# Patient Record
Sex: FEMALE | Race: WHITE | NOT HISPANIC OR LATINO | Employment: OTHER | RURAL
[De-identification: names, ages, dates, MRNs, and addresses within clinical notes are randomized per-mention and may not be internally consistent; named-entity substitution may affect disease eponyms.]

---

## 2017-01-10 ENCOUNTER — TELEPHONE (OUTPATIENT)
Dept: FAMILY MEDICINE CLINIC | Facility: CLINIC | Age: 72
End: 2017-01-10

## 2017-01-10 NOTE — TELEPHONE ENCOUNTER
----- Message from Conrado Tinoco MD sent at 10/14/2016 10:42 AM CDT -----  6/12m on sugar lags    ----- Message -----     From: Gini Rincon     Sent: 10/14/2016   9:53 AM       To: Conrado Tinoco MD

## 2017-01-16 ENCOUNTER — TELEPHONE (OUTPATIENT)
Dept: FAMILY MEDICINE CLINIC | Facility: CLINIC | Age: 72
End: 2017-01-16

## 2017-01-16 DIAGNOSIS — C50.512 MALIGNANT NEOPLASM OF LOWER-OUTER QUADRANT OF LEFT FEMALE BREAST (HCC): Primary | ICD-10-CM

## 2017-01-16 NOTE — TELEPHONE ENCOUNTER
"Vm \"I was supposed to have an appt with oncology for a check up but they called and canceled it, Im supposed to have my blood checked every 3 months and they said my doctor left. What am I supposed to do?\"  "

## 2017-01-18 LAB
ALBUMIN SERPL-MCNC: 4.4 G/DL (ref 3.5–5)
ALBUMIN/GLOB SERPL: 1.7 G/DL (ref 1.1–2.5)
ALP SERPL-CCNC: 121 U/L (ref 24–120)
ALT SERPL-CCNC: 30 U/L (ref 0–54)
AST SERPL-CCNC: 35 U/L (ref 7–45)
BASOPHILS # BLD AUTO: 0.02 10*3/MM3 (ref 0–0.2)
BASOPHILS NFR BLD AUTO: 0.2 % (ref 0–2)
BILIRUB SERPL-MCNC: 0.8 MG/DL (ref 0.1–1)
BUN SERPL-MCNC: 12 MG/DL (ref 5–21)
BUN/CREAT SERPL: 22.6 (ref 7–25)
CALCIUM SERPL-MCNC: 10 MG/DL (ref 8.4–10.4)
CANCER AG27-29 SERPL-ACNC: 35.2 U/ML (ref 0–38.6)
CEA SERPL-MCNC: 3.68 NG/ML (ref 0–5)
CHLORIDE SERPL-SCNC: 98 MMOL/L (ref 98–110)
CO2 SERPL-SCNC: 28 MMOL/L (ref 24–31)
CREAT SERPL-MCNC: 0.53 MG/DL (ref 0.5–1.4)
EOSINOPHIL # BLD AUTO: 0.13 10*3/MM3 (ref 0–0.7)
EOSINOPHIL NFR BLD AUTO: 1.5 % (ref 0–4)
ERYTHROCYTE [DISTWIDTH] IN BLOOD BY AUTOMATED COUNT: 13.2 % (ref 12–15)
GLOBULIN SER CALC-MCNC: 2.6 GM/DL
GLUCOSE SERPL-MCNC: 127 MG/DL (ref 70–100)
HCT VFR BLD AUTO: 44.1 % (ref 37–47)
HGB BLD-MCNC: 14.2 G/DL (ref 12–16)
IMM GRANULOCYTES # BLD: 0.04 10*3/MM3 (ref 0–0.03)
IMM GRANULOCYTES NFR BLD: 0.5 % (ref 0–5)
LYMPHOCYTES # BLD AUTO: 2.9 10*3/MM3 (ref 0.72–4.86)
LYMPHOCYTES NFR BLD AUTO: 33.1 % (ref 15–45)
MCH RBC QN AUTO: 30.3 PG (ref 28–32)
MCHC RBC AUTO-ENTMCNC: 32.2 G/DL (ref 33–36)
MCV RBC AUTO: 94.2 FL (ref 82–98)
MONOCYTES # BLD AUTO: 0.41 10*3/MM3 (ref 0.19–1.3)
MONOCYTES NFR BLD AUTO: 4.7 % (ref 4–12)
NEUTROPHILS # BLD AUTO: 5.27 10*3/MM3 (ref 1.87–8.4)
NEUTROPHILS NFR BLD AUTO: 60 % (ref 39–78)
PLATELET # BLD AUTO: 165 10*3/MM3 (ref 130–400)
POTASSIUM SERPL-SCNC: 4.2 MMOL/L (ref 3.5–5.3)
PROT SERPL-MCNC: 7 G/DL (ref 6.3–8.7)
RBC # BLD AUTO: 4.68 10*6/MM3 (ref 4.2–5.4)
SODIUM SERPL-SCNC: 140 MMOL/L (ref 135–145)
WBC # BLD AUTO: 8.77 10*3/MM3 (ref 4.8–10.8)

## 2017-02-01 ENCOUNTER — OFFICE VISIT (OUTPATIENT)
Dept: ONCOLOGY | Facility: CLINIC | Age: 72
End: 2017-02-01

## 2017-02-01 VITALS
HEART RATE: 56 BPM | TEMPERATURE: 97.7 F | WEIGHT: 202.8 LBS | DIASTOLIC BLOOD PRESSURE: 84 MMHG | SYSTOLIC BLOOD PRESSURE: 132 MMHG | BODY MASS INDEX: 35.93 KG/M2 | RESPIRATION RATE: 16 BRPM | OXYGEN SATURATION: 96 % | HEIGHT: 63 IN

## 2017-02-01 DIAGNOSIS — N32.81 OVERACTIVE BLADDER: Chronic | ICD-10-CM

## 2017-02-01 DIAGNOSIS — E66.9 OBESITY, UNSPECIFIED OBESITY SEVERITY, UNSPECIFIED OBESITY TYPE: Chronic | ICD-10-CM

## 2017-02-01 DIAGNOSIS — F17.201 TOBACCO USE DISORDER, SEVERE, IN SUSTAINED REMISSION: ICD-10-CM

## 2017-02-01 DIAGNOSIS — C50.512 MALIGNANT NEOPLASM OF LOWER-OUTER QUADRANT OF LEFT FEMALE BREAST (HCC): Primary | ICD-10-CM

## 2017-02-01 PROCEDURE — 99214 OFFICE O/P EST MOD 30 MIN: CPT | Performed by: INTERNAL MEDICINE

## 2017-02-01 RX ORDER — SODIUM CHLORIDE 0.9 % (FLUSH) 0.9 %
10 SYRINGE (ML) INJECTION AS NEEDED
Status: DISCONTINUED | OUTPATIENT
Start: 2017-02-01 | End: 2017-02-01 | Stop reason: HOSPADM

## 2017-02-01 RX ORDER — SODIUM CHLORIDE 0.9 % (FLUSH) 0.9 %
10 SYRINGE (ML) INJECTION AS NEEDED
Status: CANCELLED | OUTPATIENT
Start: 2017-02-01

## 2017-02-01 RX ADMIN — Medication 10 ML: at 09:45

## 2017-02-01 NOTE — PROGRESS NOTES
"[facility]  HEMATOLOGY & ONCOLOGY        Subjective Doing well but clearly worried about a recurrence of her breast cancer.  Rather, she does not, apparently, want to have her Port-A-Cath removed.  Am not sure why but I told her she did be to have it flushed more often than every \"2 months\".  She smoked for 30+ years, has not had a CAT scan, and her  continues to smoke!    VISIT DIAGNOSIS: Breast cancer and difficult bladder control.  The alteration in her Ditropan XL will spoil her control significantly.  I offered here a  referral but she wants to wait.  Encounter Diagnoses   Name Primary?   • Malignant neoplasm of lower-outer quadrant of left female breast Yes   • Tobacco use disorder, severe, in sustained remission    • Obesity, unspecified obesity severity, unspecified obesity type    • Overactive bladder        REASON FOR VISIT:   No chief complaint on file.       HEMATOLOGY / ONCOLOGY HISTORY:    No history exists.       Cancer Staging Information:  No matching staging information was found for the patient.      INTERVAL HISTORY  Patient ID: Ira Sanchez is a 71 y.o. year old female is here today for follow-up.      Past Medical History:   Past Medical History   Diagnosis Date   • Bradycardia    • Breast cancer    • Diabetes mellitus    • Urinary incontinence      Past Surgical History:   Past Surgical History   Procedure Laterality Date   • Knee surgery     • Appendectomy     • Throat surgery     • Dilatation and curettage     • Mastectomy Bilateral    • Breast surgery     • Cataract extraction Bilateral    • Nipple tattoo  04/12/2016     Social History:   Social History     Social History   • Marital status:      Spouse name: Horace   • Number of children: 0   • Years of education: 12.5     Occupational History   • retired      [employer]     Social History Main Topics   • Smoking status: Former Smoker     Packs/day: 2.00     Years: 37.00     Types: " Cigarettes     Start date: 2/20/1963     Quit date: 9/29/2000   • Smokeless tobacco: Never Used   • Alcohol use No   • Drug use: No   • Sexual activity: Defer     Other Topics Concern   • Not on file     Social History Narrative     Family History:   Family History   Problem Relation Age of Onset   • Hypertension Mother    • Heart failure Mother    • Cancer Father      colon   • Cancer Maternal Aunt      breast   • No Known Problems Maternal Grandmother    • No Known Problems Maternal Grandfather    • No Known Problems Paternal Grandmother    • No Known Problems Paternal Grandfather    • Cancer Maternal Aunt      breast   • Cancer Maternal Aunt      breast       Review of Systems   HENT: Positive for postnasal drip.    Eyes: Positive for pain, discharge and redness.   Genitourinary: Positive for frequency and urgency.   Neurological: Positive for numbness.        Performance Status:  Symptomatic; fully ambulatory    Medications:    Current Outpatient Prescriptions   Medication Sig Dispense Refill   • anastrozole (ARIMIDEX) 1 MG tablet Take 1 mg by mouth Daily.     • Calcium Carb-Cholecalciferol (CALCIUM-VITAMIN D) 600-400 MG-UNIT tablet Take 1 tablet by mouth 2 (Two) Times a Day.     • cycloSPORINE (RESTASIS) 0.05 % ophthalmic emulsion 1 drop Every 12 (Twelve) Hours.     • diclofenac (VOLTAREN) 1 % gel gel Apply 4 g topically 4 (Four) Times a Day As Needed (arthritis pain). 100 g 1   • diphenhydrAMINE (BENADRYL) 25 mg capsule Take 25 mg by mouth Daily As Needed.     • ibuprofen (ADVIL,MOTRIN) 200 MG tablet Take 200 mg by mouth.     • oxybutynin XL (DITROPAN-XL) 5 MG 24 hr tablet Take 5 mg by mouth Daily.     • thiamine (VITAMINE B-1) 100 MG tablet Take 100 mg by mouth Daily.       Current Facility-Administered Medications   Medication Dose Route Frequency Provider Last Rate Last Dose   • heparin flush (porcine) 100 UNIT/ML injection 500 Units  500 Units Intravenous PRN Edmund Malin MD   500 Units at 02/01/17 0946  "  • sodium chloride 0.9 % flush 10 mL  10 mL Intravenous PRN Edmund Malin MD   10 mL at 02/01/17 0945       ALLERGIES:    Allergies   Allergen Reactions   • Benzonatate Hives and Itching       Objective      Vitals:    02/01/17 0938   BP: 132/84   Pulse: 56   Resp: 16   Temp: 97.7 °F (36.5 °C)   TempSrc: Tympanic   SpO2: 96%   Weight: 202 lb 12.8 oz (92 kg)   Height: 63\" (160 cm)     Visit Vitals   • /84   • Pulse 56   • Temp 97.7 °F (36.5 °C) (Tympanic)   • Resp 16   • Ht 63\" (160 cm)   • Wt 202 lb 12.8 oz (92 kg)   • LMP  (LMP Unknown)   • SpO2 96%   • BMI 35.92 kg/m2       Current Status 2/1/2017   ECOG score 0         General Appearance:    Alert, cooperative, no distress, appears stated age   Head:    Normocephalic, without obvious abnormality, atraumatic   Eyes:    PERRL, conjunctiva/corneas clear, EOM's intact, fundi     benign, both eyes   Ears:    Normal TM's and external ear canals, both ears   Nose:   Nares normal, septum midline, mucosa normal, no drainage     or sinus tenderness   Throat:   Lips, mucosa, and tongue normal; teeth and gums normal   Neck:   Supple, symmetrical, trachea midline, no adenopathy;     thyroid:  no enlargement/tenderness/nodules; no carotid    bruit or JVD   Back:     Symmetric, no curvature, ROM normal, no CVA tenderness   Lungs:     Clear to auscultation bilaterally, respirations unlabored   Chest Wall:    No tenderness or deformity    Heart:    Regular rate and rhythm, S1 and S2 normal, no murmur, rub    or gallop   Abdomen:     Soft, non-tender, bowel sounds active all four quadrants,     no masses, no organomegaly   Extremities:   Extremities normal, atraumatic, no cyanosis or edema   Pulses:   2+ and symmetric all extremities   Skin:   Skin color, texture, turgor normal, no rashes or lesions   Lymph nodes:   Cervical, supraclavicular, and axillary nodes normal   Neurologic:   CNII-XII intact, normal strength, sensation and reflexes     throughout       RECENT " LABS:    No results found for: AMPHETSCREEN, BARBITSCNUR, LABBENZSCN, COCAINEUR, OSMOLALITY, PCPUR, THCSCNINP, LABMETHSCN     Results for orders placed or performed in visit on 01/16/17   Comprehensive Metabolic Panel   Result Value Ref Range    Glucose 127 (H) 70 - 100 mg/dL    BUN 12 5 - 21 mg/dL    Creatinine 0.53 0.50 - 1.40 mg/dL    eGFR Non African Am 113 >60 mL/min/1.73    eGFR African Am 137 >60 mL/min/1.73    BUN/Creatinine Ratio 22.6 7.0 - 25.0    Sodium 140 135 - 145 mmol/L    Potassium 4.2 3.5 - 5.3 mmol/L    Chloride 98 98 - 110 mmol/L    Total CO2 28.0 24.0 - 31.0 mmol/L    Calcium 10.0 8.4 - 10.4 mg/dL    Total Protein 7.0 6.3 - 8.7 g/dL    Albumin 4.40 3.50 - 5.00 g/dL    Globulin 2.6 gm/dL    A/G Ratio 1.7 1.1 - 2.5 g/dL    Total Bilirubin 0.8 0.1 - 1.0 mg/dL    Alkaline Phosphatase 121 (H) 24 - 120 U/L    AST (SGOT) 35 7 - 45 U/L    ALT (SGPT) 30 0 - 54 U/L   Cancer Antigen 27.29   Result Value Ref Range    CA 27.29 35.2 0.0 - 38.6 U/mL   CEA   Result Value Ref Range    CEA 3.68 0.00 - 5.00 ng/mL   CBC & AUTO Differential   Result Value Ref Range    WBC 8.77 4.80 - 10.80 10*3/mm3    RBC 4.68 4.20 - 5.40 10*6/mm3    Hemoglobin 14.2 12.0 - 16.0 g/dL    Hematocrit 44.1 37.0 - 47.0 %    MCV 94.2 82.0 - 98.0 fL    MCH 30.3 28.0 - 32.0 pg    MCHC 32.2 (L) 33.0 - 36.0 g/dL    RDW 13.2 12.0 - 15.0 %    Platelets 165 130 - 400 10*3/mm3    Neutrophil Rel % 60.0 39.0 - 78.0 %    Lymphocyte Rel % 33.1 15.0 - 45.0 %    Monocyte Rel % 4.7 4.0 - 12.0 %    Eosinophil Rel % 1.5 0.0 - 4.0 %    Basophil Rel % 0.2 0.0 - 2.0 %    Neutrophils Absolute 5.27 1.87 - 8.40 10*3/mm3    Lymphocytes Absolute 2.90 0.72 - 4.86 10*3/mm3    Monocytes Absolute 0.41 0.19 - 1.30 10*3/mm3    Eosinophils Absolute 0.13 0.00 - 0.70 10*3/mm3    Basophils Absolute 0.02 0.00 - 0.20 10*3/mm3    Immature Granulocyte Rel % 0.5 0.0 - 5.0 %    Immature Grans Absolute 0.04 (H) 0.00 - 0.03 10*3/mm3           RADIOLOGY:  No results found.       Assessment/Plan     Patient Active Problem List   Diagnosis   • Obesity   • Breast cancer   • Overactive bladder   • Diabetes type 2, controlled   • Hypertension   • Osteopenia   • Neuropathy   • Osteoarthritis of multiple joints   • Malignant neoplasm of lower-outer quadrant of left female breast   • Family history of malignant neoplasm of breast   • Malignant neoplasm of upper-outer quadrant of female breast   • History of bilateral mastectomy        Diagnoses and all orders for this visit:    Malignant neoplasm of lower-outer quadrant of left female breast  -     ONC Nursing Communication  -     sodium chloride 0.9 % flush 10 mL; Infuse 10 mL into a venous catheter As Needed for line care.  -     heparin flush (porcine) 100 UNIT/ML injection 500 Units; Infuse 5 mL into a venous catheter As Needed for line care.    Tobacco use disorder, severe, in sustained remission  -     CT Chest Low Dose Wo; Future    Obesity, unspecified obesity severity, unspecified obesity type    Overactive bladder    Other orders  -     sodium chloride 0.9 % flush 10 mL; Infuse 10 mL into a venous catheter As Needed for line care.  -     heparin flush (porcine) 100 UNIT/ML injection 500 Units; Infuse 5 mL into a venous catheter As Needed for line care.       Generally doing quite well with multiple medical problems which I reassured her about to some extent.          Moy Lynch MD    2/1/2017    11:05 AM

## 2017-02-06 ENCOUNTER — HOSPITAL ENCOUNTER (OUTPATIENT)
Dept: CT IMAGING | Age: 72
Discharge: HOME | End: 2017-02-06
Payer: MEDICARE

## 2017-02-06 DIAGNOSIS — F17.201 TOBACCO USE DISORDER, SEVERE, IN EARLY REMISSION: ICD-10-CM

## 2017-02-06 PROCEDURE — G0297 LDCT FOR LUNG CA SCREEN: HCPCS

## 2017-02-08 ENCOUNTER — TELEPHONE (OUTPATIENT)
Dept: FAMILY MEDICINE CLINIC | Facility: CLINIC | Age: 72
End: 2017-02-08

## 2017-02-09 NOTE — TELEPHONE ENCOUNTER
"Notified pt and stated \"i don't think i can have it again in a year because i will be 72?\" advised pt to gurpreet it on her calender and discuss it again with her doctors at that time, pt stated understanding  "

## 2017-03-15 ENCOUNTER — CLINICAL SUPPORT (OUTPATIENT)
Dept: ONCOLOGY | Facility: CLINIC | Age: 72
End: 2017-03-15

## 2017-03-15 DIAGNOSIS — C50.512 MALIGNANT NEOPLASM OF LOWER-OUTER QUADRANT OF LEFT FEMALE BREAST (HCC): Primary | ICD-10-CM

## 2017-03-15 RX ORDER — SODIUM CHLORIDE 0.9 % (FLUSH) 0.9 %
10 SYRINGE (ML) INJECTION AS NEEDED
Status: DISCONTINUED | OUTPATIENT
Start: 2017-03-15 | End: 2017-03-15 | Stop reason: HOSPADM

## 2017-03-15 RX ORDER — SODIUM CHLORIDE 0.9 % (FLUSH) 0.9 %
10 SYRINGE (ML) INJECTION AS NEEDED
Status: CANCELLED | OUTPATIENT
Start: 2017-03-15

## 2017-03-15 RX ADMIN — Medication 10 ML: at 14:33

## 2017-03-30 NOTE — PROGRESS NOTES
I have reviewed the notes, assessments, and/or procedures performed, I concur with her/his documentation of Ira Sanchez.

## 2017-04-03 RX ORDER — ANASTROZOLE 1 MG/1
1 TABLET ORAL DAILY
Qty: 90 TABLET | Refills: 3 | Status: SHIPPED | OUTPATIENT
Start: 2017-04-03 | End: 2018-02-05 | Stop reason: SDUPTHER

## 2017-04-28 ENCOUNTER — OFFICE VISIT (OUTPATIENT)
Dept: SURGERY | Age: 72
End: 2017-04-28
Payer: MEDICARE

## 2017-04-28 VITALS
SYSTOLIC BLOOD PRESSURE: 132 MMHG | HEIGHT: 63 IN | TEMPERATURE: 98.6 F | BODY MASS INDEX: 36.32 KG/M2 | DIASTOLIC BLOOD PRESSURE: 74 MMHG | WEIGHT: 205 LBS

## 2017-04-28 DIAGNOSIS — Z85.3 PERSONAL HISTORY OF MALIGNANT NEOPLASM OF BREAST: ICD-10-CM

## 2017-04-28 DIAGNOSIS — Z90.13 S/P BILATERAL MASTECTOMY: Primary | ICD-10-CM

## 2017-04-28 PROCEDURE — 99212 OFFICE O/P EST SF 10 MIN: CPT | Performed by: PHYSICIAN ASSISTANT

## 2017-04-28 RX ORDER — CALCIUM CARBONATE/VITAMIN D3 600 MG-10
1 TABLET ORAL
COMMUNITY

## 2017-04-28 RX ORDER — ANASTROZOLE 1 MG/1
1 TABLET ORAL
COMMUNITY
Start: 2017-04-03 | End: 2017-04-28

## 2017-05-09 ENCOUNTER — LAB (OUTPATIENT)
Dept: ONCOLOGY | Facility: CLINIC | Age: 72
End: 2017-05-09

## 2017-05-09 ENCOUNTER — INFUSION (OUTPATIENT)
Dept: ONCOLOGY | Facility: CLINIC | Age: 72
End: 2017-05-09

## 2017-05-09 DIAGNOSIS — C50.512 MALIGNANT NEOPLASM OF LOWER-OUTER QUADRANT OF LEFT FEMALE BREAST (HCC): ICD-10-CM

## 2017-05-09 DIAGNOSIS — C50.512 MALIGNANT NEOPLASM OF LOWER-OUTER QUADRANT OF LEFT FEMALE BREAST (HCC): Primary | ICD-10-CM

## 2017-05-09 LAB
ALBUMIN SERPL-MCNC: 4 G/DL (ref 3.5–5)
ALBUMIN/GLOB SERPL: 1.3 G/DL
ALP SERPL-CCNC: 109 U/L (ref 38–126)
ALT SERPL W P-5'-P-CCNC: 31 U/L (ref 9–52)
ANION GAP SERPL CALCULATED.3IONS-SCNC: 9 MMOL/L
AST SERPL-CCNC: 53 U/L (ref 5–40)
AUTO MIXED CELLS #: 0.5 10*3/UL (ref 0.1–1.5)
AUTO MIXED CELLS %: 6.7 % (ref 0.2–15.1)
BILIRUB SERPL-MCNC: 0.8 MG/DL (ref 0.2–1.3)
BUN BLD-MCNC: 15 MG/DL (ref 7–26)
BUN/CREAT SERPL: 30 (ref 7–25)
CALCIUM SPEC-SCNC: 9.3 MG/DL (ref 8.4–10.2)
CHLORIDE SERPL-SCNC: 103 MMOL/L (ref 98–107)
CO2 SERPL-SCNC: 28 MMOL/L (ref 22–30)
CREAT BLD-MCNC: 0.5 MG/DL (ref 0.7–1.4)
ERYTHROCYTE [DISTWIDTH] IN BLOOD BY AUTOMATED COUNT: 13.5 % (ref 11.5–14.5)
GFR SERPL CREATININE-BSD FRML MDRD: 121 ML/MIN/1.73
GLOBULIN UR ELPH-MCNC: 3 GM/DL
GLUCOSE BLD-MCNC: 217 MG/DL (ref 75–110)
HCT VFR BLD AUTO: 44.8 % (ref 37–47)
HGB BLD-MCNC: 14.3 G/DL (ref 12–16)
LYMPHOCYTES # BLD AUTO: 2.1 10*3/MM3 (ref 0.8–7)
LYMPHOCYTES NFR BLD AUTO: 31.1 % (ref 10–58.5)
MCH RBC QN AUTO: 31 PG (ref 27–31)
MCHC RBC AUTO-ENTMCNC: 31.9 G/DL (ref 33–37)
MCV RBC AUTO: 97.1 FL (ref 81–99)
NEUTROPHILS # BLD AUTO: 4.2 10*3/MM3 (ref 2–7.8)
NEUTROPHILS NFR BLD AUTO: 62.2 % (ref 37–92)
PLATELET # BLD AUTO: 158 10*3/MM3 (ref 130–400)
PMV BLD AUTO: 10.5 FL (ref 6–12)
POTASSIUM BLD-SCNC: 4.2 MMOL/L (ref 3.6–5)
PROT SERPL-MCNC: 7 G/DL (ref 6.3–8.2)
RBC # BLD AUTO: 4.61 10*6/MM3 (ref 4.2–5.4)
SODIUM BLD-SCNC: 140 MMOL/L (ref 137–145)
WBC NRBC COR # BLD: 6.8 10*3/MM3 (ref 4.8–10.8)

## 2017-05-09 PROCEDURE — 80053 COMPREHEN METABOLIC PANEL: CPT | Performed by: INTERNAL MEDICINE

## 2017-05-09 PROCEDURE — 85025 COMPLETE CBC W/AUTO DIFF WBC: CPT | Performed by: INTERNAL MEDICINE

## 2017-05-09 PROCEDURE — 36415 COLL VENOUS BLD VENIPUNCTURE: CPT | Performed by: INTERNAL MEDICINE

## 2017-05-09 RX ORDER — SODIUM CHLORIDE 0.9 % (FLUSH) 0.9 %
10 SYRINGE (ML) INJECTION AS NEEDED
Status: DISCONTINUED | OUTPATIENT
Start: 2017-05-09 | End: 2017-05-09 | Stop reason: HOSPADM

## 2017-05-09 RX ORDER — SODIUM CHLORIDE 0.9 % (FLUSH) 0.9 %
10 SYRINGE (ML) INJECTION AS NEEDED
Status: CANCELLED | OUTPATIENT
Start: 2017-05-09

## 2017-05-09 RX ADMIN — Medication 10 ML: at 08:56

## 2017-05-10 LAB
CANCER AG27-29 SERPL-ACNC: 25.3 U/ML (ref 0–38.6)
CEA SERPL-MCNC: 3.87 NG/ML (ref 0–5)

## 2017-05-12 RX ORDER — OXYBUTYNIN CHLORIDE 5 MG/1
TABLET ORAL
Qty: 180 TABLET | Refills: 1 | Status: SHIPPED | OUTPATIENT
Start: 2017-05-12 | End: 2017-11-06 | Stop reason: SDUPTHER

## 2017-05-14 PROBLEM — Z00.00 WELLNESS EXAMINATION: Status: ACTIVE | Noted: 2017-05-14

## 2017-05-16 ENCOUNTER — OFFICE VISIT (OUTPATIENT)
Dept: FAMILY MEDICINE CLINIC | Facility: CLINIC | Age: 72
End: 2017-05-16

## 2017-05-16 VITALS
HEIGHT: 63 IN | TEMPERATURE: 98.1 F | SYSTOLIC BLOOD PRESSURE: 134 MMHG | BODY MASS INDEX: 36.86 KG/M2 | WEIGHT: 208 LBS | HEART RATE: 72 BPM | DIASTOLIC BLOOD PRESSURE: 76 MMHG | RESPIRATION RATE: 20 BRPM | OXYGEN SATURATION: 94 %

## 2017-05-16 DIAGNOSIS — E11.9 CONTROLLED TYPE 2 DIABETES MELLITUS WITHOUT COMPLICATION, WITHOUT LONG-TERM CURRENT USE OF INSULIN (HCC): Chronic | ICD-10-CM

## 2017-05-16 DIAGNOSIS — C50.919 MALIGNANT NEOPLASM OF FEMALE BREAST, UNSPECIFIED LATERALITY, UNSPECIFIED SITE OF BREAST: Primary | Chronic | ICD-10-CM

## 2017-05-16 DIAGNOSIS — G62.9 NEUROPATHY: ICD-10-CM

## 2017-05-16 PROCEDURE — 99213 OFFICE O/P EST LOW 20 MIN: CPT | Performed by: FAMILY MEDICINE

## 2017-06-20 DIAGNOSIS — C50.919 MALIGNANT NEOPLASM OF OTHER SPECIFIED SITES OF FEMALE BREAST: Primary | ICD-10-CM

## 2017-06-21 ENCOUNTER — OFFICE VISIT (OUTPATIENT)
Dept: ONCOLOGY | Facility: CLINIC | Age: 72
End: 2017-06-21

## 2017-06-21 ENCOUNTER — LAB (OUTPATIENT)
Dept: ONCOLOGY | Facility: CLINIC | Age: 72
End: 2017-06-21

## 2017-06-21 VITALS
DIASTOLIC BLOOD PRESSURE: 78 MMHG | OXYGEN SATURATION: 96 % | HEART RATE: 68 BPM | RESPIRATION RATE: 18 BRPM | HEIGHT: 63 IN | WEIGHT: 203.1 LBS | SYSTOLIC BLOOD PRESSURE: 128 MMHG | BODY MASS INDEX: 35.98 KG/M2 | TEMPERATURE: 97.8 F

## 2017-06-21 DIAGNOSIS — C50.919 MALIGNANT NEOPLASM OF OTHER SPECIFIED SITES OF FEMALE BREAST: Primary | ICD-10-CM

## 2017-06-21 DIAGNOSIS — C50.411 MALIGNANT NEOPLASM OF UPPER-OUTER QUADRANT OF RIGHT FEMALE BREAST (HCC): Primary | ICD-10-CM

## 2017-06-21 DIAGNOSIS — C50.919 MALIGNANT NEOPLASM OF OTHER SPECIFIED SITES OF FEMALE BREAST: ICD-10-CM

## 2017-06-21 DIAGNOSIS — C50.919 MALIGNANT NEOPLASM OF FEMALE BREAST, UNSPECIFIED LATERALITY, UNSPECIFIED SITE OF BREAST: Primary | ICD-10-CM

## 2017-06-21 LAB
ALBUMIN SERPL-MCNC: 4.2 G/DL (ref 3.5–5)
ALBUMIN/GLOB SERPL: 1.4 G/DL
ALP SERPL-CCNC: 116 U/L (ref 38–126)
ALT SERPL W P-5'-P-CCNC: 29 U/L (ref 9–52)
ANION GAP SERPL CALCULATED.3IONS-SCNC: 9 MMOL/L
AST SERPL-CCNC: 50 U/L (ref 5–40)
AUTO MIXED CELLS #: 0.5 10*3/UL (ref 0.1–1.5)
AUTO MIXED CELLS %: 6.4 % (ref 0.2–15.1)
BILIRUB SERPL-MCNC: 0.9 MG/DL (ref 0.2–1.3)
BUN BLD-MCNC: 17 MG/DL (ref 7–26)
BUN/CREAT SERPL: 34 (ref 7–25)
CALCIUM SPEC-SCNC: 9.6 MG/DL (ref 8.4–10.2)
CHLORIDE SERPL-SCNC: 106 MMOL/L (ref 98–107)
CO2 SERPL-SCNC: 28 MMOL/L (ref 22–30)
CREAT BLD-MCNC: 0.5 MG/DL (ref 0.7–1.4)
ERYTHROCYTE [DISTWIDTH] IN BLOOD BY AUTOMATED COUNT: 13.4 % (ref 11.5–14.5)
GFR SERPL CREATININE-BSD FRML MDRD: 121 ML/MIN/1.73
GLOBULIN UR ELPH-MCNC: 3 GM/DL
GLUCOSE BLD-MCNC: 111 MG/DL (ref 75–110)
HCT VFR BLD AUTO: 44.3 % (ref 37–47)
HGB BLD-MCNC: 14.1 G/DL (ref 12–16)
LYMPHOCYTES # BLD AUTO: 2.5 10*3/MM3 (ref 0.8–7)
LYMPHOCYTES NFR BLD AUTO: 34.4 % (ref 10–58.5)
MCH RBC QN AUTO: 31 PG (ref 27–31)
MCHC RBC AUTO-ENTMCNC: 31.8 G/DL (ref 33–37)
MCV RBC AUTO: 97.3 FL (ref 81–99)
NEUTROPHILS # BLD AUTO: 4.4 10*3/MM3 (ref 2–7.8)
NEUTROPHILS NFR BLD AUTO: 59.2 % (ref 37–92)
PLATELET # BLD AUTO: 154 10*3/MM3 (ref 130–400)
PMV BLD AUTO: 10.2 FL (ref 6–12)
POTASSIUM BLD-SCNC: 4.4 MMOL/L (ref 3.6–5)
PROT SERPL-MCNC: 7.2 G/DL (ref 6.3–8.2)
RBC # BLD AUTO: 4.55 10*6/MM3 (ref 4.2–5.4)
SODIUM BLD-SCNC: 143 MMOL/L (ref 137–145)
WBC NRBC COR # BLD: 7.4 10*3/MM3 (ref 4.8–10.8)

## 2017-06-21 PROCEDURE — 99213 OFFICE O/P EST LOW 20 MIN: CPT | Performed by: INTERNAL MEDICINE

## 2017-06-21 PROCEDURE — 85025 COMPLETE CBC W/AUTO DIFF WBC: CPT | Performed by: INTERNAL MEDICINE

## 2017-06-21 PROCEDURE — 80053 COMPREHEN METABOLIC PANEL: CPT | Performed by: INTERNAL MEDICINE

## 2017-06-21 PROCEDURE — 36415 COLL VENOUS BLD VENIPUNCTURE: CPT | Performed by: INTERNAL MEDICINE

## 2017-06-21 NOTE — PROGRESS NOTES
[facility]  HEMATOLOGY & ONCOLOGY        Subjective     VISIT DIAGNOSIS: No diagnosis found.    REASON FOR VISIT:     Chief Complaint   Patient presents with   • Follow-up     Breast Ca f/u:  She is here for f/u visit today        HEMATOLOGY / ONCOLOGY HISTORY:   Oncology/Hematology History    Ms. Sanchez is a pleasant 71 year old  female with the diagnosis of stage IIA left breast carcinoma, ER/DC and HER2  positive 3+.  She is status post bilateral mastectomies and has completed 5 cycles of adjuvant systemic chemotherapy with taxotere, carboplatin and  Herceptin. She is presently receiving Herceptin 6 mg/kg every 3 weeks for a total dosing of 1 year with last dose planned for 12/09/2015.  Ms. Sanchez is also presently taking hormonal manipulation therapy with Arimidex 1 mg p.o. daily and this will be taken for 10 years, this  was initiated on 06/03/2015.  She had completed Herceptin. She had completed breast reconstruction process. We will give her a refill on her Arimidex. She is due for  her bone density in September 2016.        Breast cancer    9/29/2016 Initial Diagnosis    Breast cancer     [No treatment plan]  Cancer Staging Information:  No matching staging information was found for the patient.      INTERVAL HISTORY  Patient ID: Ira Sanchez is a 72 y.o. year old female         Review of Systems         Medications:    Current Outpatient Prescriptions   Medication Sig Dispense Refill   • anastrozole (ARIMIDEX) 1 MG tablet Take 1 tablet by mouth Daily. 90 tablet 3   • Calcium Carb-Cholecalciferol (CALCIUM-VITAMIN D) 600-400 MG-UNIT tablet Take 1 tablet by mouth 2 (Two) Times a Day.     • cycloSPORINE (RESTASIS) 0.05 % ophthalmic emulsion 1 drop Every 12 (Twelve) Hours.     • diclofenac (VOLTAREN) 1 % gel gel Apply 4 g topically 4 (Four) Times a Day As Needed (arthritis pain). 100 g 1   • diphenhydrAMINE (BENADRYL) 25 mg capsule Take 25 mg by mouth Daily As Needed.     • ibuprofen  (ADVIL,MOTRIN) 200 MG tablet Take 200 mg by mouth.     • oxybutynin (DITROPAN) 5 MG tablet TAKE 1 TABLET BY MOUTH TWICE DAILY 180 tablet 1   • thiamine (VITAMINE B-1) 100 MG tablet Take 100 mg by mouth Daily.       No current facility-administered medications for this visit.        ALLERGIES:    Allergies   Allergen Reactions   • Benzonatate Hives and Itching       Objective      @VITALS    Current Status 6/21/2017   ECOG score 0       General Appearance: Patient is awake, alert, oriented and in no acute distress. Patient is welldeveloped, wellnourished, and appears stated age.  HEENT: Normocephalic. Sclerae clear, conjunctiva pink, extraocular movements intact, pupils, round, reactive to light and  accommodation. Mouth and throat are clear with moist oral mucosa.  NECK: Supple, no jugular venous distention, thyroid not enlarged.  LYMPH: No cervical, supraclavicular, axillary, or inguinal lymphadenopathy.  CHEST: Equal bilateral expansion, AP  diameter normal, resonant percussion note  LUNGS: Good air movement, no rales, rhonchi, rubs or wheezes with auscultation  CARDIO: Regular sinus rhythm, no murmurs, gallops or rubs.  ABDOMEN: Nondistended, soft, No tenderness, no guarding, no rebound, No hepatosplenomegaly. No abdominal masses. Bowel sounds positive. No hernia  GENITALIA: Not examined.  BREASTS: Not examined.  MUSKEL: No joint swelling, decreased motion, or inflammation  EXTREMS: No edema, clubbing, cyanosis, No varicose veins.  NEURO: Grossly nonfocal, Gait is coordinated and smooth, Cognition is preserved.  SKIN: No rashes, no ecchymoses, no petechia.  PSYCH: Oriented to time, place and person. Memory is preserved. Mood and affect appear normal      RECENT LABS:  Lab on 06/21/2017   Component Date Value Ref Range Status   • Glucose 06/21/2017 111* 75 - 110 mg/dL Final   • BUN 06/21/2017 17  7 - 26 mg/dL Final   • Creatinine 06/21/2017 0.50* 0.70 - 1.40 mg/dL Final   • Sodium 06/21/2017 143  137 - 145 mmol/L  Final   • Potassium 06/21/2017 4.4  3.6 - 5.0 mmol/L Final   • Chloride 06/21/2017 106  98 - 107 mmol/L Final   • CO2 06/21/2017 28.0  22.0 - 30.0 mmol/L Final   • Calcium 06/21/2017 9.6  8.4 - 10.2 mg/dL Final   • Total Protein 06/21/2017 7.2  6.3 - 8.2 g/dL Final   • Albumin 06/21/2017 4.20  3.50 - 5.00 g/dL Final   • ALT (SGPT) 06/21/2017 29  9 - 52 U/L Final   • AST (SGOT) 06/21/2017 50* 5 - 40 U/L Final   • Alkaline Phosphatase 06/21/2017 116  38 - 126 U/L Final   • Total Bilirubin 06/21/2017 0.9  0.2 - 1.3 mg/dL Final   • eGFR Non African Amer 06/21/2017 121  >60 mL/min/1.73 Final   • Globulin 06/21/2017 3.0  gm/dL Final   • A/G Ratio 06/21/2017 1.4  g/dL Final   • BUN/Creatinine Ratio 06/21/2017 34.0* 7.0 - 25.0 Final   • Anion Gap 06/21/2017 9.0  mmol/L Final   • WBC 06/21/2017 7.40  4.80 - 10.80 10*3/mm3 Final   • RBC 06/21/2017 4.55  4.20 - 5.40 10*6/mm3 Final   • Hemoglobin 06/21/2017 14.1  12.0 - 16.0 g/dL Final   • Hematocrit 06/21/2017 44.3  37.0 - 47.0 % Final   • MCV 06/21/2017 97.3  81.0 - 99.0 fL Final   • MCH 06/21/2017 31.0  27.0 - 31.0 pg Final   • MCHC 06/21/2017 31.8* 33.0 - 37.0 g/dL Final   • RDW 06/21/2017 13.4  11.5 - 14.5 % Final   • MPV 06/21/2017 10.2  6.0 - 12.0 fL Final   • Platelets 06/21/2017 154  130 - 400 10*3/mm3 Final   • Neutrophil % 06/21/2017 59.2  37.0 - 92.0 % Final   • Lymphocyte % 06/21/2017 34.4  10.0 - 58.5 % Final   • Auto Mixed Cells % 06/21/2017 6.4  0.2 - 15.1 % Final   • Neutrophils, Absolute 06/21/2017 4.40  2.00 - 7.80 10*3/mm3 Final   • Lymphocytes, Absolute 06/21/2017 2.50  0.80 - 7.00 10*3/mm3 Final   • Auto Mixed Cells # 06/21/2017 0.50  0.10 - 1.50 10*3/uL Final       RADIOLOGY:  No results found.         Assessment/Plan      Stage IIa left-sided breast cancer and prophylactic mastectomy on the right side and bilateral breast reconstruction.  All this in 2014.  She is status post chemotherapy adjuvantly and currently on Arimidex 1 mg daily to complete a  total of 5 years.  She is previously been a smoker and more recently has undergone spiral screening CT scan shows no evidence of any cancer at least in the chest and the chest wall area.  AST is slightly up at 50 and I believe this is because of Martinez.            Edmund Malin MD    6/21/2017    3:29 PM

## 2017-06-22 LAB
CHOLEST SERPL-MCNC: 168 MG/DL (ref 100–199)
HBA1C MFR BLD: 6.7 % (ref 4.8–5.6)
HDLC SERPL-MCNC: 40 MG/DL
LDLC SERPL CALC-MCNC: 98 MG/DL (ref 0–99)
TRIGL SERPL-MCNC: 150 MG/DL (ref 0–149)
VLDLC SERPL CALC-MCNC: 30 MG/DL (ref 5–40)

## 2017-07-11 ENCOUNTER — OFFICE VISIT (OUTPATIENT)
Dept: FAMILY MEDICINE CLINIC | Facility: CLINIC | Age: 72
End: 2017-07-11

## 2017-07-11 VITALS
SYSTOLIC BLOOD PRESSURE: 134 MMHG | BODY MASS INDEX: 36.68 KG/M2 | DIASTOLIC BLOOD PRESSURE: 82 MMHG | HEIGHT: 63 IN | TEMPERATURE: 98.4 F | RESPIRATION RATE: 18 BRPM | WEIGHT: 207 LBS | OXYGEN SATURATION: 94 % | HEART RATE: 64 BPM

## 2017-07-11 DIAGNOSIS — E11.9 CONTROLLED TYPE 2 DIABETES MELLITUS WITHOUT COMPLICATION, WITHOUT LONG-TERM CURRENT USE OF INSULIN (HCC): Primary | Chronic | ICD-10-CM

## 2017-07-11 DIAGNOSIS — M25.519 SHOULDER PAIN, UNSPECIFIED CHRONICITY, UNSPECIFIED LATERALITY: ICD-10-CM

## 2017-07-11 PROCEDURE — 99213 OFFICE O/P EST LOW 20 MIN: CPT | Performed by: FAMILY MEDICINE

## 2017-07-11 RX ORDER — METHYLPREDNISOLONE 4 MG/1
TABLET ORAL
Qty: 21 TABLET | Refills: 0 | Status: SHIPPED | OUTPATIENT
Start: 2017-07-11 | End: 2017-07-28

## 2017-07-11 NOTE — PROGRESS NOTES
Subjective   Ira Sanchez is a 72 y.o. female presenting with chief complaint of:   Chief Complaint   Patient presents with   • Obesity   • Diabetes   • Peripheral Neuropathy   • Shoulder Pain   • Back Pain     low       History of Present Illness :  Alone.    Has multiple chronic problems; interval appointment.  One of these problems is DM2.   Has an acute issue today:R shoulder pain last 1-2 weeks. DM2: This has been present for years/over a year.  It is chronic.  It is controlled.  Home accuchecks rarely run.  No hypoglycemia manifest as syncope/near syncope or diaphoretic/sweating episodes.  A1c below if available..      Other chronic problem/s to consider:   Degenerative Joint Disease:  This has been present for years/over a year.  It is chronic.  It causes on/off pain and joint stiffness .  There is no joint swelling.  Obesity: This has been present for years/over a year.  It is chronic.     It is stable; there has been no recent major change in weight, or dieting   Hx Breast cancer: On/going f/u for this chronic problem.   Neuropathy: Either from chemo/or DM; improved with NSAID.    Acute issue:  2m pain R shoulder anteriorly/lateral with rest/worse with activity.  No injury/overuse for sure.  No neck pain.     The following portions of the patient's history were reviewed and updated as appropriate: allergies, current medications, past family history, past medical history, past social history, past surgical history and problem list.  TCC migrated if needed/reviewed.      Current Outpatient Prescriptions:   •  anastrozole (ARIMIDEX) 1 MG tablet, Take 1 tablet by mouth Daily., Disp: 90 tablet, Rfl: 3  •  Calcium Carb-Cholecalciferol (CALCIUM-VITAMIN D) 600-400 MG-UNIT tablet, Take 1 tablet by mouth 2 (Two) Times a Day., Disp: , Rfl:   •  cycloSPORINE (RESTASIS) 0.05 % ophthalmic emulsion, 1 drop Every 12 (Twelve) Hours., Disp: , Rfl:   •  diclofenac (VOLTAREN) 1 % gel gel, Apply 4 g topically 4 (Four) Times a  Day As Needed (arthritis pain)., Disp: 100 g, Rfl: 1  •  diphenhydrAMINE (BENADRYL) 25 mg capsule, Take 25 mg by mouth Daily As Needed., Disp: , Rfl:   •  ibuprofen (ADVIL,MOTRIN) 200 MG tablet, Take 200 mg by mouth., Disp: , Rfl:   •  oxybutynin (DITROPAN) 5 MG tablet, TAKE 1 TABLET BY MOUTH TWICE DAILY, Disp: 180 tablet, Rfl: 1  •  thiamine (VITAMINE B-1) 100 MG tablet, Take 100 mg by mouth Daily., Disp: , Rfl:     Allergies   Allergen Reactions   • Benzonatate Hives and Itching       Review of Systems  GENERAL:  Active/slower with limits, speed, samni for age and size. Sleep is ok; apnea denied.  ENDO:  No syncope, near or diaphoretic sweaty spells.  BS Ok: without download noted.  HEENT: No head injury same/rare headache,  No vision change, No hearing loss.  Ears without pain/drainage.  No sore throat.  No nasal/sinus congestion/drainage. No epistaxis.  CHEST: No chest wall tenderness or mass. Nocough,  without wheeze, SOB; no hemoptysis.  CV: No chest pain, palpatations, ankle edema.  GI: No heartburn, dysphagia.  No abdominal pain, diarrhea, constipation, rectal bleeding, or melena.    :  Voids without dysuria, or incontience to completion.  ORTHO: No painful/swollen joints but various on /off sore.  No change occ sore neck or back.  No acute neck or back pain without recent injury.   NEURO: No dizziness, weakness of extremities.  LE numbness/parethesias.   PSYCH: No memory loss.  Mood good; mildly with loss of mother anxious, depressed but/and not suicidal.  Tolerated stress.     Results for orders placed or performed in visit on 06/21/17   Lipid Panel   Result Value Ref Range    Total Cholesterol 168 100 - 199 mg/dL    Triglycerides 150 (H) 0 - 149 mg/dL    HDL Cholesterol 40 >39 mg/dL    VLDL Cholesterol 30 5 - 40 mg/dL    LDL Cholesterol  98 0 - 99 mg/dL   Hemoglobin A1c   Result Value Ref Range    Hemoglobin A1C 6.7 (H) 4.8 - 5.6 %       Lab Results   Component Value Date    HGBA1C 6.7 (H) 06/21/2017     "HGBA1C 6.40 10/11/2016       Lab Results   Component Value Date     06/21/2017     05/09/2017     01/17/2017    K 4.4 06/21/2017    K 4.2 05/09/2017    K 4.2 01/17/2017     06/21/2017     05/09/2017    CL 98 01/17/2017    CO2 28.0 06/21/2017    CO2 28.0 05/09/2017    CO2 28.0 01/17/2017    GLUCOSE 111 (H) 06/21/2017    GLUCOSE 217 (H) 05/09/2017    GLUCOSE 121 (H) 10/11/2016    BUN 17 06/21/2017    BUN 15 05/09/2017    BUN 12 01/17/2017    CREATININE 0.50 (L) 06/21/2017    CREATININE 0.50 (L) 05/09/2017    CREATININE 0.53 01/17/2017    CALCIUM 9.6 06/21/2017    CALCIUM 9.3 05/09/2017    CALCIUM 10.0 01/17/2017       No results found for: PSA     Lab Results:  CBC:    Lab Results - Last 18 Months  Lab Units 06/21/17  1455 05/09/17  0859 01/17/17  0725 10/11/16  0923   WBC 10*3/mm3 7.40 6.80 8.77 9.20   HEMATOCRIT % 44.3 44.8 44.1 47.5*     CMP:    Lab Results - Last 18 Months  Lab Units 06/21/17  1455 05/09/17  0859 01/17/17  0725 10/11/16  0923   SODIUM mmol/L 143 140 140 144   CHLORIDE mmol/L 106 103 98 103   CO2 mmol/L 28.0 28.0  --  28.0   TOTAL CO2, ARTERIAL mmol/L  --   --  28.0  --    BUN mg/dL 17 15 12 15   CREATININE mg/dL 0.50* 0.50* 0.53 0.60*   EGFR IF NONAFRICN AM mL/min/1.73 121 121 113 98   EGFR IF AFRICN AM mL/min/1.73  --   --  137  --    CALCIUM mg/dL 9.6 9.3 10.0 10.3*     THYROID:No results for input(s): TSH, T3FREE, FREET4 in the last 80115 hours.    Invalid input(s): T3, T4  A1C:    Lab Results - Last 18 Months  Lab Units 06/21/17  1456 10/11/16  0923   HEMOGLOBIN A1C % 6.7* 6.40       Objective   /82 (BP Location: Left arm, Patient Position: Sitting, Cuff Size: Adult)  Pulse 64  Temp 98.4 °F (36.9 °C) (Oral)   Resp 18  Ht 63\" (160 cm)  Wt 207 lb (93.9 kg)  LMP  (LMP Unknown)  SpO2 94%  Breastfeeding? No  BMI 36.67 kg/m2    Physical Exam  GENERAL:  Well nourished/developed in no acute distress. Obese   SKIN: Turgor excellent, without wound, rash, " lesion.  HEENT: Normal cephalic without trauma.  Pupils equal round reactive to light. Extraocular motions full without nystagmus.   External canals nonobstructive nontender without reddness. Tymphatic membranes rani with irwin structures intact.   Oral cavity without growths, exudates, and moist.  Posterior pharnyx without mass, obstruction, reddness.  No thyroidmegaly, mass, tenderness, lymphadenopathy and supple.  CV: Regular rhythm.  No murmur, gallop,  edema. Posterior pulses intact.  No carotid bruits.  CHEST: No chest wall tenderness or mass.   LUNGS: Symmetric motion with clear to auscultation.  No dullness to percussion  ABD: Soft, nontender without mass.   ORTHO: Symmetric extremities without swelling/point tenderness.  Full gross range of motion.  Symmetric R/L shoulder.  ROM both full.  Tender over lateral R shoulder/deltoid to touch/abduction > 90 deg. No crepititus.   NEURO: CN 2-12 grossly intact.  Symmetric facies. 1/4 x bicep knee equal reflexes.  UE/LE   3/5 strength throughout.  Nonfocal use extremities. Speech clear.  Reduced light touch with monofilament, vibratory sensation with tuning fork; equal toes/distal feet.    PSYCH: Oriented x 3.  Pleasant calm, well kept.  Purposeful/directed conservation with intact short/long gross memory.     Assessment/Plan     1. Shoulder pain, unspecified chronicity, unspecified laterality  Usual differential; rotator cuff likely  - XR Shoulder 2+ View Right  - MethylPREDNISolone (MEDROL, BETO,) 4 MG tablet; Take as directed on package instructions.  Dispense: 21 tablet; Refill: 0    2. Controlled type 2 diabetes mellitus without complication, without long-term current use of insulin    Rx: reviewed.  Any other changes above and:   LAB: reviewed/above.  Orders above and:   Wrap-up/other instructions: discussed as applicable  Regular cardio exercise something everyone should consider and try to do.  Normal weight a goal for everyone.  Healthy diet helpful for  weight management, illness prevention.  If over 50-screening exams include men PSA/rectal exam, women mammograms, and  everyone colonoscopy screening for colon cancer.   Patient Instructions   While on the steroid; dont take ibuprofen/motrin, alleve/naprosyn, and like Rx; even your voltaren gel rubbed on.     May give the steroid a day or two before getting the xray; if no better 1-2 days use the request for an xray of your shoulder        Follow up: Return for 11.28.17 as planned.

## 2017-07-11 NOTE — PATIENT INSTRUCTIONS
While on the steroid; dont take ibuprofen/motrin, alleve/naprosyn, and like Rx; even your voltaren gel rubbed on.     May give the steroid a day or two before getting the xray; if no better 1-2 days use the request for an xray of your shoulder

## 2017-07-21 ENCOUNTER — HOSPITAL ENCOUNTER (OUTPATIENT)
Dept: HOSPITAL 58 - RAD | Age: 72
Discharge: HOME | End: 2017-07-21
Attending: FAMILY MEDICINE

## 2017-07-21 DIAGNOSIS — M25.511: Primary | ICD-10-CM

## 2017-07-21 NOTE — DI
EXAM:  Right shoulder three view 

  

HISTORY:  Shoulder pain 

  

COMPARISON:  None 

  

FINDINGS:  The bones are normal. The glenohumeral joint and acromioclavicular joint are normal. No f
ocal soft tissue abnormality. There is left-sided chest port terminating in superior vena cava, inco
mpletely imaged 

  

IMPERSSION:  Normal examination right shoulder.

## 2017-07-28 ENCOUNTER — CLINICAL SUPPORT (OUTPATIENT)
Dept: FAMILY MEDICINE CLINIC | Facility: CLINIC | Age: 72
End: 2017-07-28

## 2017-07-28 VITALS
BODY MASS INDEX: 35.79 KG/M2 | HEIGHT: 63 IN | HEART RATE: 60 BPM | SYSTOLIC BLOOD PRESSURE: 130 MMHG | DIASTOLIC BLOOD PRESSURE: 70 MMHG | WEIGHT: 202 LBS | OXYGEN SATURATION: 97 % | TEMPERATURE: 98.2 F | RESPIRATION RATE: 18 BRPM

## 2017-07-28 DIAGNOSIS — M25.519 SHOULDER PAIN, UNSPECIFIED CHRONICITY, UNSPECIFIED LATERALITY: ICD-10-CM

## 2017-07-28 PROCEDURE — 20610 DRAIN/INJ JOINT/BURSA W/O US: CPT | Performed by: FAMILY MEDICINE

## 2017-07-28 PROCEDURE — 99213 OFFICE O/P EST LOW 20 MIN: CPT | Performed by: FAMILY MEDICINE

## 2017-07-28 RX ORDER — TRIAMCINOLONE ACETONIDE 40 MG/ML
40 INJECTION, SUSPENSION INTRA-ARTICULAR; INTRAMUSCULAR ONCE
Status: COMPLETED | OUTPATIENT
Start: 2017-07-28 | End: 2017-07-28

## 2017-07-28 RX ORDER — LIDOCAINE HYDROCHLORIDE 10 MG/ML
1 INJECTION, SOLUTION EPIDURAL; INFILTRATION; INTRACAUDAL; PERINEURAL ONCE
Status: COMPLETED | OUTPATIENT
Start: 2017-07-28 | End: 2017-07-28

## 2017-07-28 RX ADMIN — TRIAMCINOLONE ACETONIDE 40 MG: 40 INJECTION, SUSPENSION INTRA-ARTICULAR; INTRAMUSCULAR at 09:40

## 2017-07-28 RX ADMIN — LIDOCAINE HYDROCHLORIDE 1 ML: 10 INJECTION, SOLUTION EPIDURAL; INFILTRATION; INTRACAUDAL; PERINEURAL at 09:40

## 2017-09-07 DIAGNOSIS — C50.919 MALIGNANT NEOPLASM OF OTHER SPECIFIED SITES OF FEMALE BREAST: Primary | ICD-10-CM

## 2017-09-11 ENCOUNTER — TELEPHONE (OUTPATIENT)
Dept: FAMILY MEDICINE CLINIC | Facility: CLINIC | Age: 72
End: 2017-09-11

## 2017-09-11 DIAGNOSIS — Z12.11 ENCOUNTER FOR SCREENING COLONOSCOPY: Primary | ICD-10-CM

## 2017-09-11 NOTE — TELEPHONE ENCOUNTER
PT DUE FOR COLONOSCOPY IN SEPT.  NEEDS REFERRAL PUT IN FOR ZOYA/JUAN FRANCISCO SO SHE CAN HAVE DONE AT [facility].

## 2017-09-14 ENCOUNTER — LAB (OUTPATIENT)
Dept: LAB | Facility: HOSPITAL | Age: 72
End: 2017-09-14

## 2017-09-14 ENCOUNTER — OFFICE VISIT (OUTPATIENT)
Dept: ONCOLOGY | Facility: CLINIC | Age: 72
End: 2017-09-14

## 2017-09-14 VITALS
BODY MASS INDEX: 35.45 KG/M2 | OXYGEN SATURATION: 96 % | DIASTOLIC BLOOD PRESSURE: 78 MMHG | TEMPERATURE: 97.6 F | SYSTOLIC BLOOD PRESSURE: 128 MMHG | HEART RATE: 84 BPM | WEIGHT: 200.1 LBS | RESPIRATION RATE: 18 BRPM | HEIGHT: 63 IN

## 2017-09-14 DIAGNOSIS — C50.919 MALIGNANT NEOPLASM OF FEMALE BREAST, UNSPECIFIED LATERALITY, UNSPECIFIED SITE OF BREAST: Primary | ICD-10-CM

## 2017-09-14 DIAGNOSIS — C50.212 MALIGNANT NEOPLASM OF UPPER-INNER QUADRANT OF LEFT FEMALE BREAST (HCC): Primary | ICD-10-CM

## 2017-09-14 DIAGNOSIS — C50.512 MALIGNANT NEOPLASM OF LOWER-OUTER QUADRANT OF LEFT FEMALE BREAST (HCC): ICD-10-CM

## 2017-09-14 DIAGNOSIS — C50.919 MALIGNANT NEOPLASM OF OTHER SPECIFIED SITES OF FEMALE BREAST: ICD-10-CM

## 2017-09-14 LAB
ALBUMIN SERPL-MCNC: 4.1 G/DL (ref 3.5–5)
ALBUMIN/GLOB SERPL: 1.5 G/DL (ref 1.1–2.5)
ALP SERPL-CCNC: 88 U/L (ref 24–120)
ALT SERPL W P-5'-P-CCNC: 29 U/L (ref 0–54)
ANION GAP SERPL CALCULATED.3IONS-SCNC: 12 MMOL/L (ref 4–13)
AST SERPL-CCNC: 40 U/L (ref 7–45)
AUTO MIXED CELLS #: 0.4 10*3/MM3 (ref 0.1–2.6)
AUTO MIXED CELLS %: 5.9 % (ref 0.1–24)
BILIRUB SERPL-MCNC: 0.7 MG/DL (ref 0.1–1)
BUN BLD-MCNC: 17 MG/DL (ref 5–21)
BUN/CREAT SERPL: 32.7
CALCIUM SPEC-SCNC: 9.5 MG/DL (ref 8.4–10.4)
CHLORIDE SERPL-SCNC: 103 MMOL/L (ref 98–110)
CO2 SERPL-SCNC: 26 MMOL/L (ref 24–31)
CREAT BLD-MCNC: 0.52 MG/DL (ref 0.5–1.4)
ERYTHROCYTE [DISTWIDTH] IN BLOOD BY AUTOMATED COUNT: 13.1 % (ref 12–15)
GFR SERPL CREATININE-BSD FRML MDRD: 115 ML/MIN/1.73
GLOBULIN UR ELPH-MCNC: 2.8 GM/DL
GLUCOSE BLD-MCNC: 208 MG/DL (ref 70–100)
HCT VFR BLD AUTO: 43.9 % (ref 37–47)
HGB BLD-MCNC: 15 G/DL (ref 12–16)
HOLD SPECIMEN: NORMAL
LYMPHOCYTES # BLD AUTO: 2.1 10*3/MM3 (ref 0.8–7)
LYMPHOCYTES NFR BLD AUTO: 29.4 % (ref 15–45)
MCH RBC QN AUTO: 31.3 PG (ref 28–32)
MCHC RBC AUTO-ENTMCNC: 34.2 G/DL (ref 33–36)
MCV RBC AUTO: 91.6 FL (ref 82–98)
NEUTROPHILS # BLD AUTO: 4.7 10*3/MM3 (ref 1.5–8.3)
NEUTROPHILS NFR BLD AUTO: 64.7 % (ref 39–78)
PLATELET # BLD AUTO: 144 10*3/MM3 (ref 130–400)
PMV BLD AUTO: 9.8 FL (ref 6–12)
POTASSIUM BLD-SCNC: 4.1 MMOL/L (ref 3.5–5.3)
PROT SERPL-MCNC: 6.9 G/DL (ref 6.3–8.7)
RBC # BLD AUTO: 4.79 10*6/MM3 (ref 4.2–5.4)
SODIUM BLD-SCNC: 141 MMOL/L (ref 135–145)
WBC NRBC COR # BLD: 7.2 10*3/MM3 (ref 4.8–10.8)

## 2017-09-14 PROCEDURE — 80053 COMPREHEN METABOLIC PANEL: CPT

## 2017-09-14 PROCEDURE — 36415 COLL VENOUS BLD VENIPUNCTURE: CPT

## 2017-09-14 PROCEDURE — 99214 OFFICE O/P EST MOD 30 MIN: CPT | Performed by: INTERNAL MEDICINE

## 2017-09-14 PROCEDURE — 85025 COMPLETE CBC W/AUTO DIFF WBC: CPT

## 2017-09-14 RX ORDER — SODIUM CHLORIDE 0.9 % (FLUSH) 0.9 %
10 SYRINGE (ML) INJECTION AS NEEDED
Status: CANCELLED | OUTPATIENT
Start: 2017-09-14

## 2017-09-14 RX ORDER — SODIUM CHLORIDE 0.9 % (FLUSH) 0.9 %
10 SYRINGE (ML) INJECTION AS NEEDED
Status: DISCONTINUED | OUTPATIENT
Start: 2017-09-14 | End: 2017-09-14 | Stop reason: HOSPADM

## 2017-09-14 RX ADMIN — Medication 10 ML: at 09:24

## 2017-09-14 NOTE — PROGRESS NOTES
[facility]  HEMATOLOGY & ONCOLOGY        Subjective     VISIT DIAGNOSIS: No diagnosis found.    REASON FOR VISIT:     Chief Complaint   Patient presents with   • Follow-up     Breast Ca f/u:  She is here for f/u visit today        HEMATOLOGY / ONCOLOGY HISTORY:   Oncology/Hematology History    Ms. Sanchez is a pleasant 71 year old  female with the diagnosis of stage IIA left breast carcinoma, ER/WV and HER2  positive 3+.  She is status post bilateral mastectomies and has completed 5 cycles of adjuvant systemic chemotherapy with taxotere, carboplatin and  Herceptin. She is presently receiving Herceptin 6 mg/kg every 3 weeks for a total dosing of 1 year with last dose planned for 12/09/2015.  Ms. Sanchez is also presently taking hormonal manipulation therapy with Arimidex 1 mg p.o. daily and this will be taken for 10 years, this  was initiated on 06/03/2015.  She had completed Herceptin. She had completed breast reconstruction process. We will give her a refill on her Arimidex. She is due for  her bone density in September 2016.        Breast cancer    9/29/2016 Initial Diagnosis     Breast cancer          Cancer Staging Information:  No matching staging information was found for the patient.      INTERVAL HISTORY  Patient ID: Ira Sanchez is a 72 y.o. year old female with hormone positive breast cancer on hormonal manipulation and tolerates Arimidex very well with minimal side effects.  Denies any chest wall lesions, denies double vision, dizziness, nausea or vomiting, unintentional weight loss, night sweats.  She does an active life.    Past Medical History:   Past Medical History:   Diagnosis Date   • Bradycardia    • Breast cancer    • Diabetes mellitus    • Urinary incontinence      Past Surgical History:   Past Surgical History:   Procedure Laterality Date   • APPENDECTOMY     • BREAST SURGERY     • CATARACT EXTRACTION Bilateral    • DILATATION AND CURETTAGE     • KNEE SURGERY     •  MASTECTOMY Bilateral    • NIPPLE TATTOO  04/12/2016   • THROAT SURGERY       Social History:   Social History     Social History   • Marital status:      Spouse name: Horace   • Number of children: 0   • Years of education: 12.5     Occupational History   • retired      [employer]     Social History Main Topics   • Smoking status: Former Smoker     Packs/day: 2.00     Years: 37.00     Types: Cigarettes     Start date: 2/20/1963     Quit date: 9/29/2000   • Smokeless tobacco: Never Used   • Alcohol use No   • Drug use: No   • Sexual activity: Defer     Other Topics Concern   • Not on file     Social History Narrative     Family History:   Family History   Problem Relation Age of Onset   • Hypertension Mother    • Heart failure Mother    • Cancer Father      colon   • Cancer Maternal Aunt      breast   • No Known Problems Maternal Grandmother    • No Known Problems Maternal Grandfather    • No Known Problems Paternal Grandmother    • No Known Problems Paternal Grandfather    • Cancer Maternal Aunt      breast   • Cancer Maternal Aunt      breast       Review of Systems   Constitutional: Negative.    HENT: Negative.    Eyes: Negative.    Respiratory: Negative.    Cardiovascular: Negative.    Gastrointestinal: Negative.    Endocrine: Negative.    Genitourinary: Negative.    Musculoskeletal: Positive for arthralgias. Negative for myalgias.   Allergic/Immunologic: Negative.    Neurological: Negative.    Hematological: Negative.    Psychiatric/Behavioral: Negative.         Performance Status:  Asymptomatic    Medications:    Current Outpatient Prescriptions   Medication Sig Dispense Refill   • anastrozole (ARIMIDEX) 1 MG tablet Take 1 tablet by mouth Daily. 90 tablet 3   • Calcium Carb-Cholecalciferol (CALCIUM-VITAMIN D) 600-400 MG-UNIT tablet Take 1 tablet by mouth 2 (Two) Times a Day.     • cycloSPORINE (RESTASIS) 0.05 % ophthalmic emulsion 1 drop Every 12 (Twelve) Hours.     • diclofenac  "(VOLTAREN) 1 % gel gel Apply 4 g topically 4 (Four) Times a Day As Needed (arthritis pain). 100 g 1   • diphenhydrAMINE (BENADRYL) 25 mg capsule Take 25 mg by mouth Daily As Needed.     • ibuprofen (ADVIL,MOTRIN) 200 MG tablet Take 200 mg by mouth.     • oxybutynin (DITROPAN) 5 MG tablet TAKE 1 TABLET BY MOUTH TWICE DAILY 180 tablet 1   • thiamine (VITAMINE B-1) 100 MG tablet Take 100 mg by mouth Daily.       No current facility-administered medications for this visit.        ALLERGIES:    Allergies   Allergen Reactions   • Benzonatate Hives and Itching       Objective      Vitals:    09/14/17 0859   BP: 128/78   Pulse: 84   Resp: 18   Temp: 97.6 °F (36.4 °C)   TempSrc: Tympanic   SpO2: 96%   Weight: 200 lb 1.6 oz (90.8 kg)   Height: 63\" (160 cm)         Current Status 9/14/2017   ECOG score 0         Physical Exam    General Appearance: Patient is awake, alert, oriented and in no acute distress. Patient is welldeveloped, wellnourished, and appears stated age.  HEENT: Normocephalic. Sclerae clear, conjunctiva pink, extraocular movements intact, pupils, round, reactive to light and  accommodation. Mouth and throat are clear with moist oral mucosa.  NECK: Supple, no jugular venous distention, thyroid not enlarged.  LYMPH: No cervical, supraclavicular, axillary, or inguinal lymphadenopathy.  CHEST: Equal bilateral expansion, AP  diameter normal, resonant percussion note  LUNGS: Good air movement, no rales, rhonchi, rubs or wheezes with auscultation  CARDIO: Regular sinus rhythm, no murmurs, gallops or rubs.  ABDOMEN: Nondistended, soft, No tenderness, no guarding, no rebound, No hepatosplenomegaly. No abdominal masses. Bowel sounds positive. No hernia  GENITALIA: Not examined.  BREASTS: Bilateral mastectomy with reconstruction.  Well-healed.  No skin lesions.  MUSKEL: No joint swelling, decreased motion, or inflammation  EXTREMS: No edema, clubbing, cyanosis, No varicose veins.  NEURO: Grossly nonfocal, Gait is " coordinated and smooth, Cognition is preserved.  SKIN: No rashes, no ecchymoses, no petechia.  PSYCH: Oriented to time, place and person. Memory is preserved. Mood and affect appear normal  RECENT LABS:  No visits with results within 7 Day(s) from this visit.  Latest known visit with results is:    Orders Only on 06/21/2017   Component Date Value Ref Range Status   • Total Cholesterol 06/21/2017 168  100 - 199 mg/dL Final   • Triglycerides 06/21/2017 150* 0 - 149 mg/dL Final   • HDL Cholesterol 06/21/2017 40  >39 mg/dL Final   • VLDL Cholesterol 06/21/2017 30  5 - 40 mg/dL Final   • LDL Cholesterol  06/21/2017 98  0 - 99 mg/dL Final   • Hemoglobin A1C 06/21/2017 6.7* 4.8 - 5.6 % Final    Comment:          Pre-diabetes: 5.7 - 6.4           Diabetes: >6.4           Glycemic control for adults with diabetes: <7.0         RADIOLOGY:  No results found.         Assessment/Plan   72-year-old female diagnosed Stage IIa left-sided breast cancer, status post prophylactic mastectomy on the right side and bilateral breast reconstruction.  All this in 2014.  She is status post chemotherapy adjuvantly and currently on Arimidex 1 mg daily to complete a total of 5 years.        Patient Active Problem List   Diagnosis   • Obesity   • Breast cancer   • Overactive bladder   • Diabetes type 2, controlled   • Osteopenia   • Neuropathy-chemo/better   • Osteoarthritis of multiple joints   • Malignant neoplasm of lower-outer quadrant of left female breast   • Family history of malignant neoplasm of breast   • Malignant neoplasm of upper-outer quadrant of female breast   • History of bilateral mastectomy   • Wellness examination-done   • Pain in shoulder          1.ER positive breast cancer: Status post bilateral mastectomy.  No evidence of disease recurrence on physical exam or review of labs done today.  Per NCCN guidelines no indication for tumor markers.    2. Osteoarthritis: Follow with primary care.    3. Osteopenia: Continue  carcinoma vitamin D next DEXA scan next year.    4.  Health maintenance: She is previously been a smoker and more recently has undergone spiral screening CT scan shows no evidence of any cancer at least in the chest and the chest wall area.           Shanda Torres MD    9/14/2017    9:03 AM

## 2017-11-06 RX ORDER — OXYBUTYNIN CHLORIDE 5 MG/1
TABLET ORAL
Qty: 180 TABLET | Refills: 1 | Status: SHIPPED | OUTPATIENT
Start: 2017-11-06 | End: 2018-05-04 | Stop reason: SDUPTHER

## 2017-11-09 ENCOUNTER — CLINICAL SUPPORT (OUTPATIENT)
Dept: ONCOLOGY | Facility: CLINIC | Age: 72
End: 2017-11-09

## 2017-11-09 DIAGNOSIS — C50.512 MALIGNANT NEOPLASM OF LOWER-OUTER QUADRANT OF LEFT FEMALE BREAST, UNSPECIFIED ESTROGEN RECEPTOR STATUS (HCC): Primary | ICD-10-CM

## 2017-11-09 PROCEDURE — 96523 IRRIG DRUG DELIVERY DEVICE: CPT | Performed by: INTERNAL MEDICINE

## 2017-11-09 RX ORDER — SODIUM CHLORIDE 0.9 % (FLUSH) 0.9 %
10 SYRINGE (ML) INJECTION AS NEEDED
Status: DISCONTINUED | OUTPATIENT
Start: 2017-11-09 | End: 2017-11-09 | Stop reason: HOSPADM

## 2017-11-09 RX ORDER — SODIUM CHLORIDE 0.9 % (FLUSH) 0.9 %
10 SYRINGE (ML) INJECTION AS NEEDED
Status: CANCELLED | OUTPATIENT
Start: 2017-11-09

## 2017-11-09 RX ADMIN — Medication 10 ML: at 10:08

## 2017-11-10 ENCOUNTER — RESULTS ENCOUNTER (OUTPATIENT)
Dept: FAMILY MEDICINE CLINIC | Facility: CLINIC | Age: 72
End: 2017-11-10

## 2017-11-10 DIAGNOSIS — E11.9 CONTROLLED TYPE 2 DIABETES MELLITUS WITHOUT COMPLICATION, WITHOUT LONG-TERM CURRENT USE OF INSULIN (HCC): Chronic | ICD-10-CM

## 2017-11-22 ENCOUNTER — TELEPHONE (OUTPATIENT)
Dept: FAMILY MEDICINE CLINIC | Facility: CLINIC | Age: 72
End: 2017-11-22

## 2017-11-22 DIAGNOSIS — M25.519 SHOULDER PAIN, UNSPECIFIED CHRONICITY, UNSPECIFIED LATERALITY: ICD-10-CM

## 2017-11-22 RX ORDER — AZITHROMYCIN 250 MG/1
TABLET, FILM COATED ORAL
Qty: 6 TABLET | Refills: 0 | Status: SHIPPED | OUTPATIENT
Start: 2017-11-22 | End: 2017-11-28

## 2017-11-22 RX ORDER — METHYLPREDNISOLONE 4 MG/1
TABLET ORAL
Qty: 21 TABLET | Refills: 0 | Status: SHIPPED | OUTPATIENT
Start: 2017-11-22 | End: 2017-11-28

## 2017-11-22 NOTE — TELEPHONE ENCOUNTER
"Vm \"i got the flu and pneumonia shot a week ago last Friday and I have been feeling achy on Sunday, cough, diarrhea, Monday went to bed, yesterday. Went to [facility] and they told me to take Dayquill/nyquill got it today\"    PHONE CALL-NURSE       Ira Sanchez  1945    1. Your problem is, nasal drainage productive green and yellow cough, sore throat    2. Onset first sxs Sunday    3. Fever, no    4. If yes, taken or felt feverish    5. How high has it gone    6. Coming down now    7. Wants to be seen    8. Prefers per phone ,if something is needed    9. Would come in if provider requests    10. Rx/Allergy list correct yes    11. Cough , productive with green and yellow sputum    12. SOB, no    13. Wheezing, no    14. Nausea/Vomiting, no    15. Diarrhea, had diarrhea Sunday and some Monday, no more on Tuesday or today    16. If female, any chance of pregnancy, no    17. If no chance of pregnancy/why    18. Breast feeding, no    19. Anything else they want us to know,  dayquill and nyquill today    20. Pharmacy , [place]  "

## 2017-11-28 ENCOUNTER — OFFICE VISIT (OUTPATIENT)
Dept: FAMILY MEDICINE CLINIC | Facility: CLINIC | Age: 72
End: 2017-11-28

## 2017-11-28 VITALS
HEART RATE: 80 BPM | TEMPERATURE: 97.7 F | SYSTOLIC BLOOD PRESSURE: 126 MMHG | DIASTOLIC BLOOD PRESSURE: 78 MMHG | RESPIRATION RATE: 18 BRPM | WEIGHT: 196 LBS | HEIGHT: 63 IN | OXYGEN SATURATION: 95 % | BODY MASS INDEX: 34.73 KG/M2

## 2017-11-28 DIAGNOSIS — M15.9 OSTEOARTHRITIS OF MULTIPLE JOINTS, UNSPECIFIED OSTEOARTHRITIS TYPE: Chronic | ICD-10-CM

## 2017-11-28 DIAGNOSIS — R05.9 COUGH: ICD-10-CM

## 2017-11-28 DIAGNOSIS — J31.0 RHINITIS, UNSPECIFIED CHRONICITY, UNSPECIFIED TYPE: ICD-10-CM

## 2017-11-28 DIAGNOSIS — M54.9 CHRONIC BACK PAIN, UNSPECIFIED BACK LOCATION, UNSPECIFIED BACK PAIN LATERALITY: ICD-10-CM

## 2017-11-28 DIAGNOSIS — M25.519 SHOULDER PAIN, UNSPECIFIED CHRONICITY, UNSPECIFIED LATERALITY: ICD-10-CM

## 2017-11-28 DIAGNOSIS — G89.29 CHRONIC BACK PAIN, UNSPECIFIED BACK LOCATION, UNSPECIFIED BACK PAIN LATERALITY: ICD-10-CM

## 2017-11-28 DIAGNOSIS — E11.9 CONTROLLED TYPE 2 DIABETES MELLITUS WITHOUT COMPLICATION, WITHOUT LONG-TERM CURRENT USE OF INSULIN (HCC): Primary | Chronic | ICD-10-CM

## 2017-11-28 PROBLEM — Z01.89 LABORATORY TEST: Status: ACTIVE | Noted: 2017-11-28

## 2017-11-28 PROCEDURE — 99213 OFFICE O/P EST LOW 20 MIN: CPT | Performed by: FAMILY MEDICINE

## 2017-11-28 RX ORDER — METHYLPREDNISOLONE 4 MG/1
TABLET ORAL
Qty: 1 EACH | Refills: 0 | Status: SHIPPED | OUTPATIENT
Start: 2017-11-28 | End: 2018-03-26

## 2017-11-28 NOTE — PROGRESS NOTES
Subjective   Ira Sanchez is a 72 y.o. female presenting with chief complaint of:   Chief Complaint   Patient presents with   • Back Pain   • URI   • Shoulder Pain   • Diabetes   • Osteoarthritis       History of Present Illness :  unaccompanied.  Here for primarily a/an Chronic issue today.   Has has  multiple chronic problems to consider, is here for an interval appointment; one is controlled DM2.     Other chronic problem/s to consider:   Chronic back pain:  The pain has been present for years/over a year.   It is chronic.   The pain is stable.  1-3 /10 pain most of time and usually is worse after activities.  The pain limits activities.  The problem has been evaulated and the patient has no desire for more testing  Degenerative Joint Disease/arthritis:  This has been present for years/over a year.  It is a chronic condition.  It causes on/off pain and joint stiffness.  There is no joint swelling;  mostly back.   DM2: This has been present for years/over a year.  It is chronic.  It is generally controlled.  Home accuchecks run fasting 100, random 140.   No hypoglycemia manifest as syncope/near syncope or diaphoretic/sweating episodes.  A1c below if available.  Diet loose; less sweets.   Obesity/overweight: This has been present for years/over a year.  It is chronic.     It is stable; there has been no recent major change in weight, or dieting  Associated illnesses noted that are affected by this illness.   History Breast cancer:  Sees  oncology.     Has an/another acute issue today: R shoulder pain on/off without known injury.  Worse with abduction beyond 90 deg. .    The following portions of the patient's history were reviewed and updated as appropriate: allergies, current medications, past family history, past medical history, past social history, past surgical history and problem list.  Records acquired and reviewed; TCC migrated.      Current Outpatient Prescriptions:   •  anastrozole (ARIMIDEX) 1 MG  tablet, Take 1 tablet by mouth Daily., Disp: 90 tablet, Rfl: 3  •  Calcium Carb-Cholecalciferol (CALCIUM-VITAMIN D) 600-400 MG-UNIT tablet, Take 1 tablet by mouth 2 (Two) Times a Day., Disp: , Rfl:   •  cycloSPORINE (RESTASIS) 0.05 % ophthalmic emulsion, 1 drop Every 12 (Twelve) Hours., Disp: , Rfl:   •  diphenhydrAMINE (BENADRYL) 25 mg capsule, Take 25 mg by mouth Daily As Needed., Disp: , Rfl:   •  ibuprofen (ADVIL,MOTRIN) 200 MG tablet, Take 200 mg by mouth., Disp: , Rfl:   •  oxybutynin (DITROPAN) 5 MG tablet, TAKE 1 TABLET BY MOUTH TWICE DAILY, Disp: 180 tablet, Rfl: 1  •  thiamine (VITAMINE B-1) 100 MG tablet, Take 100 mg by mouth Daily., Disp: , Rfl:   •  MethylPREDNISolone (MEDROL, BETO,) 4 MG tablet, Take as directed on package instructions., Disp: 1 each, Rfl: 0    Allergies   Allergen Reactions   • Benzonatate Hives and Itching       Review of Systems  GENERAL:  Active/slower with limits, speed, stamina for age and pains back/shoulder (avoids actions that are sore). Sleep is ok; denies apnea. No fever now.  ENDO:  No syncope, near or diaphoretic sweaty spells.  BS Ok: without download noted.  HEENT: No head injury  headache,  No vision change, No significant hearing loss.  Ears without pain/drainage.  No sore throat.  No significant nasal/sinus congestion/drainage. No epistaxis.  CHEST: No chest wall tenderness or mass. No significant cough, without wheeze.  No SOB; no hemoptysis.  CV: No chest pain, palpitations, occ trace LE ankle edema.  GI: No heartburn, dysphagia.  No abdominal pain, diarrhea, constipation.  No rectal bleeding, or melena.    Colonoscopy+div/KIM/Tamie/9.30.14/3y-planned     :  Voids without dysuria, or incontinence to completion.  ORTHO: No painful/swollen joints but various on /off sore.  No change infrequent sore neck or back.  No acute neck or back pain without recent injury.   NEURO: No dizziness, weakness of extremities.  No numbness/paresthesias.   PSYCH: No memory loss.  Mood  good; not that anxious, depressed but/and not suicidal.  Tries to tolerate stress .     Results for orders placed or performed in visit on 09/14/17   Comprehensive Metabolic Panel   Result Value Ref Range    Glucose 208 (H) 70 - 100 mg/dL    BUN 17 5 - 21 mg/dL    Creatinine 0.52 0.50 - 1.40 mg/dL    Sodium 141 135 - 145 mmol/L    Potassium 4.1 3.5 - 5.3 mmol/L    Chloride 103 98 - 110 mmol/L    CO2 26.0 24.0 - 31.0 mmol/L    Calcium 9.5 8.4 - 10.4 mg/dL    Total Protein 6.9 6.3 - 8.7 g/dL    Albumin 4.10 3.50 - 5.00 g/dL    ALT (SGPT) 29 0 - 54 U/L    AST (SGOT) 40 7 - 45 U/L    Alkaline Phosphatase 88 24 - 120 U/L    Total Bilirubin 0.7 0.1 - 1.0 mg/dL    eGFR Non African Amer 115 >60 mL/min/1.73    Globulin 2.8 gm/dL    A/G Ratio 1.5 1.1 - 2.5 g/dL    BUN/Creatinine Ratio 32.7 (H)      Anion Gap 12.0 4.0 - 13.0 mmol/L   CBC Auto Differential   Result Value Ref Range    WBC 7.20 4.80 - 10.80 10*3/mm3    RBC 4.79 4.20 - 5.40 10*6/mm3    Hemoglobin 15.0 12.0 - 16.0 g/dL    Hematocrit 43.9 37.0 - 47.0 %    MCV 91.6 82.0 - 98.0 fL    MCH 31.3 28.0 - 32.0 pg    MCHC 34.2 33.0 - 36.0 g/dL    RDW 13.1 12.0 - 15.0 %    MPV 9.8 6.0 - 12.0 fL    Platelets 144 130 - 400 10*3/mm3    Neutrophil % 64.7 39.0 - 78.0 %    Lymphocyte % 29.4 15.0 - 45.0 %    Auto Mixed Cells % 5.9 0.1 - 24.0 %    Neutrophils, Absolute 4.70 1.50 - 8.30 10*3/mm3    Lymphocytes, Absolute 2.10 0.80 - 7.00 10*3/mm3    Auto Mixed Cells # 0.40 0.10 - 2.60 10*3/mm3   Gold Top - SST   Result Value Ref Range    Extra Tube Hold for add-ons.        Lab Results   Component Value Date    HGBA1C 6.7 (H) 06/21/2017    HGBA1C 6.40 10/11/2016       Lab Results   Component Value Date     09/14/2017     06/21/2017     05/09/2017    K 4.1 09/14/2017    K 4.4 06/21/2017    K 4.2 05/09/2017     09/14/2017     06/21/2017     05/09/2017    CO2 26.0 09/14/2017    CO2 28.0 06/21/2017    CO2 28.0 05/09/2017    GLUCOSE 208 (H) 09/14/2017     "GLUCOSE 111 (H) 06/21/2017    GLUCOSE 217 (H) 05/09/2017    BUN 17 09/14/2017    BUN 17 06/21/2017    BUN 15 05/09/2017    CREATININE 0.52 09/14/2017    CREATININE 0.50 (L) 06/21/2017    CREATININE 0.50 (L) 05/09/2017    CALCIUM 9.5 09/14/2017    CALCIUM 9.6 06/21/2017    CALCIUM 9.3 05/09/2017       No results found for: PSA     Lab Results:  CBC:    Lab Results - Last 18 Months  Lab Units 09/14/17  0853 06/21/17  1455 05/09/17  0859 01/17/17  0725 10/11/16  0923   WBC 10*3/mm3 7.20 7.40 6.80 8.77 9.20   HEMATOCRIT % 43.9 44.3 44.8 44.1 47.5*     CMP:    Lab Results - Last 18 Months  Lab Units 09/14/17  0853 06/21/17  1455 05/09/17  0859 01/17/17  0725 10/11/16  0923   SODIUM mmol/L 141 143 140 140 144   CHLORIDE mmol/L 103 106 103 98 103   CO2 mmol/L 26.0 28.0 28.0  --  28.0   TOTAL CO2, ARTERIAL mmol/L  --   --   --  28.0  --    BUN mg/dL 17 17 15 12 15   CREATININE mg/dL 0.52 0.50* 0.50* 0.53 0.60*   EGFR IF NONAFRICN AM mL/min/1.73 115 121 121 113 98   EGFR IF AFRICN AM mL/min/1.73  --   --   --  137  --    CALCIUM mg/dL 9.5 9.6 9.3 10.0 10.3*     THYROID:No results for input(s): TSH, T3FREE, FREET4 in the last 98827 hours.    Invalid input(s): T3, T4  A1C:    Lab Results - Last 18 Months  Lab Units 06/21/17  1456 10/11/16  0923   HEMOGLOBIN A1C % 6.7* 6.40       Objective   /78  Pulse 80  Temp 97.7 °F (36.5 °C) (Oral)   Resp 18  Ht 63\" (160 cm)  Wt 196 lb (88.9 kg)  LMP  (LMP Unknown)  SpO2 95%  Breastfeeding? No  BMI 34.72 kg/m2    Physical Exam  GENERAL:  Well nourished/developed in no acute distress. Obese   SKIN: Turgor excellent, without wound, rash, lesion.  HEENT: Normal cephalic without trauma.  Pupils equal round reactive to light. Extraocular motions full without nystagmus.   External canals nonobstructive nontender without reddness. Tymphatic membranes rani with irwin structures intact.   Oral cavity without growths, exudates, and moist.  Posterior pharynx without mass, obstruction, " redness.  No thyromegaly, mass, tenderness, lymphadenopathy and supple.  CV: Regular rhythm.  No murmur, gallop,  edema. Posterior pulses intact.  No carotid bruits.  CHEST: No chest wall tenderness or mass.   LUNGS: Symmetric motion with clear to auscultation.  No dullness to percussion  ABD: Soft, nontender without mass.   ORTHO: Symmetric extremities without swelling/point tenderness.  Full gross range of motion; but R shoulder sore laterally with beyond 90 deg abduction.  NEURO: CN 2-12 grossly intact.  Symmetric facies. 1/4 x bicep knee equal reflexes.  UE/LE   3/5 strength throughout.  Nonfocal use extremities. Speech clear. Intact light touch with monofilament, vibratory sensation with tuning fork; equal toes/distal feet.    PSYCH: Oriented x 3.  Pleasant calm, well kept.  Purposeful/directed conservation with intact short/long gross memory.     Assessment/Plan     1. Controlled type 2 diabetes mellitus without complication, without long-term current use of insulin    2. Osteoarthritis of multiple joints, unspecified osteoarthritis type    3. Shoulder pain, unspecified chronicity, unspecified laterality    4. Chronic back pain, unspecified back location, unspecified back pain laterality    5. Cough    6. Rhinitis, unspecified chronicity, unspecified type        Rx: reviewed/changes:  medrol    LAB: reviewed/orders:   Orders Placed This Encounter   Procedures   • XR Spine Lumbar 2 or 3 View   • Urinalysis With / Culture If Indicated - Urine, Clean Catch     Discussions:   below    Patient Instructions   The labs we want are:  6m CBC, CMP, A1c  12m CBC, CMP, A1c, LIPID, TSH, Vit D    Get your colonoscopy  Get an xray of your back    When using steroids  A. Do not use anti-inflammatories such as Motrin/ibuprofen, Alleve/naprosyn, Mobic and like medications  B. Watch your blood sugar; it will go up.  If it stays around/below 300 you will be briefly ok; let us know if it slowly continues to elevate and  elevate        Follow up: Return for Dr Tinoco-, 6 m;.  Future Appointments  Date Time Provider Department Center   11/29/2017 10:15 AM REBEL Montoya [facility] None   1/11/2018 9:25 AM [facility] NURSE [facility]AD   3/12/2018 2:45 PM  [facility]  [facility] PAD   3/12/2018 3:20 PM Shanda Torres MD [facility] PAD   6/5/2018 10:30 AM Cnorado Tinoco MD [facility] None

## 2017-11-28 NOTE — PATIENT INSTRUCTIONS
The labs we want are:  6m CBC, CMP, A1c  12m CBC, CMP, A1c, LIPID, TSH, Vit D    Get your colonoscopy  Get an xray of your back    When using steroids  A. Do not use anti-inflammatories such as Motrin/ibuprofen, Alleve/naprosyn, Mobic and like medications  B. Watch your blood sugar; it will go up.  If it stays around/below 300 you will be briefly ok; let us know if it slowly continues to elevate and elevate

## 2017-11-29 ENCOUNTER — OFFICE VISIT (OUTPATIENT)
Dept: GASTROENTEROLOGY | Facility: CLINIC | Age: 72
End: 2017-11-29

## 2017-11-29 VITALS
TEMPERATURE: 97.9 F | HEIGHT: 63 IN | SYSTOLIC BLOOD PRESSURE: 170 MMHG | HEART RATE: 63 BPM | DIASTOLIC BLOOD PRESSURE: 90 MMHG | OXYGEN SATURATION: 98 % | BODY MASS INDEX: 34.91 KG/M2 | WEIGHT: 197 LBS

## 2017-11-29 DIAGNOSIS — Z80.0 FAMILY HX OF COLON CANCER: Primary | ICD-10-CM

## 2017-11-29 DIAGNOSIS — Z86.010 HX OF COLONIC POLYP: ICD-10-CM

## 2017-11-29 LAB
APPEARANCE UR: ABNORMAL
BACTERIA #/AREA URNS HPF: ABNORMAL /HPF
BILIRUB UR QL STRIP: NEGATIVE
COLOR UR: YELLOW
CRYSTALS URNS MICRO: ABNORMAL
EPI CELLS #/AREA URNS HPF: ABNORMAL /HPF
GLUCOSE UR QL: NEGATIVE
HGB UR QL STRIP: NEGATIVE
KETONES UR QL STRIP: NEGATIVE
LEUKOCYTE ESTERASE UR QL STRIP: NEGATIVE
MICRO URNS: ABNORMAL
MICRO URNS: ABNORMAL
MUCOUS THREADS URNS QL MICRO: PRESENT /HPF
NITRITE UR QL STRIP: NEGATIVE
PH UR STRIP: 5 [PH] (ref 5–7.5)
PROT UR QL STRIP: NEGATIVE
RBC #/AREA URNS HPF: ABNORMAL /HPF
SP GR UR: >=1.03 (ref 1–1.03)
UNIDENT CRYS URNS QL MICRO: PRESENT /LPF
URINALYSIS REFLEX: ABNORMAL
UROBILINOGEN UR STRIP-MCNC: 0.2 MG/DL (ref 0.2–1)
WBC #/AREA URNS HPF: ABNORMAL /HPF

## 2017-11-29 PROCEDURE — S0260 H&P FOR SURGERY: HCPCS | Performed by: NURSE PRACTITIONER

## 2017-11-29 RX ORDER — POLYETHYLENE GLYCOL 3350, SODIUM CHLORIDE, SODIUM BICARBONATE, POTASSIUM CHLORIDE 420; 11.2; 5.72; 1.48 G/4L; G/4L; G/4L; G/4L
4000 POWDER, FOR SOLUTION ORAL SEE ADMIN INSTRUCTIONS
Qty: 4000 ML | Refills: 0 | Status: SHIPPED | OUTPATIENT
Start: 2017-11-29 | End: 2018-03-26

## 2017-11-29 NOTE — PROGRESS NOTES
[facility]    Primary Physician Conrado Tinoco MD    11/29/2017    Ira Sanchez   1945      Chief Complaint   Patient presents with   • Colonoscopy       Subjective     HPI    Ira Sanchez is a 72 y.o. female who presents as a referral for preventative maintenance. She has no complaints of nausea or vomiting. No change in bowels. No wt loss. No BRBPR. No melena. No abdominal pain.  Last colonoscopy was 9/2014 ,  By dr weinberg , recall rec 3 years .  The patient does  have history of colon polyps. The patient does not  have history of colon cancer.   There is family history of colon cancer involving at age 63.       Past Medical History:   Diagnosis Date   • Bradycardia    • Breast cancer    • Colon polyp    • Diabetes mellitus    • Peripheral neuropathy    • Urinary incontinence        Past Surgical History:   Procedure Laterality Date   • APPENDECTOMY     • BREAST SURGERY     • CATARACT EXTRACTION Bilateral    • DILATATION AND CURETTAGE     • KNEE SURGERY     • MASTECTOMY Bilateral    • NIPPLE TATTOO  04/12/2016   • PORTACATH PLACEMENT     • THROAT SURGERY         Outpatient Prescriptions Marked as Taking for the 11/29/17 encounter (Office Visit) with REBEL Montoya   Medication Sig Dispense Refill   • anastrozole (ARIMIDEX) 1 MG tablet Take 1 tablet by mouth Daily. 90 tablet 3   • Calcium Carb-Cholecalciferol (CALCIUM-VITAMIN D) 600-400 MG-UNIT tablet Take 1 tablet by mouth 2 (Two) Times a Day.     • cycloSPORINE (RESTASIS) 0.05 % ophthalmic emulsion 1 drop Every 12 (Twelve) Hours.     • diphenhydrAMINE (BENADRYL) 25 mg capsule Take 25 mg by mouth Daily As Needed.     • ibuprofen (ADVIL,MOTRIN) 200 MG tablet Take 200 mg by mouth As Needed.     • MethylPREDNISolone (MEDROL, BETO,) 4 MG tablet Take as directed on package instructions. 1 each 0   • oxybutynin (DITROPAN) 5 MG tablet TAKE 1 TABLET BY MOUTH TWICE DAILY 180 tablet 1   • thiamine (VITAMINE B-1) 100 MG tablet  Take 100 mg by mouth Daily.         Allergies   Allergen Reactions   • Benzonatate Hives and Itching       Social History     Social History   • Marital status:      Spouse name: Horace   • Number of children: 0   • Years of education: 12.5     Occupational History   • retired      [employer]     Social History Main Topics   • Smoking status: Former Smoker     Packs/day: 2.00     Years: 37.00     Types: Cigarettes     Start date: 2/20/1963     Quit date: 9/29/2000   • Smokeless tobacco: Never Used   • Alcohol use No   • Drug use: No   • Sexual activity: Defer     Other Topics Concern   • Not on file     Social History Narrative       Family History   Problem Relation Age of Onset   • Hypertension Mother    • Heart failure Mother    • Cancer Father      colon   • Colon cancer Father    • Cancer Maternal Aunt      breast   • No Known Problems Maternal Grandmother    • No Known Problems Maternal Grandfather    • No Known Problems Paternal Grandmother    • No Known Problems Paternal Grandfather    • Cancer Maternal Aunt      breast   • Cancer Maternal Aunt      breast       Review of Systems   Constitutional: Negative for appetite change, chills, fatigue, fever and unexpected weight change.   HENT: Negative for sore throat and trouble swallowing.    Respiratory: Negative for cough, chest tightness, shortness of breath and wheezing.    Cardiovascular: Negative for chest pain and palpitations.   Gastrointestinal: Negative for abdominal distention, abdominal pain, anal bleeding, blood in stool, constipation, diarrhea, nausea, rectal pain and vomiting.        As mentioned in hpi   Genitourinary: Negative for difficulty urinating and hematuria.   Musculoskeletal: Positive for back pain (occasional lower back pain ). Negative for arthralgias.        Chronic shoulder pain    Skin: Negative for color change and rash.   Neurological: Negative for dizziness, syncope, light-headedness and headaches.    Hematological: Negative for adenopathy. Does not bruise/bleed easily.   Psychiatric/Behavioral: Negative for confusion. The patient is not nervous/anxious.        Objective     Vitals:    11/29/17 0945   BP: 170/90   Pulse: 63   Temp: 97.9 °F (36.6 °C)   SpO2: 98%     Last 2 weights    11/29/17 0945   Weight: 197 lb (89.4 kg)     Body mass index is 34.9 kg/(m^2).    Physical Exam   Constitutional: She appears well-developed and well-nourished. No distress.   HENT:   Head: Normocephalic and atraumatic.   Eyes: EOM are normal. No scleral icterus.   Neck: Neck supple. No JVD present.   Cardiovascular: Normal rate, regular rhythm and normal heart sounds.    Pulmonary/Chest: Effort normal and breath sounds normal. No stridor.   Abdominal: Soft. Bowel sounds are normal. She exhibits no distension and no mass. There is no tenderness. There is no rebound and no guarding.   Musculoskeletal: She exhibits no edema.   Neurological: She is alert.   Skin: Skin is warm and dry. No rash noted.   Psychiatric: She has a normal mood and affect. Her behavior is normal.   Vitals reviewed.      Imaging Results (most recent)     None          Assessment/Plan     Ira was seen today for colonoscopy.    Diagnoses and all orders for this visit:    Family hx of colon cancer  -     External Facility Surgical/Procedural Request    Hx of colonic polyp  -     External Facility Surgical/Procedural Request    Other orders  -     polyethylene glycol-electrolytes (NULYTELY WITH FLAVOR PACKS) 420 g solution; Take 4,000 mL by mouth See Admin Instructions.        COLONOSCOPY   All risks, benefits, alternatives, and indications of colonoscopy procedure have been discussed with the patient. Risks to include perforation of the colon requiring possible surgery or colostomy, risk of bleeding from biopsies or removal of colon tissue, possibility of missing a colon polyp or cancer, or adverse drug reaction.  Benefits to include the diagnosis and  management of disease of the colon and rectum. Alternatives to include barium enema, radiographic evaluation, lab testing or no intervention. Pt verbalizes understanding and agrees.          REBEL Gomez Dragon/transcription disclaimer:  Much of this encounter note is electronic transcription/translation of spoken language to printed text.  The electronic translation of spoken language may be erroneous, or at times, nonsensical words or phrases may be inadvertently transcribed.  Although I have reviewed the note for such errors, some may still exist.

## 2017-11-30 ENCOUNTER — HOSPITAL ENCOUNTER (OUTPATIENT)
Dept: HOSPITAL 58 - RAD | Age: 72
Discharge: HOME | End: 2017-11-30
Attending: FAMILY MEDICINE

## 2017-11-30 DIAGNOSIS — M54.5: ICD-10-CM

## 2017-11-30 DIAGNOSIS — G89.29: ICD-10-CM

## 2017-11-30 DIAGNOSIS — M15.9: Primary | ICD-10-CM

## 2017-11-30 NOTE — DI
EXAM:  Three views of the lumbar spine. 

  

History:  Lower back pain. 

  

Findings:  Atherosclerotic vascular calcifications.  No acute fracture or subluxation.  Moderate to s
evere multilevel degenerative disc space narrowing with endplate sclerosis and osteophyte formation. 
 Severe facet hypertrophy within lower lumbar spine. 

  

Impression:  No acute osseous abnormality.  Moderate to severe degenerative changes.

## 2017-12-19 ENCOUNTER — HOSPITAL ENCOUNTER (OUTPATIENT)
Dept: HOSPITAL 58 - SURG | Age: 72
Discharge: HOME | End: 2017-12-19
Attending: INTERNAL MEDICINE

## 2017-12-19 ENCOUNTER — OUTSIDE FACILITY SERVICE (OUTPATIENT)
Dept: GASTROENTEROLOGY | Facility: CLINIC | Age: 72
End: 2017-12-19

## 2017-12-19 VITALS — DIASTOLIC BLOOD PRESSURE: 77 MMHG | SYSTOLIC BLOOD PRESSURE: 146 MMHG | TEMPERATURE: 97.6 F

## 2017-12-19 DIAGNOSIS — Q27.33: ICD-10-CM

## 2017-12-19 DIAGNOSIS — Z80.0: ICD-10-CM

## 2017-12-19 DIAGNOSIS — Z86.010: Primary | ICD-10-CM

## 2017-12-19 DIAGNOSIS — K57.30: ICD-10-CM

## 2017-12-19 PROCEDURE — 45378 DIAGNOSTIC COLONOSCOPY: CPT | Performed by: INTERNAL MEDICINE

## 2017-12-20 NOTE — OP
INDICATIONS FOR PROCEDURE: 72 year old female presents for colonoscopy exam. 
She has a past history of adenomatous polyps with last colonoscopy three years 
ago and her father had colon cancer at age 63.



MEDICATIONS:  SEE ANESTHESIA NOTES.



PROCEDURE:  COLONOSCOPY.



REPORT:  The risks, benefits, alternatives and limitations were discussed in 
detail with the patient.  Informed consent was obtained.



After adequate sedation was achieved, a digital rectal exam revealed good tone, 
no masses.  The colonoscope was introduced into the rectum and advanced under 
direct visual guidance to the cecum.  The cecum was identified by the 
appendiceal orifice and IC valve. I then slowly withdrew the scope in 
circumferential manner and examined the mucosa quite carefully. I looked on the 
proximal and distal sides of the folds and flexures as best as possible. I was 
able to retroflex the scope in the right colon and the left colon to increase 
visualization. In the cecum there are three small AVM's. In the sigmoid there 
are scattered small mouth diverticuli. No other abnormalities noted including 
on retroflex view of the anal canal. The prep was adequate. The withdraw time 
was 11 minutes and 3 seconds. The patient tolerated the procedure well with 
stable vital signs and pulse oximetry throughout.



IMPRESSION:

1.  Diverticulosis 

2.  Three cecal AVM's 



RECOMMENDATIONS: 

1.   High fiber diet

2.   Office visit as needed 

3.   Colonoscopy examination again in 5 years if she is clinically well or 
sooner if there are signs or symptoms to indicate otherwise. 



CC: Dr. Crescencio BARRIGA

## 2018-01-09 ENCOUNTER — OFFICE VISIT (OUTPATIENT)
Dept: SURGERY | Age: 73
End: 2018-01-09
Payer: MEDICARE

## 2018-01-09 VITALS
DIASTOLIC BLOOD PRESSURE: 90 MMHG | SYSTOLIC BLOOD PRESSURE: 150 MMHG | HEART RATE: 72 BPM | BODY MASS INDEX: 34.55 KG/M2 | HEIGHT: 63 IN | WEIGHT: 195 LBS

## 2018-01-09 DIAGNOSIS — Z85.3 PERSONAL HISTORY OF MALIGNANT NEOPLASM OF BREAST: Primary | ICD-10-CM

## 2018-01-09 DIAGNOSIS — Z90.13 S/P BILATERAL MASTECTOMY: ICD-10-CM

## 2018-01-09 PROCEDURE — 1036F TOBACCO NON-USER: CPT | Performed by: PHYSICIAN ASSISTANT

## 2018-01-09 PROCEDURE — 4040F PNEUMOC VAC/ADMIN/RCVD: CPT | Performed by: PHYSICIAN ASSISTANT

## 2018-01-09 PROCEDURE — 3017F COLORECTAL CA SCREEN DOC REV: CPT | Performed by: PHYSICIAN ASSISTANT

## 2018-01-09 PROCEDURE — G8417 CALC BMI ABV UP PARAM F/U: HCPCS | Performed by: PHYSICIAN ASSISTANT

## 2018-01-09 PROCEDURE — 1090F PRES/ABSN URINE INCON ASSESS: CPT | Performed by: PHYSICIAN ASSISTANT

## 2018-01-09 PROCEDURE — G8399 PT W/DXA RESULTS DOCUMENT: HCPCS | Performed by: PHYSICIAN ASSISTANT

## 2018-01-09 PROCEDURE — 1123F ACP DISCUSS/DSCN MKR DOCD: CPT | Performed by: PHYSICIAN ASSISTANT

## 2018-01-09 PROCEDURE — 99212 OFFICE O/P EST SF 10 MIN: CPT | Performed by: PHYSICIAN ASSISTANT

## 2018-01-09 PROCEDURE — G8484 FLU IMMUNIZE NO ADMIN: HCPCS | Performed by: PHYSICIAN ASSISTANT

## 2018-01-09 PROCEDURE — 3014F SCREEN MAMMO DOC REV: CPT | Performed by: PHYSICIAN ASSISTANT

## 2018-01-09 PROCEDURE — G8427 DOCREV CUR MEDS BY ELIG CLIN: HCPCS | Performed by: PHYSICIAN ASSISTANT

## 2018-01-11 ENCOUNTER — INFUSION (OUTPATIENT)
Dept: ONCOLOGY | Facility: CLINIC | Age: 73
End: 2018-01-11

## 2018-01-11 DIAGNOSIS — C50.512 MALIGNANT NEOPLASM OF LOWER-OUTER QUADRANT OF LEFT FEMALE BREAST, UNSPECIFIED ESTROGEN RECEPTOR STATUS (HCC): Primary | ICD-10-CM

## 2018-01-11 RX ORDER — SODIUM CHLORIDE 0.9 % (FLUSH) 0.9 %
10 SYRINGE (ML) INJECTION AS NEEDED
Status: CANCELLED | OUTPATIENT
Start: 2018-01-11

## 2018-01-11 RX ORDER — SODIUM CHLORIDE 0.9 % (FLUSH) 0.9 %
10 SYRINGE (ML) INJECTION AS NEEDED
Status: DISCONTINUED | OUTPATIENT
Start: 2018-01-11 | End: 2018-01-11 | Stop reason: HOSPADM

## 2018-01-11 RX ADMIN — Medication 10 ML: at 10:03

## 2018-02-05 RX ORDER — ANASTROZOLE 1 MG/1
TABLET ORAL
Qty: 90 TABLET | Refills: 0 | Status: SHIPPED | OUTPATIENT
Start: 2018-02-05 | End: 2018-06-23 | Stop reason: SDUPTHER

## 2018-02-19 ENCOUNTER — TELEPHONE (OUTPATIENT)
Dept: FAMILY MEDICINE CLINIC | Facility: CLINIC | Age: 73
End: 2018-02-19

## 2018-02-19 ENCOUNTER — HOSPITAL ENCOUNTER (OUTPATIENT)
Dept: HOSPITAL 58 - RAD | Age: 73
Discharge: HOME | End: 2018-02-19
Attending: FAMILY MEDICINE

## 2018-02-19 DIAGNOSIS — M25.561 ACUTE PAIN OF RIGHT KNEE: Primary | ICD-10-CM

## 2018-02-19 DIAGNOSIS — M25.561: Primary | ICD-10-CM

## 2018-02-19 DIAGNOSIS — M25.561 ACUTE PAIN OF RIGHT KNEE: ICD-10-CM

## 2018-02-19 NOTE — TELEPHONE ENCOUNTER
Pt came in and filled out nurse communication form and states she fell down her basement steps about 1 hour ago and hurt her R knee and would like to have a Xray at [facility]

## 2018-02-19 NOTE — TELEPHONE ENCOUNTER
Called [facility] radiology dept and advised knee with injury is a 4 view    Attempted to call pt, and left message on vm to go to [facility] as she advised this am for knee xray and order faxed    Called pt again and advised

## 2018-02-19 NOTE — DI
EXAM:  Radiographs, right knee 

  

HISTORY:  Acute right knee pain. 

  

COMPARISON:  01/15/2014. 

  

TECHNIQUE:  Four views. 

  

  

FINDINGS:  Transverse, minimally-displaced fracture through the mid pole of the patella noted.  Anter
ior soft tissue swelling and joint effusion are also present.  No dislocation identified.  Mild osteo
arthritis noted throughout the knee. 

  

------------------------------ 

IMPRESSION: 

Minimally-displaced patellar fracture.

## 2018-02-20 ENCOUNTER — TELEPHONE (OUTPATIENT)
Dept: FAMILY MEDICINE CLINIC | Facility: CLINIC | Age: 73
End: 2018-02-20

## 2018-02-20 DIAGNOSIS — S82.009A CLOSED NONDISPLACED FRACTURE OF PATELLA, UNSPECIFIED FRACTURE MORPHOLOGY, UNSPECIFIED LATERALITY, INITIAL ENCOUNTER: Primary | ICD-10-CM

## 2018-02-20 NOTE — TELEPHONE ENCOUNTER
----- Message from Conrado Tinoco MD sent at 2/19/2018  5:10 PM CST -----  Called pt; told if able to get knee imobilizer and not to bend knee; do not fall down  Will try to get ortho to see tomorrow OR get her an knee imbolizer  Called Myah Caballero/ [facility] asking if they can get the managed care coverage for her  to give 1-2 more days?/they will try

## 2018-02-20 NOTE — TELEPHONE ENCOUNTER
I called the [facility] and got patient an appointment for today at 10am with EWELINA Golden of [facility]. Patient notified and to  disc at [facility] of the xray. I mentioned to patient that I was directed to call the facility where her spouse presently resides to see if his stay can be increased and she told me that they are already working on trying to make that happen.

## 2018-03-07 DIAGNOSIS — C50.919 MALIGNANT NEOPLASM OF FEMALE BREAST, UNSPECIFIED ESTROGEN RECEPTOR STATUS, UNSPECIFIED LATERALITY, UNSPECIFIED SITE OF BREAST (HCC): Primary | ICD-10-CM

## 2018-03-12 ENCOUNTER — APPOINTMENT (OUTPATIENT)
Dept: LAB | Facility: HOSPITAL | Age: 73
End: 2018-03-12

## 2018-03-22 ENCOUNTER — HOSPITAL ENCOUNTER (EMERGENCY)
Dept: HOSPITAL 58 - ED | Age: 73
Discharge: HOME | End: 2018-03-22

## 2018-03-22 VITALS — BODY MASS INDEX: 34.7 KG/M2

## 2018-03-22 VITALS — TEMPERATURE: 97.8 F

## 2018-03-22 VITALS — SYSTOLIC BLOOD PRESSURE: 190 MMHG | DIASTOLIC BLOOD PRESSURE: 90 MMHG

## 2018-03-22 DIAGNOSIS — I10: ICD-10-CM

## 2018-03-22 DIAGNOSIS — R42: Primary | ICD-10-CM

## 2018-03-22 DIAGNOSIS — R53.1: ICD-10-CM

## 2018-03-22 PROCEDURE — 82550 ASSAY OF CK (CPK): CPT

## 2018-03-22 PROCEDURE — 99284 EMERGENCY DEPT VISIT MOD MDM: CPT

## 2018-03-22 PROCEDURE — 80053 COMPREHEN METABOLIC PANEL: CPT

## 2018-03-22 PROCEDURE — 85025 COMPLETE CBC W/AUTO DIFF WBC: CPT

## 2018-03-22 PROCEDURE — 81001 URINALYSIS AUTO W/SCOPE: CPT

## 2018-03-22 PROCEDURE — 83735 ASSAY OF MAGNESIUM: CPT

## 2018-03-22 PROCEDURE — 36415 COLL VENOUS BLD VENIPUNCTURE: CPT

## 2018-03-22 PROCEDURE — 93010 ELECTROCARDIOGRAM REPORT: CPT

## 2018-03-22 PROCEDURE — 84484 ASSAY OF TROPONIN QUANT: CPT

## 2018-03-22 PROCEDURE — 93005 ELECTROCARDIOGRAM TRACING: CPT

## 2018-03-22 NOTE — ED.PDOC
General


ED Provider: 


Dr. ANA BLOOM





Chief Complaint: Dizziness


Stated Complaint: Developed dizziness last evening. Under considerable stress 

with her late husbands illness.  passed away last evening.  This AM 

awakened and after a short time developed recurrent dizziness described as 

light headedness. States better now.


Time Seen by Physician: 15:00


Mode of Arrival: Wheelchair


Information Source: Patient


Primary Care Provider: 


MAGGI JONES





Nursing and Triage Documentation Reviewed and Agree: Yes


Reviewed sepsis parameters & appropriate labs ordered?: Yes


System Inflammatory Response Syndrome: Not Applicable


Sepsis Protocol: 


For patient's 13 years and over:





Temp is 96.8 and below  and greater


Pulse >90 BPM


Resp >20/minute


Acutely Altered Mental Status





Are patient's symptoms suggestive of a new infection, such as:


   -Pneumonia


   -Skin, Soft Tissue


   -Endocarditis


   -UTI


   -Bone, Joint Infection


   -Implantable Device


   -Acute Abdominal Infection


   -Wound Infection


   -Meningitis


   -Blood Stream Catheter Infection


   -Unknown





System Inflammatory Response Syndrome: Not Applicable





Neurological Complaint Exam





- Dizziness Complaint/Exam


Onset: Sudden


Duration: 12 hrs intermittently 


Symptoms Are: Still present (lessened and resolving.)


Timing: Intermittent


Episodes Lasting: Minutes


Initial Severity: Moderate


Current Severity: Mild


Character: Reports: Head spinning, Lightheaded, Weak, Dizzy


Aggravating: Reports: None


Alleviating: Reports: Rest


Associated Signs and Symptoms: Reports: Nausea.  Denies: Palpitations, Unsteady 

gait, GI blood loss, Visual changes, Decreased oral intake, Change in medication

, Change in diet, Loss of balance


Cardiac Risk Factors: Reports: None


Related Surgical History: Reports: None


JVD Present: No


Carotid Bruit Present: No


Nystagmus Present: No


Gag Reflex Present: Yes


Meningeal Signs Positive: No


Focal Weakness: Present: None


Focal Sensory Loss: Present: None


Gait: Normal


Finger-to-Nose: Normal Findings


Romberg Test Positive: No


Heel to Toe Normal: Yes


Deepa-Hallpike Test Positive: Yes


Differential Diagnoses: Anxiety, Labyrinthitis





Review of Systems





- Review Of Systems


Constitutional: Reports: Weakness


Eyes: Reports: No symptoms


Ears, Nose, Mouth, Throat: Reports: No symptoms


Respiratory: Reports: No symptoms


Cardiac: Reports: No symptoms, Lightheadedness


GI: Reports: No symptoms


: Reports: No symptoms


Musculoskeletal: Reports: No symptoms


Skin: Reports: No symptoms


Neurological: Reports: No symptoms, Other (dizziness)


Endocrine: Reports: No symptoms


Hematologic/Lymphatic: Reports: No symptoms


All Other Systems: Reviewed and Negative





Past Medical History





- Past Medical History


Previously Healthy: Yes


Endocrine: Reports: None


Cardiovascular: Reports: None


Respiratory: Reports: None


Hematological: Reports: None


Gastrointestinal: Reports: None


Genitourinary: Reports: None


Neuro/Psych: Reports: None


Musculoskeletal: Reports: None


Cancer: Reports: None


Last Menstrual Period: n/a





- Surgical History


General Surgical History: Reports: None





- Family History


Family History: Reports: None





- Social History


Smoking Status: Former smoker


Hx Substance Use: No


Alcohol Screening: None





Physical Exam





- Physical Exam


Appearance: Well-appearing, Obese


Ill-appearing: Mild


Pain Distress: None


Eyes: MARICARMEN, EOMI, Conjunctiva clear


ENT: Ears normal, Nose normal, Oropharynx normal


Neck: Supple


Respiratory: Airway patent, Breath sounds clear, Breath sounds equal


Cardiovascular: RRR, Pulses normal, No rub, No murmur


GI/: Soft, Nontender, No masses


Musculoskeletal: Normal strength, ROM intact, No edema


Skin: Warm, Dry, Normal color


Neurological: Sensation intact, Alert, Oriented


Psychiatric: Affect appropriate, Mood appropriate, Anxious





Re-Evaluation





- Re-Evaluation


Time of Re-Evaluation: 18:30


Status: Improved


Vital Signs Stable: Yes (BP improved)


Appearance: NAD


Lungs: Clear


Skin: Warm and Dry


Neuro: Alert and Oriented X3


CV: RRR





Critical Care Note





- Critical Care Note


Total Time (mins): 0





Course





- Course


Hematology/Chemistry: 


 03/22/18 15:58





 03/22/18 15:58


Orders, Labs, Meds: 


Lab Review











  03/22/18 03/22/18 03/22/18





  15:58 15:58 15:58


 


WBC  9.38  


 


RBC  4.34  


 


Hgb  13.9  


 


Hct  39.6  


 


MCV  91.2  


 


MCH  32.0 H  


 


MCHC  35.1  


 


RDW Coeff of Rena  12.3  


 


Plt Count  164  


 


Immature Gran % (Auto)  0.3  


 


Neut % (Auto)  72.4  


 


Lymph % (Auto)  21.7  


 


Mono % (Auto)  4.7  


 


Eos % (Auto)  0.6  


 


Baso % (Auto)  0.3  


 


Immature Gran # (Auto)  0.0  


 


Neut # (Auto)  6.8  


 


Lymph # (Auto)  2.0  


 


Mono # (Auto)  0.4  


 


Eos # (Auto)  0.1  


 


Baso # (Auto)  0.0  


 


Sodium   142 


 


Potassium   3.9 


 


Chloride   104 


 


Carbon Dioxide   29 


 


Anion Gap   12.9 


 


BUN   11 


 


Creatinine   0.64 


 


Estimated GFR (MDRD)   90.00 


 


BUN/Creatinine Ratio   17.18 


 


Glucose   148 H 


 


Calcium   9.4 


 


Magnesium    1.9


 


Total Bilirubin   0.4 


 


AST   28 


 


ALT   14 


 


Alkaline Phosphatase   94 


 


Total Creatine Kinase    41


 


Troponin I    < 0.0100


 


Total Protein   6.7 


 


Albumin   3.9 


 


Globulin   2.8 


 


Albumin/Globulin Ratio   1.39 


 


Urine Color   


 


Urine Clarity   


 


Urine pH   


 


Ur Specific Gravity   


 


Urine Protein   


 


Urine Glucose (UA)   


 


Urine Ketones   


 


Urine Blood   


 


Urine Nitrite   


 


Urine Bilirubin   


 


Urine Urobilinogen   


 


Ur Leukocyte Esterase   


 


Urine Microscopic WBC   


 


Ur Squamous Epith Cells   














  03/22/18





  16:13


 


WBC 


 


RBC 


 


Hgb 


 


Hct 


 


MCV 


 


MCH 


 


MCHC 


 


RDW Coeff of Rena 


 


Plt Count 


 


Immature Gran % (Auto) 


 


Neut % (Auto) 


 


Lymph % (Auto) 


 


Mono % (Auto) 


 


Eos % (Auto) 


 


Baso % (Auto) 


 


Immature Gran # (Auto) 


 


Neut # (Auto) 


 


Lymph # (Auto) 


 


Mono # (Auto) 


 


Eos # (Auto) 


 


Baso # (Auto) 


 


Sodium 


 


Potassium 


 


Chloride 


 


Carbon Dioxide 


 


Anion Gap 


 


BUN 


 


Creatinine 


 


Estimated GFR (MDRD) 


 


BUN/Creatinine Ratio 


 


Glucose 


 


Calcium 


 


Magnesium 


 


Total Bilirubin 


 


AST 


 


ALT 


 


Alkaline Phosphatase 


 


Total Creatine Kinase 


 


Troponin I 


 


Total Protein 


 


Albumin 


 


Globulin 


 


Albumin/Globulin Ratio 


 


Urine Color  Yellow


 


Urine Clarity  Clear


 


Urine pH  7.0


 


Ur Specific Gravity  1.015


 


Urine Protein  Negative


 


Urine Glucose (UA)  Negative


 


Urine Ketones  Negative


 


Urine Blood  Negative


 


Urine Nitrite  Negative


 


Urine Bilirubin  Negative


 


Urine Urobilinogen  0.2


 


Ur Leukocyte Esterase  Trace


 


Urine Microscopic WBC  0-2


 


Ur Squamous Epith Cells  0-2








Orders











 Category Date Time Status


 


 EKG-(ED ONLY) Stat CARDIO  03/22/18 15:34 Completed


 


 CBC W/ AUTO DIFF Stat LAB  03/22/18 15:58 Completed


 


 CMP [COMPREHENSIVE METABOLIC PANEL] Stat LAB  03/22/18 15:58 Completed


 


 CPK [CREATINE KINASE] Stat LAB  03/22/18 15:58 Completed


 


 MAGNESIUM Stat LAB  03/22/18 15:58 Completed


 


 TROPONIN I Stat LAB  03/22/18 15:58 Completed


 


 UA [URINALYSIS C & S IF INDICATED] Stat LAB  03/22/18 16:13 Completed


 


 CHEST, 2 VIEWS PA & LAT Stat RADS  03/22/18 15:34 Completed


 


 CT HEAD W/O CONTRAST Stat RADS  03/22/18 15:35 Completed











Vital Signs: 


 











  Temp Pulse Resp BP Pulse Ox


 


 03/22/18 14:51  97.8 F  58 L  16  170/80 H  96














Departure





- Departure


Time of Disposition: 18:30


Disposition: HOME SELF-CARE


Discharge Problem: 


 Dizziness, Hypertension





Instructions:  Motion Sickness (ED), Hypertension (ED)


Condition: Good


Pt referred to PMD for follow-up: Yes


IPMP verified?: No


Additional Instructions: 


Monitor BP at Home


Take all meds as directed


Follow up PCP in 5-7 days


Allergies/Adverse Reactions: 


Allergies





No Known Allergies Allergy (Unverified 09/15/14 12:50)


 








Home Medications: 


Ambulatory Orders





Oxybutynin Chloride [Ditropan Xl] 1 tab PO BID 09/29/14

## 2018-03-22 NOTE — CT
EXAM: CT head without contrast 

  

HISTORY: Dizziness 

  

COMPARISON:  None 

  

TECHNIQUE:  Serial axial images of the brain were obtained from the skull base to the vertex without 
IV contrast. 

  

FINDINGS:  The ventricles, cisterns and sulci demonstrate generalized volume loss.  The gray-white ma
tter junction is maintained.  There is scattered low attenuation throughout the periventricular white
 matter.No midline shift or mass is identified.  There is no abnormal intra or extra-axial fluid bhupinder
ection.  The paranasal sinuses and mastoid air cells are clear.  The osseous calvarium is intact. 

  

IMPRESSION: 

1.  Intracranial abnormality or hemorrhage. 

2.  Generalized volume loss with extensive low attenuation throughout the periventricular white matte
r suggestive of microangiopathy.  If further evaluation is clinically indicated, MRI may be obtained.

## 2018-03-22 NOTE — DI
EXAM:  Two-view chest. 

  

HISTORY:  Dizziness. 

  

COMPARISON:  12/10/2015 

  

FINDINGS: 

PA and lateral views of the chest. 

  

LUNGS: The lung volumes are normal. There is a left-sided chest port.  Clips are seen in the left mila
e of the chest. There is no lobar consolidation or effusion.  The pulmonary interstitium is normal. T
here are no suspicious nodules. 

  

MEDIASTINUM: The heart size and pulmonary vasculature are within normal limits.    The aorta is tortu
ous and calcified. 

  

OSSEOUS STRUCTURES: The osseous structures show mild degenerative changes consistent with age. The so
ft tissues are unremarkable. 

  

IMPRESSION: 

 No acute pulmonary disease.

## 2018-03-26 ENCOUNTER — OFFICE VISIT (OUTPATIENT)
Dept: FAMILY MEDICINE CLINIC | Facility: CLINIC | Age: 73
End: 2018-03-26

## 2018-03-26 VITALS
OXYGEN SATURATION: 92 % | TEMPERATURE: 97.7 F | WEIGHT: 182 LBS | HEIGHT: 63 IN | HEART RATE: 64 BPM | DIASTOLIC BLOOD PRESSURE: 96 MMHG | SYSTOLIC BLOOD PRESSURE: 180 MMHG | RESPIRATION RATE: 18 BRPM | BODY MASS INDEX: 32.25 KG/M2

## 2018-03-26 DIAGNOSIS — E11.9 CONTROLLED TYPE 2 DIABETES MELLITUS WITHOUT COMPLICATION, WITHOUT LONG-TERM CURRENT USE OF INSULIN (HCC): Chronic | ICD-10-CM

## 2018-03-26 DIAGNOSIS — F41.9 ANXIETY: ICD-10-CM

## 2018-03-26 DIAGNOSIS — C50.919 MALIGNANT NEOPLASM OF FEMALE BREAST, UNSPECIFIED ESTROGEN RECEPTOR STATUS, UNSPECIFIED LATERALITY, UNSPECIFIED SITE OF BREAST (HCC): Chronic | ICD-10-CM

## 2018-03-26 DIAGNOSIS — R03.0 ELEVATED BLOOD PRESSURE READING: Primary | ICD-10-CM

## 2018-03-26 DIAGNOSIS — R42 DIZZY: ICD-10-CM

## 2018-03-26 DIAGNOSIS — R11.2 NAUSEA AND VOMITING, INTRACTABILITY OF VOMITING NOT SPECIFIED, UNSPECIFIED VOMITING TYPE: ICD-10-CM

## 2018-03-26 PROBLEM — R00.1 BRADYCARDIA: Status: ACTIVE | Noted: 2018-03-26

## 2018-03-26 PROCEDURE — 99213 OFFICE O/P EST LOW 20 MIN: CPT | Performed by: FAMILY MEDICINE

## 2018-03-26 RX ORDER — AMLODIPINE BESYLATE 5 MG/1
5 TABLET ORAL DAILY
Qty: 30 TABLET | Refills: 5 | Status: SHIPPED | OUTPATIENT
Start: 2018-03-26 | End: 2018-07-18 | Stop reason: SDUPTHER

## 2018-03-26 RX ORDER — LISINOPRIL 10 MG/1
20 TABLET ORAL DAILY
Refills: 0 | COMMUNITY
Start: 2018-03-22 | End: 2018-04-16 | Stop reason: DRUGHIGH

## 2018-03-26 NOTE — PROGRESS NOTES
"Subjective   Ira Sanchez is a 73 y.o. female presenting with chief complaint of:   Chief Complaint   Patient presents with   • Dizziness     [facility] ER follow up   • Fall     [facility] ER follow up   • Vomiting     \"8 times since I got back from the ER\"       History of Present Illness :  With sister.  Here for primarily an acute issue today; dizziness, elevated blood pressure.   Has multiple chronic problems to consider that might have a bearing on today's issues; not an interval appointment.       1. Elevated blood pressure reading    2. Nausea and vomiting, intractability of vomiting not specified, unspecified vomiting type    3. Dizzy    4. Anxiety    5. Controlled type 2 diabetes mellitus without complication, without long-term current use of insulin    6. Malignant neoplasm of female breast, unspecified estrogen receptor status, unspecified laterality, unspecified site of breast        Other chronic problem/s to consider:  Anxiety: This has been present for years/over a year.  It is chronic.  It is much worse with loss of  and  arrangements.  It is associated with stressors: mentioned.  Medications not requested  DM2: This has been present for years/over a year.  It is chronic.  It is generally controlled.  Home accuchecks run fasting 100-120, random 140.   No hypoglycemia manifest as syncope/near syncope or diaphoretic/sweating episodes.  A1c below if available.  Diet loose; not eating well during the .     Has an/another acute issue today:   Nausea/vomiting; onset 3.21; few initial times and resolved.  No associated melena, hematochezia, diarrhea, fever;   last week and relatives were drinking/causing stress.  Was not eating, drinking, sleeping well at all.  Went to [facility] ED; labs, CTs/xrays ok and started on 10 mg zestril.  Less stress this week.   Dizziness:  ON/off started same time. Worse with standing/walking; resolved now.  No chest pain, palpitations, ankle edema.     The " following portions of the patient's history were reviewed and updated as appropriate: allergies, current medications, past family history, past medical history, past social history, past surgical history and problem list.  Records acquired and reviewed; TCC migrated.      Current Outpatient Prescriptions:   •  amLODIPine (NORVASC) 5 MG tablet, Take 1 tablet by mouth Daily., Disp: 30 tablet, Rfl: 5  •  anastrozole (ARIMIDEX) 1 MG tablet, TAKE 1 TABLET BY MOUTH DAILY, Disp: 90 tablet, Rfl: 0  •  Calcium Carb-Cholecalciferol (CALCIUM-VITAMIN D) 600-400 MG-UNIT tablet, Take 1 tablet by mouth 2 (Two) Times a Day., Disp: , Rfl:   •  cycloSPORINE (RESTASIS) 0.05 % ophthalmic emulsion, 1 drop Every 12 (Twelve) Hours., Disp: , Rfl:   •  diphenhydrAMINE (BENADRYL) 25 mg capsule, Take 25 mg by mouth Daily As Needed., Disp: , Rfl:   •  ibuprofen (ADVIL,MOTRIN) 200 MG tablet, Take 200 mg by mouth As Needed., Disp: , Rfl:   •  lisinopril (PRINIVIL,ZESTRIL) 10 MG tablet, Take 10 mg by mouth Daily., Disp: , Rfl: 0  •  oxybutynin (DITROPAN) 5 MG tablet, TAKE 1 TABLET BY MOUTH TWICE DAILY, Disp: 180 tablet, Rfl: 1  •  thiamine (VITAMINE B-1) 100 MG tablet, Take 100 mg by mouth Daily., Disp: , Rfl:     No problems with medications.  Refills if needed done    Allergies   Allergen Reactions   • Benzonatate Hives and Itching       Review of Systems  GENERAL:  Inactive/slower with limits, speed, stamina for age and size, stress mentioned. Sleep is ok now; bad last week falling/staying asleep. No fever now.  SKIN: No  rash/skin lesion of concern:   ENDO:  No syncope, near or diaphoretic sweaty spells now.  HEENT: No recent head injury;  headache,  No vision change, No significant hearing loss.  Ears without pain/drainage.  No sore throat.  No significant nasal/sinus congestion/drainage. No epistaxis.  CHEST: No chest wall tenderness or mass. No cough,  without wheeze.  No SOB; no hemoptysis.  CV: No chest pain, palpitations, ankle  edema.  GI: No heartburn, dysphagia.  No abdominal pain, diarrhea, constipation.  No rectal bleeding, or melena.    :  Voids without dysuria, or  incontinence to completion.  ORTHO: No painful/swollen joints but various on /off sore.  Same occ sore neck or back.  No acute neck or back pain without recent injury.  NEURO: No dizziness, weakness of extremities.  Same LE numbness/paresthesias.   PSYCH: No memory loss.  Mood good; much more anxious, depressed but/and not suicidal.  Tries to tolerate stress .     Results for orders placed or performed in visit on 11/28/17   Urinalysis With / Culture If Indicated - Urine, Clean Catch   Result Value Ref Range    Specific Gravity, UA      >=1.030 (A) 1.005 - 1.030    pH, UA 5.0 5.0 - 7.5    Color, UA Yellow Yellow    Appearance, UA Cloudy (A) Clear    Leukocytes, UA Negative Negative    Protein Negative Negative/Trace    Glucose, UA Negative Negative    Ketones Negative Negative    Blood, UA Negative Negative    Bilirubin, UA Negative Negative    Urobilinogen, UA 0.2 0.2 - 1.0 mg/dL    Nitrite, UA Negative Negative    Microscopic Examination Comment     MICROSCOPIC EXAMINATION See below:     Urinalysis Reflex Comment    Microscopic Examination   Result Value Ref Range    WBC, UA 0-5 0 - 5 /hpf    RBC, UA 0-2 0 - 2 /hpf    Epithelial Cells (non renal) 0-10 0 - 10 /hpf    Crystals, UA Present (A) N/A /lpf    Crystal Type Calcium Oxalate N/A    Mucus, UA Present Not Estab. /hpf    Bacteria, UA None seen None seen/Few /hpf       No results found for: PSA     Lab Results:  CBC:    Lab Results - Last 18 Months  Lab Units 09/14/17  0853 06/21/17  1455 05/09/17  0859 01/17/17  0725 10/11/16  0923   WBC 10*3/mm3 7.20 7.40 6.80 8.77 9.20   HEMOGLOBIN g/dL 15.0 14.1 14.3 14.2 14.8   HEMATOCRIT % 43.9 44.3 44.8 44.1 47.5*   PLATELETS 10*3/mm3 144 154 158 165 154      BMP/CMP:    Lab Results - Last 18 Months  Lab Units 09/14/17  0853 06/21/17  1455 05/09/17  0859 01/17/17  0725  "10/11/16  0923   SODIUM mmol/L 141 143 140 140 144   POTASSIUM mmol/L 4.1 4.4 4.2 4.2 4.6   CHLORIDE mmol/L 103 106 103 98 103   CO2 mmol/L 26.0 28.0 28.0  --  28.0   TOTAL CO2, ARTERIAL mmol/L  --   --   --  28.0  --    GLUCOSE mg/dL  --   --   --  127*  --    BUN mg/dL 17 17 15 12 15   CREATININE mg/dL 0.52 0.50* 0.50* 0.53 0.60*   EGFR IF NONAFRICN AM mL/min/1.73 115 121 121 113 98   EGFR IF AFRICN AM mL/min/1.73  --   --   --  137  --    CALCIUM mg/dL 9.5 9.6 9.3 10.0 10.3*     HEPATIC:    Lab Results - Last 18 Months  Lab Units 09/14/17  0853 06/21/17  1455 05/09/17  0859 01/17/17  0725 10/11/16  0923   ALT (SGPT) U/L 29 29 31 30 35   AST (SGOT) U/L 40 50* 53* 35 53*   ALK PHOS U/L 88 116 109 121* 125     THYROID:No results for input(s): TSH, T3FREE, FREET4, FTI in the last 04941 hours.    Invalid input(s): T3, T4, TEUP  A1C:    Lab Results - Last 18 Months  Lab Units 06/21/17  1456 10/11/16  0923   HEMOGLOBIN A1C % 6.7* 6.40     PSA:No results for input(s): PSA in the last 63811 hours.    Objective   /96 (BP Location: Left arm, Patient Position: Sitting, Cuff Size: Large Adult)   Pulse 64   Temp 97.7 °F (36.5 °C) (Oral)   Resp 18   Ht 160 cm (63\")   Wt 82.6 kg (182 lb)   LMP  (LMP Unknown)   SpO2 92%   BMI 32.24 kg/m²   Body mass index is 32.24 kg/m².    Physical Exam  GENERAL:  Well nourished/developed in no acute distress.   SKIN: Turgor excellent, without wound, rash, lesion  HEENT: Normal cephalic without trauma.  Pupils equal round reactive to light. Extraocular motions full without nystagmus.   External canals nonobstructive nontender without reddness. Tymphatic membranes rani with irwin structures intact.   Oral cavity without growths, exudates, and moist.  Posterior pharynx without mass, obstruction, redness.  No thyromegaly, mass, tenderness, lymphadenopathy and supple.  CV: Regular rhythm.  No murmur, gallop,  edema. Posterior pulses intact.  No carotid bruits.  CHEST: No chest wall " tenderness or mass.   LUNGS: Symmetric motion with clear to auscultation.   ABD: Soft, nontender without mass.   ORTHO: Symmetric extremities without swelling/point tenderness.  Full gross range of motion.  NEURO: CN 2-12 grossly intact.  Symmetric facies and UE/LE. 3/5 strength throughout. 1/4 x bicep equal reflexes.  Nonfocal use extremities. Speech clear    PSYCH:  Oriented x 3.  Pleasant calm, well kept.   Purposeful/directed conservation with intact short/long gross memory.     Assessment/Plan     1. Elevated blood pressure reading    2. Nausea and vomiting, intractability of vomiting not specified, unspecified vomiting type    3. Dizzy    4. Anxiety    5. Controlled type 2 diabetes mellitus without complication, without long-term current use of insulin    6. Malignant neoplasm of female breast, unspecified estrogen receptor status, unspecified laterality, unspecified site of breast        Rx: reviewed/changes:  Norvasc 5 to Zestril 10 started    LAB/Testing/Referrals: reviewed/orders:   Orders Placed This Encounter   Procedures   • Basic Metabolic Panel   • CBC & Differential   today and same 6/12    Discussions:   Body mass index is 32.24 kg/m².   Discussed the patient's BMI with her. BMI is above normal parameters. Follow-up plan includes:  inappropriate to deal with today..reminded she was overweight.   Non-smoker      Patient Instructions   No activities involving heights, ladders, machinery through getting bp regulated  Adequate fluids:  60 oz each 24 hr.   Adequate sleep very important  Home bps and bring next week    Follow up: Return for lab today; Dr Tinoco 7-10 days.  Future Appointments  Date Time Provider Department Center   4/3/2018 1:45 PM Conrado Tinoco MD [facility] None   4/16/2018 2:45 PM  [facility]  [facility]   4/16/2018 3:20 PM Shanda Torres MD [facility]   6/5/2018 10:30 AM Conrado Tinoco MD [facility] None

## 2018-03-26 NOTE — PATIENT INSTRUCTIONS
No activities involving heights, ladders, machinery through getting bp regulated  Adequate fluids:  60 oz each 24 hr.   Adequate sleep very important

## 2018-03-27 LAB
BASOPHILS # BLD AUTO: 0.02 10*3/MM3 (ref 0–0.2)
BASOPHILS NFR BLD AUTO: 0.2 % (ref 0–2)
BUN SERPL-MCNC: 13 MG/DL (ref 5–21)
BUN/CREAT SERPL: 28.3 (ref 7–25)
CALCIUM SERPL-MCNC: 10.1 MG/DL (ref 8.4–10.4)
CHLORIDE SERPL-SCNC: 101 MMOL/L (ref 98–110)
CO2 SERPL-SCNC: 25 MMOL/L (ref 24–31)
CREAT SERPL-MCNC: 0.46 MG/DL (ref 0.5–1.4)
EOSINOPHIL # BLD AUTO: 0.04 10*3/MM3 (ref 0–0.7)
EOSINOPHIL NFR BLD AUTO: 0.4 % (ref 0–4)
ERYTHROCYTE [DISTWIDTH] IN BLOOD BY AUTOMATED COUNT: 12.6 % (ref 12–15)
GFR SERPLBLD CREATININE-BSD FMLA CKD-EPI: 132 ML/MIN/1.73
GFR SERPLBLD CREATININE-BSD FMLA CKD-EPI: >150 ML/MIN/1.73
GLUCOSE SERPL-MCNC: 127 MG/DL (ref 70–100)
HCT VFR BLD AUTO: 46.6 % (ref 37–47)
HGB BLD-MCNC: 14.7 G/DL (ref 12–16)
IMM GRANULOCYTES # BLD: 0.03 10*3/MM3 (ref 0–0.03)
IMM GRANULOCYTES NFR BLD: 0.3 % (ref 0–5)
LYMPHOCYTES # BLD AUTO: 2.29 10*3/MM3 (ref 0.72–4.86)
LYMPHOCYTES NFR BLD AUTO: 23.2 % (ref 15–45)
MCH RBC QN AUTO: 30.5 PG (ref 28–32)
MCHC RBC AUTO-ENTMCNC: 31.5 G/DL (ref 33–36)
MCV RBC AUTO: 96.7 FL (ref 82–98)
MONOCYTES # BLD AUTO: 0.43 10*3/MM3 (ref 0.19–1.3)
MONOCYTES NFR BLD AUTO: 4.3 % (ref 4–12)
NEUTROPHILS # BLD AUTO: 7.08 10*3/MM3 (ref 1.87–8.4)
NEUTROPHILS NFR BLD AUTO: 71.6 % (ref 39–78)
NRBC BLD AUTO-RTO: 0 /100 WBC (ref 0–0)
PLATELET # BLD AUTO: 193 10*3/MM3 (ref 130–400)
POTASSIUM SERPL-SCNC: 3.9 MMOL/L (ref 3.5–5.3)
RBC # BLD AUTO: 4.82 10*6/MM3 (ref 4.2–5.4)
SODIUM SERPL-SCNC: 143 MMOL/L (ref 135–145)
WBC # BLD AUTO: 9.89 10*3/MM3 (ref 4.8–10.8)

## 2018-04-03 ENCOUNTER — OFFICE VISIT (OUTPATIENT)
Dept: FAMILY MEDICINE CLINIC | Facility: CLINIC | Age: 73
End: 2018-04-03

## 2018-04-03 VITALS
BODY MASS INDEX: 32.43 KG/M2 | DIASTOLIC BLOOD PRESSURE: 82 MMHG | HEART RATE: 74 BPM | WEIGHT: 183 LBS | RESPIRATION RATE: 18 BRPM | SYSTOLIC BLOOD PRESSURE: 156 MMHG | TEMPERATURE: 98.5 F | HEIGHT: 63 IN | OXYGEN SATURATION: 96 %

## 2018-04-03 DIAGNOSIS — M85.80 OSTEOPENIA, UNSPECIFIED LOCATION: ICD-10-CM

## 2018-04-03 DIAGNOSIS — E11.9 CONTROLLED TYPE 2 DIABETES MELLITUS WITHOUT COMPLICATION, WITHOUT LONG-TERM CURRENT USE OF INSULIN (HCC): Chronic | ICD-10-CM

## 2018-04-03 DIAGNOSIS — R03.0 ELEVATED BLOOD PRESSURE READING: Primary | ICD-10-CM

## 2018-04-03 DIAGNOSIS — I10 HYPERTENSION, UNSPECIFIED TYPE: Chronic | ICD-10-CM

## 2018-04-03 PROCEDURE — 99213 OFFICE O/P EST LOW 20 MIN: CPT | Performed by: FAMILY MEDICINE

## 2018-04-03 NOTE — PATIENT INSTRUCTIONS
Stay on Norvasc 5 mg one a day  Increase your Zestril/lisinopril 10 mg one a day; increase to two or 20 mg a day    Goal for your blood pressure is 130 range top and 80 range bottom and you feeling better. Use us/or home monitoring to prove this. (ok to call us if dont get there)

## 2018-04-03 NOTE — PROGRESS NOTES
Subjective   Ira Sanchez is a 73 y.o. female presenting with chief complaint of:   Chief Complaint   Patient presents with   • Hypertension       History of Present Illness :  Alone.   Has multiple chronic problems to consider that might have a bearing on today's issues;  an interval appointment.       Other chronic problem/s to consider:   HTN.  The HTN has been present for years/it is chronic.  The HTN is assumed essential/without testing needed to look for other. Recent visit/fu for increasing Rx.  The HTN is better controlled manifest by todays blood pressure and same home monitoring.  Associated illness below.   DM2: This has been present for years/over a year.  It is chronic.  It is generally controlled.  Home accuchecks run fasting 140, random 140-160.   No hypoglycemia manifest as syncope/near syncope or diaphoretic/sweating episodes.  A1c below if available.  Diet loose; but tries healthy.   Osteopenia (but fell/broke patella):  This has been present for years/over a year.  It is chronic.  Agrees to another bone density when due; really till not later.       Has an/another acute issue today: lost  to cancer; stress there but tolerated.    The following portions of the patient's history were reviewed and updated as appropriate: allergies, current medications, past family history, past medical history, past social history, past surgical history and problem list.  Records acquired and reviewed; TCC migrated.      Current Outpatient Prescriptions:   •  amLODIPine (NORVASC) 5 MG tablet, Take 1 tablet by mouth Daily., Disp: 30 tablet, Rfl: 5  •  anastrozole (ARIMIDEX) 1 MG tablet, TAKE 1 TABLET BY MOUTH DAILY, Disp: 90 tablet, Rfl: 0  •  Calcium Carb-Cholecalciferol (CALCIUM-VITAMIN D) 600-400 MG-UNIT tablet, Take 1 tablet by mouth 2 (Two) Times a Day., Disp: , Rfl:   •  cycloSPORINE (RESTASIS) 0.05 % ophthalmic emulsion, 1 drop Every 12 (Twelve) Hours., Disp: , Rfl:   •  diphenhydrAMINE (BENADRYL) 25  mg capsule, Take 25 mg by mouth Daily As Needed., Disp: , Rfl:   •  lisinopril (PRINIVIL,ZESTRIL) 10 MG tablet, Take 10 mg by mouth Daily., Disp: , Rfl: 0  •  oxybutynin (DITROPAN) 5 MG tablet, TAKE 1 TABLET BY MOUTH TWICE DAILY, Disp: 180 tablet, Rfl: 1  •  thiamine (VITAMINE B-1) 100 MG tablet, Take 100 mg by mouth Daily., Disp: , Rfl:   •  ibuprofen (ADVIL,MOTRIN) 200 MG tablet, Take 200 mg by mouth As Needed., Disp: , Rfl:     No problems with medications.  Refills if needed done    Allergies   Allergen Reactions   • Benzonatate Hives and Itching       Review of Systems  GENERAL:  Inactive/slower with limits, speed, stamina for age and recovery patella; some walking. Sleep is ok; apnea denied. No fever now.  SKIN: No  rash/skin lesion of concern:   ENDO:  No syncope, near or diaphoretic sweaty spells.  HEENT: No recent head injury; or headache,  No vision change, No significant hearing loss.  Ears without pain/drainage.  No sore throat.  No nasal/sinus congestion/drainage. No epistaxis.  CHEST: No chest wall tenderness or mass. No cough, without wheeze.  No SOB; no hemoptysis.  CV: No chest pain, palpitations, ankle edema.  GI: No heartburn, dysphagia.  No abdominal pain, diarrhea, constipation.  No rectal bleeding, or melena.    :  Voids without dysuria, or  incontinence to completion.  ORTHO: No painful/swollen joints but various on /off sore.  No sore neck or back.  No acute neck or back pain without recent injury.  Out of splint for knee  NEURO: No dizziness, weakness of extremities.  No numbness/paresthesias.   PSYCH: No memory loss.  Mood good; some anxious, depressed but/and not suicidal.  Tries to tolerate stress .     Results for orders placed or performed in visit on 03/26/18   Basic Metabolic Panel   Result Value Ref Range    Glucose 127 (H) 70 - 100 mg/dL    BUN 13 5 - 21 mg/dL    Creatinine 0.46 (L) 0.50 - 1.40 mg/dL    eGFR Non African Am 132 >60 mL/min/1.73    eGFR African Am >150 >60  mL/min/1.73    BUN/Creatinine Ratio 28.3 (H) 7.0 - 25.0    Sodium 143 135 - 145 mmol/L    Potassium 3.9 3.5 - 5.3 mmol/L    Chloride 101 98 - 110 mmol/L    Total CO2 25.0 24.0 - 31.0 mmol/L    Calcium 10.1 8.4 - 10.4 mg/dL   CBC & Differential   Result Value Ref Range    WBC 9.89 4.80 - 10.80 10*3/mm3    RBC 4.82 4.20 - 5.40 10*6/mm3    Hemoglobin 14.7 12.0 - 16.0 g/dL    Hematocrit 46.6 37.0 - 47.0 %    MCV 96.7 82.0 - 98.0 fL    MCH 30.5 28.0 - 32.0 pg    MCHC 31.5 (L) 33.0 - 36.0 g/dL    RDW 12.6 12.0 - 15.0 %    Platelets 193 130 - 400 10*3/mm3    Neutrophil Rel % 71.6 39.0 - 78.0 %    Lymphocyte Rel % 23.2 15.0 - 45.0 %    Monocyte Rel % 4.3 4.0 - 12.0 %    Eosinophil Rel % 0.4 0.0 - 4.0 %    Basophil Rel % 0.2 0.0 - 2.0 %    Neutrophils Absolute 7.08 1.87 - 8.40 10*3/mm3    Lymphocytes Absolute 2.29 0.72 - 4.86 10*3/mm3    Monocytes Absolute 0.43 0.19 - 1.30 10*3/mm3    Eosinophils Absolute 0.04 0.00 - 0.70 10*3/mm3    Basophils Absolute 0.02 0.00 - 0.20 10*3/mm3    Immature Granulocyte Rel % 0.3 0.0 - 5.0 %    Immature Grans Absolute 0.03 0.00 - 0.03 10*3/mm3    nRBC 0.0 0.0 - 0.0 /100 WBC       No results found for: PSA     Lab Results:  CBC:    Lab Results - Last 18 Months  Lab Units 03/26/18  0847 09/14/17  0853 06/21/17  1455 05/09/17  0859 01/17/17  0725 10/11/16  0923   WBC 10*3/mm3 9.89 7.20 7.40 6.80 8.77 9.20   HEMOGLOBIN g/dL 14.7 15.0 14.1 14.3 14.2 14.8   HEMATOCRIT % 46.6 43.9 44.3 44.8 44.1 47.5*   PLATELETS 10*3/mm3 193 144 154 158 165 154      BMP/CMP:    Lab Results - Last 18 Months  Lab Units 03/26/18  0847 09/14/17  0853 06/21/17  1455 05/09/17  0859 01/17/17  0725 10/11/16  0923   SODIUM mmol/L 143 141 143 140 140 144   POTASSIUM mmol/L 3.9 4.1 4.4 4.2 4.2 4.6   CHLORIDE mmol/L 101 103 106 103 98 103   CO2 mmol/L  --  26.0 28.0 28.0  --  28.0   TOTAL CO2, ARTERIAL mmol/L 25.0  --   --   --  28.0  --    GLUCOSE mg/dL 127*  --   --   --  127*  --    BUN mg/dL 13 17 17 15 12 15   CREATININE  "mg/dL 0.46* 0.52 0.50* 0.50* 0.53 0.60*   EGFR IF NONAFRICN AM mL/min/1.73 132 115 121 121 113 98   EGFR IF AFRICN AM mL/min/1.73 >150  --   --   --  137  --    CALCIUM mg/dL 10.1 9.5 9.6 9.3 10.0 10.3*     HEPATIC:    Lab Results - Last 18 Months  Lab Units 09/14/17  0853 06/21/17  1455 05/09/17  0859 01/17/17  0725 10/11/16  0923   ALT (SGPT) U/L 29 29 31 30 35   AST (SGOT) U/L 40 50* 53* 35 53*   ALK PHOS U/L 88 116 109 121* 125     THYROID:No results for input(s): TSH, T3FREE, FREET4, FTI in the last 36727 hours.    Invalid input(s): T3, T4, TEUP  A1C:    Lab Results - Last 18 Months  Lab Units 06/21/17  1456 10/11/16  0923   HEMOGLOBIN A1C % 6.7* 6.40     PSA:No results for input(s): PSA in the last 21759 hours.    Objective   /82 (BP Location: Left arm, Patient Position: Sitting, Cuff Size: Large Adult)   Pulse 74   Temp 98.5 °F (36.9 °C) (Oral)   Resp 18   Ht 160 cm (63\")   Wt 83 kg (183 lb)   LMP  (LMP Unknown)   SpO2 96%   BMI 32.42 kg/m²   Body mass index is 32.42 kg/m².    Physical Exam  GENERAL:  Well nourished/developed in no acute distress.   SKIN: Turgor excellent, without wound, rash, lesion  HEENT: Normal cephalic without trauma.  Pupils equal round reactive to light. Extraocular motions full without nystagmus.   External canals nonobstructive nontender without reddness. Tymphatic membranes rani with irwin structures intact.   Oral cavity without growths, exudates, and moist.  Posterior pharynx without mass, obstruction, redness.  No thyromegaly, mass, tenderness, lymphadenopathy and supple.  CV: Regular rhythm.  No murmur, gallop,  edema. Posterior pulses intact.  No carotid bruits.  CHEST: No chest wall tenderness or mass.   LUNGS: Symmetric motion with clear to auscultation.   ABD: Soft, nontender without mass.   ORTHO: Symmetric extremities without swelling/point tenderness.  Full gross range of motion except R knee; not tested.  NEURO: CN 2-12 grossly intact.  Symmetric facies " and UE/LE. 3/5 strength throughout. 1/4 x bicep equal reflexes.  Nonfocal use extremities. Speech clear.  Intact light touch with monofilament, vibratory sensation with tuning fork; equal toes/distal feet.    PSYCH: Oriented x 3.  Pleasant calm, well kept.  Purposeful/directed conservation with intact short/long gross memory.     Assessment/Plan     1. Elevated blood pressure reading    2. Hypertension, unspecified type    3. Controlled type 2 diabetes mellitus without complication, without long-term current use of insulin    4. Osteopenia, unspecified location        Rx: reviewed/changes:  Stay on Norvasc 5 mg one a day  Increase your Zestril/lisinopril 10 mg one a day; increase to two or 20 mg a day    LAB/Testing/Referrals: reviewed/orders:   Today: none  No orders of the defined types were placed in this encounter.    Usual:   6m CBC, CMP, A1c  12m CBC, CMP, A1c, LIPID, TSH, Vit D    Discussions:   Body mass index is 32.42 kg/m².   Discussed the patient's BMI with her. BMI is above normal parameters. Follow-up plan includes:  exercise counseling, nutrition counseling and as ortho will allow.  Non-smoker      Patient Instructions   Stay on Norvasc 5 mg one a day  Increase your Zestril/lisinopril 10 mg one a day; increase to two or 20 mg a day    Goal for your blood pressure is 130 range top and 80 range bottom and you feeling better. Use us/or home monitoring to prove this. (ok to call us if dont get there)        Follow up: Return for Dr Tinoco-, lab;, as planned 6.5.18.  Future Appointments  Date Time Provider Department Center   4/16/2018 2:45 PM  [facility]  [facility]   4/16/2018 3:20 PM Shanda Torres MD [facility]   6/5/2018 10:30 AM Conrado Tinoco MD [facility] None

## 2018-04-13 DIAGNOSIS — C50.919 MALIGNANT NEOPLASM OF FEMALE BREAST, UNSPECIFIED ESTROGEN RECEPTOR STATUS, UNSPECIFIED LATERALITY, UNSPECIFIED SITE OF BREAST (HCC): Primary | ICD-10-CM

## 2018-04-16 ENCOUNTER — LAB (OUTPATIENT)
Dept: LAB | Facility: HOSPITAL | Age: 73
End: 2018-04-16

## 2018-04-16 ENCOUNTER — OFFICE VISIT (OUTPATIENT)
Dept: ONCOLOGY | Facility: CLINIC | Age: 73
End: 2018-04-16

## 2018-04-16 VITALS
OXYGEN SATURATION: 93 % | SYSTOLIC BLOOD PRESSURE: 148 MMHG | HEART RATE: 63 BPM | DIASTOLIC BLOOD PRESSURE: 78 MMHG | WEIGHT: 182.9 LBS | TEMPERATURE: 97.4 F | RESPIRATION RATE: 18 BRPM | HEIGHT: 63 IN | BODY MASS INDEX: 32.41 KG/M2

## 2018-04-16 DIAGNOSIS — C50.919 MALIGNANT NEOPLASM OF FEMALE BREAST, UNSPECIFIED ESTROGEN RECEPTOR STATUS, UNSPECIFIED LATERALITY, UNSPECIFIED SITE OF BREAST (HCC): Primary | ICD-10-CM

## 2018-04-16 DIAGNOSIS — C50.512 MALIGNANT NEOPLASM OF LOWER-OUTER QUADRANT OF LEFT FEMALE BREAST, UNSPECIFIED ESTROGEN RECEPTOR STATUS (HCC): ICD-10-CM

## 2018-04-16 DIAGNOSIS — C50.912 MALIGNANT NEOPLASM OF LEFT BREAST IN FEMALE, ESTROGEN RECEPTOR POSITIVE, UNSPECIFIED SITE OF BREAST (HCC): Primary | ICD-10-CM

## 2018-04-16 DIAGNOSIS — I10 HYPERTENSION, UNSPECIFIED TYPE: Primary | Chronic | ICD-10-CM

## 2018-04-16 DIAGNOSIS — Z17.0 MALIGNANT NEOPLASM OF LEFT BREAST IN FEMALE, ESTROGEN RECEPTOR POSITIVE, UNSPECIFIED SITE OF BREAST (HCC): Primary | ICD-10-CM

## 2018-04-16 LAB
ALBUMIN SERPL-MCNC: 4.4 G/DL (ref 3.5–5)
ALBUMIN/GLOB SERPL: 1.5 G/DL (ref 1.1–2.5)
ALP SERPL-CCNC: 101 U/L (ref 24–120)
ALT SERPL W P-5'-P-CCNC: 28 U/L (ref 0–54)
ANION GAP SERPL CALCULATED.3IONS-SCNC: 10 MMOL/L (ref 4–13)
AST SERPL-CCNC: 27 U/L (ref 7–45)
BASOPHILS # BLD AUTO: 0.05 10*3/MM3 (ref 0–0.2)
BASOPHILS NFR BLD AUTO: 0.5 % (ref 0–2)
BILIRUB SERPL-MCNC: 0.4 MG/DL (ref 0.1–1)
BUN BLD-MCNC: 15 MG/DL (ref 5–21)
BUN/CREAT SERPL: 27.3 (ref 7–25)
CALCIUM SPEC-SCNC: 10.1 MG/DL (ref 8.4–10.4)
CHLORIDE SERPL-SCNC: 100 MMOL/L (ref 98–110)
CO2 SERPL-SCNC: 29 MMOL/L (ref 24–31)
CREAT BLD-MCNC: 0.55 MG/DL (ref 0.5–1.4)
DEPRECATED RDW RBC AUTO: 39.8 FL (ref 40–54)
EOSINOPHIL # BLD AUTO: 0.12 10*3/MM3 (ref 0–0.7)
EOSINOPHIL NFR BLD AUTO: 1.1 % (ref 0–4)
ERYTHROCYTE [DISTWIDTH] IN BLOOD BY AUTOMATED COUNT: 12.1 % (ref 12–15)
GFR SERPL CREATININE-BSD FRML MDRD: 108 ML/MIN/1.73
GLOBULIN UR ELPH-MCNC: 3 GM/DL
GLUCOSE BLD-MCNC: 88 MG/DL (ref 70–100)
HCT VFR BLD AUTO: 44.5 % (ref 37–47)
HGB BLD-MCNC: 15.1 G/DL (ref 12–16)
HOLD SPECIMEN: NORMAL
HOLD SPECIMEN: NORMAL
IMM GRANULOCYTES # BLD: 0.05 10*3/MM3 (ref 0–0.03)
IMM GRANULOCYTES NFR BLD: 0.5 % (ref 0–5)
LYMPHOCYTES # BLD AUTO: 3.5 10*3/MM3 (ref 0.72–4.86)
LYMPHOCYTES NFR BLD AUTO: 32.7 % (ref 15–45)
MCH RBC QN AUTO: 30.5 PG (ref 28–32)
MCHC RBC AUTO-ENTMCNC: 33.9 G/DL (ref 33–36)
MCV RBC AUTO: 89.9 FL (ref 82–98)
MONOCYTES # BLD AUTO: 0.66 10*3/MM3 (ref 0.19–1.3)
MONOCYTES NFR BLD AUTO: 6.2 % (ref 4–12)
NEUTROPHILS # BLD AUTO: 6.31 10*3/MM3 (ref 1.87–8.4)
NEUTROPHILS NFR BLD AUTO: 59 % (ref 39–78)
NRBC BLD MANUAL-RTO: 0 /100 WBC (ref 0–0)
PLATELET # BLD AUTO: 204 10*3/MM3 (ref 130–400)
PMV BLD AUTO: 10.7 FL (ref 6–12)
POTASSIUM BLD-SCNC: 4.4 MMOL/L (ref 3.5–5.3)
PROT SERPL-MCNC: 7.4 G/DL (ref 6.3–8.7)
RBC # BLD AUTO: 4.95 10*6/MM3 (ref 4.2–5.4)
SODIUM BLD-SCNC: 139 MMOL/L (ref 135–145)
WBC NRBC COR # BLD: 10.69 10*3/MM3 (ref 4.8–10.8)

## 2018-04-16 PROCEDURE — 80053 COMPREHEN METABOLIC PANEL: CPT | Performed by: INTERNAL MEDICINE

## 2018-04-16 PROCEDURE — 99214 OFFICE O/P EST MOD 30 MIN: CPT | Performed by: INTERNAL MEDICINE

## 2018-04-16 PROCEDURE — 85025 COMPLETE CBC W/AUTO DIFF WBC: CPT | Performed by: INTERNAL MEDICINE

## 2018-04-16 PROCEDURE — 36415 COLL VENOUS BLD VENIPUNCTURE: CPT

## 2018-04-16 RX ORDER — SODIUM CHLORIDE 0.9 % (FLUSH) 0.9 %
10 SYRINGE (ML) INJECTION AS NEEDED
Status: SHIPPED | OUTPATIENT
Start: 2018-04-16

## 2018-04-16 RX ORDER — SODIUM CHLORIDE 0.9 % (FLUSH) 0.9 %
10 SYRINGE (ML) INJECTION AS NEEDED
Status: CANCELLED | OUTPATIENT
Start: 2018-04-16

## 2018-04-16 RX ORDER — LISINOPRIL 20 MG/1
20 TABLET ORAL DAILY
Qty: 90 TABLET | Refills: 1 | Status: SHIPPED | OUTPATIENT
Start: 2018-04-16 | End: 2018-10-08 | Stop reason: SDUPTHER

## 2018-04-16 RX ADMIN — Medication 10 ML: at 15:03

## 2018-04-16 NOTE — TELEPHONE ENCOUNTER
"Vm \"I need a refill of the lisinopril the hospital put me on I only have 5 left\" called pt and advised will send in a 20mg tablet  "

## 2018-04-16 NOTE — PROGRESS NOTES
[facility]  HEMATOLOGY & ONCOLOGY        Subjective     VISIT DIAGNOSIS:   Encounter Diagnosis   Name Primary?   • Malignant neoplasm of left breast in female, estrogen receptor positive, unspecified site of breast Yes       REASON FOR VISIT:     Chief Complaint   Patient presents with   • Breast Cancer     f/u        HEMATOLOGY / ONCOLOGY HISTORY:   Oncology/Hematology History    Ms. Sanchez is a pleasant 71 year old  female with the diagnosis of stage IIA left breast carcinoma, ER/CA and HER2  positive 3+.  She is status post bilateral mastectomies and has completed 5 cycles of adjuvant systemic chemotherapy with taxotere, carboplatin and  Herceptin. She is presently receiving Herceptin 6 mg/kg every 3 weeks for a total dosing of 1 year with last dose planned for 2015.  Ms. Sanchez is also presently taking hormonal manipulation therapy with Arimidex 1 mg p.o. daily and this will be taken for 10 years, this  was initiated on 2015.  She had completed Herceptin. She had completed breast reconstruction process. We will give her a refill on her Arimidex. She is due for  her bone density in 2016.        Breast cancer    2016 Initial Diagnosis     Breast cancer          Cancer Staging Information:  No matching staging information was found for the patient.      INTERVAL HISTORY  Patient ID: Ira Sanchez is a 73 y.o. year old female with hormone positive breast cancer on hormonal manipulation and tolerates Arimidex very well with minimal side effects.  Denies any chest wall lesions, denies double vision, dizziness, nausea or vomiting, unintentional weight loss, night sweats.  She does have an active life.  --  recently  from head and neck ca. She started blood pr medication    Past Medical History:   Past Medical History:   Diagnosis Date   • Bradycardia    • Breast cancer    • Colon polyp    • Diabetes mellitus    • Peripheral neuropathy    • Urinary  incontinence      Past Surgical History:   Past Surgical History:   Procedure Laterality Date   • APPENDECTOMY     • BREAST SURGERY     • CATARACT EXTRACTION Bilateral    • DILATATION AND CURETTAGE     • KNEE SURGERY     • MASTECTOMY Bilateral    • NIPPLE TATTOO  04/12/2016   • PORTACATH PLACEMENT     • THROAT SURGERY       Social History:   Social History     Social History   • Marital status:      Spouse name: Horace   • Number of children: 0   • Years of education: 12.5     Occupational History   • retired      [employer]     Social History Main Topics   • Smoking status: Former Smoker     Packs/day: 2.00     Years: 37.00     Types: Cigarettes     Start date: 2/20/1963     Quit date: 9/29/2000   • Smokeless tobacco: Never Used   • Alcohol use No   • Drug use: No   • Sexual activity: Defer     Other Topics Concern   • Not on file     Social History Narrative   • No narrative on file     Family History:   Family History   Problem Relation Age of Onset   • Hypertension Mother    • Heart failure Mother    • Cancer Father      colon   • Colon cancer Father    • Cancer Maternal Aunt      breast   • No Known Problems Maternal Grandmother    • No Known Problems Maternal Grandfather    • No Known Problems Paternal Grandmother    • No Known Problems Paternal Grandfather    • Cancer Maternal Aunt      breast   • Cancer Maternal Aunt      breast       Review of Systems   Constitutional: Negative.    HENT: Negative.    Eyes: Negative.    Respiratory: Negative.    Cardiovascular: Negative.    Gastrointestinal: Negative.    Endocrine: Negative.    Genitourinary: Negative.    Musculoskeletal: Positive for arthralgias. Negative for myalgias.   Allergic/Immunologic: Negative.    Neurological: Negative.    Hematological: Negative.    Psychiatric/Behavioral: Negative.         Performance Status:  Asymptomatic    Medications:    Current Outpatient Prescriptions   Medication Sig Dispense Refill   •  amLODIPine (NORVASC) 5 MG tablet Take 1 tablet by mouth Daily. 30 tablet 5   • anastrozole (ARIMIDEX) 1 MG tablet TAKE 1 TABLET BY MOUTH DAILY 90 tablet 0   • Calcium Carb-Cholecalciferol (CALCIUM-VITAMIN D) 600-400 MG-UNIT tablet Take 1 tablet by mouth 2 (Two) Times a Day.     • cycloSPORINE (RESTASIS) 0.05 % ophthalmic emulsion 1 drop Every 12 (Twelve) Hours.     • diphenhydrAMINE (BENADRYL) 25 mg capsule Take 25 mg by mouth Daily As Needed.     • ibuprofen (ADVIL,MOTRIN) 200 MG tablet Take 200 mg by mouth As Needed.     • lisinopril (PRINIVIL,ZESTRIL) 20 MG tablet Take 1 tablet by mouth Daily. 90 tablet 1   • oxybutynin (DITROPAN) 5 MG tablet TAKE 1 TABLET BY MOUTH TWICE DAILY 180 tablet 1   • thiamine (VITAMINE B-1) 100 MG tablet Take 100 mg by mouth Daily.       No current facility-administered medications for this visit.        ALLERGIES:    Allergies   Allergen Reactions   • Benzonatate Hives and Itching       Objective      There were no vitals filed for this visit.      Current Status 9/14/2017   ECOG score 0         Physical Exam      General Appearance: Patient is awake, alert, oriented and in no acute distress. Patient is welldeveloped, wellnourished, and appears stated age.  HEENT: Normocephalic. Sclerae clear, conjunctiva pink, extraocular movements intact, pupils, round, reactive to light and  accommodation. Mouth and throat are clear with moist oral mucosa.  NECK: Supple, no jugular venous distention, thyroid not enlarged.  LYMPH: No cervical, supraclavicular, axillary, or inguinal lymphadenopathy.  CHEST: Equal bilateral expansion, AP  diameter normal, resonant percussion note  LUNGS: Good air movement, no rales, rhonchi, rubs or wheezes with auscultation  CARDIO: Regular sinus rhythm, no murmurs, gallops or rubs.  ABDOMEN: Nondistended, soft, No tenderness, no guarding, no rebound, No hepatosplenomegaly. No abdominal masses. Bowel sounds positive. No hernia  GENITALIA: Not examined.  BREASTS:  Bilateral mastectomy with reconstruction.  Well-healed.  No skin lesions.  MUSKEL: No joint swelling, decreased motion, or inflammation  EXTREMS: No edema, clubbing, cyanosis, No varicose veins.  NEURO: Grossly nonfocal, Gait is coordinated and smooth, Cognition is preserved.  SKIN: No rashes, no ecchymoses, no petechia.  PSYCH: Oriented to time, place and person. Memory is preserved. Mood and affect appear normal  RECENT LABS:  No visits with results within 7 Day(s) from this visit.   Latest known visit with results is:   Office Visit on 03/26/2018   Component Date Value Ref Range Status   • Glucose 03/27/2018 127* 70 - 100 mg/dL Final   • BUN 03/27/2018 13  5 - 21 mg/dL Final   • Creatinine 03/27/2018 0.46* 0.50 - 1.40 mg/dL Final   • eGFR Non African Am 03/27/2018 132  >60 mL/min/1.73 Final    Comment: The MDRD GFR formula is only valid for adults with stable  renal function between ages 18 and 70.     • eGFR  Am 03/27/2018 >150  >60 mL/min/1.73 Final   • BUN/Creatinine Ratio 03/27/2018 28.3* 7.0 - 25.0 Final   • Sodium 03/27/2018 143  135 - 145 mmol/L Final   • Potassium 03/27/2018 3.9  3.5 - 5.3 mmol/L Final   • Chloride 03/27/2018 101  98 - 110 mmol/L Final   • Total CO2 03/27/2018 25.0  24.0 - 31.0 mmol/L Final   • Calcium 03/27/2018 10.1  8.4 - 10.4 mg/dL Final   • WBC 03/27/2018 9.89  4.80 - 10.80 10*3/mm3 Final   • RBC 03/27/2018 4.82  4.20 - 5.40 10*6/mm3 Final   • Hemoglobin 03/27/2018 14.7  12.0 - 16.0 g/dL Final   • Hematocrit 03/27/2018 46.6  37.0 - 47.0 % Final   • MCV 03/27/2018 96.7  82.0 - 98.0 fL Final   • MCH 03/27/2018 30.5  28.0 - 32.0 pg Final   • MCHC 03/27/2018 31.5* 33.0 - 36.0 g/dL Final   • RDW 03/27/2018 12.6  12.0 - 15.0 % Final   • Platelets 03/27/2018 193  130 - 400 10*3/mm3 Final   • Neutrophil Rel % 03/27/2018 71.6  39.0 - 78.0 % Final   • Lymphocyte Rel % 03/27/2018 23.2  15.0 - 45.0 % Final   • Monocyte Rel % 03/27/2018 4.3  4.0 - 12.0 % Final   • Eosinophil Rel %  03/27/2018 0.4  0.0 - 4.0 % Final   • Basophil Rel % 03/27/2018 0.2  0.0 - 2.0 % Final   • Neutrophils Absolute 03/27/2018 7.08  1.87 - 8.40 10*3/mm3 Final   • Lymphocytes Absolute 03/27/2018 2.29  0.72 - 4.86 10*3/mm3 Final   • Monocytes Absolute 03/27/2018 0.43  0.19 - 1.30 10*3/mm3 Final   • Eosinophils Absolute 03/27/2018 0.04  0.00 - 0.70 10*3/mm3 Final   • Basophils Absolute 03/27/2018 0.02  0.00 - 0.20 10*3/mm3 Final   • Immature Granulocyte Rel % 03/27/2018 0.3  0.0 - 5.0 % Final   • Immature Grans Absolute 03/27/2018 0.03  0.00 - 0.03 10*3/mm3 Final   • nRBC 03/27/2018 0.0  0.0 - 0.0 /100 WBC Final       RADIOLOGY:  No results found.         Assessment/Plan   72-year-old female diagnosed Stage IIa left-sided breast cancer, status post prophylactic mastectomy on the right side and bilateral breast reconstruction.  All this in 2014.  She is status post chemotherapy adjuvantly and currently on Arimidex 1 mg daily to complete a total of 5 years.        Patient Active Problem List   Diagnosis   • Obesity   • Breast cancer   • Overactive bladder   • Diabetes type 2, controlled   • Hypertension   • Osteopenia   • Neuropathy-chemo/better   • Osteoarthritis of multiple joints   • Malignant neoplasm of lower-outer quadrant of left female breast   • Family history of malignant neoplasm of breast   • Malignant neoplasm of upper-outer quadrant of female breast   • History of bilateral mastectomy   • Wellness examination-done   • Pain in shoulder   • Chronic back pain   • Laboratory test*   • Bradycardia          1.ER positive breast cancer: Status post bilateral mastectomy.  No evidence of disease recurrence on physical exam or review of labs done today.  Per NCCN guidelines no indication for tumor markers.    2. Osteoarthritis: Follow with primary care.    3. Osteopenia: Continue carcinoma vitamin D next DEXA scan 9/18.    4.  Health maintenance: She is previously been a smoker and more recently has undergone spiral  screening CT scan shows no evidence of any cancer at least in the chest and the chest wall area.           Shanda Torres MD    4/16/2018    2:25 PM

## 2018-05-07 RX ORDER — OXYBUTYNIN CHLORIDE 5 MG/1
TABLET ORAL
Qty: 180 TABLET | Refills: 1 | Status: SHIPPED | OUTPATIENT
Start: 2018-05-07 | End: 2018-11-03 | Stop reason: SDUPTHER

## 2018-05-09 ENCOUNTER — OFFICE VISIT (OUTPATIENT)
Dept: FAMILY MEDICINE CLINIC | Facility: CLINIC | Age: 73
End: 2018-05-09

## 2018-05-09 ENCOUNTER — TELEPHONE (OUTPATIENT)
Dept: FAMILY MEDICINE CLINIC | Facility: CLINIC | Age: 73
End: 2018-05-09

## 2018-05-09 VITALS
HEART RATE: 78 BPM | RESPIRATION RATE: 18 BRPM | DIASTOLIC BLOOD PRESSURE: 76 MMHG | WEIGHT: 180 LBS | OXYGEN SATURATION: 96 % | BODY MASS INDEX: 31.89 KG/M2 | HEIGHT: 63 IN | TEMPERATURE: 99.2 F | SYSTOLIC BLOOD PRESSURE: 118 MMHG

## 2018-05-09 DIAGNOSIS — G89.29 CHRONIC BACK PAIN, UNSPECIFIED BACK LOCATION, UNSPECIFIED BACK PAIN LATERALITY: ICD-10-CM

## 2018-05-09 DIAGNOSIS — M25.519 SHOULDER PAIN, UNSPECIFIED CHRONICITY, UNSPECIFIED LATERALITY: ICD-10-CM

## 2018-05-09 DIAGNOSIS — E11.9 CONTROLLED TYPE 2 DIABETES MELLITUS WITHOUT COMPLICATION, WITHOUT LONG-TERM CURRENT USE OF INSULIN (HCC): Chronic | ICD-10-CM

## 2018-05-09 DIAGNOSIS — R22.1 NECK MASS: ICD-10-CM

## 2018-05-09 DIAGNOSIS — J31.0 RHINITIS, UNSPECIFIED CHRONICITY, UNSPECIFIED TYPE: ICD-10-CM

## 2018-05-09 DIAGNOSIS — R05.9 COUGH: ICD-10-CM

## 2018-05-09 DIAGNOSIS — J30.9 ALLERGIC RHINITIS, UNSPECIFIED CHRONICITY, UNSPECIFIED SEASONALITY, UNSPECIFIED TRIGGER: Primary | ICD-10-CM

## 2018-05-09 DIAGNOSIS — J32.9 SINUSITIS, UNSPECIFIED CHRONICITY, UNSPECIFIED LOCATION: ICD-10-CM

## 2018-05-09 DIAGNOSIS — I10 HYPERTENSION, UNSPECIFIED TYPE: Chronic | ICD-10-CM

## 2018-05-09 DIAGNOSIS — M15.9 OSTEOARTHRITIS OF MULTIPLE JOINTS, UNSPECIFIED OSTEOARTHRITIS TYPE: Chronic | ICD-10-CM

## 2018-05-09 DIAGNOSIS — M54.9 CHRONIC BACK PAIN, UNSPECIFIED BACK LOCATION, UNSPECIFIED BACK PAIN LATERALITY: ICD-10-CM

## 2018-05-09 PROCEDURE — 99213 OFFICE O/P EST LOW 20 MIN: CPT | Performed by: FAMILY MEDICINE

## 2018-05-09 RX ORDER — AZITHROMYCIN 250 MG/1
TABLET, FILM COATED ORAL
Qty: 6 TABLET | Refills: 0 | Status: SHIPPED | OUTPATIENT
Start: 2018-05-09 | End: 2018-06-05

## 2018-05-09 RX ORDER — METHYLPREDNISOLONE 4 MG/1
TABLET ORAL
Qty: 1 EACH | Refills: 0 | Status: SHIPPED | OUTPATIENT
Start: 2018-05-09 | End: 2018-06-05

## 2018-05-09 RX ORDER — FLUTICASONE PROPIONATE 50 MCG
2 SPRAY, SUSPENSION (ML) NASAL DAILY
Qty: 1 BOTTLE | Refills: 0 | Status: SHIPPED | OUTPATIENT
Start: 2018-05-09 | End: 2019-03-26

## 2018-05-09 NOTE — PROGRESS NOTES
Subjective   Ira Sanchez is a 73 y.o. female presenting with chief complaint of:   Chief Complaint   Patient presents with   • URI   • Shortness of Breath   • Wheezing   • Cough       History of Present Illness :  Alone.  Here for primarily an acute issue today; URI.   Has multiple chronic problems to consider that might have a bearing on today's issues; not an interval appointment.    3-4 days ago increased sinus/nasal congestion, alittle sore throat, without fever.  Infrequent cough without wheeze, sob.     1. Allergic rhinitis, unspecified chronicity, unspecified seasonality, unspecified trigger    2. Cough    3. Shoulder pain, unspecified chronicity, unspecified laterality    4. Hypertension, unspecified type    5. Sinusitis, unspecified chronicity, unspecified location    6. Controlled type 2 diabetes mellitus without complication, without long-term current use of insulin    7. Neck mass    8. Osteoarthritis of multiple joints, unspecified osteoarthritis type    9. Chronic back pain, unspecified back location, unspecified back pain laterality    10. Rhinitis, unspecified chronicity, unspecified type        Other chronic problem/s to consider:   HTN.  The HTN has been present for years/it is chronic.  The HTN is assumed essential/without testing needed to look for other.  The HTN is controlled manifest by todays blood pressure and no home monitoring.  Associated illness below.   DM2: This has been present for years/over a year.  It is chronic.  It is generally controlled.  Home accuchecks infrequently run.  No hypoglycemia manifest as syncope/near syncope or diaphoretic/sweating episodes.  A1c below if available.  Diet loose.   Allergic rhinitis:  This has been present for years/over a year.  The nasal/sinus congestion on/off has been present for years and is currently stable/ same as usual.  Medications not used regularily.  Not as much problem while living in [place]; since moved back more spring/fall and  seasonal.   Breast CA:  Has had several years/chronic.  Sees oncology 11/2018 as 6m.  ? Knot R neck.     Has an/another acute issue today: none.    The following portions of the patient's history were reviewed and updated as appropriate: allergies, current medications, past family history, past medical history, past social history, past surgical history and problem list.  Records acquired and reviewed; TCC migrated.      Current Outpatient Prescriptions:   •  amLODIPine (NORVASC) 5 MG tablet, Take 1 tablet by mouth Daily., Disp: 30 tablet, Rfl: 5  •  anastrozole (ARIMIDEX) 1 MG tablet, TAKE 1 TABLET BY MOUTH DAILY, Disp: 90 tablet, Rfl: 0  •  Calcium Carb-Cholecalciferol (CALCIUM-VITAMIN D) 600-400 MG-UNIT tablet, Take 1 tablet by mouth 2 (Two) Times a Day., Disp: , Rfl:   •  cycloSPORINE (RESTASIS) 0.05 % ophthalmic emulsion, 1 drop Every 12 (Twelve) Hours., Disp: , Rfl:   •  diphenhydrAMINE (BENADRYL) 25 mg capsule, Take 25 mg by mouth Daily As Needed., Disp: , Rfl:   •  lisinopril (PRINIVIL,ZESTRIL) 20 MG tablet, Take 1 tablet by mouth Daily., Disp: 90 tablet, Rfl: 1  •  oxybutynin (DITROPAN) 5 MG tablet, TAKE 1 TABLET BY MOUTH TWICE DAILY, Disp: 180 tablet, Rfl: 1  •  thiamine (VITAMINE B-1) 100 MG tablet, Take 100 mg by mouth Daily., Disp: , Rfl:   •  ibuprofen (ADVIL,MOTRIN) 200 MG tablet, Take 200 mg by mouth As Needed., Disp: , Rfl:   No current facility-administered medications for this visit.     Facility-Administered Medications Ordered in Other Visits:   •  heparin flush (porcine) 100 UNIT/ML injection 500 Units, 500 Units, Intravenous, PRN, Shanda Torres MD, 500 Units at 04/16/18 1504  •  sodium chloride 0.9 % flush 10 mL, 10 mL, Intravenous, PRN, Shanda Torres MD, 10 mL at 04/16/18 1503       No problems with medications.  Refills if needed done    Allergies   Allergen Reactions   • Benzonatate Hives and Itching       Review of Systems  GENERAL:  Active/slower with limits,  speed, stamina for age and acute fatigue with this. Sleep is ok. No fever now.  SKIN: No  rash/skin lesion of concern:   ENDO:  No syncope, near or diaphoretic sweaty spells.  No download..  HEENT: No recent head injury; increased sinus area headache,  No vision change.  Muffled acute but no chronic hearing loss.  Ears with fullness without pain/drainage.  No sore throat now.  Increased  significant nasal/sinus congestion/drainage. No epistaxis. ? Knot R neck.   CHEST: No chest wall tenderness or mass.  No significant cough,  without wheeze.  No SOB; no hemoptysis.  CV: No chest pain, palpitations, ankle edema.  GI: No heartburn, dysphagia.  No abdominal pain, diarrhea, constipation.  No rectal bleeding, or melena.    :  Voids without dysuria, or  incontinence to completion.  ORTHO: No painful/swollen joints but various on /off sore.  No sore neck or back.  No acute neck or back pain without recent injury.  NEURO: No dizziness, weakness of extremities.  No numbness/paresthesias.   PSYCH: No memory loss.  Mood good; mild anxious, depressed but/and not suicidal.  Tries to tolerate stress .     Results for orders placed or performed in visit on 04/16/18   Comprehensive Metabolic Panel   Result Value Ref Range    Glucose 88 70 - 100 mg/dL    BUN 15 5 - 21 mg/dL    Creatinine 0.55 0.50 - 1.40 mg/dL    Sodium 139 135 - 145 mmol/L    Potassium 4.4 3.5 - 5.3 mmol/L    Chloride 100 98 - 110 mmol/L    CO2 29.0 24.0 - 31.0 mmol/L    Calcium 10.1 8.4 - 10.4 mg/dL    Total Protein 7.4 6.3 - 8.7 g/dL    Albumin 4.40 3.50 - 5.00 g/dL    ALT (SGPT) 28 0 - 54 U/L    AST (SGOT) 27 7 - 45 U/L    Alkaline Phosphatase 101 24 - 120 U/L    Total Bilirubin 0.4 0.1 - 1.0 mg/dL    eGFR Non African Amer 108 >60 mL/min/1.73    Globulin 3.0 gm/dL    A/G Ratio 1.5 1.1 - 2.5 g/dL    BUN/Creatinine Ratio 27.3 (H) 7.0 - 25.0    Anion Gap 10.0 4.0 - 13.0 mmol/L   CBC Auto Differential   Result Value Ref Range    WBC 10.69 4.80 - 10.80 10*3/mm3     RBC 4.95 4.20 - 5.40 10*6/mm3    Hemoglobin 15.1 12.0 - 16.0 g/dL    Hematocrit 44.5 37.0 - 47.0 %    MCV 89.9 82.0 - 98.0 fL    MCH 30.5 28.0 - 32.0 pg    MCHC 33.9 33.0 - 36.0 g/dL    RDW 12.1 12.0 - 15.0 %    RDW-SD 39.8 (L) 40.0 - 54.0 fl    MPV 10.7 6.0 - 12.0 fL    Platelets 204 130 - 400 10*3/mm3    Neutrophil % 59.0 39.0 - 78.0 %    Lymphocyte % 32.7 15.0 - 45.0 %    Monocyte % 6.2 4.0 - 12.0 %    Eosinophil % 1.1 0.0 - 4.0 %    Basophil % 0.5 0.0 - 2.0 %    Immature Grans % 0.5 0.0 - 5.0 %    Neutrophils, Absolute 6.31 1.87 - 8.40 10*3/mm3    Lymphocytes, Absolute 3.50 0.72 - 4.86 10*3/mm3    Monocytes, Absolute 0.66 0.19 - 1.30 10*3/mm3    Eosinophils, Absolute 0.12 0.00 - 0.70 10*3/mm3    Basophils, Absolute 0.05 0.00 - 0.20 10*3/mm3    Immature Grans, Absolute 0.05 (H) 0.00 - 0.03 10*3/mm3    nRBC 0.0 0.0 - 0.0 /100 WBC   Gold Top - SST   Result Value Ref Range    Extra Tube Hold for add-ons.    Gold Top - SST   Result Value Ref Range    Extra Tube Hold for add-ons.        No results found for: PSA     Lab Results:  CBC:    Lab Results - Last 18 Months  Lab Units 04/16/18  1424 03/26/18  0847 09/14/17  0853 06/21/17  1455 05/09/17  0859 01/17/17  0725   WBC 10*3/mm3 10.69 9.89 7.20 7.40 6.80 8.77   HEMOGLOBIN g/dL 15.1 14.7 15.0 14.1 14.3 14.2   HEMATOCRIT % 44.5 46.6 43.9 44.3 44.8 44.1   PLATELETS 10*3/mm3 204 193 144 154 158 165      BMP/CMP:    Lab Results - Last 18 Months  Lab Units 04/16/18  1424 03/26/18  0847 09/14/17  0853 06/21/17  1455 05/09/17  0859 01/17/17  0725   SODIUM mmol/L 139 143 141 143 140 140   POTASSIUM mmol/L 4.4 3.9 4.1 4.4 4.2 4.2   CHLORIDE mmol/L 100 101 103 106 103 98   CO2 mmol/L 29.0  --  26.0 28.0 28.0  --    TOTAL CO2, ARTERIAL mmol/L  --  25.0  --   --   --  28.0   GLUCOSE mg/dL  --  127*  --   --   --  127*   BUN mg/dL 15 13 17 17 15 12   CREATININE mg/dL 0.55 0.46* 0.52 0.50* 0.50* 0.53   EGFR IF NONAFRICN AM mL/min/1.73 108 132 115 121 121 113   EGFR IF AFRICN AM  "mL/min/1.73  --  >150  --   --   --  137   CALCIUM mg/dL 10.1 10.1 9.5 9.6 9.3 10.0     HEPATIC:    Lab Results - Last 18 Months  Lab Units 04/16/18  1424 09/14/17  0853 06/21/17  1455 05/09/17  0859 01/17/17  0725   ALT (SGPT) U/L 28 29 29 31 30   AST (SGOT) U/L 27 40 50* 53* 35   ALK PHOS U/L 101 88 116 109 121*     THYROID:No results for input(s): TSH, T3FREE, FREET4, FTI in the last 14372 hours.    Invalid input(s): T3, T4, TEUP  A1C:    Lab Results - Last 18 Months  Lab Units 06/21/17  1456   HEMOGLOBIN A1C % 6.7*     PSA:No results for input(s): PSA in the last 15485 hours.    Objective   /76 (BP Location: Left arm, Patient Position: Sitting, Cuff Size: Large Adult)   Pulse 78   Temp 99.2 °F (37.3 °C) (Oral)   Resp 18   Ht 160 cm (63\")   Wt 81.6 kg (180 lb)   LMP  (LMP Unknown)   SpO2 96%   BMI 31.89 kg/m²   Body mass index is 31.89 kg/m².    Physical Exam  GENERAL:  Well nourished/developed in no acute distress.   SKIN: Turgor excellent, without wound, rash, lesion  HEENT: Normal cephalic without trauma.  Pupils equal round reactive to light. Extraocular motions full without nystagmus.   External canals nonobstructive nontender without reddness. Tymphatic membranes dull with irwin structures intact.   Oral cavity without growths, exudates, and moist.  Posterior pharynx without mass, obstruction, redness.  No thyromegaly, mass, tenderness, definite lymphadenopathy and supple; ? 1 cm soft, movable posterior cervical node.   CV: Regular rhythm.  No murmur, gallop,  edema. Posterior pulses intact.  No carotid bruits.  CHEST: No chest wall tenderness or mass.   LUNGS: Symmetric motion with clear to auscultation.  No dullness to percussion  ABD: Soft, nontender without mass.   ORTHO: Symmetric extremities without swelling/point tenderness.  Full gross range of motion; R shoulder sore. .  NEURO: CN 2-12 grossly intact.  Symmetric facies and UE/LE. 3/5 strength throughout.   Nonfocal use extremities. " Speech clear.     PSYCH: Oriented x 3.  Pleasant calm, well kept.  Purposeful/directed conservation with intact short/long gross memory.     Assessment/Plan     1. Allergic rhinitis, unspecified chronicity, unspecified seasonality, unspecified trigger    2. Cough    3. Shoulder pain, unspecified chronicity, unspecified laterality    4. Hypertension, unspecified type    5. Sinusitis, unspecified chronicity, unspecified location    6. Controlled type 2 diabetes mellitus without complication, without long-term current use of insulin    7. Neck mass    8. Osteoarthritis of multiple joints, unspecified osteoarthritis type    9. Chronic back pain, unspecified back location, unspecified back pain laterality    10. Rhinitis, unspecified chronicity, unspecified type        Rx: reviewed/changes:  Medrol dose  z pack  flonase    LAB/Testing/Referrals: reviewed/orders:   Today:   No orders of the defined types were placed in this encounter.    Usual:   6m CBC, CMP, A1c  12m CBC, CMP, A1c, LIPID, TSH, Vit D    Discussions:   Body mass index is 31.89 kg/m².   Patient's Body mass index is 31.89 kg/m². BMI is above normal parameters. Recommendations include: exercise counseling and nutrition counseling.  Non-smoker      Patient Instructions   Need to discuss your neck bump with cancer doctor    Avoid allergies.     When using steroids  A. Do not use anti-inflammatories such as Motrin/ibuprofen, Alleve/naprosyn, Mobic and like medications    Consider calling for appointment to do injection in shoulder if Rx does not work.         Follow up: Return for lab;, Dr Tinoco-, as planned;.  Future Appointments  Date Time Provider Department Center   6/5/2018 10:30 AM Conrado Tinoco MD [facility] METR None   6/12/2018 2:00 PM W ONC PAD NURSE W ONC PAD PAD   10/16/2018 2:00 PM  PAD CANCER CTR LAB  PAD CCLAB PAD   10/16/2018 2:30 PM Shanda Torres MD [facility] ONC PAD PAD

## 2018-05-09 NOTE — PATIENT INSTRUCTIONS
Need to discuss your neck bump with cancer doctor    Avoid allergies.     When using steroids  A. Do not use anti-inflammatories such as Motrin/ibuprofen, Alleve/naprosyn, Mobic and like medications    Consider calling for appointment to do injection in shoulder if Rx does not work.

## 2018-05-11 ENCOUNTER — RESULTS ENCOUNTER (OUTPATIENT)
Dept: FAMILY MEDICINE CLINIC | Facility: CLINIC | Age: 73
End: 2018-05-11

## 2018-05-11 DIAGNOSIS — E11.9 CONTROLLED TYPE 2 DIABETES MELLITUS WITHOUT COMPLICATION, WITHOUT LONG-TERM CURRENT USE OF INSULIN (HCC): Chronic | ICD-10-CM

## 2018-06-05 ENCOUNTER — OFFICE VISIT (OUTPATIENT)
Dept: FAMILY MEDICINE CLINIC | Facility: CLINIC | Age: 73
End: 2018-06-05

## 2018-06-05 VITALS
DIASTOLIC BLOOD PRESSURE: 78 MMHG | WEIGHT: 180 LBS | HEART RATE: 58 BPM | SYSTOLIC BLOOD PRESSURE: 130 MMHG | HEIGHT: 63 IN | BODY MASS INDEX: 31.89 KG/M2 | RESPIRATION RATE: 18 BRPM | TEMPERATURE: 97.9 F | OXYGEN SATURATION: 98 %

## 2018-06-05 DIAGNOSIS — C50.919 MALIGNANT NEOPLASM OF FEMALE BREAST, UNSPECIFIED ESTROGEN RECEPTOR STATUS, UNSPECIFIED LATERALITY, UNSPECIFIED SITE OF BREAST (HCC): Primary | ICD-10-CM

## 2018-06-05 DIAGNOSIS — M25.519 SHOULDER PAIN, UNSPECIFIED CHRONICITY, UNSPECIFIED LATERALITY: Primary | ICD-10-CM

## 2018-06-05 DIAGNOSIS — I10 HYPERTENSION, UNSPECIFIED TYPE: Chronic | ICD-10-CM

## 2018-06-05 DIAGNOSIS — E11.9 CONTROLLED TYPE 2 DIABETES MELLITUS WITHOUT COMPLICATION, WITHOUT LONG-TERM CURRENT USE OF INSULIN (HCC): Chronic | ICD-10-CM

## 2018-06-05 PROCEDURE — 99213 OFFICE O/P EST LOW 20 MIN: CPT | Performed by: FAMILY MEDICINE

## 2018-06-05 NOTE — PATIENT INSTRUCTIONS
Bone density in the fall    Regular cardio exercise something everyone should consider and try to do; even if health limitations (ie find that exercise UE/LE/cardio that they can tolerate). (as we discussed)  Normal weight a goal for everyone (as we discussed)  Healthy diet helpful for weight management, illness prevention (as we discussed)  If over 50-screening exams include men PSA/rectal exam, women mammograms, and  everyone colonoscopy screening for colon cancer.

## 2018-06-12 ENCOUNTER — OFFICE VISIT (OUTPATIENT)
Dept: ONCOLOGY | Facility: CLINIC | Age: 73
End: 2018-06-12

## 2018-06-12 ENCOUNTER — LAB (OUTPATIENT)
Dept: LAB | Facility: HOSPITAL | Age: 73
End: 2018-06-12

## 2018-06-12 VITALS
TEMPERATURE: 97.7 F | OXYGEN SATURATION: 93 % | RESPIRATION RATE: 16 BRPM | SYSTOLIC BLOOD PRESSURE: 132 MMHG | DIASTOLIC BLOOD PRESSURE: 78 MMHG | HEART RATE: 56 BPM | WEIGHT: 183.2 LBS | HEIGHT: 63 IN | BODY MASS INDEX: 32.46 KG/M2

## 2018-06-12 DIAGNOSIS — C50.512 MALIGNANT NEOPLASM OF LOWER-OUTER QUADRANT OF LEFT FEMALE BREAST, UNSPECIFIED ESTROGEN RECEPTOR STATUS (HCC): ICD-10-CM

## 2018-06-12 DIAGNOSIS — Z17.0 MALIGNANT NEOPLASM OF UPPER-INNER QUADRANT OF LEFT BREAST IN FEMALE, ESTROGEN RECEPTOR POSITIVE (HCC): ICD-10-CM

## 2018-06-12 DIAGNOSIS — R22.1 LOCALIZED SWELLING, MASS OR LUMP OF NECK: Primary | ICD-10-CM

## 2018-06-12 DIAGNOSIS — C50.919 MALIGNANT NEOPLASM OF FEMALE BREAST, UNSPECIFIED ESTROGEN RECEPTOR STATUS, UNSPECIFIED LATERALITY, UNSPECIFIED SITE OF BREAST (HCC): Primary | ICD-10-CM

## 2018-06-12 DIAGNOSIS — C50.212 MALIGNANT NEOPLASM OF UPPER-INNER QUADRANT OF LEFT BREAST IN FEMALE, ESTROGEN RECEPTOR POSITIVE (HCC): ICD-10-CM

## 2018-06-12 LAB
ALBUMIN SERPL-MCNC: 4.3 G/DL (ref 3.5–5)
ALBUMIN/GLOB SERPL: 1.4 G/DL (ref 1.1–2.5)
ALP SERPL-CCNC: 97 U/L (ref 24–120)
ALT SERPL W P-5'-P-CCNC: 16 U/L (ref 0–54)
ANION GAP SERPL CALCULATED.3IONS-SCNC: 11 MMOL/L (ref 4–13)
AST SERPL-CCNC: 25 U/L (ref 7–45)
BASOPHILS # BLD AUTO: 0.05 10*3/MM3 (ref 0–0.2)
BASOPHILS NFR BLD AUTO: 0.6 % (ref 0–2)
BILIRUB SERPL-MCNC: 0.5 MG/DL (ref 0.1–1)
BUN BLD-MCNC: 14 MG/DL (ref 5–21)
BUN/CREAT SERPL: 26.9 (ref 7–25)
CALCIUM SPEC-SCNC: 9.9 MG/DL (ref 8.4–10.4)
CHLORIDE SERPL-SCNC: 101 MMOL/L (ref 98–110)
CO2 SERPL-SCNC: 29 MMOL/L (ref 24–31)
CREAT BLD-MCNC: 0.52 MG/DL (ref 0.5–1.4)
DEPRECATED RDW RBC AUTO: 42.2 FL (ref 40–54)
EOSINOPHIL # BLD AUTO: 0.15 10*3/MM3 (ref 0–0.7)
EOSINOPHIL NFR BLD AUTO: 1.9 % (ref 0–4)
ERYTHROCYTE [DISTWIDTH] IN BLOOD BY AUTOMATED COUNT: 12.6 % (ref 12–15)
GFR SERPL CREATININE-BSD FRML MDRD: 115 ML/MIN/1.73
GLOBULIN UR ELPH-MCNC: 3 GM/DL
GLUCOSE BLD-MCNC: 80 MG/DL (ref 70–100)
HCT VFR BLD AUTO: 41.8 % (ref 37–47)
HGB BLD-MCNC: 14.1 G/DL (ref 12–16)
HOLD SPECIMEN: NORMAL
HOLD SPECIMEN: NORMAL
IMM GRANULOCYTES # BLD: 0.04 10*3/MM3 (ref 0–0.03)
IMM GRANULOCYTES NFR BLD: 0.5 % (ref 0–5)
LYMPHOCYTES # BLD AUTO: 2.79 10*3/MM3 (ref 0.72–4.86)
LYMPHOCYTES NFR BLD AUTO: 36.1 % (ref 15–45)
MCH RBC QN AUTO: 30.8 PG (ref 28–32)
MCHC RBC AUTO-ENTMCNC: 33.7 G/DL (ref 33–36)
MCV RBC AUTO: 91.3 FL (ref 82–98)
MONOCYTES # BLD AUTO: 0.53 10*3/MM3 (ref 0.19–1.3)
MONOCYTES NFR BLD AUTO: 6.9 % (ref 4–12)
NEUTROPHILS # BLD AUTO: 4.16 10*3/MM3 (ref 1.87–8.4)
NEUTROPHILS NFR BLD AUTO: 54 % (ref 39–78)
NRBC BLD MANUAL-RTO: 0 /100 WBC (ref 0–0)
PLATELET # BLD AUTO: 185 10*3/MM3 (ref 130–400)
PMV BLD AUTO: 10.8 FL (ref 6–12)
POTASSIUM BLD-SCNC: 4.1 MMOL/L (ref 3.5–5.3)
PROT SERPL-MCNC: 7.3 G/DL (ref 6.3–8.7)
RBC # BLD AUTO: 4.58 10*6/MM3 (ref 4.2–5.4)
SODIUM BLD-SCNC: 141 MMOL/L (ref 135–145)
WBC NRBC COR # BLD: 7.72 10*3/MM3 (ref 4.8–10.8)

## 2018-06-12 PROCEDURE — 36415 COLL VENOUS BLD VENIPUNCTURE: CPT

## 2018-06-12 PROCEDURE — 85025 COMPLETE CBC W/AUTO DIFF WBC: CPT

## 2018-06-12 PROCEDURE — 80053 COMPREHEN METABOLIC PANEL: CPT

## 2018-06-12 PROCEDURE — 99214 OFFICE O/P EST MOD 30 MIN: CPT | Performed by: INTERNAL MEDICINE

## 2018-06-12 RX ORDER — SODIUM CHLORIDE 0.9 % (FLUSH) 0.9 %
10 SYRINGE (ML) INJECTION AS NEEDED
Status: CANCELLED | OUTPATIENT
Start: 2018-06-12

## 2018-06-12 RX ORDER — SODIUM CHLORIDE 0.9 % (FLUSH) 0.9 %
10 SYRINGE (ML) INJECTION AS NEEDED
Status: SHIPPED | OUTPATIENT
Start: 2018-06-12

## 2018-06-12 RX ADMIN — Medication 10 ML: at 14:45

## 2018-06-12 NOTE — PROGRESS NOTES
[facility]  HEMATOLOGY & ONCOLOGY        Subjective     VISIT DIAGNOSIS:   Encounter Diagnoses   Name Primary?   • Localized swelling, mass or lump of neck Yes   • Malignant neoplasm of upper-inner quadrant of left breast in female, estrogen receptor positive        REASON FOR VISIT:     Chief Complaint   Patient presents with   • Malignant neoplasm of upper-inner quadrant of left female br   • Neck Mass        HEMATOLOGY / ONCOLOGY HISTORY:   Oncology/Hematology History    Ms. Sanchez is a pleasant 71 year old  female with the diagnosis of stage IIA left breast carcinoma, ER/[place] and HER2  positive 3+.  She is status post bilateral mastectomies and has completed 5 cycles of adjuvant systemic chemotherapy with taxotere, carboplatin and  Herceptin. She is presently receiving Herceptin 6 mg/kg every 3 weeks for a total dosing of 1 year with last dose planned for 2015.  Ms. Sanchez is also presently taking hormonal manipulation therapy with Arimidex 1 mg p.o. daily and this will be taken for 10 years, this  was initiated on 2015.  She had completed Herceptin. She had completed breast reconstruction process. We will give her a refill on her Arimidex. She is due for  her bone density in 2016.        Breast cancer-L/valente masectomy 2016 Initial Diagnosis     Breast cancer          Cancer Staging Information:  No matching staging information was found for the patient.      INTERVAL HISTORY  Patient ID: Ira Sanchez is a 73 y.o. year old female with hormone positive breast cancer on hormonal manipulation and tolerates Arimidex very well with minimal side effects.  Denies any chest wall lesions, denies double vision, dizziness, nausea or vomiting, unintentional weight loss, night sweats.  She does have an active life.  --  recently  from head and neck ca.   --noticed a right neck mass. Denies any mouth sores, head sores or infection. Denies fever. Has been  there >2 weeks.  Past Medical History:   Past Medical History:   Diagnosis Date   • Bradycardia    • Breast cancer    • Colon polyp    • Diabetes mellitus    • Peripheral neuropathy    • Urinary incontinence      Past Surgical History:   Past Surgical History:   Procedure Laterality Date   • APPENDECTOMY     • BREAST SURGERY     • CATARACT EXTRACTION Bilateral    • DILATATION AND CURETTAGE     • KNEE SURGERY     • MASTECTOMY Bilateral    • NIPPLE TATTOO  04/12/2016   • PORTACATH PLACEMENT     • THROAT SURGERY       Social History:   Social History     Social History   • Marital status:      Spouse name: Horace   • Number of children: 0   • Years of education: 12.5     Occupational History   • retired      [employer]     Social History Main Topics   • Smoking status: Former Smoker     Packs/day: 2.00     Years: 37.00     Types: Cigarettes     Start date: 2/20/1963     Quit date: 9/29/2000   • Smokeless tobacco: Never Used   • Alcohol use No   • Drug use: No   • Sexual activity: Defer     Other Topics Concern   • Not on file     Social History Narrative   • No narrative on file     Family History:   Family History   Problem Relation Age of Onset   • Hypertension Mother    • Heart failure Mother    • Cancer Father         colon   • Colon cancer Father    • Cancer Maternal Aunt         breast   • No Known Problems Maternal Grandmother    • No Known Problems Maternal Grandfather    • No Known Problems Paternal Grandmother    • No Known Problems Paternal Grandfather    • Cancer Maternal Aunt         breast   • Cancer Maternal Aunt         breast       Review of Systems   Constitutional: Negative.    HENT: Negative.    Eyes: Negative.    Respiratory: Negative.    Cardiovascular: Negative.    Gastrointestinal: Negative.    Endocrine: Negative.    Genitourinary: Negative.    Musculoskeletal: Positive for arthralgias. Negative for myalgias.        Neck lump   Allergic/Immunologic: Negative.   "  Neurological: Negative.    Hematological: Negative.    Psychiatric/Behavioral: Negative.         Performance Status:  Asymptomatic    Medications:    Current Outpatient Prescriptions   Medication Sig Dispense Refill   • amLODIPine (NORVASC) 5 MG tablet Take 1 tablet by mouth Daily. 30 tablet 5   • anastrozole (ARIMIDEX) 1 MG tablet TAKE 1 TABLET BY MOUTH DAILY 90 tablet 0   • Calcium Carb-Cholecalciferol (CALCIUM-VITAMIN D) 600-400 MG-UNIT tablet Take 1 tablet by mouth 2 (Two) Times a Day.     • cycloSPORINE (RESTASIS) 0.05 % ophthalmic emulsion 1 drop Every 12 (Twelve) Hours.     • diphenhydrAMINE (BENADRYL) 25 mg capsule Take 25 mg by mouth Daily As Needed.     • fluticasone (FLONASE) 50 MCG/ACT nasal spray 2 sprays into each nostril Daily. 1 bottle 0   • ibuprofen (ADVIL,MOTRIN) 200 MG tablet Take 200 mg by mouth As Needed.     • lisinopril (PRINIVIL,ZESTRIL) 20 MG tablet Take 1 tablet by mouth Daily. 90 tablet 1   • oxybutynin (DITROPAN) 5 MG tablet TAKE 1 TABLET BY MOUTH TWICE DAILY 180 tablet 1   • thiamine (VITAMINE B-1) 100 MG tablet Take 100 mg by mouth Daily.       No current facility-administered medications for this visit.      Facility-Administered Medications Ordered in Other Visits   Medication Dose Route Frequency Provider Last Rate Last Dose   • heparin flush (porcine) 100 UNIT/ML injection 500 Units  500 Units Intravenous PRN Shanda Torres MD   500 Units at 04/16/18 1504   • sodium chloride 0.9 % flush 10 mL  10 mL Intravenous PRN Shanda Torres MD   10 mL at 04/16/18 1503       ALLERGIES:    Allergies   Allergen Reactions   • Benzonatate Allergic reaction to contrast dye and Itching       Objective      Vitals:    06/12/18 1356   BP: 132/78   Pulse: 56   Resp: 16   Temp: 97.7 °F (36.5 °C)   TempSrc: Tympanic   SpO2: 93%   Weight: 83.1 kg (183 lb 3.2 oz)   Height: 160 cm (62.99\")         Current Status 6/12/2018   ECOG score 0         Physical Exam      General " Appearance: Patient is awake, alert, oriented and in no acute distress. Patient is welldeveloped, wellnourished, and appears stated age.  HEENT: Normocephalic. Sclerae clear, conjunctiva pink, extraocular movements intact, pupils, round, reactive to light and  accommodation. Mouth and throat are clear with moist oral mucosa.  NECK: right neck lump, no jugular venous distention, thyroid not enlarged.  LYMPH: No cervical, supraclavicular, axillary, or inguinal lymphadenopathy.  CHEST: Equal bilateral expansion, AP  diameter normal, resonant percussion note  LUNGS: Good air movement, no rales, rhonchi, rubs or wheezes with auscultation  CARDIO: Regular sinus rhythm, no murmurs, gallops or rubs.  ABDOMEN: Nondistended, soft, No tenderness, no guarding, no rebound, No hepatosplenomegaly. No abdominal masses. Bowel sounds positive. No hernia  GENITALIA: Not examined.  BREASTS: Bilateral mastectomy with reconstruction.  Well-healed.  No skin lesions.  MUSKEL: No joint swelling, decreased motion, or inflammation  EXTREMS: No edema, clubbing, cyanosis, No varicose veins.  NEURO: Grossly nonfocal, Gait is coordinated and smooth, Cognition is preserved.  SKIN: No rashes, no ecchymoses, no petechia.  PSYCH: Oriented to time, place and person. Memory is preserved. Mood and affect appear normal  RECENT LABS:  Lab on 06/12/2018   Component Date Value Ref Range Status   • WBC 06/12/2018 7.72  4.80 - 10.80 10*3/mm3 Final   • RBC 06/12/2018 4.58  4.20 - 5.40 10*6/mm3 Final   • Hemoglobin 06/12/2018 14.1  12.0 - 16.0 g/dL Final   • Hematocrit 06/12/2018 41.8  37.0 - 47.0 % Final   • MCV 06/12/2018 91.3  82.0 - 98.0 fL Final   • MCH 06/12/2018 30.8  28.0 - 32.0 pg Final   • MCHC 06/12/2018 33.7  33.0 - 36.0 g/dL Final   • RDW 06/12/2018 12.6  12.0 - 15.0 % Final   • RDW-SD 06/12/2018 42.2  40.0 - 54.0 fl Final   • MPV 06/12/2018 10.8  6.0 - 12.0 fL Final   • Platelets 06/12/2018 185  130 - 400 10*3/mm3 Final   • Neutrophil % 06/12/2018  54.0  39.0 - 78.0 % Final   • Lymphocyte % 06/12/2018 36.1  15.0 - 45.0 % Final   • Monocyte % 06/12/2018 6.9  4.0 - 12.0 % Final   • Eosinophil % 06/12/2018 1.9  0.0 - 4.0 % Final   • Basophil % 06/12/2018 0.6  0.0 - 2.0 % Final   • Immature Grans % 06/12/2018 0.5  0.0 - 5.0 % Final   • Neutrophils, Absolute 06/12/2018 4.16  1.87 - 8.40 10*3/mm3 Final   • Lymphocytes, Absolute 06/12/2018 2.79  0.72 - 4.86 10*3/mm3 Final   • Monocytes, Absolute 06/12/2018 0.53  0.19 - 1.30 10*3/mm3 Final   • Eosinophils, Absolute 06/12/2018 0.15  0.00 - 0.70 10*3/mm3 Final   • Basophils, Absolute 06/12/2018 0.05  0.00 - 0.20 10*3/mm3 Final   • Immature Grans, Absolute 06/12/2018 0.04* 0.00 - 0.03 10*3/mm3 Final   • nRBC 06/12/2018 0.0  0.0 - 0.0 /100 WBC Final       RADIOLOGY:  No results found.         Assessment/Plan   72-year-old female diagnosed Stage IIa left-sided breast cancer, status post prophylactic mastectomy on the right side and bilateral breast reconstruction.  All this in 2014.  She is status post chemotherapy adjuvantly and currently on Arimidex 1 mg daily to complete a total of 5 years.        Patient Active Problem List   Diagnosis   • Obesity   • Breast cancer-L/valente masectomy 2014   • Overactive bladder   • Diabetes type 2, controlled   • Hypertension   • Osteopenia [id number]   • Neuropathy-chemo/better   • Osteoarthritis of multiple joints   • Malignant neoplasm of lower-outer quadrant of left female breast   • Family history of malignant neoplasm of breast   • Malignant neoplasm of upper-outer quadrant of female breast   • History of bilateral mastectomy   • Wellness examination-done   • Pain in shoulder   • Chronic back pain   • Laboratory test*   • Bradycardia   • Cough   • Allergic rhinitis          1.ER positive breast cancer: Status post bilateral mastectomy.  No evidence of disease recurrence on physical exam or review of labs done today.  Per NCCN guidelines no indication for tumor markers.  -- omayra  arimidex started 2015.    2. Osteoarthritis: Follow with primary care.    3. Osteopenia: Continue ca+ vitamin D next DEXA scan 9/18.    4.  Health maintenance: She is previously been a smoker and more recently has undergone spiral screening CT scan shows no evidence of any cancer at least in the chest and the chest wall area.  5. Right neck lump: ct neck ordered.           Shanda Torres MD    6/12/2018    2:16 PM

## 2018-06-13 ENCOUNTER — HOSPITAL ENCOUNTER (OUTPATIENT)
Dept: CT IMAGING | Facility: HOSPITAL | Age: 73
Discharge: HOME | End: 2018-06-13
Attending: INTERNAL MEDICINE | Admitting: INTERNAL MEDICINE

## 2018-06-13 DIAGNOSIS — R22.1 LOCALIZED SWELLING, MASS OR LUMP OF NECK: ICD-10-CM

## 2018-06-13 LAB — CREAT BLDA-MCNC: 0.7 MG/DL (ref 0.6–1.3)

## 2018-06-13 PROCEDURE — 82565 ASSAY OF CREATININE: CPT

## 2018-06-13 PROCEDURE — 25010000002 IOPAMIDOL 61 % SOLUTION: Performed by: INTERNAL MEDICINE

## 2018-06-13 PROCEDURE — 70491 CT SOFT TISSUE NECK W/DYE: CPT

## 2018-06-13 RX ADMIN — IOPAMIDOL 100 ML: 612 INJECTION, SOLUTION INTRAVENOUS at 14:00

## 2018-06-15 ENCOUNTER — OFFICE VISIT (OUTPATIENT)
Dept: ONCOLOGY | Facility: CLINIC | Age: 73
End: 2018-06-15

## 2018-06-15 VITALS
OXYGEN SATURATION: 93 % | WEIGHT: 181.9 LBS | DIASTOLIC BLOOD PRESSURE: 84 MMHG | RESPIRATION RATE: 16 BRPM | HEART RATE: 68 BPM | TEMPERATURE: 97.3 F | SYSTOLIC BLOOD PRESSURE: 148 MMHG | HEIGHT: 63 IN | BODY MASS INDEX: 32.23 KG/M2

## 2018-06-15 DIAGNOSIS — C50.919 MALIGNANT NEOPLASM OF FEMALE BREAST, UNSPECIFIED ESTROGEN RECEPTOR STATUS, UNSPECIFIED LATERALITY, UNSPECIFIED SITE OF BREAST (HCC): Primary | ICD-10-CM

## 2018-06-15 DIAGNOSIS — R59.0 LYMPHADENOPATHY OF RIGHT CERVICAL REGION: Primary | ICD-10-CM

## 2018-06-15 PROCEDURE — 99213 OFFICE O/P EST LOW 20 MIN: CPT | Performed by: INTERNAL MEDICINE

## 2018-06-15 NOTE — PROGRESS NOTES
[facility]  HEMATOLOGY & ONCOLOGY        Subjective     VISIT DIAGNOSIS:   Encounter Diagnosis   Name Primary?   • Lymphadenopathy of right cervical region Yes       REASON FOR VISIT:     Chief Complaint   Patient presents with   • review CT result        HEMATOLOGY / ONCOLOGY HISTORY:   Oncology/Hematology History    Ms. Sanchez is a pleasant 71 year old  female with the diagnosis of stage IIA left breast carcinoma, ER/UT and HER2  positive 3+.  She is status post bilateral mastectomies and has completed 5 cycles of adjuvant systemic chemotherapy with taxotere, carboplatin and  Herceptin. She is presently receiving Herceptin 6 mg/kg every 3 weeks for a total dosing of 1 year with last dose planned for 2015.  Ms. Sanchez is also presently taking hormonal manipulation therapy with Arimidex 1 mg p.o. daily and this will be taken for 10 years, this  was initiated on 2015.  She had completed Herceptin. She had completed breast reconstruction process. We will give her a refill on her Arimidex. She is due for  her bone density in 2016.        Breast cancer-L/valente masectomy 2016 Initial Diagnosis     Breast cancer          Cancer Staging Information:  No matching staging information was found for the patient.      INTERVAL HISTORY  Patient ID: Ira Sanchez is a 73 y.o. year old female with hormone positive breast cancer on hormonal manipulation and tolerates Arimidex very well with minimal side effects.  Denies any chest wall lesions, denies double vision, dizziness, nausea or vomiting, unintentional weight loss, night sweats.  She does have an active life.  --  recently  from head and neck ca.   --noticed a right neck mass. Denies any mouth sores, head sores or infection. Denies fever. Has been there >2 weeks.  --6/15/18: pt here to review neck CT done or palpable GISELLA. No ct correlate,incidental thyroid nodule.  Past Medical History:   Past Medical  History:   Diagnosis Date   • Bradycardia    • Breast cancer    • Colon polyp    • Diabetes mellitus    • Peripheral neuropathy    • Urinary incontinence      Past Surgical History:   Past Surgical History:   Procedure Laterality Date   • APPENDECTOMY     • BREAST SURGERY     • CATARACT EXTRACTION Bilateral    • DILATATION AND CURETTAGE     • KNEE SURGERY     • MASTECTOMY Bilateral    • NIPPLE TATTOO  04/12/2016   • PORTACATH PLACEMENT     • THROAT SURGERY       Social History:   Social History     Social History   • Marital status:      Spouse name: Horace   • Number of children: 0   • Years of education: 12.5     Occupational History   • retired      [employer]     Social History Main Topics   • Smoking status: Former Smoker     Packs/day: 2.00     Years: 37.00     Types: Cigarettes     Start date: 2/20/1963     Quit date: 9/29/2000   • Smokeless tobacco: Never Used   • Alcohol use No   • Drug use: No   • Sexual activity: Defer     Other Topics Concern   • Not on file     Social History Narrative   • No narrative on file     Family History:   Family History   Problem Relation Age of Onset   • Hypertension Mother    • Heart failure Mother    • Cancer Father         colon   • Colon cancer Father    • Cancer Maternal Aunt         breast   • No Known Problems Maternal Grandmother    • No Known Problems Maternal Grandfather    • No Known Problems Paternal Grandmother    • No Known Problems Paternal Grandfather    • Cancer Maternal Aunt         breast   • Cancer Maternal Aunt         breast       Review of Systems   Constitutional: Negative.    HENT: Negative.    Eyes: Negative.    Respiratory: Negative.    Cardiovascular: Negative.    Gastrointestinal: Negative.    Endocrine: Negative.    Genitourinary: Negative.    Musculoskeletal: Positive for arthralgias. Negative for myalgias.        Neck lump   Allergic/Immunologic: Negative.    Neurological: Negative.    Hematological: Negative.     Psychiatric/Behavioral: Negative.         Performance Status:  Asymptomatic    Medications:    Current Outpatient Prescriptions   Medication Sig Dispense Refill   • amLODIPine (NORVASC) 5 MG tablet Take 1 tablet by mouth Daily. 30 tablet 5   • anastrozole (ARIMIDEX) 1 MG tablet TAKE 1 TABLET BY MOUTH DAILY 90 tablet 0   • Calcium Carb-Cholecalciferol (CALCIUM-VITAMIN D) 600-400 MG-UNIT tablet Take 1 tablet by mouth 2 (Two) Times a Day.     • cycloSPORINE (RESTASIS) 0.05 % ophthalmic emulsion 1 drop Every 12 (Twelve) Hours.     • diphenhydrAMINE (BENADRYL) 25 mg capsule Take 25 mg by mouth Daily As Needed.     • fluticasone (FLONASE) 50 MCG/ACT nasal spray 2 sprays into each nostril Daily. 1 bottle 0   • ibuprofen (ADVIL,MOTRIN) 200 MG tablet Take 200 mg by mouth As Needed.     • lisinopril (PRINIVIL,ZESTRIL) 20 MG tablet Take 1 tablet by mouth Daily. 90 tablet 1   • oxybutynin (DITROPAN) 5 MG tablet TAKE 1 TABLET BY MOUTH TWICE DAILY 180 tablet 1   • thiamine (VITAMINE B-1) 100 MG tablet Take 100 mg by mouth Daily.       No current facility-administered medications for this visit.      Facility-Administered Medications Ordered in Other Visits   Medication Dose Route Frequency Provider Last Rate Last Dose   • heparin flush (porcine) 100 UNIT/ML injection 500 Units  500 Units Intravenous PRN Shanda Torres MD   500 Units at 04/16/18 1504   • heparin flush (porcine) 100 UNIT/ML injection 500 Units  500 Units Intravenous PRN Shanda Torres MD   500 Units at 06/12/18 1445   • sodium chloride 0.9 % flush 10 mL  10 mL Intravenous PRN Shanda Torres MD   10 mL at 04/16/18 1503   • sodium chloride 0.9 % flush 10 mL  10 mL Intravenous PRN Shanda Torres MD   10 mL at 06/12/18 1445       ALLERGIES:    Allergies   Allergen Reactions   • Benzonatate Hives and Itching       Objective      Vitals:    06/15/18 0821   BP: 148/84   Pulse: 68   Resp: 16   Temp: 97.3 °F (36.3 °C)  "  TempSrc: Tympanic   SpO2: 93%   Weight: 82.5 kg (181 lb 14.4 oz)   Height: 160 cm (62.99\")         Current Status 6/12/2018   ECOG score 0         Physical Exam      General Appearance: Patient is awake, alert, oriented and in no acute distress. Patient is welldeveloped, wellnourished, and appears stated age.  HEENT: Normocephalic. Sclerae clear, conjunctiva pink, extraocular movements intact, pupils, round, reactive to light and  accommodation. Mouth and throat are clear with moist oral mucosa.  NECK: right neck lump slightly diminished, no jugular venous distention, thyroid not enlarged.  LYMPH: No cervical, supraclavicular, axillary, or inguinal lymphadenopathy.  CHEST: Equal bilateral expansion, AP  diameter normal, resonant percussion note  LUNGS: Good air movement, no rales, rhonchi, rubs or wheezes with auscultation  CARDIO: Regular sinus rhythm, no murmurs, gallops or rubs.  ABDOMEN: Nondistended, soft, No tenderness, no guarding, no rebound, No hepatosplenomegaly. No abdominal masses. Bowel sounds positive. No hernia  GENITALIA: Not examined.  BREASTS: Bilateral mastectomy with reconstruction.  Well-healed.  No skin lesions.  MUSKEL: No joint swelling, decreased motion, or inflammation  EXTREMS: No edema, clubbing, cyanosis, No varicose veins.  NEURO: Grossly nonfocal, Gait is coordinated and smooth, Cognition is preserved.  SKIN: No rashes, no ecchymoses, no petechia.  PSYCH: Oriented to time, place and person. Memory is preserved. Mood and affect appear normal  RECENT LABS:  Hospital Outpatient Visit on 06/13/2018   Component Date Value Ref Range Status   • Creatinine 06/13/2018 0.70  0.60 - 1.30 mg/dL Final    Serial Number: [id number]:  [id number]   Lab on 06/12/2018   Component Date Value Ref Range Status   • Glucose 06/12/2018 80  70 - 100 mg/dL Final   • BUN 06/12/2018 14  5 - 21 mg/dL Final   • Creatinine 06/12/2018 0.52  0.50 - 1.40 mg/dL Final   • Sodium 06/12/2018 141  135 - 145 mmol/L Final "   • Potassium 06/12/2018 4.1  3.5 - 5.3 mmol/L Final   • Chloride 06/12/2018 101  98 - 110 mmol/L Final   • CO2 06/12/2018 29.0  24.0 - 31.0 mmol/L Final   • Calcium 06/12/2018 9.9  8.4 - 10.4 mg/dL Final   • Total Protein 06/12/2018 7.3  6.3 - 8.7 g/dL Final   • Albumin 06/12/2018 4.30  3.50 - 5.00 g/dL Final   • ALT (SGPT) 06/12/2018 16  0 - 54 U/L Final   • AST (SGOT) 06/12/2018 25  7 - 45 U/L Final   • Alkaline Phosphatase 06/12/2018 97  24 - 120 U/L Final   • Total Bilirubin 06/12/2018 0.5  0.1 - 1.0 mg/dL Final   • eGFR Non African Amer 06/12/2018 115  >60 mL/min/1.73 Final   • Globulin 06/12/2018 3.0  gm/dL Final   • A/G Ratio 06/12/2018 1.4  1.1 - 2.5 g/dL Final   • BUN/Creatinine Ratio 06/12/2018 26.9* 7.0 - 25.0 Final   • Anion Gap 06/12/2018 11.0  4.0 - 13.0 mmol/L Final   • WBC 06/12/2018 7.72  4.80 - 10.80 10*3/mm3 Final   • RBC 06/12/2018 4.58  4.20 - 5.40 10*6/mm3 Final   • Hemoglobin 06/12/2018 14.1  12.0 - 16.0 g/dL Final   • Hematocrit 06/12/2018 41.8  37.0 - 47.0 % Final   • MCV 06/12/2018 91.3  82.0 - 98.0 fL Final   • MCH 06/12/2018 30.8  28.0 - 32.0 pg Final   • MCHC 06/12/2018 33.7  33.0 - 36.0 g/dL Final   • RDW 06/12/2018 12.6  12.0 - 15.0 % Final   • RDW-SD 06/12/2018 42.2  40.0 - 54.0 fl Final   • MPV 06/12/2018 10.8  6.0 - 12.0 fL Final   • Platelets 06/12/2018 185  130 - 400 10*3/mm3 Final   • Neutrophil % 06/12/2018 54.0  39.0 - 78.0 % Final   • Lymphocyte % 06/12/2018 36.1  15.0 - 45.0 % Final   • Monocyte % 06/12/2018 6.9  4.0 - 12.0 % Final   • Eosinophil % 06/12/2018 1.9  0.0 - 4.0 % Final   • Basophil % 06/12/2018 0.6  0.0 - 2.0 % Final   • Immature Grans % 06/12/2018 0.5  0.0 - 5.0 % Final   • Neutrophils, Absolute 06/12/2018 4.16  1.87 - 8.40 10*3/mm3 Final   • Lymphocytes, Absolute 06/12/2018 2.79  0.72 - 4.86 10*3/mm3 Final   • Monocytes, Absolute 06/12/2018 0.53  0.19 - 1.30 10*3/mm3 Final   • Eosinophils, Absolute 06/12/2018 0.15  0.00 - 0.70 10*3/mm3 Final   • Basophils,  Absolute 06/12/2018 0.05  0.00 - 0.20 10*3/mm3 Final   • Immature Grans, Absolute 06/12/2018 0.04* 0.00 - 0.03 10*3/mm3 Final   • nRBC 06/12/2018 0.0  0.0 - 0.0 /100 WBC Final   • Extra Tube 06/12/2018 Hold for add-ons.   Final    Auto resulted.   • Extra Tube 06/12/2018 Hold for add-ons.   Final    Auto resulted.       RADIOLOGY:  Ct Soft Tissue Neck With Contrast    Result Date: 6/13/2018  Narrative: EXAMINATION:  CT SOFT TISSUE NECK W CONTRAST-  6/13/2018 1:28 PM CDT  HISTORY: right neck lump evaluate; R22.1-Localized swelling, mass and lump, neck  COMPARISON: No comparison study.  TECHNIQUE:  Radiation dose equals  mGy-cm.  Automated exposure control dose reduction technique was implemented.  Thin section axial images were obtained with intravenous contrast. 2-D sagittal and coronal reconstruction images were generated.  A marker was placed above and below the area of palpable concern.  FINDINGS:  In the region of palpable concern, there are several small superficial lymph nodes, just deep to the sternocleidomastoid muscle, largest measuring approximate 5 mm in short axis. No definite mass lesion observed otherwise this region.  Despite negative imaging features, any suspicious palpable deserves follow-up and possible surgical consultation.  No brain lesions identified.  The nasopharynx is observed symmetrically without masses.  Dental artifact obscuring detail in the oral cavity without definite oral cavity lesion.  The epiglottis, vallecula, and piriform sinus regions are imaged symmetrically without deformity or masses. There are no laryngeal supraglottic masses. The airway is widely patent. The subglottic airway structures are imaged appropriately.  There is an 8 mm low-attenuation nodule identified in the right lobe of the thyroid gland, lower pole. The gland is otherwise homogeneous.  The submandibular glands are observed without masses. The parotid glands are also imaged appropriately.  There are  multiple shoddy appearing lateral cervical lymph nodes. There are no pathologically enlarged nodes. Largest node identified in the left spinal accessory region, at the level of the epiglottis, measuring 7 mm in short axis.  The superior mediastinum appreciated without masses.  There are carotid artery calcifications  There is disc degeneration spondylosis in the mid to lower cervical spine.  There is mild right maxillary sinus mucosal thickening.      Impression: 1. Palpable abnormality right lateral neck, WITHOUT CT correlate 2. 8 mm nodule right lobe of the thyroid gland incidentally noted. 3. Otherwise, No neck mass or lymphadenopathy 4. Carotid artery calcifications. 5. Degenerative changes in the cervical spine. 6. Mild right maxillary sinus mucosal thickening. This report was finalized on 06/13/2018 14:28 by Dr. Sam Elam MD.           Assessment/Plan   72-year-old female diagnosed Stage IIa left-sided breast cancer, status post prophylactic mastectomy on the right side and bilateral breast reconstruction.  All this in 2014.  She is status post chemotherapy adjuvantly and currently on Arimidex 1 mg daily to complete a total of 5 years.        Patient Active Problem List   Diagnosis   • Obesity   • Breast cancer-L/valente masectomy 2014   • Overactive bladder   • Diabetes type 2, controlled   • Hypertension   • Osteopenia [id number]   • Neuropathy-chemo/better   • Osteoarthritis of multiple joints   • Malignant neoplasm of lower-outer quadrant of left female breast   • Family history of malignant neoplasm of breast   • Malignant neoplasm of upper-outer quadrant of female breast   • History of bilateral mastectomy   • Wellness examination-done   • Pain in shoulder   • Chronic back pain   • Laboratory test*   • Bradycardia   • Cough   • Allergic rhinitis          1.ER positive breast cancer: Status post bilateral mastectomy.  No evidence of disease recurrence on physical exam or review of labs done today.  Per  NCCN guidelines no indication for tumor markers.  -- omayra arimidex started 2015.    2. Osteoarthritis: Follow with primary care.    3. Osteopenia: Continue ca+ vitamin D next DEXA scan 9/18.    4.  Health maintenance: She is previously been a smoker and more recently has undergone spiral screening CT scan shows no evidence of any cancer at least in the chest and the chest wall area.  5. Right neck lump: ct neck ordered showed some small neck LNs, no CT coorelate of palpable GISELLA, incidental thyroid nodule. We wull not pursue these further as they ere benign unless things change.  rtc in 6months           Shanda Torres MD    6/15/2018    8:33 AM

## 2018-06-25 RX ORDER — ANASTROZOLE 1 MG/1
TABLET ORAL
Qty: 90 TABLET | Refills: 3 | Status: SHIPPED | OUTPATIENT
Start: 2018-06-25 | End: 2019-06-17 | Stop reason: SDUPTHER

## 2018-07-18 DIAGNOSIS — R03.0 ELEVATED BLOOD PRESSURE READING: ICD-10-CM

## 2018-07-18 DIAGNOSIS — R42 DIZZY: ICD-10-CM

## 2018-07-18 RX ORDER — AMLODIPINE BESYLATE 5 MG/1
5 TABLET ORAL DAILY
Qty: 90 TABLET | Refills: 1 | Status: SHIPPED | OUTPATIENT
Start: 2018-07-18 | End: 2019-01-09 | Stop reason: SDUPTHER

## 2018-08-28 ENCOUNTER — INFUSION (OUTPATIENT)
Dept: ONCOLOGY | Facility: CLINIC | Age: 73
End: 2018-08-28

## 2018-08-28 DIAGNOSIS — C50.512 MALIGNANT NEOPLASM OF LOWER-OUTER QUADRANT OF LEFT FEMALE BREAST, UNSPECIFIED ESTROGEN RECEPTOR STATUS (HCC): Primary | ICD-10-CM

## 2018-08-28 RX ORDER — SODIUM CHLORIDE 0.9 % (FLUSH) 0.9 %
10 SYRINGE (ML) INJECTION AS NEEDED
Status: DISCONTINUED | OUTPATIENT
Start: 2018-08-28 | End: 2018-08-28 | Stop reason: HOSPADM

## 2018-08-28 RX ORDER — SODIUM CHLORIDE 0.9 % (FLUSH) 0.9 %
10 SYRINGE (ML) INJECTION AS NEEDED
Status: CANCELLED | OUTPATIENT
Start: 2018-08-28

## 2018-08-28 RX ADMIN — Medication 10 ML: at 09:40

## 2018-10-08 DIAGNOSIS — I10 HYPERTENSION, UNSPECIFIED TYPE: Chronic | ICD-10-CM

## 2018-10-08 RX ORDER — LISINOPRIL 20 MG/1
20 TABLET ORAL DAILY
Qty: 90 TABLET | Refills: 1 | Status: SHIPPED | OUTPATIENT
Start: 2018-10-08 | End: 2019-04-09 | Stop reason: SDUPTHER

## 2018-10-25 ENCOUNTER — CLINICAL SUPPORT (OUTPATIENT)
Dept: ONCOLOGY | Facility: CLINIC | Age: 73
End: 2018-10-25

## 2018-10-25 DIAGNOSIS — C50.512 MALIGNANT NEOPLASM OF LOWER-OUTER QUADRANT OF LEFT FEMALE BREAST, UNSPECIFIED ESTROGEN RECEPTOR STATUS (HCC): Primary | ICD-10-CM

## 2018-10-25 RX ORDER — SODIUM CHLORIDE 0.9 % (FLUSH) 0.9 %
10 SYRINGE (ML) INJECTION AS NEEDED
Status: DISCONTINUED | OUTPATIENT
Start: 2018-10-25 | End: 2018-10-25 | Stop reason: HOSPADM

## 2018-10-25 RX ORDER — SODIUM CHLORIDE 0.9 % (FLUSH) 0.9 %
10 SYRINGE (ML) INJECTION AS NEEDED
Status: CANCELLED | OUTPATIENT
Start: 2018-10-25

## 2018-10-25 RX ADMIN — Medication 10 ML: at 10:20

## 2018-11-05 RX ORDER — OXYBUTYNIN CHLORIDE 5 MG/1
TABLET ORAL
Qty: 180 TABLET | Refills: 1 | Status: SHIPPED | OUTPATIENT
Start: 2018-11-05 | End: 2019-06-30 | Stop reason: SDUPTHER

## 2018-11-09 ENCOUNTER — RESULTS ENCOUNTER (OUTPATIENT)
Dept: FAMILY MEDICINE CLINIC | Facility: CLINIC | Age: 73
End: 2018-11-09

## 2018-11-09 DIAGNOSIS — E11.9 CONTROLLED TYPE 2 DIABETES MELLITUS WITHOUT COMPLICATION, WITHOUT LONG-TERM CURRENT USE OF INSULIN (HCC): Chronic | ICD-10-CM

## 2018-12-11 ENCOUNTER — OFFICE VISIT (OUTPATIENT)
Dept: FAMILY MEDICINE CLINIC | Facility: CLINIC | Age: 73
End: 2018-12-11

## 2018-12-11 VITALS
HEIGHT: 63 IN | TEMPERATURE: 98.2 F | HEART RATE: 54 BPM | WEIGHT: 188 LBS | RESPIRATION RATE: 18 BRPM | SYSTOLIC BLOOD PRESSURE: 120 MMHG | OXYGEN SATURATION: 96 % | BODY MASS INDEX: 33.31 KG/M2 | DIASTOLIC BLOOD PRESSURE: 74 MMHG

## 2018-12-11 DIAGNOSIS — R00.1 BRADYCARDIA: ICD-10-CM

## 2018-12-11 DIAGNOSIS — E11.9 CONTROLLED TYPE 2 DIABETES MELLITUS WITHOUT COMPLICATION, WITHOUT LONG-TERM CURRENT USE OF INSULIN (HCC): Chronic | ICD-10-CM

## 2018-12-11 DIAGNOSIS — M15.9 OSTEOARTHRITIS OF MULTIPLE JOINTS, UNSPECIFIED OSTEOARTHRITIS TYPE: Chronic | ICD-10-CM

## 2018-12-11 DIAGNOSIS — M85.80 OSTEOPENIA, UNSPECIFIED LOCATION: ICD-10-CM

## 2018-12-11 DIAGNOSIS — Z78.0 MENOPAUSE: ICD-10-CM

## 2018-12-11 DIAGNOSIS — G62.9 NEUROPATHY: ICD-10-CM

## 2018-12-11 DIAGNOSIS — I10 HYPERTENSION, UNSPECIFIED TYPE: Primary | Chronic | ICD-10-CM

## 2018-12-11 PROCEDURE — 99213 OFFICE O/P EST LOW 20 MIN: CPT | Performed by: FAMILY MEDICINE

## 2018-12-11 NOTE — PROGRESS NOTES
Subjective   Ira Sanchez is a 73 y.o. female presenting with chief complaint of:   Chief Complaint   Patient presents with   • Hypertension     6 month follow-up.   • Diabetes       History of Present Illness :  Alone.   Has multiple chronic problems to consider that might have a bearing on today's issues;  an interval appointment.       Chronic/acute problems reviewed today:   1. Hypertension, unspecified type:Chronic/stable. Stable here/if home blood pressures.  No significant chest pain, SOB, LE edema, orthopnea, near syncope, dizziness/light headness.      2. Controlled type 2 diabetes mellitus without complication, without long-term current use of insulin (CMS/[facility]): Chronic/stable. No problem/pattern hypoglycemia/hyperglycemia manifest by poly- dypsia, phagia, uria, or sweats, diaphoretic episodes, syncope/near.     3. Osteoarthritis of multiple joints, unspecified osteoarthritis type: Chronic/stable.  Various on/off joint pains/soreness/stiffness.  Particular joint problems with knees.  No joint swelling.  Treats mainly with OTC.     4. Osteopenia, unspecified location: Chronic/stable.  Last bone density below.  Agrees to repeat bone density.    5. Neuropathy-chemo/better: chronic/better without as much LE numbness   6. Bradycardia: chronic/variable without significance for pacer/like.    7. Menopause: chronic/risk for osteoporosis.      Has an/another acute issue today: none.    The following portions of the patient's history were reviewed and updated as appropriate: allergies, current medications, past family history, past medical history, past social history, past surgical history and problem list.      Current Outpatient Medications:   •  amLODIPine (NORVASC) 5 MG tablet, Take 1 tablet by mouth Daily., Disp: 90 tablet, Rfl: 1  •  anastrozole (ARIMIDEX) 1 MG tablet, TAKE 1 TABLET BY MOUTH DAILY, Disp: 90 tablet, Rfl: 3  •  Calcium Carb-Cholecalciferol (CALCIUM-VITAMIN D) 600-400 MG-UNIT tablet, Take 1  tablet by mouth 2 (Two) Times a Day., Disp: , Rfl:   •  cycloSPORINE (RESTASIS) 0.05 % ophthalmic emulsion, 1 drop Every 12 (Twelve) Hours., Disp: , Rfl:   •  diphenhydrAMINE (BENADRYL) 25 mg capsule, Take 25 mg by mouth Daily As Needed., Disp: , Rfl:   •  fluticasone (FLONASE) 50 MCG/ACT nasal spray, 2 sprays into each nostril Daily., Disp: 1 bottle, Rfl: 0  •  ibuprofen (ADVIL,MOTRIN) 200 MG tablet, Take 200 mg by mouth As Needed., Disp: , Rfl:   •  lisinopril (PRINIVIL,ZESTRIL) 20 MG tablet, TAKE 1 TABLET BY MOUTH DAILY, Disp: 90 tablet, Rfl: 1  •  oxybutynin (DITROPAN) 5 MG tablet, TAKE 1 TABLET BY MOUTH TWICE DAILY, Disp: 180 tablet, Rfl: 1  •  thiamine (VITAMINE B-1) 100 MG tablet, Take 100 mg by mouth Daily., Disp: , Rfl:   No current facility-administered medications for this visit.     Facility-Administered Medications Ordered in Other Visits:   •  heparin flush (porcine) 100 UNIT/ML injection 500 Units, 500 Units, Intravenous, PRN, Shanda Torres MD, 500 Units at 04/16/18 1504  •  heparin flush (porcine) 100 UNIT/ML injection 500 Units, 500 Units, Intravenous, PRN, Shanda Torres MD, 500 Units at 06/12/18 1445  •  sodium chloride 0.9 % flush 10 mL, 10 mL, Intravenous, PRN, Shanda Torres MD, 10 mL at 04/16/18 1503  •  sodium chloride 0.9 % flush 10 mL, 10 mL, Intravenous, PRN, Shanda Torres MD, 10 mL at 06/12/18 1445    No problems with medications.  Refills if needed done    Allergies   Allergen Reactions   • Benzonatate Hives and Itching       Review of Systems  GENERAL:  Active/slower with limits, speed, stamina for age and acute fatigue.  Sleep is ok. No fever now.  SKIN: No  rash/skin lesion of concern:   ENDO:  No syncope, near or diaphoretic sweaty spells.  No download..  HEENT: No recent head injury; increased sinus area headache,  No vision change.  No chronic hearing loss.  Ears with fullness without pain/drainage.  No sore throat now.   No significant nasal/sinus congestion/drainage. No epistaxis.   CHEST: No chest wall tenderness or mass.  No significant cough,  without wheeze.  No SOB; no hemoptysis.  CV: No chest pain, palpitations, ankle edema.  GI: No heartburn, dysphagia.  No abdominal pain, diarrhea, constipation.  No rectal bleeding, or melena.    :  Voids without dysuria, or  incontinence to completion.  ORTHO: No painful/swollen joints but various on /off sore.  Occ sore neck or back.  No acute neck or back pain without recent injury.  NEURO: No dizziness, weakness of extremities.  Same mild numbness/paresthesias.   PSYCH: No memory loss.  Mood good; mild anxious, depressed but/and not suicidal.  Tries to tolerate stress .   Screening:  Mammogram: bilateral masectomy  Bone density: 2016: LS - +0.3/hip -1.3; discussed/repeat 18-24 m/ and continue Ca/Vit D-ordered 2018  Low dose CT chest: CTs with breast cancer  GI: Colon-avm-div/Shieben/MM/12.19.17  Prostate: NA  Usual lab order  6m CBC, CMP, A1c  12m CBC, CMP, A1c, LIPID, TSH, Vit D    Results for orders placed or performed during the hospital encounter of 06/13/18   POC Creatinine   Result Value Ref Range    Creatinine 0.70 0.60 - 1.30 mg/dL       No results found for: PSA     Lab Results:  CBC:  Lab Results - Last 18 Months   Lab Units  06/12/18   1351  04/16/18   1424  03/26/18   0847  09/14/17   0853  06/21/17   1455   WBC 10*3/mm3  7.72  10.69  9.89  7.20  7.40   HEMOGLOBIN g/dL  14.1  15.1  14.7  15.0  14.1   HEMATOCRIT %  41.8  44.5  46.6  43.9  44.3   PLATELETS 10*3/mm3  185  204  193  144  154      BMP/CMP:  Lab Results - Last 18 Months   Lab Units  06/13/18   1329  06/12/18   1351  04/16/18   1424  03/26/18   0847  09/14/17   0853  06/21/17   1455   SODIUM mmol/L   --   141  139  143  141  143   POTASSIUM mmol/L   --   4.1  4.4  3.9  4.1  4.4   CHLORIDE mmol/L   --   101  100  101  103  106   CO2 mmol/L   --   29.0  29.0   --   26.0  28.0   TOTAL CO2 mmol/L   --    --    --   " 25.0   --    --    GLUCOSE mg/dL   --    --    --   127*   --    --    BUN mg/dL   --   14  15  13  17  17   CREATININE mg/dL  0.70  0.52  0.55  0.46*  0.52  0.50*   EGFR IF NONAFRICN AM mL/min/1.73   --   115  108  132  115  121   EGFR IF AFRICN AM mL/min/1.73   --    --    --   >150   --    --    CALCIUM mg/dL   --   9.9  10.1  10.1  9.5  9.6     HEPATIC:  Lab Results - Last 18 Months   Lab Units  06/12/18   1351  04/16/18   1424  09/14/17   0853  06/21/17   1455   ALT (SGPT) U/L  16  28  29  29   AST (SGOT) U/L  25  27  40  50*   ALK PHOS U/L  97  101  88  116     THYROID:No results for input(s): TSH, T3FREE, FTI in the last 74183 hours.    Invalid input(s): T4FREE, T3, T4, TEUP,  TOTALT4  A1C:  Lab Results - Last 18 Months   Lab Units  06/21/17   1456   HEMOGLOBIN A1C %  6.7*     PSA:No results for input(s): PSA in the last 92157 hours.    Objective   /74 (BP Location: Left arm, Patient Position: Sitting, Cuff Size: Adult)   Pulse 54   Temp 98.2 °F (36.8 °C) (Oral)   Resp 18   Ht 160 cm (63\")   Wt 85.3 kg (188 lb)   LMP  (LMP Unknown)   SpO2 96%   BMI 33.30 kg/m²   Body mass index is 33.3 kg/m².    Physical Exam  GENERAL:  Well nourished/developed in no acute distress.   SKIN: Turgor excellent, without wound, rash, lesion  HEENT: Normal cephalic without trauma.  Pupils equal round reactive to light. Extraocular motions full without nystagmus.   External canals nonobstructive nontender without reddness. Tymphatic membranes dull with irwin structures intact.   Oral cavity without growths, exudates, and moist.  Posterior pharynx without mass, obstruction, redness.  No thyromegaly, mass, tenderness, definite lymphadenopathy and supple.   CV: Regular rhythm.  No murmur, gallop,  edema. Posterior pulses intact.  No carotid bruits.  CHEST: No chest wall tenderness or mass.   LUNGS: Symmetric motion with clear to auscultation.  No dullness to percussion  ABD: Soft, nontender without mass.   ORTHO: " Symmetric extremities without swelling/point tenderness.  Full gross range of motion.   NEURO: CN 2-12 grossly intact.  Symmetric facies and UE/LE. 3/5 strength throughout.   Nonfocal use extremities. Speech clear.     PSYCH: Oriented x 3.  Pleasant calm, well kept.  Purposeful/directed conservation with intact short/long gross memory.    Assessment/Plan     1. Hypertension, unspecified type    2. Controlled type 2 diabetes mellitus without complication, without long-term current use of insulin (CMS/[facility])    3. Osteoarthritis of multiple joints, unspecified osteoarthritis type    4. Osteopenia, unspecified location    5. Neuropathy-chemo/better    6. Bradycardia    7. Menopause        Rx: reviewed/changes:  No orders of the defined types were placed in this encounter.      LAB/Testing/Referrals: reviewed/orders:   Today:   Orders Placed This Encounter   Procedures   • DEXA Bone Density Axial       Discussions:   weight  Body mass index is 33.3 kg/m².   Patient's Body mass index is 33.3 kg/m². BMI is above normal parameters. Recommendations include: exercise counseling, nutrition counseling and as able.  Non-smoker    Patient Instructions   12m CBC, CMP, A1c, LIPID, TSH, Vit D      Follow up: Return for Dr Tinoco-, 6 m;.  Future Appointments   Date Time Provider Department Center   12/13/2018 11:00 AM  [facility]  [facility]   12/13/2018 11:15 AM Shanda Torres MD [facility]   6/18/2019 10:30 AM Conrado Tinoco MD [facility] None

## 2018-12-13 ENCOUNTER — LAB (OUTPATIENT)
Dept: LAB | Facility: HOSPITAL | Age: 73
End: 2018-12-13

## 2018-12-13 ENCOUNTER — OFFICE VISIT (OUTPATIENT)
Dept: ONCOLOGY | Facility: CLINIC | Age: 73
End: 2018-12-13

## 2018-12-13 VITALS
TEMPERATURE: 97.6 F | DIASTOLIC BLOOD PRESSURE: 82 MMHG | OXYGEN SATURATION: 96 % | BODY MASS INDEX: 32.97 KG/M2 | HEIGHT: 63 IN | SYSTOLIC BLOOD PRESSURE: 126 MMHG | RESPIRATION RATE: 16 BRPM | WEIGHT: 186.1 LBS | HEART RATE: 56 BPM

## 2018-12-13 DIAGNOSIS — I10 HYPERTENSION, UNSPECIFIED TYPE: ICD-10-CM

## 2018-12-13 DIAGNOSIS — E11.9 CONTROLLED TYPE 2 DIABETES MELLITUS WITHOUT COMPLICATION, WITHOUT LONG-TERM CURRENT USE OF INSULIN (HCC): Primary | Chronic | ICD-10-CM

## 2018-12-13 DIAGNOSIS — E55.9 VITAMIN D DEFICIENCY: ICD-10-CM

## 2018-12-13 DIAGNOSIS — C50.919 MALIGNANT NEOPLASM OF FEMALE BREAST, UNSPECIFIED ESTROGEN RECEPTOR STATUS, UNSPECIFIED LATERALITY, UNSPECIFIED SITE OF BREAST (HCC): Primary | ICD-10-CM

## 2018-12-13 DIAGNOSIS — C50.512 MALIGNANT NEOPLASM OF LOWER-OUTER QUADRANT OF LEFT FEMALE BREAST, UNSPECIFIED ESTROGEN RECEPTOR STATUS (HCC): Primary | ICD-10-CM

## 2018-12-13 DIAGNOSIS — E11.9 CONTROLLED TYPE 2 DIABETES MELLITUS WITHOUT COMPLICATION, WITHOUT LONG-TERM CURRENT USE OF INSULIN (HCC): Primary | ICD-10-CM

## 2018-12-13 LAB
25(OH)D3 SERPL-MCNC: 40.9 NG/ML (ref 30–100)
ALBUMIN SERPL-MCNC: 4.4 G/DL (ref 3.5–5)
ALBUMIN/GLOB SERPL: 1.4 G/DL (ref 1.1–2.5)
ALP SERPL-CCNC: 104 U/L (ref 24–120)
ALT SERPL W P-5'-P-CCNC: <15 U/L (ref 0–54)
ANION GAP SERPL CALCULATED.3IONS-SCNC: 13 MMOL/L (ref 4–13)
ARTICHOKE IGE QN: 111 MG/DL (ref 0–99)
AST SERPL-CCNC: 28 U/L (ref 7–45)
BASOPHILS # BLD AUTO: 0.03 10*3/MM3 (ref 0–0.2)
BASOPHILS NFR BLD AUTO: 0.4 % (ref 0–2)
BILIRUB SERPL-MCNC: 0.6 MG/DL (ref 0.1–1)
BUN BLD-MCNC: 15 MG/DL (ref 5–21)
BUN/CREAT SERPL: 26.8 (ref 7–25)
CALCIUM SPEC-SCNC: 9.6 MG/DL (ref 8.4–10.4)
CHLORIDE SERPL-SCNC: 97 MMOL/L (ref 98–110)
CHOLEST SERPL-MCNC: 165 MG/DL (ref 130–200)
CO2 SERPL-SCNC: 27 MMOL/L (ref 24–31)
CREAT BLD-MCNC: 0.56 MG/DL (ref 0.5–1.4)
DEPRECATED RDW RBC AUTO: 40 FL (ref 40–54)
EOSINOPHIL # BLD AUTO: 0.06 10*3/MM3 (ref 0–0.7)
EOSINOPHIL NFR BLD AUTO: 0.7 % (ref 0–4)
ERYTHROCYTE [DISTWIDTH] IN BLOOD BY AUTOMATED COUNT: 12.1 % (ref 12–15)
GFR SERPL CREATININE-BSD FRML MDRD: 106 ML/MIN/1.73
GLOBULIN UR ELPH-MCNC: 3.2 GM/DL
GLUCOSE BLD-MCNC: 109 MG/DL (ref 70–100)
HBA1C MFR BLD: 5.9 %
HCT VFR BLD AUTO: 43.4 % (ref 37–47)
HDLC SERPL-MCNC: 36 MG/DL
HGB BLD-MCNC: 14.8 G/DL (ref 12–16)
IMM GRANULOCYTES # BLD: 0.03 10*3/MM3 (ref 0–0.03)
IMM GRANULOCYTES NFR BLD: 0.4 % (ref 0–5)
LDLC/HDLC SERPL: 2.58 {RATIO}
LYMPHOCYTES # BLD AUTO: 2.49 10*3/MM3 (ref 0.72–4.86)
LYMPHOCYTES NFR BLD AUTO: 31.1 % (ref 15–45)
MCH RBC QN AUTO: 30.8 PG (ref 28–32)
MCHC RBC AUTO-ENTMCNC: 34.1 G/DL (ref 33–36)
MCV RBC AUTO: 90.2 FL (ref 82–98)
MONOCYTES # BLD AUTO: 0.4 10*3/MM3 (ref 0.19–1.3)
MONOCYTES NFR BLD AUTO: 5 % (ref 4–12)
NEUTROPHILS # BLD AUTO: 5 10*3/MM3 (ref 1.87–8.4)
NEUTROPHILS NFR BLD AUTO: 62.4 % (ref 39–78)
NRBC BLD MANUAL-RTO: 0 /100 WBC (ref 0–0)
PLATELET # BLD AUTO: 180 10*3/MM3 (ref 130–400)
PMV BLD AUTO: 10.4 FL (ref 6–12)
POTASSIUM BLD-SCNC: 4.1 MMOL/L (ref 3.5–5.3)
PROT SERPL-MCNC: 7.6 G/DL (ref 6.3–8.7)
RBC # BLD AUTO: 4.81 10*6/MM3 (ref 4.2–5.4)
SODIUM BLD-SCNC: 137 MMOL/L (ref 135–145)
TRIGL SERPL-MCNC: 180 MG/DL (ref 0–149)
TSH SERPL DL<=0.05 MIU/L-ACNC: 1.92 MIU/ML (ref 0.47–4.68)
WBC NRBC COR # BLD: 8.01 10*3/MM3 (ref 4.8–10.8)

## 2018-12-13 PROCEDURE — 83036 HEMOGLOBIN GLYCOSYLATED A1C: CPT | Performed by: FAMILY MEDICINE

## 2018-12-13 PROCEDURE — 82306 VITAMIN D 25 HYDROXY: CPT

## 2018-12-13 PROCEDURE — 80053 COMPREHEN METABOLIC PANEL: CPT | Performed by: FAMILY MEDICINE

## 2018-12-13 PROCEDURE — 85025 COMPLETE CBC W/AUTO DIFF WBC: CPT | Performed by: INTERNAL MEDICINE

## 2018-12-13 PROCEDURE — 36415 COLL VENOUS BLD VENIPUNCTURE: CPT | Performed by: INTERNAL MEDICINE

## 2018-12-13 PROCEDURE — 99214 OFFICE O/P EST MOD 30 MIN: CPT | Performed by: INTERNAL MEDICINE

## 2018-12-13 PROCEDURE — 84443 ASSAY THYROID STIM HORMONE: CPT

## 2018-12-13 PROCEDURE — 80061 LIPID PANEL: CPT | Performed by: FAMILY MEDICINE

## 2018-12-13 RX ORDER — SODIUM CHLORIDE 0.9 % (FLUSH) 0.9 %
10 SYRINGE (ML) INJECTION AS NEEDED
Status: DISCONTINUED | OUTPATIENT
Start: 2018-12-13 | End: 2018-12-13 | Stop reason: HOSPADM

## 2018-12-13 RX ORDER — SODIUM CHLORIDE 0.9 % (FLUSH) 0.9 %
10 SYRINGE (ML) INJECTION AS NEEDED
Status: CANCELLED | OUTPATIENT
Start: 2018-12-13

## 2018-12-13 RX ADMIN — Medication 10 ML: at 11:50

## 2018-12-13 NOTE — PROGRESS NOTES
[facility]  HEMATOLOGY & ONCOLOGY        Subjective     VISIT DIAGNOSIS:   Encounter Diagnosis   Name Primary?   • Malignant neoplasm of lower-outer quadrant of left female breast, unspecified estrogen receptor status (CMS/HCC) Yes       REASON FOR VISIT:     Chief Complaint   Patient presents with   • Breast Cancer     routine visit        HEMATOLOGY / ONCOLOGY HISTORY:   Oncology/Hematology History    Ms. Sanchez is a pleasant 71 year old  female with the diagnosis of stage IIA left breast carcinoma, ER/GA and HER2  positive 3+.  She is status post bilateral mastectomies and has completed 5 cycles of adjuvant systemic chemotherapy with taxotere, carboplatin and  Herceptin. She is presently receiving Herceptin 6 mg/kg every 3 weeks for a total dosing of 1 year with last dose planned for 2015.  Ms. Sanchez is also presently taking hormonal manipulation therapy with Arimidex 1 mg p.o. daily and this will be taken for 10 years, this  was initiated on 2015.  She had completed Herceptin. She had completed breast reconstruction process. We will give her a refill on her Arimidex. She is due for  her bone density in 2016.        Breast cancer-L/valente masectomy 2016 Initial Diagnosis     Breast cancer          Cancer Staging Information:  No matching staging information was found for the patient.      INTERVAL HISTORY  Patient ID: Ira Sanchez is a 73 y.o. year old female with hormone positive breast cancer on hormonal manipulation and tolerates Arimidex very well with minimal side effects.  Denies any chest wall lesions, denies double vision, dizziness, nausea or vomiting, unintentional weight loss, night sweats.  She does have an active life.  --  recently  from head and neck ca.   --noticed a right neck mass. Denies any mouth sores, head sores or infection. Denies fever. Has been there >2 weeks.  --6/15/18: pt here to review neck CT done or palpable  GISELLA. No ct correlate,incidental thyroid nodule.  --18: no issues or concern  Past Medical History:   Past Medical History:   Diagnosis Date   • Bradycardia    • Breast cancer (CMS/HCC)    • Colon polyp    • Diabetes mellitus (CMS/HCC)    • Peripheral neuropathy    • Urinary incontinence      Past Surgical History:   Past Surgical History:   Procedure Laterality Date   • APPENDECTOMY     • BREAST SURGERY     • CATARACT EXTRACTION Bilateral    • DILATATION AND CURETTAGE     • KNEE SURGERY     • MASTECTOMY Bilateral    • NIPPLE TATTOO  2016   • PORTACATH PLACEMENT     • THROAT SURGERY       Social History:   Social History     Socioeconomic History   • Marital status:      Spouse name: Horace   • Number of children: 0   • Years of education: 12.5   • Highest education level: Not on file   Social Needs   • Financial resource strain: Not on file   • Food insecurity - worry: Not on file   • Food insecurity - inability: Not on file   • Transportation needs - medical: Not on file   • Transportation needs - non-medical: Not on file   Occupational History   • Occupation: retired     Comment: [employer]   Tobacco Use   • Smoking status: Former Smoker     Packs/day: 2.00     Years: 37.00     Pack years: 74.00     Types: Cigarettes     Start date: 1963     Last attempt to quit: 2000     Years since quittin.2   • Smokeless tobacco: Never Used   Substance and Sexual Activity   • Alcohol use: No   • Drug use: No   • Sexual activity: Defer   Other Topics Concern   • Not on file   Social History Narrative   • Not on file     Family History:   Family History   Problem Relation Age of Onset   • Hypertension Mother    • Heart failure Mother    • Cancer Father         colon   • Colon cancer Father    • Cancer Maternal Aunt         breast   • No Known Problems Maternal Grandmother    • No Known Problems Maternal Grandfather    • No Known Problems Paternal Grandmother    • No Known  Problems Paternal Grandfather    • Cancer Maternal Aunt         breast   • Cancer Maternal Aunt         breast       Review of Systems   Constitutional: Negative.    HENT: Negative.    Eyes: Negative.    Respiratory: Negative.    Cardiovascular: Negative.    Gastrointestinal: Negative.    Endocrine: Negative.    Genitourinary: Negative.    Musculoskeletal: Positive for arthralgias. Negative for myalgias.        Neck lump   Allergic/Immunologic: Negative.    Neurological: Negative.    Hematological: Negative.    Psychiatric/Behavioral: Negative.         Performance Status:  Asymptomatic    Medications:    Current Outpatient Medications   Medication Sig Dispense Refill   • amLODIPine (NORVASC) 5 MG tablet Take 1 tablet by mouth Daily. 90 tablet 1   • anastrozole (ARIMIDEX) 1 MG tablet TAKE 1 TABLET BY MOUTH DAILY 90 tablet 3   • Calcium Carb-Cholecalciferol (CALCIUM-VITAMIN D) 600-400 MG-UNIT tablet Take 1 tablet by mouth 2 (Two) Times a Day.     • cycloSPORINE (RESTASIS) 0.05 % ophthalmic emulsion 1 drop Every 12 (Twelve) Hours.     • diphenhydrAMINE (BENADRYL) 25 mg capsule Take 25 mg by mouth Daily As Needed.     • fluticasone (FLONASE) 50 MCG/ACT nasal spray 2 sprays into each nostril Daily. 1 bottle 0   • ibuprofen (ADVIL,MOTRIN) 200 MG tablet Take 200 mg by mouth As Needed.     • lisinopril (PRINIVIL,ZESTRIL) 20 MG tablet TAKE 1 TABLET BY MOUTH DAILY 90 tablet 1   • oxybutynin (DITROPAN) 5 MG tablet TAKE 1 TABLET BY MOUTH TWICE DAILY 180 tablet 1   • thiamine (VITAMINE B-1) 100 MG tablet Take 100 mg by mouth Daily.       Current Facility-Administered Medications   Medication Dose Route Frequency Provider Last Rate Last Dose   • heparin flush (porcine) 100 UNIT/ML injection 500 Units  500 Units Intravenous PRN Shanda Torres MD   500 Units at 12/13/18 1152   • sodium chloride 0.9 % flush 10 mL  10 mL Intravenous PRN Shanda Torres MD   10 mL at 12/13/18 1150     Facility-Administered  "Medications Ordered in Other Visits   Medication Dose Route Frequency Provider Last Rate Last Dose   • heparin flush (porcine) 100 UNIT/ML injection 500 Units  500 Units Intravenous PRN Shanda Torres MD   500 Units at 04/16/18 1504   • heparin flush (porcine) 100 UNIT/ML injection 500 Units  500 Units Intravenous PRN Shanda Torres MD   500 Units at 06/12/18 1445   • sodium chloride 0.9 % flush 10 mL  10 mL Intravenous PRN Shanda Torres MD   10 mL at 04/16/18 1503   • sodium chloride 0.9 % flush 10 mL  10 mL Intravenous PRN Shanda Torres MD   10 mL at 06/12/18 1445       ALLERGIES:    Allergies   Allergen Reactions   • Benzonatate Hives and Itching       Objective      Vitals:    12/13/18 1133   BP: 126/82   Pulse: 56   Resp: 16   Temp: 97.6 °F (36.4 °C)   TempSrc: Tympanic   SpO2: 96%   Weight: 84.4 kg (186 lb 1.6 oz)   Height: 160 cm (63\")         Current Status 12/13/2018   ECOG score 0         Physical Exam      General Appearance: Patient is awake, alert, oriented and in no acute distress. Patient is welldeveloped, wellnourished, and appears stated age.  HEENT: Normocephalic. Sclerae clear, conjunctiva pink, extraocular movements intact, pupils, round, reactive to light and  accommodation. Mouth and throat are clear with moist oral mucosa.  NECK: right neck lump slightly diminished, no jugular venous distention, thyroid not enlarged.  LYMPH: No cervical, supraclavicular, axillary, or inguinal lymphadenopathy.  CHEST: Equal bilateral expansion, AP  diameter normal, resonant percussion note  LUNGS: Good air movement, no rales, rhonchi, rubs or wheezes with auscultation  CARDIO: Regular sinus rhythm, no murmurs, gallops or rubs.  ABDOMEN: Nondistended, soft, No tenderness, no guarding, no rebound, No hepatosplenomegaly. No abdominal masses. Bowel sounds positive. No hernia  GENITALIA: Not examined.  BREASTS: Bilateral mastectomy with reconstruction.  " Well-healed.  No skin lesions.  MUSKEL: No joint swelling, decreased motion, or inflammation  EXTREMS: No edema, clubbing, cyanosis, No varicose veins.  NEURO: Grossly nonfocal, Gait is coordinated and smooth, Cognition is preserved.  SKIN: No rashes, no ecchymoses, no petechia.  PSYCH: Oriented to time, place and person. Memory is preserved. Mood and affect appear normal  RECENT LABS:  Orders Only on 12/13/2018   Component Date Value Ref Range Status   • Glucose 12/13/2018 109* 70 - 100 mg/dL Final   • BUN 12/13/2018 15  5 - 21 mg/dL Final   • Creatinine 12/13/2018 0.56  0.50 - 1.40 mg/dL Final   • Sodium 12/13/2018 137  135 - 145 mmol/L Final   • Potassium 12/13/2018 4.1  3.5 - 5.3 mmol/L Final   • Chloride 12/13/2018 97* 98 - 110 mmol/L Final   • CO2 12/13/2018 27.0  24.0 - 31.0 mmol/L Final   • Calcium 12/13/2018 9.6  8.4 - 10.4 mg/dL Final   • Total Protein 12/13/2018 7.6  6.3 - 8.7 g/dL Final   • Albumin 12/13/2018 4.40  3.50 - 5.00 g/dL Final   • ALT (SGPT) 12/13/2018 <15  0 - 54 U/L Final   • AST (SGOT) 12/13/2018 28  7 - 45 U/L Final   • Alkaline Phosphatase 12/13/2018 104  24 - 120 U/L Final   • Total Bilirubin 12/13/2018 0.6  0.1 - 1.0 mg/dL Final   • eGFR Non African Amer 12/13/2018 106  >60 mL/min/1.73 Final   • Globulin 12/13/2018 3.2  gm/dL Final   • A/G Ratio 12/13/2018 1.4  1.1 - 2.5 g/dL Final   • BUN/Creatinine Ratio 12/13/2018 26.8* 7.0 - 25.0 Final   • Anion Gap 12/13/2018 13.0  4.0 - 13.0 mmol/L Final   • WBC 12/13/2018 8.01  4.80 - 10.80 10*3/mm3 Final   • RBC 12/13/2018 4.81  4.20 - 5.40 10*6/mm3 Final   • Hemoglobin 12/13/2018 14.8  12.0 - 16.0 g/dL Final   • Hematocrit 12/13/2018 43.4  37.0 - 47.0 % Final   • MCV 12/13/2018 90.2  82.0 - 98.0 fL Final   • MCH 12/13/2018 30.8  28.0 - 32.0 pg Final   • MCHC 12/13/2018 34.1  33.0 - 36.0 g/dL Final   • RDW 12/13/2018 12.1  12.0 - 15.0 % Final   • RDW-SD 12/13/2018 40.0  40.0 - 54.0 fl Final   • MPV 12/13/2018 10.4  6.0 - 12.0 fL Final   •  Platelets 12/13/2018 180  130 - 400 10*3/mm3 Final   • Neutrophil % 12/13/2018 62.4  39.0 - 78.0 % Final   • Lymphocyte % 12/13/2018 31.1  15.0 - 45.0 % Final   • Monocyte % 12/13/2018 5.0  4.0 - 12.0 % Final   • Eosinophil % 12/13/2018 0.7  0.0 - 4.0 % Final   • Basophil % 12/13/2018 0.4  0.0 - 2.0 % Final   • Immature Grans % 12/13/2018 0.4  0.0 - 5.0 % Final   • Neutrophils, Absolute 12/13/2018 5.00  1.87 - 8.40 10*3/mm3 Final   • Lymphocytes, Absolute 12/13/2018 2.49  0.72 - 4.86 10*3/mm3 Final   • Monocytes, Absolute 12/13/2018 0.40  0.19 - 1.30 10*3/mm3 Final   • Eosinophils, Absolute 12/13/2018 0.06  0.00 - 0.70 10*3/mm3 Final   • Basophils, Absolute 12/13/2018 0.03  0.00 - 0.20 10*3/mm3 Final   • Immature Grans, Absolute 12/13/2018 0.03  0.00 - 0.03 10*3/mm3 Final   • nRBC 12/13/2018 0.0  0.0 - 0.0 /100 WBC Final   Lab on 12/13/2018   Component Date Value Ref Range Status   • 25 Hydroxy, Vitamin D 12/13/2018 40.9  30.0 - 100.0 ng/ml Final   • TSH 12/13/2018 1.920  0.470 - 4.680 mIU/mL Final       RADIOLOGY:  No results found.         Assessment/Plan   72-year-old female diagnosed Stage IIa left-sided breast cancer, status post prophylactic mastectomy on the right side and bilateral breast reconstruction.  All this in 2014.  She is status post chemotherapy adjuvantly and currently on Arimidex 1 mg daily to complete a total of 5 years.        Patient Active Problem List   Diagnosis   • Obesity   • Breast cancer-L/valente masectomy 2014   • Overactive bladder   • Diabetes type 2, controlled (CMS/HCC)   • Hypertension   • Osteopenia [id number]   • Neuropathy-chemo/better   • Osteoarthritis of multiple joints   • Malignant neoplasm of lower-outer quadrant of left female breast (CMS/HCC)   • Family history of malignant neoplasm of breast   • Malignant neoplasm of upper-outer quadrant of female breast (CMS/HCC)   • History of bilateral mastectomy   • Wellness examination-done   • Pain in shoulder   • Chronic back pain    • Laboratory test*   • Bradycardia   • Cough   • Allergic rhinitis   • Menopause          1.ER positive breast cancer: Status post bilateral mastectomy.  No evidence of disease recurrence on physical exam or review of labs done today.  Per NCCN guidelines no indication for tumor markers.  -- omayra arimidex started 2015. COmplete 5 yrs in Jan 2019. Will stop  rtc in 6 months then subsequently yearly    2. Osteoarthritis: Follow with primary care.    3. Osteopenia: Continue ca+ vitamin D next DEXA scan 9/18.    4.  Health maintenance: She is previously been a smoker and more recently has undergone spiral screening CT scan shows no evidence of any cancer at least in the chest and the chest wall area.  5. Right neck lump: ct neck ordered showed some small neck LNs, no CT coorelate of palpable GISELLA, incidental thyroid nodule. We wull not pursue these further as they ere benign unless things change.  rtc in 6months           Shanda Torres MD    12/13/2018    3:53 PM

## 2018-12-19 DIAGNOSIS — E11.9 CONTROLLED TYPE 2 DIABETES MELLITUS WITHOUT COMPLICATION, WITHOUT LONG-TERM CURRENT USE OF INSULIN (HCC): Chronic | ICD-10-CM

## 2018-12-19 DIAGNOSIS — Z78.0 MENOPAUSE: ICD-10-CM

## 2018-12-19 DIAGNOSIS — M85.80 OSTEOPENIA, UNSPECIFIED LOCATION: ICD-10-CM

## 2019-01-09 DIAGNOSIS — R42 DIZZY: ICD-10-CM

## 2019-01-09 DIAGNOSIS — R03.0 ELEVATED BLOOD PRESSURE READING: ICD-10-CM

## 2019-01-10 RX ORDER — AMLODIPINE BESYLATE 5 MG/1
5 TABLET ORAL DAILY
Qty: 90 TABLET | Refills: 3 | Status: SHIPPED | OUTPATIENT
Start: 2019-01-10 | End: 2019-12-30

## 2019-02-27 ENCOUNTER — TELEPHONE (OUTPATIENT)
Dept: FAMILY MEDICINE CLINIC | Facility: CLINIC | Age: 74
End: 2019-02-27

## 2019-02-27 ENCOUNTER — CLINICAL SUPPORT (OUTPATIENT)
Dept: FAMILY MEDICINE CLINIC | Facility: CLINIC | Age: 74
End: 2019-02-27

## 2019-02-27 DIAGNOSIS — R05.9 COUGH: Primary | ICD-10-CM

## 2019-02-27 DIAGNOSIS — R09.89 CHEST CONGESTION: ICD-10-CM

## 2019-02-27 LAB
EXPIRATION DATE: NORMAL
FLUAV AG NPH QL: NEGATIVE
FLUBV AG NPH QL: NEGATIVE
INTERNAL CONTROL: NORMAL
Lab: NORMAL

## 2019-02-27 PROCEDURE — 87804 INFLUENZA ASSAY W/OPTIC: CPT | Performed by: FAMILY MEDICINE

## 2019-02-27 RX ORDER — AZITHROMYCIN 250 MG/1
TABLET, FILM COATED ORAL
Qty: 6 TABLET | Refills: 0 | Status: SHIPPED | OUTPATIENT
Start: 2019-02-27 | End: 2019-03-06

## 2019-02-27 NOTE — TELEPHONE ENCOUNTER
"Vm \"I had a terrible cold, now I have a bad sore throat and cough and diarrhea and I have to watch my grandchildren and need something called in\"  "

## 2019-03-04 ENCOUNTER — OFFICE VISIT (OUTPATIENT)
Dept: FAMILY MEDICINE CLINIC | Facility: CLINIC | Age: 74
End: 2019-03-04

## 2019-03-04 VITALS
SYSTOLIC BLOOD PRESSURE: 130 MMHG | OXYGEN SATURATION: 93 % | HEART RATE: 69 BPM | TEMPERATURE: 98.4 F | DIASTOLIC BLOOD PRESSURE: 75 MMHG | WEIGHT: 187 LBS | BODY MASS INDEX: 33.13 KG/M2 | RESPIRATION RATE: 18 BRPM | HEIGHT: 63 IN

## 2019-03-04 DIAGNOSIS — E11.9 CONTROLLED TYPE 2 DIABETES MELLITUS WITHOUT COMPLICATION, WITHOUT LONG-TERM CURRENT USE OF INSULIN (HCC): Chronic | ICD-10-CM

## 2019-03-04 DIAGNOSIS — I10 ESSENTIAL HYPERTENSION: Chronic | ICD-10-CM

## 2019-03-04 DIAGNOSIS — R05.9 COUGH: ICD-10-CM

## 2019-03-04 DIAGNOSIS — J40 BRONCHITIS: ICD-10-CM

## 2019-03-04 PROCEDURE — 96372 THER/PROPH/DIAG INJ SC/IM: CPT | Performed by: FAMILY MEDICINE

## 2019-03-04 PROCEDURE — 99213 OFFICE O/P EST LOW 20 MIN: CPT | Performed by: FAMILY MEDICINE

## 2019-03-04 RX ORDER — DEXAMETHASONE SODIUM PHOSPHATE 10 MG/ML
10 INJECTION INTRAMUSCULAR; INTRAVENOUS ONCE
Status: DISCONTINUED | OUTPATIENT
Start: 2019-03-04 | End: 2019-03-04

## 2019-03-04 RX ORDER — IPRATROPIUM BROMIDE AND ALBUTEROL SULFATE 2.5; .5 MG/3ML; MG/3ML
3 SOLUTION RESPIRATORY (INHALATION) 4 TIMES DAILY
Qty: 10 VIAL | Refills: 0 | Status: SHIPPED | OUTPATIENT
Start: 2019-03-04 | End: 2019-03-26

## 2019-03-04 RX ORDER — CEFTRIAXONE 1 G/1
1 INJECTION, POWDER, FOR SOLUTION INTRAMUSCULAR; INTRAVENOUS EVERY 24 HOURS
Status: COMPLETED | OUTPATIENT
Start: 2019-03-04 | End: 2019-03-05

## 2019-03-04 RX ORDER — DEXAMETHASONE SODIUM PHOSPHATE 4 MG/ML
10 INJECTION, SOLUTION INTRA-ARTICULAR; INTRALESIONAL; INTRAMUSCULAR; INTRAVENOUS; SOFT TISSUE ONCE
Status: COMPLETED | OUTPATIENT
Start: 2019-03-04 | End: 2019-03-04

## 2019-03-04 RX ADMIN — CEFTRIAXONE 1 G: 1 INJECTION, POWDER, FOR SOLUTION INTRAMUSCULAR; INTRAVENOUS at 12:12

## 2019-03-04 RX ADMIN — DEXAMETHASONE SODIUM PHOSPHATE 10 MG: 4 INJECTION, SOLUTION INTRA-ARTICULAR; INTRALESIONAL; INTRAMUSCULAR; INTRAVENOUS; SOFT TISSUE at 12:13

## 2019-03-04 NOTE — PATIENT INSTRUCTIONS
When using steroids  A. Do not use anti-inflammatories such as Motrin/ibuprofen, Alleve/naprosyn, Mobic and like medications  B. Stay on your stomach medicines for ulcer/like (like prilosec, protonix, nexium)  C. If you are diabetic or pre-diabetic; it will raise your blood sugar.  For most people this will be a minimal and very tolerated elevation.  If you check your blood sugars (or have the ability to check) you should consider for the first days of the steroid Rx to check your blood sugar more often (1-3/days).  Call if you see blood sugars over 350.  Being more strict on your diabetic diet while using the steroid will help.     If using insulin/or becomes necessary: add sliding scale to your program:     Based on BS give this much insulin EXTRA to any other insulin/diabetic medication being used.     150-199 2 units  200-249 3 units  250-299 4 units  300-349 5 units  350-400 6 units  Greater 400 7 units

## 2019-03-04 NOTE — PROGRESS NOTES
Rocephin 1GM IM RVG site, Decadron 10mg IM LVG site, pt tolerated well. Pt is scheduled to come back tomorrow for another Rocepin 1 GM injection.

## 2019-03-04 NOTE — PROGRESS NOTES
Subjective   Ira Sanchez is a 74 y.o. female presenting with chief complaint of:   Chief Complaint   Patient presents with   • Cough     5 days been coughing up green/yellow mucus    • Urinary Frequency     5 days having accidents        History of Present Illness :  With sister; Tracy.  Here for primarily an acute issue today; week of cough, sinus.  Nasal/sinus congestion with rhinorrhea and at times bilateral nasal obstruction.  Has posterior pharyngeal drainage and sore throat.  All of this comes and goes.  The cough is somewhat dry; rarely productive.  When productive is green yellow and not bloody.  Is not been associated with obvious wheezing; she feels mildly short of breath but is not significantly influenced by the limited activity that she is doing..   Has few chronic problems to consider that might have a bearing on today's issues; not an interval appointment.       Chronic/acute problems reviewed today:   1. Essential hypertension: Chronic/stable. Stable here/if home blood pressures.  No significant chest pain, SOB, LE edema, orthopnea, near syncope, dizziness/light headness.      2. Cough: acute/above.    3. Controlled type 2 diabetes mellitus without complication, without long-term current use of insulin (CMS/[facility]): Chronic/stable. No problem/pattern hypoglycemia/hyperglycemia manifest by poly- dypsia, phagia, uria, or sweats, diaphoretic episodes, syncope/near.     4. Bronchitis: acute/above     Has an/another acute issue today: none.    The following portions of the patient's history were reviewed and updated as appropriate: allergies, current medications, past family history, past medical history, past social history, past surgical history and problem list.      Current Outpatient Medications:   •  amLODIPine (NORVASC) 5 MG tablet, TAKE 1 TABLET BY MOUTH DAILY, Disp: 90 tablet, Rfl: 3  •  anastrozole (ARIMIDEX) 1 MG tablet, TAKE 1 TABLET BY MOUTH DAILY, Disp: 90 tablet, Rfl: 3  •  Calcium  Carb-Cholecalciferol (CALCIUM-VITAMIN D) 600-400 MG-UNIT tablet, Take 1 tablet by mouth 2 (Two) Times a Day., Disp: , Rfl:   •  cycloSPORINE (RESTASIS) 0.05 % ophthalmic emulsion, 1 drop Every 12 (Twelve) Hours., Disp: , Rfl:   •  diphenhydrAMINE (BENADRYL) 25 mg capsule, Take 25 mg by mouth Daily As Needed., Disp: , Rfl:   •  fluticasone (FLONASE) 50 MCG/ACT nasal spray, 2 sprays into each nostril Daily., Disp: 1 bottle, Rfl: 0  •  ibuprofen (ADVIL,MOTRIN) 200 MG tablet, Take 200 mg by mouth As Needed., Disp: , Rfl:   •  lisinopril (PRINIVIL,ZESTRIL) 20 MG tablet, TAKE 1 TABLET BY MOUTH DAILY, Disp: 90 tablet, Rfl: 1  •  oxybutynin (DITROPAN) 5 MG tablet, TAKE 1 TABLET BY MOUTH TWICE DAILY, Disp: 180 tablet, Rfl: 1  •  thiamine (VITAMINE B-1) 100 MG tablet, Take 100 mg by mouth Daily., Disp: , Rfl:   •  azithromycin (ZITHROMAX Z-BETO) 250 MG tablet, Take 2 tablets the first day, then 1 tablet daily for 4 days., Disp: 6 tablet, Rfl: 0  •  ipratropium-albuterol (DUO-NEB) 0.5-2.5 mg/3 ml nebulizer, Take 3 mL by nebulization 4 (Four) Times a Day., Disp: 10 vial, Rfl: 0    Current Facility-Administered Medications:   •  cefTRIAXone (ROCEPHIN) injection 1 g, 1 g, Intramuscular, Q24H, Conrado Tinoco MD, 1 g at 03/04/19 1212    Facility-Administered Medications Ordered in Other Visits:   •  heparin flush (porcine) 100 UNIT/ML injection 500 Units, 500 Units, Intravenous, PRN, Shanda Torres MD, 500 Units at 04/16/18 1504  •  heparin flush (porcine) 100 UNIT/ML injection 500 Units, 500 Units, Intravenous, PRN, Shanda Torres MD, 500 Units at 06/12/18 1445  •  sodium chloride 0.9 % flush 10 mL, 10 mL, Intravenous, PRN, Shanda Torres MD, 10 mL at 04/16/18 1503  •  sodium chloride 0.9 % flush 10 mL, 10 mL, Intravenous, PRN, Shanda Torres MD, 10 mL at 06/12/18 1445    No problems with medications.  Refills if needed done    Allergies   Allergen Reactions   • Benzonatate  Hives and Itching       Review of Systems  GENERAL:  Active/slower with limits, speed, stamina for age and acute fatigue and above cough/sinus.  Sleep is ok. No fever now.  SKIN: No  rash/skin lesion of concern:   ENDO:  No syncope, near or diaphoretic sweaty spells.  No download..  HEENT: No recent head injury; increased sinus area headache.   No vision change.  No chronic hearing loss.  Ears with fullness without pain/drainage.  No sore throat now.  No significant nasal/sinus congestion/drainage. No epistaxis.   CHEST: No chest wall tenderness or mass.  No usual significant cough, and usual without wheeze.  No SOB; no hemoptysis.  CV: No chest pain, palpitations, ankle edema.  GI: No heartburn, dysphagia.  No abdominal pain, diarrhea, constipation.  No rectal bleeding, or melena.    :  Voids without dysuria, or  incontinence to completion.  ORTHO: No painful/swollen joints but various on /off sore.  Occ sore neck or back.  No acute neck or back pain without recent injury.  NEURO: No dizziness, weakness of extremities.  Same mild numbness/paresthesias.   PSYCH: No memory loss.  Mood good; mild anxious, depressed but/and not suicidal.  Tries to tolerate stress .   Screening:  Mammogram: bilateral masectomy  Bone density: 2016: LS - +0.3/hip -1.3; discussed/repeat 18-24 m/ and continue Ca/Vit D-ordered 2018  Low dose CT chest: CTs with breast cancer  GI: Colon-avm-div/[facility]/12.19.17  Prostate: NA  Usual lab order  6m CBC, CMP, A1c  12m CBC, CMP, A1c, LIPID, TSH, Vit D     Lab Results:  Results for orders placed or performed in visit on 02/27/19   POC Influenza A / B   Result Value Ref Range    Rapid Influenza A Ag Negative Negative    Rapid Influenza B Ag Negative Negative    Internal Control Passed Passed    Lot Number [id number]     Expiration Date 09-        A1C:  Lab Results - Last 18 Months   Lab Units 12/13/18  1131   HEMOGLOBIN A1C % 5.9     PSA:No results for input(s): PSA in the last 74438  "hours.  CBC:  Lab Results - Last 18 Months   Lab Units 12/13/18  1131 06/12/18  1351 04/16/18  1424 03/26/18  0847 09/14/17  0853   WBC 10*3/mm3 8.01 7.72 10.69 9.89 7.20   HEMOGLOBIN g/dL 14.8 14.1 15.1 14.7 15.0   HEMATOCRIT % 43.4 41.8 44.5 46.6 43.9   PLATELETS 10*3/mm3 180 185 204 193 144      BMP/CMP:  Lab Results - Last 18 Months   Lab Units 12/13/18  1131 06/13/18  1329 06/12/18  1351 04/16/18  1424 03/26/18  0847 09/14/17  0853   SODIUM mmol/L 137  --  141 139 143 141   POTASSIUM mmol/L 4.1  --  4.1 4.4 3.9 4.1   CHLORIDE mmol/L 97*  --  101 100 101 103   TOTAL CO2 mmol/L  --   --   --   --  25.0  --    CO2 mmol/L 27.0  --  29.0 29.0  --  26.0   GLUCOSE mg/dL  --   --   --   --  127*  --    BUN mg/dL 15  --  14 15 13 17   CREATININE mg/dL 0.56 0.70 0.52 0.55 0.46* 0.52   EGFR IF NONAFRICN AM mL/min/1.73 106  --  115 108 132 115   EGFR IF AFRICN AM mL/min/1.73  --   --   --   --  >150  --    CALCIUM mg/dL 9.6  --  9.9 10.1 10.1 9.5     HEPATIC:  Lab Results - Last 18 Months   Lab Units 12/13/18  1131 06/12/18  1351 04/16/18  1424 09/14/17  0853   ALT (SGPT) U/L <15 16 28 29   AST (SGOT) U/L 28 25 27 40   ALK PHOS U/L 104 97 101 88     THYROID:  Lab Results - Last 18 Months   Lab Units 12/13/18  1131   TSH mIU/mL 1.920       Objective   /75 (BP Location: Left arm, Patient Position: Sitting, Cuff Size: Adult)   Pulse 69   Temp 98.4 °F (36.9 °C) (Oral)   Resp 18   Ht 160 cm (63\")   Wt 84.8 kg (187 lb)   LMP  (LMP Unknown)   SpO2 93%   Breastfeeding? No   BMI 33.13 kg/m²   Body mass index is 33.13 kg/m².    Physical Exam  GENERAL:  Well nourished/developed in no acute distress but frequent cough; at times paroxysmal.   SKIN: Turgor excellent, without wound, rash, lesion  HEENT: Normal cephalic without trauma.  Pupils equal round reactive to light. Extraocular motions full without nystagmus.   External canals nonobstructive nontender without reddness. Tymphatic membranes dull with irwin structures " intact.   Oral cavity without growths, exudates, and moist.  Posterior pharynx without mass, obstruction, redness.  No thyromegaly, mass, tenderness, definite lymphadenopathy and supple.   CV: Regular rhythm.  No murmur, gallop,  edema. Posterior pulses intact.  No carotid bruits.  CHEST: No chest wall tenderness or mass.   LUNGS: Symmetric motion with clear to auscultation.  No dullness to percussion  ABD: Soft, nontender without mass.   ORTHO: Symmetric extremities without swelling/point tenderness.  Full gross range of motion.   NEURO: CN 2-12 grossly intact.  Symmetric facies and UE/LE. 3/5 strength throughout.   Nonfocal use extremities. Speech clear.     PSYCH: Oriented x 3.  Pleasant calm, well kept.  Purposeful/directed conservation with intact short/long gross memory.    Assessment/Plan     1. Essential hypertension    2. Cough    3. Controlled type 2 diabetes mellitus without complication, without long-term current use of insulin (CMS/[facility])    4. Bronchitis      She has an obvious acute respiratory infection with sinus and bronchitis components.  She does not sound like a pneumonia.  She seems relatively hemodynamically stable and has a stable oxygenation.  She is a reasonable candidate to try steroids as needed.    Rx: reviewed/changes:  New Medications Ordered This Visit   Medications   • ipratropium-albuterol (DUO-NEB) 0.5-2.5 mg/3 ml nebulizer     Sig: Take 3 mL by nebulization 4 (Four) Times a Day.     Dispense:  10 vial     Refill:  0   • cefTRIAXone (ROCEPHIN) injection 1 g   • dexamethasone (DECADRON) injection 10 mg       LAB/Testing/Referrals: reviewed/orders:   Today:   Orders Placed This Encounter   Procedures   • Home Nebulizer Accessories   • Home Nebulizer     Chronic/recurrent labs above or change to:   same     Discussions:   above  Body mass index is 33.13 kg/m².  Patient's Body mass index is 33.13 kg/m². BMI is above normal parameters. Recommendations include: exercise counseling,  nutrition counseling and as before: especially when feeling better.    Tobacco use reviewed:  Non-smoker    Patient Instructions   When using steroids  A. Do not use anti-inflammatories such as Motrin/ibuprofen, Alleve/naprosyn, Mobic and like medications  B. Stay on your stomach medicines for ulcer/like (like prilosec, protonix, nexium)  C. If you are diabetic or pre-diabetic; it will raise your blood sugar.  For most people this will be a minimal and very tolerated elevation.  If you check your blood sugars (or have the ability to check) you should consider for the first days of the steroid Rx to check your blood sugar more often (1-3/days).  Call if you see blood sugars over 350.  Being more strict on your diabetic diet while using the steroid will help.     If using insulin/or becomes necessary: add sliding scale to your program:     Based on BS give this much insulin EXTRA to any other insulin/diabetic medication being used.     150-199 2 units  200-249 3 units  250-299 4 units  300-349 5 units  350-400 6 units  Greater 400 7 units          Follow up: Return for lab today.  Then lab/Dr Tinoco as planned.  Future Appointments   Date Time Provider Department Center   3/5/2019 11:45 AM NURSE/[facility] W PC METR None   3/12/2019  9:00 AM W ONC PAD NURSE W ONC PAD PAD   6/13/2019  8:30 AM  [facility] LAB  [facility] PAD   6/13/2019  9:00 AM Shanda Torres MD W ONC PAD PAD   6/18/2019 10:30 AM Conrado Tinoco MD W PC METR None

## 2019-03-05 ENCOUNTER — PRIOR AUTHORIZATION (OUTPATIENT)
Dept: FAMILY MEDICINE CLINIC | Facility: CLINIC | Age: 74
End: 2019-03-05

## 2019-03-05 ENCOUNTER — CLINICAL SUPPORT (OUTPATIENT)
Dept: FAMILY MEDICINE CLINIC | Facility: CLINIC | Age: 74
End: 2019-03-05

## 2019-03-05 DIAGNOSIS — R05.9 COUGH: ICD-10-CM

## 2019-03-05 PROCEDURE — 96372 THER/PROPH/DIAG INJ SC/IM: CPT | Performed by: FAMILY MEDICINE

## 2019-03-05 RX ADMIN — CEFTRIAXONE 1 G: 1 INJECTION, POWDER, FOR SOLUTION INTRAMUSCULAR; INTRAVENOUS at 11:53

## 2019-03-05 NOTE — PROGRESS NOTES
"Pt here for second Rocephin 1GM injection as ordered. Given in LVG site . Pt tolerated well and no adverse reactions noted and pt left office. Pt advised \"im feeling a lot better, do I need to get another injection tomorrow? Also how long do I have to stay away from my grand babies?\"  "

## 2019-03-06 ENCOUNTER — TELEPHONE (OUTPATIENT)
Dept: FAMILY MEDICINE CLINIC | Facility: CLINIC | Age: 74
End: 2019-03-06

## 2019-03-06 RX ORDER — ALBUTEROL SULFATE 90 UG/1
1 AEROSOL, METERED RESPIRATORY (INHALATION) 3 TIMES DAILY
Qty: 1 INHALER | Refills: 0 | Status: SHIPPED | OUTPATIENT
Start: 2019-03-06 | End: 2019-03-26

## 2019-03-06 RX ORDER — AZITHROMYCIN 250 MG/1
TABLET, FILM COATED ORAL
Qty: 6 TABLET | Refills: 0 | Status: SHIPPED | OUTPATIENT
Start: 2019-03-06 | End: 2019-03-26

## 2019-03-06 RX ORDER — AZITHROMYCIN 1 G
1 PACKET (EA) ORAL ONCE
Qty: 1 PACKET | Refills: 0 | Status: SHIPPED | OUTPATIENT
Start: 2019-03-06 | End: 2019-03-06

## 2019-03-06 RX ORDER — METHYLPREDNISOLONE 4 MG/1
TABLET ORAL
Qty: 1 EACH | Refills: 0 | Status: SHIPPED | OUTPATIENT
Start: 2019-03-06 | End: 2019-03-26

## 2019-03-06 NOTE — TELEPHONE ENCOUNTER
Current Outpatient Medications:   •  amLODIPine (NORVASC) 5 MG tablet, TAKE 1 TABLET BY MOUTH DAILY, Disp: 90 tablet, Rfl: 3  •  anastrozole (ARIMIDEX) 1 MG tablet, TAKE 1 TABLET BY MOUTH DAILY, Disp: 90 tablet, Rfl: 3  •  Calcium Carb-Cholecalciferol (CALCIUM-VITAMIN D) 600-400 MG-UNIT tablet, Take 1 tablet by mouth 2 (Two) Times a Day., Disp: , Rfl:   •  cycloSPORINE (RESTASIS) 0.05 % ophthalmic emulsion, 1 drop Every 12 (Twelve) Hours., Disp: , Rfl:   •  diphenhydrAMINE (BENADRYL) 25 mg capsule, Take 25 mg by mouth Daily As Needed., Disp: , Rfl:   •  fluticasone (FLONASE) 50 MCG/ACT nasal spray, 2 sprays into each nostril Daily., Disp: 1 bottle, Rfl: 0  •  ibuprofen (ADVIL,MOTRIN) 200 MG tablet, Take 200 mg by mouth As Needed., Disp: , Rfl:   •  lisinopril (PRINIVIL,ZESTRIL) 20 MG tablet, TAKE 1 TABLET BY MOUTH DAILY, Disp: 90 tablet, Rfl: 1  •  oxybutynin (DITROPAN) 5 MG tablet, TAKE 1 TABLET BY MOUTH TWICE DAILY, Disp: 180 tablet, Rfl: 1  •  thiamine (VITAMINE B-1) 100 MG tablet, Take 100 mg by mouth Daily., Disp: , Rfl:   No current facility-administered medications for this visit.     Allergies   Allergen Reactions   • Benzonatate Hives and Itching     Medrol dose pack-see steroids below  proAir #1 qid  Z pack  Progress report 1-2 days

## 2019-03-06 NOTE — TELEPHONE ENCOUNTER
"Called pt and she advised \"I guess im a little better, I cough so much I couldn't sleep and my ribs are hurting. I didn't get the machine because I they wouldn't give me the medication for it\"  "

## 2019-03-06 NOTE — TELEPHONE ENCOUNTER
"Pt called \"do I need to come in for another shot today?\"    Pt advised \"Im still coughing and didn't get much sleep last night, it is greenish yellow gunk coming up\"  Pt denied any fever or SOB  "

## 2019-03-07 ENCOUNTER — TELEPHONE (OUTPATIENT)
Dept: FAMILY MEDICINE CLINIC | Facility: CLINIC | Age: 74
End: 2019-03-07

## 2019-03-07 NOTE — TELEPHONE ENCOUNTER
"Called pt to inquire how she it doing? Pt stated \"Im doing a lot better\" does still have occ cough, non productive \"not as bad as it was\" pt is on medrol dose pack and Zpack    Advised pt if not better to call office when she has completed her medication or before, pt agreeable  "

## 2019-03-12 ENCOUNTER — CLINICAL SUPPORT (OUTPATIENT)
Dept: ONCOLOGY | Facility: CLINIC | Age: 74
End: 2019-03-12

## 2019-03-12 DIAGNOSIS — C50.512 MALIGNANT NEOPLASM OF LOWER-OUTER QUADRANT OF LEFT FEMALE BREAST, UNSPECIFIED ESTROGEN RECEPTOR STATUS (HCC): Primary | ICD-10-CM

## 2019-03-12 RX ORDER — SODIUM CHLORIDE 0.9 % (FLUSH) 0.9 %
10 SYRINGE (ML) INJECTION AS NEEDED
Status: CANCELLED | OUTPATIENT
Start: 2019-03-12

## 2019-03-12 RX ORDER — SODIUM CHLORIDE 0.9 % (FLUSH) 0.9 %
10 SYRINGE (ML) INJECTION AS NEEDED
Status: DISCONTINUED | OUTPATIENT
Start: 2019-03-12 | End: 2019-03-12 | Stop reason: HOSPADM

## 2019-03-12 RX ADMIN — Medication 10 ML: at 09:19

## 2019-03-25 ENCOUNTER — TELEPHONE (OUTPATIENT)
Dept: FAMILY MEDICINE CLINIC | Facility: CLINIC | Age: 74
End: 2019-03-25

## 2019-03-25 NOTE — TELEPHONE ENCOUNTER
"Vm \"I took a lot of medication to get over this cough. And it is coming back, I dont know if it is allergies or the same thing? Can I get something called in for it?\" [phone]  "

## 2019-03-26 ENCOUNTER — OFFICE VISIT (OUTPATIENT)
Dept: FAMILY MEDICINE CLINIC | Facility: CLINIC | Age: 74
End: 2019-03-26

## 2019-03-26 ENCOUNTER — TELEPHONE (OUTPATIENT)
Dept: FAMILY MEDICINE CLINIC | Facility: CLINIC | Age: 74
End: 2019-03-26

## 2019-03-26 VITALS
OXYGEN SATURATION: 95 % | HEART RATE: 68 BPM | SYSTOLIC BLOOD PRESSURE: 122 MMHG | TEMPERATURE: 98.8 F | RESPIRATION RATE: 18 BRPM | DIASTOLIC BLOOD PRESSURE: 70 MMHG | WEIGHT: 188.5 LBS | HEIGHT: 63 IN | BODY MASS INDEX: 33.4 KG/M2

## 2019-03-26 DIAGNOSIS — R05.9 COUGH: ICD-10-CM

## 2019-03-26 DIAGNOSIS — I10 ESSENTIAL HYPERTENSION: Chronic | ICD-10-CM

## 2019-03-26 DIAGNOSIS — H10.45 OTHER CHRONIC ALLERGIC CONJUNCTIVITIS OF BOTH EYES: Primary | ICD-10-CM

## 2019-03-26 DIAGNOSIS — E11.9 CONTROLLED TYPE 2 DIABETES MELLITUS WITHOUT COMPLICATION, WITHOUT LONG-TERM CURRENT USE OF INSULIN (HCC): Chronic | ICD-10-CM

## 2019-03-26 DIAGNOSIS — J30.9 ALLERGIC RHINITIS, UNSPECIFIED SEASONALITY, UNSPECIFIED TRIGGER: ICD-10-CM

## 2019-03-26 PROCEDURE — 99213 OFFICE O/P EST LOW 20 MIN: CPT | Performed by: FAMILY MEDICINE

## 2019-03-26 RX ORDER — BUDESONIDE AND FORMOTEROL FUMARATE DIHYDRATE 80; 4.5 UG/1; UG/1
2 AEROSOL RESPIRATORY (INHALATION)
Qty: 1 INHALER | Refills: 12 | Status: SHIPPED | OUTPATIENT
Start: 2019-03-26 | End: 2019-04-04

## 2019-03-26 RX ORDER — METHYLPREDNISOLONE 4 MG/1
TABLET ORAL
Qty: 1 EACH | Refills: 0 | Status: SHIPPED | OUTPATIENT
Start: 2019-03-26 | End: 2019-04-04

## 2019-03-26 RX ORDER — FLUTICASONE PROPIONATE 50 MCG
2 SPRAY, SUSPENSION (ML) NASAL DAILY
Qty: 1 BOTTLE | Refills: 0 | Status: SHIPPED | OUTPATIENT
Start: 2019-03-26 | End: 2019-06-18

## 2019-03-26 RX ORDER — OLOPATADINE HYDROCHLORIDE 1 MG/ML
1 SOLUTION/ DROPS OPHTHALMIC 2 TIMES DAILY
Qty: 5 ML | Refills: 2 | Status: SHIPPED | OUTPATIENT
Start: 2019-03-26 | End: 2020-06-22

## 2019-03-26 NOTE — PATIENT INSTRUCTIONS
When using steroids  A. Do not use anti-inflammatories such as Motrin/ibuprofen, Alleve/naprosyn, Mobic and like medications  B. Stay on your stomach medicines for ulcer/like (like prilosec, protonix, nexium)  C. If you are diabetic or pre-diabetic; it will raise your blood sugar.  For most people this will be a minimal and very tolerated elevation.  If you check your blood sugars (or have the ability to check) you should consider for the first days of the steroid Rx to check your blood sugar more often (1-3/days).  Call if you see blood sugars over 350.  Being more strict on your diabetic diet while using the steroid will help.     I

## 2019-03-26 NOTE — TELEPHONE ENCOUNTER
advair diskus  advair HFA  breo  dulera    If pharmacy could tell us which one would prevent having to write 4 Rx to see

## 2019-03-26 NOTE — PROGRESS NOTES
"Subjective   Ira Sanchez is a 74 y.o. female presenting with chief complaint of:   Chief Complaint   Patient presents with   • Cough     Coughing is keeping patient up at night with clear mucus.       History of Present Illness :  Alone.  Here for primarily an acute issue today; return of cough.  Associated with nasal drainage, eye drainage.  Not associated with fever.  Got over last spells and then with history of \"allergies\" all of this started.   Has multiple chronic problems to consider that might have a bearing on today's issues; not an interval appointment.      3.4.19  History of Present Illness :  With sister; Tracy.  Here for primarily an acute issue today; week of cough, sinus.  Nasal/sinus congestion with rhinorrhea and at times bilateral nasal obstruction.  Has posterior pharyngeal drainage and sore throat.  All of this comes and goes.  The cough is somewhat dry; rarely productive.  When productive is green yellow and not bloody.  Is not been associated with obvious wheezing; she feels mildly short of breath but is not significantly influenced by the limited activity that she is doing..   Has few chronic problems to consider that might have a bearing on today's issues; not an interval appointment.      • ipratropium-albuterol (DUO-NEB) 0.5-2.5 mg/3 ml nebulizer       Sig: Take 3 mL by nebulization 4 (Four) Times a Day.       Dispense:  10 vial       Refill:  0   • cefTRIAXone (ROCEPHIN) injection 1 g   • dexamethasone (DECADRON) injection 10 mg     Z pack  Medrol dose pack    Chronic/acute problems reviewed today:   1. Other chronic allergic conjunctivitis of both eyes: chronic/variable worse around spring/fall.    2. Cough: acute/variable recently.  First was associated with URI/bronchitis and this appears associated with sinus (sinus drainage); or even asthma.    3. Essential hypertension: Chronic/stable. Stable here/if home blood pressures.  No significant chest pain, SOB, LE edema, orthopnea, near " syncope, dizziness/light headness.      4. Controlled type 2 diabetes mellitus without complication, without long-term current use of insulin (CMS/[facility]): Chronic/stable. No problem/pattern hypoglycemia/hyperglycemia manifest by poly- dypsia, phagia, uria, or sweats, diaphoretic episodes, syncope/near.     5. Allergic rhinitis, unspecified seasonality, unspecified trigger: chronic/variable worse with spring/fall.       Has an/another acute issue today: none.    The following portions of the patient's history were reviewed and updated as appropriate: allergies, current medications, past family history, past medical history, past social history, past surgical history and problem list.      Current Outpatient Medications:   •  albuterol sulfate  (90 Base) MCG/ACT inhaler, Inhale 1 puff 3 (Three) Times a Day., Disp: 1 inhaler, Rfl: 0  •  amLODIPine (NORVASC) 5 MG tablet, TAKE 1 TABLET BY MOUTH DAILY, Disp: 90 tablet, Rfl: 3  •  anastrozole (ARIMIDEX) 1 MG tablet, TAKE 1 TABLET BY MOUTH DAILY, Disp: 90 tablet, Rfl: 3  •  azithromycin (ZITHROMAX Z-BETO) 250 MG tablet, Take 2 tablets the first day, then 1 tablet daily for 4 days., Disp: 6 tablet, Rfl: 0  •  Calcium Carb-Cholecalciferol (CALCIUM-VITAMIN D) 600-400 MG-UNIT tablet, Take 1 tablet by mouth 2 (Two) Times a Day., Disp: , Rfl:   •  cycloSPORINE (RESTASIS) 0.05 % ophthalmic emulsion, 1 drop Every 12 (Twelve) Hours., Disp: , Rfl:   •  diphenhydrAMINE (BENADRYL) 25 mg capsule, Take 25 mg by mouth Daily As Needed., Disp: , Rfl:   •  fluticasone (FLONASE) 50 MCG/ACT nasal spray, 2 sprays into each nostril Daily., Disp: 1 bottle, Rfl: 0  •  ibuprofen (ADVIL,MOTRIN) 200 MG tablet, Take 200 mg by mouth As Needed., Disp: , Rfl:   •  ipratropium-albuterol (DUO-NEB) 0.5-2.5 mg/3 ml nebulizer, Take 3 mL by nebulization 4 (Four) Times a Day., Disp: 10 vial, Rfl: 0  •  lisinopril (PRINIVIL,ZESTRIL) 20 MG tablet, TAKE 1 TABLET BY MOUTH DAILY, Disp: 90 tablet, Rfl: 1  •   methylPREDNISolone (MEDROL, BETO,) 4 MG tablet, Take as directed on package instructions., Disp: 1 each, Rfl: 0  •  oxybutynin (DITROPAN) 5 MG tablet, TAKE 1 TABLET BY MOUTH TWICE DAILY, Disp: 180 tablet, Rfl: 1  •  thiamine (VITAMINE B-1) 100 MG tablet, Take 100 mg by mouth Daily., Disp: , Rfl:   No current facility-administered medications for this visit.     Facility-Administered Medications Ordered in Other Visits:   •  heparin flush (porcine) 100 UNIT/ML injection 500 Units, 500 Units, Intravenous, PRN, Shanda Torres MD, 500 Units at 04/16/18 1504  •  heparin flush (porcine) 100 UNIT/ML injection 500 Units, 500 Units, Intravenous, PRN, Shanda Torres MD, 500 Units at 06/12/18 1445  •  sodium chloride 0.9 % flush 10 mL, 10 mL, Intravenous, PRN, Shanda Torres MD, 10 mL at 04/16/18 1503  •  sodium chloride 0.9 % flush 10 mL, 10 mL, Intravenous, PRN, Shanda Torres MD, 10 mL at 06/12/18 1445    No problems with medications.  Refills if needed done    Allergies   Allergen Reactions   • Benzonatate Hives and Itching       Review of Systems  GENERAL:  Active/slower with limits, speed, stamina for age and acute fatigue and above cough/sinus.  Sleep is ok. No fever now.  SKIN: No  rash/skin lesion of concern:   ENDO:  No syncope, near or diaphoretic sweaty spells.  No download..  HEENT: No recent head injury; increased sinus area headache.   No vision change.  No chronic hearing loss.  Ears with fullness without pain/drainage.  No sore throat now.  Increased nasal/sinus congestion/drainage. No epistaxis.   CHEST: No chest wall tenderness or mass.  No usual significant cough, and usual without wheeze; cough with above.  No SOB; no hemoptysis.  CV: No chest pain, palpitations, ankle edema.  GI: No heartburn, dysphagia.  No abdominal pain, diarrhea, constipation.  No rectal bleeding, or melena.    :  Voids without dysuria, or  incontinence to completion.  ORTHO: No  painful/swollen joints but various on /off sore.  Occ sore neck or back.  No acute neck or back pain without recent injury.  NEURO: No dizziness, weakness of extremities.  Same mild numbness/paresthesias.   PSYCH: No memory loss.  Mood good; mild anxious, depressed but/and not suicidal.  Tries to tolerate stress .   Screening:  Mammogram: bilateral masectomy  Bone density: 2016: LS - +0.3/hip -1.3; discussed/repeat 18-24 m/ and continue Ca/Vit D-ordered 2018  Low dose CT chest: CTs with breast cancer  GI: Colon-avm-div/[place]/[facility]/12.19.17  Prostate: NA  Usual lab order  6m CBC, CMP, A1c  12m CBC, CMP, A1c, LIPID, TSH, Vit D    Lab Results:  Results for orders placed or performed in visit on 02/27/19   POC Influenza A / B   Result Value Ref Range    Rapid Influenza A Ag Negative Negative    Rapid Influenza B Ag Negative Negative    Internal Control Passed Passed    Lot Number [id number]     Expiration Date 09-        A1C:  Lab Results - Last 18 Months   Lab Units 12/13/18  1131   HEMOGLOBIN A1C % 5.9     PSA:No results for input(s): PSA in the last 19132 hours.  CBC:  Lab Results - Last 18 Months   Lab Units 12/13/18  1131 06/12/18  1351 04/16/18  1424 03/26/18  0847   WBC 10*3/mm3 8.01 7.72 10.69 9.89   HEMOGLOBIN g/dL 14.8 14.1 15.1 14.7   HEMATOCRIT % 43.4 41.8 44.5 46.6   PLATELETS 10*3/mm3 180 185 204 193      BMP/CMP:  Lab Results - Last 18 Months   Lab Units 12/13/18  1131 06/13/18  1329 06/12/18  1351 04/16/18  1424 03/26/18  0847   SODIUM mmol/L 137  --  141 139 143   POTASSIUM mmol/L 4.1  --  4.1 4.4 3.9   CHLORIDE mmol/L 97*  --  101 100 101   TOTAL CO2 mmol/L  --   --   --   --  25.0   CO2 mmol/L 27.0  --  29.0 29.0  --    GLUCOSE mg/dL  --   --   --   --  127*   BUN mg/dL 15  --  14 15 13   CREATININE mg/dL 0.56 0.70 0.52 0.55 0.46*   EGFR IF NONAFRICN AM mL/min/1.73 106  --  115 108 132   EGFR IF AFRICN AM mL/min/1.73  --   --   --   --  >150   CALCIUM mg/dL 9.6  --  9.9 10.1 10.1  "    HEPATIC:  Lab Results - Last 18 Months   Lab Units 12/13/18  1131 06/12/18  1351 04/16/18  1424   ALT (SGPT) U/L <15 16 28   AST (SGOT) U/L 28 25 27   ALK PHOS U/L 104 97 101     THYROID:  Lab Results - Last 18 Months   Lab Units 12/13/18  1131   TSH mIU/mL 1.920       Objective   /70 (BP Location: Left arm, Patient Position: Sitting, Cuff Size: Adult)   Pulse 68   Temp 98.8 °F (37.1 °C) (Oral)   Resp 18   Ht 160 cm (63\")   Wt 85.5 kg (188 lb 8 oz)   LMP  (LMP Unknown)   SpO2 95%   BMI 33.39 kg/m²   Body mass index is 33.39 kg/m².    Physical Exam  GENERAL:  Well nourished/developed in no acute distress but frequent cough; at times paroxysmal.   SKIN: Turgor excellent, without wound, rash, lesion  HEENT: Normal cephalic without trauma.  Pupils equal round reactive to light. Extraocular motions full without nystagmus.   External canals nonobstructive nontender without reddness. Tymphatic membranes dull with irwin structures intact.   Oral cavity without growths, exudates, and moist.  Posterior pharynx without mass, obstruction, redness.  No thyromegaly, mass, tenderness, definite lymphadenopathy and supple.   CV: Regular rhythm.  No murmur, gallop,  edema. Posterior pulses intact.  No carotid bruits.  CHEST: No chest wall tenderness or mass.   LUNGS: Symmetric motion with clear to auscultation.  No dullness to percussion  ABD: Soft, nontender without mass.   ORTHO: Symmetric extremities without swelling/point tenderness.  Full gross range of motion.   NEURO: CN 2-12 grossly intact.  Symmetric facies and UE/LE. 3/5 strength throughout.   Nonfocal use extremities. Speech clear.     PSYCH: Oriented x 3.  Pleasant calm, well kept.  Purposeful/directed conservation with intact short/long gross memory.    Assessment/Plan     1. Other chronic allergic conjunctivitis of both eyes    2. Cough    3. Essential hypertension    4. Controlled type 2 diabetes mellitus without complication, without long-term " current use of insulin (CMS/[facility])    5. Allergic rhinitis, unspecified seasonality, unspecified trigger      Suggestive allergy etiology for sinus/cough    Rx: reviewed/changes:  New Medications Ordered This Visit   Medications   • fluticasone (FLONASE) 50 MCG/ACT nasal spray     Si sprays into the nostril(s) as directed by provider Daily.     Dispense:  1 bottle     Refill:  0   • olopatadine (PATANOL) 0.1 % ophthalmic solution     Sig: Administer 1 drop to both eyes 2 (Two) Times a Day.     Dispense:  5 mL     Refill:  2   • budesonide-formoterol (SYMBICORT) 80-4.5 MCG/ACT inhaler     Sig: Inhale 2 puffs 2 (Two) Times a Day.     Dispense:  1 inhaler     Refill:  12   • methylPREDNISolone (MEDROL, BETO,) 4 MG tablet     Sig: Take as directed on package instructions.     Dispense:  1 each     Refill:  0     Pharmacist-tell patient When using steroids Do not use anti-inflammatories such as Motrin/ibuprofen, Alleve/naprosyn, Mobic and like medications       LAB/Testing/Referrals: reviewed/orders:   Today:   No orders of the defined types were placed in this encounter.    Chronic/recurrent labs above or change to:   same     Discussions:   Body mass index is 33.39 kg/m².  Patient's Body mass index is 33.39 kg/m². BMI is above normal parameters. Recommendations include: exercise counseling, nutrition counseling and reminded.    Tobacco use reviewed:  Non-smoker    Patient Instructions   When using steroids  A. Do not use anti-inflammatories such as Motrin/ibuprofen, Alleve/naprosyn, Mobic and like medications  B. Stay on your stomach medicines for ulcer/like (like prilosec, protonix, nexium)  C. If you are diabetic or pre-diabetic; it will raise your blood sugar.  For most people this will be a minimal and very tolerated elevation.  If you check your blood sugars (or have the ability to check) you should consider for the first days of the steroid Rx to check your blood sugar more often (1-3/days).  Call if you see  blood sugars over 350.  Being more strict on your diabetic diet while using the steroid will help.     I      Follow up: Return for lab;, Dr Tinoco-, as planned;.  Future Appointments   Date Time Provider Department Center   6/13/2019  8:30 AM [facility] CANCER CTR LAB [facility]   6/13/2019  9:00 AM Shanda Torres MD [facility] ON[facility]   6/18/2019 10:30 AM Conrado Tinoco MD [facility] PC METR None

## 2019-04-02 RX ORDER — ALBUTEROL SULFATE 90 UG/1
AEROSOL, METERED RESPIRATORY (INHALATION)
Qty: 18 G | Refills: 0 | Status: SHIPPED | OUTPATIENT
Start: 2019-04-02 | End: 2019-04-04

## 2019-04-04 ENCOUNTER — OFFICE VISIT (OUTPATIENT)
Dept: FAMILY MEDICINE CLINIC | Facility: CLINIC | Age: 74
End: 2019-04-04

## 2019-04-04 ENCOUNTER — TELEPHONE (OUTPATIENT)
Dept: FAMILY MEDICINE CLINIC | Facility: CLINIC | Age: 74
End: 2019-04-04

## 2019-04-04 VITALS
WEIGHT: 185 LBS | HEIGHT: 63 IN | BODY MASS INDEX: 32.78 KG/M2 | TEMPERATURE: 98.3 F | OXYGEN SATURATION: 94 % | SYSTOLIC BLOOD PRESSURE: 128 MMHG | RESPIRATION RATE: 18 BRPM | DIASTOLIC BLOOD PRESSURE: 70 MMHG | HEART RATE: 79 BPM

## 2019-04-04 DIAGNOSIS — B02.9 HERPES ZOSTER WITHOUT COMPLICATION: ICD-10-CM

## 2019-04-04 DIAGNOSIS — R51.9 FACIAL PAIN: ICD-10-CM

## 2019-04-04 DIAGNOSIS — R05.9 COUGH: ICD-10-CM

## 2019-04-04 DIAGNOSIS — B30.9 ACUTE VIRAL CONJUNCTIVITIS OF LEFT EYE: ICD-10-CM

## 2019-04-04 PROCEDURE — 99213 OFFICE O/P EST LOW 20 MIN: CPT | Performed by: FAMILY MEDICINE

## 2019-04-04 RX ORDER — GABAPENTIN 100 MG/1
100 CAPSULE ORAL 3 TIMES DAILY
Qty: 90 CAPSULE | Refills: 1 | Status: SHIPPED | OUTPATIENT
Start: 2019-04-04 | End: 2019-04-25

## 2019-04-04 RX ORDER — VALACYCLOVIR HYDROCHLORIDE 1 G/1
1000 TABLET, FILM COATED ORAL 3 TIMES DAILY
Qty: 21 TABLET | Refills: 0 | Status: SHIPPED | OUTPATIENT
Start: 2019-04-04 | End: 2019-04-25

## 2019-04-04 RX ORDER — BUDESONIDE AND FORMOTEROL FUMARATE DIHYDRATE 80; 4.5 UG/1; UG/1
2 AEROSOL RESPIRATORY (INHALATION)
Qty: 1 INHALER | Refills: 12 | COMMUNITY
Start: 2019-04-04 | End: 2020-06-22

## 2019-04-04 NOTE — PATIENT INSTRUCTIONS
A new shingles vaccine (a shot to lower your chance of catching shingles) is now available (shingrix).  This vaccine is the second vaccine created for this purpose; we have had zostavax for years.  Shingrix provides a much better and longer immunity for shingles than zostavax.  For this and other reasons Shingrix can be started at age 50.  If you have had zostavax in the past; you can still take Shingrix.      This vaccine is not paid for in a doctor's office by medicare, medicaid and probably most insurances.  Like zostavax; this is covered in drug stores.  This is a vaccine that if you chose to get you need to get at a drug store that gives vaccines (like [facility], [facility] and [facility].      If you get this vaccine; please let us know so we can record this on your record here.     ########################    Want you to try a medication called Neurotin.  This works to help a lot of pain conditions.  Can cause very mild/short term tiredness with the initial use which can be kept to a minimum if we start with a small dose and increase slowly.   Your Rx calls eventually for three times a day Neurotin.   Start with one at bedtime and a few days increase to adding the morning dose and then later add the noon dose.    This Rx works even better as we increase the dose; so if you dont get the response we want we want you to call so we can increase your dose even more.

## 2019-04-04 NOTE — TELEPHONE ENCOUNTER
"Called pt \"my left is almost swollen shut, I have like a rash around my eye, it started on Sunday. But my eye is almost shut. I still have this cough too\"     Questioned if could come in to be seen? Advised yes  "

## 2019-04-05 ENCOUNTER — TELEPHONE (OUTPATIENT)
Dept: FAMILY MEDICINE CLINIC | Facility: CLINIC | Age: 74
End: 2019-04-05

## 2019-04-05 NOTE — TELEPHONE ENCOUNTER
"Avoid for two weeks    You have shingles.  A few points we want you to remember:    You are being given an antiviral medication.  This works to fight the viruses causing this rash and pain.  It is very important for you to use this medication and finish it.  It will limit by some number of days the pain and rash duration.  You should be on Neurontin/gabapentin or Lyrica/pregabalin.  These medicines work to limit the amount of pain you have.  Shingles is a particular type pain: Neuropathic.  These medications work particularly well to help this pain.  You should start slow with the medication as directed; but the biggest mistake made with this medication is not to take enough of it.  (Sometimes going too fast with a strong strength can cause a lot of sedation).  If requested you are being given a pain medication.  Should frequently comes up on whether you are contagious.  This is more theoretical than actual.  It is prudent that should probably stay away from anybody who are immunosuppressed; ie: someone undergoing active treatment for cancer or HIV would be good examples.  We usually also include in this group those pregnant and newborns in the first 6 months of life.  At the same time this disease is fairly contained to your body and is not the type of infection \"to jump across a room\".  If you are able to keep the evidence of the rash covered such as with clothing; I would.   Despite catching this; if you have never had this shingles vaccine you are still a candidate for that when you pass 60 years old.    "

## 2019-04-05 NOTE — PROGRESS NOTES
Subjective   Ira Sanchez is a 74 y.o. female presenting with chief complaint of:   Chief Complaint   Patient presents with   • Rash     Rash on forehead and in scalp that looks like shingles.       History of Present Illness :  Alone.  Here for primarily an acute issue today; 2 days of forehead pain and increasing rash with L eye swelling.       Has multiple chronic problems to consider that might have a bearing on today's issues; not an interval appointment.     Recent bronchitis/sinus and still has some cough.  Was not able to get neb machine, neb tx, symbicort (during recent issues)    Chronic/acute problems reviewed today:   1. Herpes zoster without complication: acute/above   2. Facial pain: acute/above   3. Cough: subacute/slowly improves.     4.      Conjunctivitis    Has an/another acute issue today: none.    The following portions of the patient's history were reviewed and updated as appropriate: allergies, current medications, past family history, past medical history, past social history, past surgical history and problem list.      Current Outpatient Medications:   •  amLODIPine (NORVASC) 5 MG tablet, TAKE 1 TABLET BY MOUTH DAILY, Disp: 90 tablet, Rfl: 3  •  anastrozole (ARIMIDEX) 1 MG tablet, TAKE 1 TABLET BY MOUTH DAILY, Disp: 90 tablet, Rfl: 3  •  Calcium Carb-Cholecalciferol (CALCIUM-VITAMIN D) 600-400 MG-UNIT tablet, Take 1 tablet by mouth 2 (Two) Times a Day., Disp: , Rfl:   •  cycloSPORINE (RESTASIS) 0.05 % ophthalmic emulsion, 1 drop Every 12 (Twelve) Hours., Disp: , Rfl:   •  diphenhydrAMINE (BENADRYL) 25 mg capsule, Take 25 mg by mouth Daily As Needed., Disp: , Rfl:   •  fluticasone (FLONASE) 50 MCG/ACT nasal spray, 2 sprays into the nostril(s) as directed by provider Daily., Disp: 1 bottle, Rfl: 0  •  ibuprofen (ADVIL,MOTRIN) 200 MG tablet, Take 200 mg by mouth As Needed., Disp: , Rfl:   •  lisinopril (PRINIVIL,ZESTRIL) 20 MG tablet, TAKE 1 TABLET BY MOUTH DAILY, Disp: 90 tablet, Rfl: 1  •   olopatadine (PATANOL) 0.1 % ophthalmic solution, Administer 1 drop to both eyes 2 (Two) Times a Day., Disp: 5 mL, Rfl: 2  •  oxybutynin (DITROPAN) 5 MG tablet, TAKE 1 TABLET BY MOUTH TWICE DAILY, Disp: 180 tablet, Rfl: 1  •  thiamine (VITAMINE B-1) 100 MG tablet, Take 100 mg by mouth Daily., Disp: , Rfl:     No current facility-administered medications for this visit.     Facility-Administered Medications Ordered in Other Visits:   •  heparin flush (porcine) 100 UNIT/ML injection 500 Units, 500 Units, Intravenous, PRN, Shanda Torres MD, 500 Units at 04/16/18 1504  •  heparin flush (porcine) 100 UNIT/ML injection 500 Units, 500 Units, Intravenous, PRN, Shanda Torres MD, 500 Units at 06/12/18 1445  •  sodium chloride 0.9 % flush 10 mL, 10 mL, Intravenous, PRN, Shanda Torres MD, 10 mL at 04/16/18 1503  •  sodium chloride 0.9 % flush 10 mL, 10 mL, Intravenous, PRN, Shanda Torres MD, 10 mL at 06/12/18 1445    No problems with medications.  Refills if needed done    Allergies   Allergen Reactions   • Benzonatate Hives and Itching       Review of Systems  GENERAL: Fatigued with this.  Sleep is without orthopnea/sob.  SKIN: No other rash.   ENDO: BS not an issue.  ENT: Mild HA.  Sinus/nasal congestion, drainage mild.  No epistaxis.   CHEST: Sore/diffuse. Less but still cough without wheeze.  No SOB.  No hemoptysis.   CV: No palpitations, leg edema.   GI: No nausea, vomiting, diarrhea.      Lab Results:  Results for orders placed or performed in visit on 02/27/19   POC Influenza A / B   Result Value Ref Range    Rapid Influenza A Ag Negative Negative    Rapid Influenza B Ag Negative Negative    Internal Control Passed Passed    Lot Number [id number]     Expiration Date 09-        A1C:  Lab Results - Last 18 Months   Lab Units 12/13/18  1131   HEMOGLOBIN A1C % 5.9     PSA:No results for input(s): PSA in the last 71692 hours.  CBC:  Lab Results - Last 18 Months  "  Lab Units 12/13/18  1131 06/12/18  1351 04/16/18  1424 03/26/18  0847   WBC 10*3/mm3 8.01 7.72 10.69 9.89   HEMOGLOBIN g/dL 14.8 14.1 15.1 14.7   HEMATOCRIT % 43.4 41.8 44.5 46.6   PLATELETS 10*3/mm3 180 185 204 193      BMP/CMP:  Lab Results - Last 18 Months   Lab Units 12/13/18  1131 06/13/18  1329 06/12/18  1351 04/16/18  1424 03/26/18  0847   SODIUM mmol/L 137  --  141 139 143   POTASSIUM mmol/L 4.1  --  4.1 4.4 3.9   CHLORIDE mmol/L 97*  --  101 100 101   TOTAL CO2 mmol/L  --   --   --   --  25.0   CO2 mmol/L 27.0  --  29.0 29.0  --    GLUCOSE mg/dL  --   --   --   --  127*   BUN mg/dL 15  --  14 15 13   CREATININE mg/dL 0.56 0.70 0.52 0.55 0.46*   EGFR IF NONAFRICN AM mL/min/1.73 106  --  115 108 132   EGFR IF AFRICN AM mL/min/1.73  --   --   --   --  >150   CALCIUM mg/dL 9.6  --  9.9 10.1 10.1     HEPATIC:  Lab Results - Last 18 Months   Lab Units 12/13/18  1131 06/12/18  1351 04/16/18  1424   ALT (SGPT) U/L <15 16 28   AST (SGOT) U/L 28 25 27   ALK PHOS U/L 104 97 101     THYROID:  Lab Results - Last 18 Months   Lab Units 12/13/18  1131   TSH mIU/mL 1.920       Objective   /70 (BP Location: Left arm, Patient Position: Sitting, Cuff Size: Adult)   Pulse 79   Temp 98.3 °F (36.8 °C) (Oral)   Resp 18   Ht 160 cm (63\")   Wt 83.9 kg (185 lb)   LMP  (LMP Unknown)   SpO2 94%   BMI 32.77 kg/m²   Body mass index is 32.77 kg/m².    Physical Exam  GENERAL: Appropriate, NAD  SKIN: No rash, turgor ok other than L facial. PHOTO  ENT: Mildly dull TMs with bony structures intact.  No nasal mucosa reddnes.   Throat without mass reddness.  CHEST: Rales R lower base; clear otherwise throughout.  No dullness to percussion  CV: Regular without murmur, edema  GI: Soft with mass, tenderness.       Assessment/Plan     1. Herpes zoster without complication    2. Facial pain    3. Cough        Rx: reviewed/changes:  New Medications Ordered This Visit   Medications   • valACYclovir (VALTREX) 1000 MG tablet     Sig: Take " 1 tablet by mouth 3 (Three) Times a Day.     Dispense:  21 tablet     Refill:  0   • gabapentin (NEURONTIN) 100 MG capsule     Sig: Take 1 capsule by mouth 3 (Three) Times a Day.     Dispense:  90 capsule     Refill:  1   • budesonide-formoterol (SYMBICORT) 80-4.5 MCG/ACT inhaler     Sig: Inhale 2 puffs 2 (Two) Times a Day.     Dispense:  1 inhaler     Refill:  12       LAB/Testing/Referrals: reviewed/orders:   Today:   Orders Placed This Encounter   Procedures   • Ambulatory Referral to Ophthalmology     Chronic/recurrent labs above or change to:   same    Discussions:   About shingles  About old shingles vaccine and new vaccine  Body mass index is 32.77 kg/m².  Patient's Body mass index is 32.77 kg/m². BMI is above normal parameters. Recommendations include: exercise counseling, nutrition counseling and as before.    Tobacco use reviewed:  Non-smoker      Patient Instructions   A new shingles vaccine (a shot to lower your chance of catching shingles) is now available (shingrix).  This vaccine is the second vaccine created for this purpose; we have had zostavax for years.  Shingrix provides a much better and longer immunity for shingles than zostavax.  For this and other reasons Shingrix can be started at age 50.  If you have had zostavax in the past; you can still take Shingrix.      This vaccine is not paid for in a doctor's office by medicare, medicaid and probably most insurances.  Like zostavax; this is covered in drug stores.  This is a vaccine that if you chose to get you need to get at a drug store that gives vaccines (like [facility], [facility] and [facility].      If you get this vaccine; please let us know so we can record this on your record here.     ########################    Want you to try a medication called Neurotin.  This works to help a lot of pain conditions.  Can cause very mild/short term tiredness with the initial use which can be kept to a minimum if we start with a small  dose and increase slowly.   Your Rx calls eventually for three times a day Neurotin.   Start with one at bedtime and a few days increase to adding the morning dose and then later add the noon dose.    This Rx works even better as we increase the dose; so if you dont get the response we want we want you to call so we can increase your dose even more.         Follow up: Return for lab;, Dr Tinoco-, as planned;.  Future Appointments   Date Time Provider Department Center   6/13/2019  8:30 AM [facility] CANCER CTR LAB [facility]   6/13/2019  9:00 AM Shanda Torres MD [facility]   6/18/2019 10:30 AM Conrado Tinoco MD [facility] PC METR None

## 2019-04-09 ENCOUNTER — TELEPHONE (OUTPATIENT)
Dept: FAMILY MEDICINE CLINIC | Facility: CLINIC | Age: 74
End: 2019-04-09

## 2019-04-09 DIAGNOSIS — I10 HYPERTENSION, UNSPECIFIED TYPE: Chronic | ICD-10-CM

## 2019-04-09 RX ORDER — LISINOPRIL 20 MG/1
20 TABLET ORAL DAILY
Qty: 90 TABLET | Refills: 0 | Status: SHIPPED | OUTPATIENT
Start: 2019-04-09 | End: 2019-07-07 | Stop reason: SDUPTHER

## 2019-04-09 NOTE — TELEPHONE ENCOUNTER
Called patient and confirmed no fever and explained that she could get CT of sinus and chest ex-ray.    Patient states she does not have the funds for duoneb treatments, but she thinks there are some left from her 's home care supplies. She thinks she can borrow a nebulizer.    I told her to check the expiration date of duoneb and to call us back.    Patient called back with information of treatments she found at home:    Ipart/Abuterol mg Exp 07/2019  9 vials  Tubing and mouth piece.

## 2019-04-09 NOTE — TELEPHONE ENCOUNTER
Patient called stating that she hasn't gotten any better.  Coughing with yellow/green mucus part of the time and coughing till she urinates in her clothing.    Pt states that she thought she needed to do something else now for her cough that you had discussed with her.

## 2019-04-09 NOTE — TELEPHONE ENCOUNTER
Can she find a [employer] machine to borrow (think her insurance turned her down)  Can she private pay for some du[employer] for a week?   CT of sinus if not done      Current Outpatient Medications:   •  amLODIPine (NORVASC) 5 MG tablet, TAKE 1 TABLET BY MOUTH DAILY, Disp: 90 tablet, Rfl: 3  •  anastrozole (ARIMIDEX) 1 MG tablet, TAKE 1 TABLET BY MOUTH DAILY, Disp: 90 tablet, Rfl: 3  •  budesonide-formoterol (SYMBICORT) 80-4.5 MCG/ACT inhaler, Inhale 2 puffs 2 (Two) Times a Day., Disp: 1 inhaler, Rfl: 12  •  Calcium Carb-Cholecalciferol (CALCIUM-VITAMIN D) 600-400 MG-UNIT tablet, Take 1 tablet by mouth 2 (Two) Times a Day., Disp: , Rfl:   •  cycloSPORINE (RESTASIS) 0.05 % ophthalmic emulsion, 1 drop Every 12 (Twelve) Hours., Disp: , Rfl:   •  diphenhydrAMINE (BENADRYL) 25 mg capsule, Take 25 mg by mouth Daily As Needed., Disp: , Rfl:   •  fluticasone (FLONASE) 50 MCG/ACT nasal spray, 2 sprays into the nostril(s) as directed by provider Daily., Disp: 1 bottle, Rfl: 0  •  gabapentin (NEURONTIN) 100 MG capsule, Take 1 capsule by mouth 3 (Three) Times a Day., Disp: 90 capsule, Rfl: 1  •  ibuprofen (ADVIL,MOTRIN) 200 MG tablet, Take 200 mg by mouth As Needed., Disp: , Rfl:   •  lisinopril (PRINIVIL,ZESTRIL) 20 MG tablet, TAKE 1 TABLET BY MOUTH DAILY, Disp: 90 tablet, Rfl: 1  •  olopatadine (PATANOL) 0.1 % ophthalmic solution, Administer 1 drop to both eyes 2 (Two) Times a Day., Disp: 5 mL, Rfl: 2  •  oxybutynin (DITROPAN) 5 MG tablet, TAKE 1 TABLET BY MOUTH TWICE DAILY, Disp: 180 tablet, Rfl: 1  •  thiamine (VITAMINE B-1) 100 MG tablet, Take 100 mg by mouth Daily., Disp: , Rfl:   •  valACYclovir (VALTREX) 1000 MG tablet, Take 1 tablet by mouth 3 (Three) Times a Day., Disp: 21 tablet, Rfl: 0  No current facility-administered medications for this visit.     Facility-Administered Medications Ordered in Other Visits:   •  heparin flush (porcine) 100 UNIT/ML injection 500 Units, 500 Units, Intravenous, PRN, Shanda Torres,  MD, 500 Units at 04/16/18 1504  •  heparin flush (porcine) 100 UNIT/ML injection 500 Units, 500 Units, Intravenous, PRN, Shanda Torres MD, 500 Units at 06/12/18 1445  •  sodium chloride 0.9 % flush 10 mL, 10 mL, Intravenous, Brian ALLEN Obiageli Chinaka, MD, 10 mL at 04/16/18 1503  •  sodium chloride 0.9 % flush 10 mL, 10 mL, Intravenous, Brian ALLEN Obiageli Chinaka, MD, 10 mL at 06/12/18 1449

## 2019-04-09 NOTE — TELEPHONE ENCOUNTER
Patient here and wanted to know if there is something to put around her eye area where shingles are (area weeping). Wants anointment.

## 2019-04-25 ENCOUNTER — OFFICE VISIT (OUTPATIENT)
Dept: FAMILY MEDICINE CLINIC | Facility: CLINIC | Age: 74
End: 2019-04-25

## 2019-04-25 VITALS
BODY MASS INDEX: 33.49 KG/M2 | HEART RATE: 60 BPM | TEMPERATURE: 98.9 F | WEIGHT: 189 LBS | HEIGHT: 63 IN | RESPIRATION RATE: 18 BRPM | SYSTOLIC BLOOD PRESSURE: 118 MMHG | OXYGEN SATURATION: 94 % | DIASTOLIC BLOOD PRESSURE: 74 MMHG

## 2019-04-25 DIAGNOSIS — B02.29 POST HERPETIC NEURALGIA: ICD-10-CM

## 2019-04-25 DIAGNOSIS — R51.9 FACIAL PAIN: ICD-10-CM

## 2019-04-25 DIAGNOSIS — B02.9 HERPES ZOSTER WITHOUT COMPLICATION: ICD-10-CM

## 2019-04-25 DIAGNOSIS — B02.8 HERPES ZOSTER WITH OTHER COMPLICATION: ICD-10-CM

## 2019-04-25 DIAGNOSIS — H02.402 PTOSIS OF LEFT EYELID: ICD-10-CM

## 2019-04-25 PROCEDURE — 99214 OFFICE O/P EST MOD 30 MIN: CPT | Performed by: FAMILY MEDICINE

## 2019-04-25 RX ORDER — GABAPENTIN 100 MG/1
300 CAPSULE ORAL 3 TIMES DAILY
Qty: 90 CAPSULE | Refills: 1
Start: 2019-04-25 | End: 2019-06-18

## 2019-04-25 RX ORDER — NEOMYCIN SULFATE, POLYMYXIN B SULFATE AND DEXAMETHASONE 3.5; 10000; 1 MG/ML; [USP'U]/ML; MG/ML
1 SUSPENSION/ DROPS OPHTHALMIC 3 TIMES DAILY
COMMUNITY
End: 2019-12-20

## 2019-04-25 RX ORDER — GABAPENTIN 300 MG/1
300 CAPSULE ORAL 3 TIMES DAILY
Qty: 45 CAPSULE | Refills: 1 | Status: SHIPPED | OUTPATIENT
Start: 2019-04-25 | End: 2019-12-20

## 2019-04-25 NOTE — PATIENT INSTRUCTIONS
Today: use neurotin 100 mg two three times a day and tomorrow increase 300 mg three times a day    ########################    Keep up with eye doctor

## 2019-04-26 NOTE — PROGRESS NOTES
"Subjective   Ira Sanchez is a 74 y.o. female presenting with chief complaint of:   Chief Complaint   Patient presents with   • Eye Problem     \"the light just shuts it, it wont stay open. It itches and feels like someting is in ther\"       History of Present Illness :  Alone.  Here for primarily an acute issue today; ongoing herpes zoster issues.   Has few chronic problems to consider that might have a bearing on today's issues; not an interval appointment.       4.4.2019  Assessment/Plan   1. Herpes zoster without complication    2. Facial pain    3. Cough      Rx: reviewed/changes:       New Medications Ordered This Visit   Medications   • valACYclovir (VALTREX) 1000 MG tablet       Sig: Take 1 tablet by mouth 3 (Three) Times a Day.       Dispense:  21 tablet       Refill:  0   • gabapentin (NEURONTIN) 100 MG capsule       Sig: Take 1 capsule by mouth 3 (Three) Times a Day.       Dispense:  90 capsule       Refill:  1   • budesonide-formoterol (SYMBICORT) 80-4.5 MCG/ACT inhaler       Sig: Inhale 2 puffs 2 (Two) Times a Day.       Dispense:  1 inhaler       Refill:  12       Still having left forehead discomforts itching and burning.  It extends down into the left eyelid.  She has active ongoing follow-up with ophthalmology using some various drops.  Her eyelid wants to close; she does not describe it is truly being photophobic but is definitely worse when she is in the sun.  It makes it hard to dry winter I keeps trying to closed.  She says at this point her vision has been preserved.    Chronic/acute problems reviewed today:   1. Herpes zoster with other complication: Acute-see above.   2. Ptosis of left eyelid acute-see above.   3. Herpes zoster without complication acute-see above.   4. Facial pain acute-see above.   5. Post herpetic neuralgia acute-see above.     Has an/another acute issue today: none.    The following portions of the patient's history were reviewed and updated as appropriate: allergies, " current medications, past family history, past medical history, past social history, past surgical history and problem list.      Current Outpatient Medications:   •  amLODIPine (NORVASC) 5 MG tablet, TAKE 1 TABLET BY MOUTH DAILY, Disp: 90 tablet, Rfl: 3  •  anastrozole (ARIMIDEX) 1 MG tablet, TAKE 1 TABLET BY MOUTH DAILY, Disp: 90 tablet, Rfl: 3  •  budesonide-formoterol (SYMBICORT) 80-4.5 MCG/ACT inhaler, Inhale 2 puffs 2 (Two) Times a Day., Disp: 1 inhaler, Rfl: 12  •  Calcium Carb-Cholecalciferol (CALCIUM-VITAMIN D) 600-400 MG-UNIT tablet, Take 1 tablet by mouth 2 (Two) Times a Day., Disp: , Rfl:   •  diphenhydrAMINE (BENADRYL) 25 mg capsule, Take 25 mg by mouth Daily As Needed., Disp: , Rfl:   •  fluticasone (FLONASE) 50 MCG/ACT nasal spray, 2 sprays into the nostril(s) as directed by provider Daily., Disp: 1 bottle, Rfl: 0  •  gabapentin (NEURONTIN) 100 MG capsule, Take 3 capsules by mouth 3 (Three) Times a Day., Disp: 90 capsule, Rfl: 1  •  ibuprofen (ADVIL,MOTRIN) 200 MG tablet, Take 200 mg by mouth As Needed., Disp: , Rfl:   •  lisinopril (PRINIVIL,ZESTRIL) 20 MG tablet, TAKE 1 TABLET BY MOUTH DAILY, Disp: 90 tablet, Rfl: 0  •  neomycin-polymyxin-dexamethasone (MAXITROL) 3.5-60134-1.1 ophthalmic suspension, Administer 1 drop into the left eye 3 (Three) Times a Day., Disp: , Rfl:   •  olopatadine (PATANOL) 0.1 % ophthalmic solution, Administer 1 drop to both eyes 2 (Two) Times a Day., Disp: 5 mL, Rfl: 2  •  oxybutynin (DITROPAN) 5 MG tablet, TAKE 1 TABLET BY MOUTH TWICE DAILY, Disp: 180 tablet, Rfl: 1  •  thiamine (VITAMINE B-1) 100 MG tablet, Take 100 mg by mouth Daily., Disp: , Rfl:   •  cycloSPORINE (RESTASIS) 0.05 % ophthalmic emulsion, 1 drop Every 12 (Twelve) Hours., Disp: , Rfl:   •  gabapentin (NEURONTIN) 100 MG capsule, Take 1 capsule by mouth 3 (Three) Times a Day., Disp: 45 capsule, Rfl: 1    No problems with medications.  Refills if needed done    Allergies   Allergen Reactions   • Benzonatate  Hives and Itching       Review of Systems  GENERAL:  Active/slower with limits, speed, stamina for age and acute fatigue.  Sleep is ok. No fever now.  SKIN: No other rash/skin lesion of concern:   ENDO:  No syncope, near or diaphoretic sweaty spells.  No download..  HEENT: No recent head injury; less sinus area headache.  No vision change.  No chronic hearing loss.  Ears with fullness without pain/drainage.  No sore throat now.  No significant nasal/sinus congestion/drainage. No epistaxis.   CHEST: No chest wall tenderness or mass.  No usual significant cough, and usual without wheeze.  No SOB; no hemoptysis.  CV: No chest pain, palpitations, ankle edema.  GI: No heartburn, dysphagia.  No abdominal pain, diarrhea, constipation.  No rectal bleeding, or melena.    :  Voids without dysuria, or  incontinence to completion.  ORTHO: No painful/swollen joints but various on /off sore.  Occ sore neck or back.  No acute neck or back pain without recent injury.  NEURO: No dizziness, weakness of extremities.  Same mild numbness/paresthesias LE.  Now L forehead paresthesias.   PSYCH: No memory loss.  Mood good; mild anxious, depressed but/and not suicidal.  Tries to tolerate stress .   Screening:  Mammogram: bilateral masectomy  Bone density: Bone d-deca/[facility]/T hip -0.5 LS +1.4- no change; maybe 2-5 yr  Low dose CT chest: CTs with breast cancer  GI: Colon-avm-div/[facility]/12.19.17  Prostate: NA  Usual lab order  6m CBC, CMP, A1c  12m CBC, CMP, A1c, LIPID, TSH, Vit D    Lab Results:  Results for orders placed or performed in visit on 02/27/19   POC Influenza A / B   Result Value Ref Range    Rapid Influenza A Ag Negative Negative    Rapid Influenza B Ag Negative Negative    Internal Control Passed Passed    Lot Number [id number]     Expiration Date 09-        A1C:  Lab Results - Last 18 Months   Lab Units 12/13/18  1131   HEMOGLOBIN A1C % 5.9     PSA:No results for input(s): PSA in the last 36439 hours.  CBC:  Lab  "Results - Last 18 Months   Lab Units 12/13/18  1131 06/12/18  1351 04/16/18  1424 03/26/18  0847   WBC 10*3/mm3 8.01 7.72 10.69 9.89   HEMOGLOBIN g/dL 14.8 14.1 15.1 14.7   HEMATOCRIT % 43.4 41.8 44.5 46.6   PLATELETS 10*3/mm3 180 185 204 193      BMP/CMP:  Lab Results - Last 18 Months   Lab Units 12/13/18  1131 06/13/18  1329 06/12/18  1351 04/16/18  1424 03/26/18  0847   SODIUM mmol/L 137  --  141 139 143   POTASSIUM mmol/L 4.1  --  4.1 4.4 3.9   CHLORIDE mmol/L 97*  --  101 100 101   TOTAL CO2 mmol/L  --   --   --   --  25.0   CO2 mmol/L 27.0  --  29.0 29.0  --    GLUCOSE mg/dL  --   --   --   --  127*   BUN mg/dL 15  --  14 15 13   CREATININE mg/dL 0.56 0.70 0.52 0.55 0.46*   EGFR IF NONAFRICN AM mL/min/1.73 106  --  115 108 132   EGFR IF AFRICN AM mL/min/1.73  --   --   --   --  >150   CALCIUM mg/dL 9.6  --  9.9 10.1 10.1     HEPATIC:  Lab Results - Last 18 Months   Lab Units 12/13/18  1131 06/12/18  1351 04/16/18  1424   ALT (SGPT) U/L <15 16 28   AST (SGOT) U/L 28 25 27   ALK PHOS U/L 104 97 101     THYROID:  Lab Results - Last 18 Months   Lab Units 12/13/18  1131   TSH mIU/mL 1.920       Objective   /74 (BP Location: Right arm, Patient Position: Sitting, Cuff Size: Large Adult)   Pulse 60   Temp 98.9 °F (37.2 °C) (Core)   Resp 18   Ht 160 cm (63\")   Wt 85.7 kg (189 lb)   LMP  (LMP Unknown)   SpO2 94%   Breastfeeding? No   BMI 33.48 kg/m²   Body mass index is 33.48 kg/m².    Physical Exam  GENERAL:  Well nourished/developed in no acute distress but frequent cough; at times paroxysmal.   SKIN: Turgor excellent, without wound, rash, lesion  HEENT: Normal cephalic without trauma. L forehead hyperesthetic to light touch.  Pupils equal round reactive to light. Extraocular motions full without nystagmus. L eyelid hangs down.   External canals nonobstructive nontender without reddness. Tymphatic membranes dull with irwin structures intact.   Oral cavity without growths, exudates, and moist.  Posterior " pharynx without mass, obstruction, redness.  No thyromegaly, mass, tenderness, definite lymphadenopathy and supple.   CV: Regular rhythm.  No murmur, gallop,  edema. Posterior pulses intact.  No carotid bruits.  CHEST: No chest wall tenderness or mass.   LUNGS: Symmetric motion with clear to auscultation.  No dullness to percussion  ABD: Soft, nontender without mass.   ORTHO: Symmetric extremities without swelling/point tenderness.  Full gross range of motion.   NEURO: CN 2-12 grossly intact except L eyelid.  Otherwise symmetric facies and UE/LE. 3/5 strength throughout.   Otherwise nonfocal use extremities. Speech clear.     PSYCH: Oriented x 3.  Pleasant calm, well kept.  Purposeful/directed conservation with intact short/long gross memory.     Assessment/Plan     1. Herpes zoster with other complication    2. Ptosis of left eyelid    3. Herpes zoster without complication    4. Facial pain    5. Post herpetic neuralgia      Continued ongoing neurologic complications of recent left forehead herpes zoster.  I think she will benefit and can tolerate increased Neurontin.  She needs to stay very close with ophthalmology.    Rx: reviewed/changes:  New Medications Ordered This Visit   Medications   • gabapentin (NEURONTIN) 100 MG capsule     Sig: Take 3 capsules by mouth 3 (Three) Times a Day.     Dispense:  90 capsule     Refill:  1   • gabapentin (NEURONTIN) 300 MG capsule     Sig: Take 1 capsule by mouth 3 (Three) Times a Day.     Dispense:  45 capsule     Refill:  1     LAB/Testing/Referrals: reviewed/orders:   Today:   No orders of the defined types were placed in this encounter.    Chronic/recurrent labs above or change to:   same     Discussions:   Abo ve  Body mass index is 33.48 kg/m².  Patient's Body mass index is 33.48 kg/m². BMI is above normal parameters. Recommendations include: exercise counseling, nutrition counseling and once feeling better.    Tobacco use reviewed:  Non-smoker    Patient Instructions    Today: use neurotin 100 mg two three times a day and tomorrow increase 300 mg three times a day    ########################    Keep up with eye doctor      Follow up: Return for lab;, Dr Tinoco-, as planned;.  Future Appointments   Date Time Provider Department Center   6/13/2019  8:30 AM [facility] CANCER [facility] LAB [facility]   6/13/2019  9:00 AM Shanda Torres MD [facility] ONC [facility]   6/18/2019 10:30 AM Conrado Tinoco MD [facility] PC METR None

## 2019-05-10 ENCOUNTER — RESULTS ENCOUNTER (OUTPATIENT)
Dept: FAMILY MEDICINE CLINIC | Facility: CLINIC | Age: 74
End: 2019-05-10

## 2019-05-10 DIAGNOSIS — E11.9 CONTROLLED TYPE 2 DIABETES MELLITUS WITHOUT COMPLICATION, WITHOUT LONG-TERM CURRENT USE OF INSULIN (HCC): Chronic | ICD-10-CM

## 2019-05-14 ENCOUNTER — PRIOR AUTHORIZATION (OUTPATIENT)
Dept: FAMILY MEDICINE CLINIC | Facility: CLINIC | Age: 74
End: 2019-05-14

## 2019-06-10 DIAGNOSIS — C50.512 MALIGNANT NEOPLASM OF LOWER-OUTER QUADRANT OF LEFT FEMALE BREAST, UNSPECIFIED ESTROGEN RECEPTOR STATUS (HCC): Primary | ICD-10-CM

## 2019-06-13 ENCOUNTER — OFFICE VISIT (OUTPATIENT)
Dept: ONCOLOGY | Facility: CLINIC | Age: 74
End: 2019-06-13

## 2019-06-13 ENCOUNTER — LAB (OUTPATIENT)
Dept: LAB | Facility: HOSPITAL | Age: 74
End: 2019-06-13

## 2019-06-13 VITALS
DIASTOLIC BLOOD PRESSURE: 74 MMHG | WEIGHT: 190.3 LBS | HEIGHT: 63 IN | TEMPERATURE: 98.4 F | OXYGEN SATURATION: 98 % | SYSTOLIC BLOOD PRESSURE: 130 MMHG | BODY MASS INDEX: 33.72 KG/M2 | HEART RATE: 63 BPM | RESPIRATION RATE: 18 BRPM

## 2019-06-13 DIAGNOSIS — C50.512 MALIGNANT NEOPLASM OF LOWER-OUTER QUADRANT OF LEFT FEMALE BREAST, UNSPECIFIED ESTROGEN RECEPTOR STATUS (HCC): Primary | ICD-10-CM

## 2019-06-13 DIAGNOSIS — C50.512 MALIGNANT NEOPLASM OF LOWER-OUTER QUADRANT OF LEFT FEMALE BREAST, UNSPECIFIED ESTROGEN RECEPTOR STATUS (HCC): ICD-10-CM

## 2019-06-13 LAB
ALBUMIN SERPL-MCNC: 4.3 G/DL (ref 3.5–5)
ALBUMIN/GLOB SERPL: 1.6 G/DL (ref 1.1–2.5)
ALP SERPL-CCNC: 90 U/L (ref 24–120)
ALT SERPL W P-5'-P-CCNC: 20 U/L (ref 0–54)
ANION GAP SERPL CALCULATED.3IONS-SCNC: 9 MMOL/L (ref 4–13)
AST SERPL-CCNC: 34 U/L (ref 7–45)
BASOPHILS # BLD AUTO: 0.02 10*3/MM3 (ref 0–0.2)
BASOPHILS NFR BLD AUTO: 0.3 % (ref 0–2)
BILIRUB SERPL-MCNC: 0.7 MG/DL (ref 0.1–1)
BUN BLD-MCNC: 15 MG/DL (ref 5–21)
BUN/CREAT SERPL: 24.6 (ref 7–25)
CALCIUM SPEC-SCNC: 9.4 MG/DL (ref 8.4–10.4)
CHLORIDE SERPL-SCNC: 99 MMOL/L (ref 98–110)
CO2 SERPL-SCNC: 26 MMOL/L (ref 24–31)
CREAT BLD-MCNC: 0.61 MG/DL (ref 0.5–1.4)
DEPRECATED RDW RBC AUTO: 42.2 FL (ref 40–54)
EOSINOPHIL # BLD AUTO: 0.1 10*3/MM3 (ref 0–0.7)
EOSINOPHIL NFR BLD AUTO: 1.5 % (ref 0–4)
ERYTHROCYTE [DISTWIDTH] IN BLOOD BY AUTOMATED COUNT: 12.7 % (ref 12–15)
GFR SERPL CREATININE-BSD FRML MDRD: 95 ML/MIN/1.73
GLOBULIN UR ELPH-MCNC: 2.7 GM/DL
GLUCOSE BLD-MCNC: 119 MG/DL (ref 70–100)
HCT VFR BLD AUTO: 37.1 % (ref 37–47)
HGB BLD-MCNC: 12.7 G/DL (ref 12–16)
HOLD SPECIMEN: NORMAL
IMM GRANULOCYTES # BLD AUTO: 0.03 10*3/MM3 (ref 0–0.05)
IMM GRANULOCYTES NFR BLD AUTO: 0.4 % (ref 0–5)
LYMPHOCYTES # BLD AUTO: 1.32 10*3/MM3 (ref 0.72–4.86)
LYMPHOCYTES NFR BLD AUTO: 19.6 % (ref 15–45)
MCH RBC QN AUTO: 30.9 PG (ref 28–32)
MCHC RBC AUTO-ENTMCNC: 34.2 G/DL (ref 33–36)
MCV RBC AUTO: 90.3 FL (ref 82–98)
MONOCYTES # BLD AUTO: 0.53 10*3/MM3 (ref 0.19–1.3)
MONOCYTES NFR BLD AUTO: 7.9 % (ref 4–12)
NEUTROPHILS # BLD AUTO: 4.75 10*3/MM3 (ref 1.87–8.4)
NEUTROPHILS NFR BLD AUTO: 70.3 % (ref 39–78)
NRBC BLD AUTO-RTO: 0 /100 WBC (ref 0–0.2)
PLATELET # BLD AUTO: 165 10*3/MM3 (ref 130–400)
PMV BLD AUTO: 10.8 FL (ref 6–12)
POTASSIUM BLD-SCNC: 4.4 MMOL/L (ref 3.5–5.3)
PROT SERPL-MCNC: 7 G/DL (ref 6.3–8.7)
RBC # BLD AUTO: 4.11 10*6/MM3 (ref 4.2–5.4)
SODIUM BLD-SCNC: 134 MMOL/L (ref 135–145)
WBC NRBC COR # BLD: 6.75 10*3/MM3 (ref 4.8–10.8)

## 2019-06-13 PROCEDURE — 85025 COMPLETE CBC W/AUTO DIFF WBC: CPT

## 2019-06-13 PROCEDURE — 83036 HEMOGLOBIN GLYCOSYLATED A1C: CPT | Performed by: FAMILY MEDICINE

## 2019-06-13 PROCEDURE — 99214 OFFICE O/P EST MOD 30 MIN: CPT | Performed by: INTERNAL MEDICINE

## 2019-06-13 PROCEDURE — 86300 IMMUNOASSAY TUMOR CA 15-3: CPT

## 2019-06-13 PROCEDURE — 80061 LIPID PANEL: CPT | Performed by: FAMILY MEDICINE

## 2019-06-13 PROCEDURE — 36415 COLL VENOUS BLD VENIPUNCTURE: CPT | Performed by: FAMILY MEDICINE

## 2019-06-13 PROCEDURE — 80053 COMPREHEN METABOLIC PANEL: CPT

## 2019-06-13 RX ORDER — SODIUM CHLORIDE 0.9 % (FLUSH) 0.9 %
10 SYRINGE (ML) INJECTION AS NEEDED
Status: CANCELLED | OUTPATIENT
Start: 2019-06-13

## 2019-06-13 RX ORDER — SODIUM CHLORIDE 0.9 % (FLUSH) 0.9 %
10 SYRINGE (ML) INJECTION AS NEEDED
Status: DISCONTINUED | OUTPATIENT
Start: 2019-06-13 | End: 2020-04-24

## 2019-06-13 RX ADMIN — Medication 10 ML: at 09:36

## 2019-06-13 NOTE — PROGRESS NOTES
[facility]  HEMATOLOGY & ONCOLOGY        Subjective     VISIT DIAGNOSIS:   No diagnosis found.    REASON FOR VISIT:     Chief Complaint   Patient presents with   • Breast Cancer     Here for 6 month follow up, she has had recent Bone Density Dec 2018, also she is getting over Shingle outbreak developed in April 2019 notes the outbreak was located on her left forehead area and down into left eye, she was seen by Opthal and continues to be followed regarding this matter   • DEPRESSION SCREENING     Initial Depression Screening   (see chart)        HEMATOLOGY / ONCOLOGY HISTORY:   Oncology/Hematology History    Ms. Sanchez is a pleasant 71 year old  female with the diagnosis of stage IIA left breast carcinoma, ER/ND and HER2  positive 3+.  She is status post bilateral mastectomies and has completed 5 cycles of adjuvant systemic chemotherapy with taxotere, carboplatin and  Herceptin. She is presently receiving Herceptin 6 mg/kg every 3 weeks for a total dosing of 1 year with last dose planned for 12/09/2015.  Ms. Sanchez is also presently taking hormonal manipulation therapy with Arimidex 1 mg p.o. daily and this will be taken for 10 years, this  was initiated on 06/03/2015.  She had completed Herceptin. She had completed breast reconstruction process. We will give her a refill on her Arimidex. She is due for  her bone density in September 2016.        Breast cancer-L/valente masectomy 2014 9/29/2016 Initial Diagnosis     Breast cancer            Cancer Staging Information:  No matching staging information was found for the patient.      INTERVAL HISTORY  Patient ID: Ira Sanchez is a 74 y.o. year old female with hormone positive breast cancer on hormonal manipulation and tolerates Arimidex very well with minimal side effects.  Denies any chest wall lesions, denies double vision, dizziness, nausea or vomiting, unintentional weight loss, night sweats.  She does have an active life.  --   recently  from head and neck ca.   --noticed a right neck mass. Denies any mouth sores, head sores or infection. Denies fever. Has been there >2 weeks.  --6/15/18: pt here to review neck CT done or palpable GISELLA. No ct correlate,incidental thyroid nodule.  --19: no issues or concern. Denies chest lesion, sob,cp,n/v, abdominal pain, new back pain, focal weakness, dizziness, night sweats, weight loss. The rest of ros unremarkable.    Past Medical History:   Past Medical History:   Diagnosis Date   • Bradycardia    • Breast cancer (CMS/HCC)    • Colon polyp    • Diabetes mellitus (CMS/HCC)    • Hypertension    • Peripheral neuropathy    • Urinary incontinence      Past Surgical History:   Past Surgical History:   Procedure Laterality Date   • APPENDECTOMY     • BREAST SURGERY     • CATARACT EXTRACTION Bilateral    • DILATATION AND CURETTAGE     • KNEE SURGERY     • MASTECTOMY Bilateral    • NIPPLE TATTOO  2016   • PORTACATH PLACEMENT     • THROAT SURGERY       Social History:   Social History     Socioeconomic History   • Marital status:      Spouse name: Horace   • Number of children: 0   • Years of education: 12.5   • Highest education level: Not on file   Occupational History   • Occupation: retired     Comment: [email or web address]   Tobacco Use   • Smoking status: Former Smoker     Packs/day: 2.00     Years: 37.00     Pack years: 74.00     Types: Cigarettes     Start date: 1963     Last attempt to quit: 2000     Years since quittin.7   • Smokeless tobacco: Never Used   Substance and Sexual Activity   • Alcohol use: No   • Drug use: No   • Sexual activity: Defer     Family History:   Family History   Problem Relation Age of Onset   • Hypertension Mother    • Heart failure Mother    • Cancer Father         colon   • Colon cancer Father    • Cancer Maternal Aunt         breast   • No Known Problems Maternal Grandmother    • No Known Problems Maternal Grandfather    • No  Known Problems Paternal Grandmother    • No Known Problems Paternal Grandfather    • Cancer Maternal Aunt         breast   • Cancer Maternal Aunt         breast       Review of Systems   Constitutional: Negative.    HENT: Negative.    Eyes: Negative.    Respiratory: Negative.    Cardiovascular: Negative.    Gastrointestinal: Negative.    Endocrine: Negative.    Genitourinary: Negative.    Musculoskeletal: Positive for arthralgias. Negative for myalgias.        Neck lump   Allergic/Immunologic: Negative.    Neurological: Negative.    Hematological: Negative.    Psychiatric/Behavioral: Negative.         Performance Status:  Asymptomatic    Medications:    Current Outpatient Medications   Medication Sig Dispense Refill   • amLODIPine (NORVASC) 5 MG tablet TAKE 1 TABLET BY MOUTH DAILY 90 tablet 3   • anastrozole (ARIMIDEX) 1 MG tablet TAKE 1 TABLET BY MOUTH DAILY 90 tablet 3   • Calcium Carb-Cholecalciferol (CALCIUM-VITAMIN D) 600-400 MG-UNIT tablet Take 1 tablet by mouth 2 (Two) Times a Day.     • cycloSPORINE (RESTASIS) 0.05 % ophthalmic emulsion 1 drop Every 12 (Twelve) Hours.     • diphenhydrAMINE (BENADRYL) 25 mg capsule Take 25 mg by mouth Daily As Needed.     • gabapentin (NEURONTIN) 100 MG capsule Take 3 capsules by mouth 3 (Three) Times a Day. 90 capsule 1   • ibuprofen (ADVIL,MOTRIN) 200 MG tablet Take 200 mg by mouth As Needed.     • lisinopril (PRINIVIL,ZESTRIL) 20 MG tablet TAKE 1 TABLET BY MOUTH DAILY 90 tablet 0   • oxybutynin (DITROPAN) 5 MG tablet TAKE 1 TABLET BY MOUTH TWICE DAILY 180 tablet 1   • thiamine (VITAMINE B-1) 100 MG tablet Take 100 mg by mouth Daily.     • budesonide-formoterol (SYMBICORT) 80-4.5 MCG/ACT inhaler Inhale 2 puffs 2 (Two) Times a Day. 1 inhaler 12   • fluticasone (FLONASE) 50 MCG/ACT nasal spray 2 sprays into the nostril(s) as directed by provider Daily. 1 bottle 0   • gabapentin (NEURONTIN) 300 MG capsule Take 1 capsule by mouth 3 (Three) Times a Day. 45 capsule 1   •  "neomycin-polymyxin-dexamethasone (MAXITROL) 3.5-84313-9.1 ophthalmic suspension Administer 1 drop into the left eye 3 (Three) Times a Day.     • olopatadine (PATANOL) 0.1 % ophthalmic solution Administer 1 drop to both eyes 2 (Two) Times a Day. 5 mL 2     No current facility-administered medications for this visit.      Facility-Administered Medications Ordered in Other Visits   Medication Dose Route Frequency Provider Last Rate Last Dose   • heparin flush (porcine) 100 UNIT/ML injection 500 Units  500 Units Intravenous PRN Shanda Torres MD   500 Units at 04/16/18 1504   • heparin flush (porcine) 100 UNIT/ML injection 500 Units  500 Units Intravenous PRN Shanda Torres MD   500 Units at 06/12/18 1445   • sodium chloride 0.9 % flush 10 mL  10 mL Intravenous PRN Shanda Torres MD   10 mL at 04/16/18 1503   • sodium chloride 0.9 % flush 10 mL  10 mL Intravenous PRN Shanda Torres MD   10 mL at 06/12/18 1445       ALLERGIES:    Allergies   Allergen Reactions   • Benzonatate Hives and Itching       Objective      Vitals:    06/13/19 0854   BP: 130/74   Pulse: 63   Resp: 18   Temp: 98.4 °F (36.9 °C)   TempSrc: Tympanic   SpO2: 98%   Weight: 86.3 kg (190 lb 4.8 oz)   Height: 160 cm (63\")   PainSc: 0-No pain         Current Status 6/13/2019   ECOG score 0         Physical Examremains the same    General Appearance: Patient is awake, alert, oriented and in no acute distress. Patient is welldeveloped, wellnourished, and appears stated age.  HEENT: Normocephalic. Sclerae clear, conjunctiva pink, extraocular movements intact, pupils, round, reactive to light and  accommodation. Mouth and throat are clear with moist oral mucosa.  NECK: right neck lump slightly diminished, no jugular venous distention, thyroid not enlarged.  LYMPH: No cervical, supraclavicular, axillary, or inguinal lymphadenopathy.  CHEST: Equal bilateral expansion, AP  diameter normal, resonant percussion " note  LUNGS: Good air movement, no rales, rhonchi, rubs or wheezes with auscultation  CARDIO: Regular sinus rhythm, no murmurs, gallops or rubs.  ABDOMEN: Nondistended, soft, No tenderness, no guarding, no rebound, No hepatosplenomegaly. No abdominal masses. Bowel sounds positive. No hernia  GENITALIA: Not examined.  BREASTS: Bilateral mastectomy with reconstruction.  Well-healed.  No skin lesions.  MUSKEL: No joint swelling, decreased motion, or inflammation  EXTREMS: No edema, clubbing, cyanosis, No varicose veins.  NEURO: Grossly nonfocal, Gait is coordinated and smooth, Cognition is preserved.  SKIN: No rashes, no ecchymoses, no petechia.  PSYCH: Oriented to time, place and person. Memory is preserved. Mood and affect appear normal  RECENT LABS:  Lab on 06/13/2019   Component Date Value Ref Range Status   • WBC 06/13/2019 6.75  4.80 - 10.80 10*3/mm3 Final   • RBC 06/13/2019 4.11* 4.20 - 5.40 10*6/mm3 Final   • Hemoglobin 06/13/2019 12.7  12.0 - 16.0 g/dL Final   • Hematocrit 06/13/2019 37.1  37.0 - 47.0 % Final   • MCV 06/13/2019 90.3  82.0 - 98.0 fL Final   • MCH 06/13/2019 30.9  28.0 - 32.0 pg Final   • MCHC 06/13/2019 34.2  33.0 - 36.0 g/dL Final   • RDW 06/13/2019 12.7  12.0 - 15.0 % Final   • RDW-SD 06/13/2019 42.2  40.0 - 54.0 fl Final   • MPV 06/13/2019 10.8  6.0 - 12.0 fL Final   • Platelets 06/13/2019 165  130 - 400 10*3/mm3 Final   • Neutrophil % 06/13/2019 70.3  39.0 - 78.0 % Final   • Lymphocyte % 06/13/2019 19.6  15.0 - 45.0 % Final   • Monocyte % 06/13/2019 7.9  4.0 - 12.0 % Final   • Eosinophil % 06/13/2019 1.5  0.0 - 4.0 % Final   • Basophil % 06/13/2019 0.3  0.0 - 2.0 % Final   • Immature Grans % 06/13/2019 0.4  0.0 - 5.0 % Final   • Neutrophils, Absolute 06/13/2019 4.75  1.87 - 8.40 10*3/mm3 Final   • Lymphocytes, Absolute 06/13/2019 1.32  0.72 - 4.86 10*3/mm3 Final   • Monocytes, Absolute 06/13/2019 0.53  0.19 - 1.30 10*3/mm3 Final   • Eosinophils, Absolute 06/13/2019 0.10  0.00 - 0.70  10*3/mm3 Final   • Basophils, Absolute 06/13/2019 0.02  0.00 - 0.20 10*3/mm3 Final   • Immature Grans, Absolute 06/13/2019 0.03  0.00 - 0.05 10*3/mm3 Final   • nRBC 06/13/2019 0.0  0.0 - 0.2 /100 WBC Final       RADIOLOGY:  No results found.         Assessment/Plan   72-year-old female diagnosed Stage IIa left-sided breast cancer, status post prophylactic mastectomy on the right side and bilateral breast reconstruction.  All this in 2014.  She is status post chemotherapy adjuvantly and currently on Arimidex 1 mg daily to complete a total of 5 years.        Patient Active Problem List   Diagnosis   • Obesity   • Breast cancer-L/valente masectomy 2014   • Overactive bladder   • Diabetes type 2, controlled (CMS/HCC)   • Hypertension   • Osteopenia 2018-? 2 to 5y   • Neuropathy-chemo/better   • Osteoarthritis of multiple joints   • Malignant neoplasm of lower-outer quadrant of left female breast (CMS/HCC)   • Family history of malignant neoplasm of breast   • Malignant neoplasm of upper-outer quadrant of female breast (CMS/HCC)   • History of bilateral mastectomy   • Wellness examination-done   • Pain in shoulder   • Chronic back pain   • Laboratory test*   • Bradycardia   • Cough   • Allergic rhinitis   • Menopause   • Shingles          1.ER positive breast cancer: Status post bilateral mastectomy.  No evidence of disease recurrence on physical exam or review of labs done today.  Per NCCN guidelines no indication for tumor markers.  -- omayra arimidex started 2015. COmpletes 5 yrs in Nov 2019. Will stop  -labs reviewed  Wbc 6.75, hg 12.7, plt 165. Cr and lft all normal.  rtc in 6 months then subsequently yearly    2. Osteoarthritis: Follow with primary care.    3. Osteopenia: Continue ca+ vitamin D next DEXA scan 9/18.    4.  Health maintenance: She is previously been a smoker and more recently has undergone spiral screening CT scan shows no evidence of any cancer at least in the chest and the chest wall area.  5. Right  neck lump: ct neck ordered showed some small neck LNs, no CT coorelate of palpable GISELLA, incidental thyroid nodule. We wull not pursue these further as they ere benign unless things change.  rtc in 6months           Shanda Torres MD    6/13/2019    9:11 AM

## 2019-06-14 LAB
CANCER AG15-3 SERPL-ACNC: 29.6 U/ML (ref 0–25)
CANCER AG27-29 SERPL-ACNC: 25.2 U/ML (ref 0–38.6)
CHOLEST SERPL-MCNC: 144 MG/DL (ref 100–199)
HBA1C MFR BLD: 6.2 % (ref 4.8–5.6)
HDLC SERPL-MCNC: 38 MG/DL
LDLC SERPL CALC-MCNC: 82 MG/DL (ref 0–99)
TRIGL SERPL-MCNC: 120 MG/DL (ref 0–149)
VLDLC SERPL-MCNC: 24 MG/DL (ref 5–40)

## 2019-06-17 RX ORDER — ANASTROZOLE 1 MG/1
1 TABLET ORAL DAILY
Qty: 90 TABLET | Refills: 3 | Status: SHIPPED | OUTPATIENT
Start: 2019-06-17 | End: 2019-12-20

## 2019-06-18 ENCOUNTER — OFFICE VISIT (OUTPATIENT)
Dept: FAMILY MEDICINE CLINIC | Facility: CLINIC | Age: 74
End: 2019-06-18

## 2019-06-18 VITALS
SYSTOLIC BLOOD PRESSURE: 120 MMHG | WEIGHT: 189 LBS | DIASTOLIC BLOOD PRESSURE: 70 MMHG | RESPIRATION RATE: 18 BRPM | OXYGEN SATURATION: 93 % | TEMPERATURE: 98.5 F | HEIGHT: 63 IN | HEART RATE: 60 BPM | BODY MASS INDEX: 33.49 KG/M2

## 2019-06-18 DIAGNOSIS — E11.9 CONTROLLED TYPE 2 DIABETES MELLITUS WITHOUT COMPLICATION, WITHOUT LONG-TERM CURRENT USE OF INSULIN (HCC): Chronic | ICD-10-CM

## 2019-06-18 DIAGNOSIS — M25.519 SHOULDER PAIN, UNSPECIFIED CHRONICITY, UNSPECIFIED LATERALITY: ICD-10-CM

## 2019-06-18 DIAGNOSIS — C50.919 MALIGNANT NEOPLASM OF FEMALE BREAST, UNSPECIFIED ESTROGEN RECEPTOR STATUS, UNSPECIFIED LATERALITY, UNSPECIFIED SITE OF BREAST (HCC): Chronic | ICD-10-CM

## 2019-06-18 DIAGNOSIS — G89.29 CHRONIC BACK PAIN, UNSPECIFIED BACK LOCATION, UNSPECIFIED BACK PAIN LATERALITY: ICD-10-CM

## 2019-06-18 DIAGNOSIS — M85.80 OSTEOPENIA, UNSPECIFIED LOCATION: ICD-10-CM

## 2019-06-18 DIAGNOSIS — R05.9 COUGH: Primary | ICD-10-CM

## 2019-06-18 DIAGNOSIS — M54.9 CHRONIC BACK PAIN, UNSPECIFIED BACK LOCATION, UNSPECIFIED BACK PAIN LATERALITY: ICD-10-CM

## 2019-06-18 DIAGNOSIS — J44.9 CHRONIC OBSTRUCTIVE PULMONARY DISEASE, UNSPECIFIED COPD TYPE (HCC): ICD-10-CM

## 2019-06-18 DIAGNOSIS — Z86.19 HISTORY OF SHINGLES: ICD-10-CM

## 2019-06-18 DIAGNOSIS — I10 ESSENTIAL HYPERTENSION: Chronic | ICD-10-CM

## 2019-06-18 PROCEDURE — 20610 DRAIN/INJ JOINT/BURSA W/O US: CPT | Performed by: FAMILY MEDICINE

## 2019-06-18 PROCEDURE — 99214 OFFICE O/P EST MOD 30 MIN: CPT | Performed by: FAMILY MEDICINE

## 2019-06-18 RX ORDER — LIDOCAINE HYDROCHLORIDE 10 MG/ML
1 INJECTION, SOLUTION INFILTRATION; PERINEURAL ONCE
Status: COMPLETED | OUTPATIENT
Start: 2019-06-18 | End: 2019-06-18

## 2019-06-18 RX ORDER — LIDOCAINE HYDROCHLORIDE 10 MG/ML
1 INJECTION, SOLUTION EPIDURAL; INFILTRATION; INTRACAUDAL; PERINEURAL ONCE
Status: DISCONTINUED | OUTPATIENT
Start: 2019-06-18 | End: 2019-06-18

## 2019-06-18 RX ORDER — TRIAMCINOLONE ACETONIDE 40 MG/ML
40 INJECTION, SUSPENSION INTRA-ARTICULAR; INTRAMUSCULAR ONCE
Status: COMPLETED | OUTPATIENT
Start: 2019-06-18 | End: 2019-06-18

## 2019-06-18 RX ADMIN — LIDOCAINE HYDROCHLORIDE 1 ML: 10 INJECTION, SOLUTION INFILTRATION; PERINEURAL at 16:20

## 2019-06-18 RX ADMIN — TRIAMCINOLONE ACETONIDE 40 MG: 40 INJECTION, SUSPENSION INTRA-ARTICULAR; INTRAMUSCULAR at 16:21

## 2019-06-18 NOTE — PATIENT INSTRUCTIONS
A new shingles vaccine (a shot to lower your chance of catching shingles) is now available (shingrix).  This vaccine is the second vaccine created for this purpose; we have had zostavax for years.  Shingrix provides a much better and longer immunity for shingles than zostavax.  For this and other reasons Shingrix can be started at age 50.  If you have had zostavax in the past; you can still take Shingrix.      This vaccine is not paid for in a doctor's office by medicare, medicaid and probably most insurances.  Like zostavax; this is covered in drug stores.  This is a vaccine that if you chose to get you need to get at a drug store that gives vaccines (like [facility], [facility] and [facility].      If you get this vaccine; please let us know so we can record this on your record here.     ########################

## 2019-06-18 NOTE — PROGRESS NOTES
Subjective   Ira Sanchez is a 74 y.o. female presenting with chief complaint of:   Chief Complaint   Patient presents with   • Hypertension     6 month follow up   • Diabetes   • Cough     X 5 days       History of Present Illness :  Alone.   Has multiple chronic problems to consider that might have a bearing on today's issues;  an interval appointment.       Chronic/acute problems reviewed today:   1. Cough: Acute/3 to 5 days.  There is a dry cough.  There is no significant wheeze.  She has several years of smoking and secondary smoke exposure.  (Is probably starting to represent COPD).   2. History of shingles:  acute/recent.  Says her vision is intact.  She still has some stinging over the left eye.  She has zoster vacs.  She is interested in Shingrix   3. Malignant neoplasm of female breast, unspecified estrogen receptor status, unspecified laterality, unspecified site of breast (CMS/[facility]): Chronic/stable.  She had both breasts removed.  Recent visit with oncology she was told she was doing well (no CA 15-3 was elevated).   4. Osteopenia, unspecified location: Chronic/stable.  Last bone density below.   Has no Rx.  Has no plan for bone density repeat.      5. Controlled type 2 diabetes mellitus without complication, without long-term current use of insulin (CMS/[facility]): Chronic/stable No problem/pattern hypoglycemia/hyperglycemia manifest by poly- dypsia, phagia, uria, or sweats, diaphoretic episodes, syncope/near.     6. Essential hypertension: Chronic/stable. Stable here/if home blood pressures.  No significant chest pain, SOB, LE edema, orthopnea, near syncope, dizziness/light headness.      7. Chronic back pain, unspecified back location, unspecified back pain laterality: : chronic/variable 0-3/10 lower back pain with infrequent/no radiation to UE/LE.  No LE numbness.  Has bladder/bowel control. No desire to change approach of care.      8. Shoulder pain, unspecified chronicity, unspecified laterality:  Chronic/intermittent and variable.  We injected her right shoulder 2016 and she is done well to recent.  She is getting some soreness posterior aspect shoulder with abduction.  There is been no recent injury.   9. Chronic obstructive pulmonary disease, unspecified COPD type (CMS/HCC): Chronic/stable mild occ cough, sob, wheeze.  Rx not being used.   No smoking.         Has an/another acute issue today: none.    The following portions of the patient's history were reviewed and updated as appropriate: allergies, current medications, past family history, past medical history, past social history, past surgical history and problem list.      Current Outpatient Medications:   •  amLODIPine (NORVASC) 5 MG tablet, TAKE 1 TABLET BY MOUTH DAILY, Disp: 90 tablet, Rfl: 3  •  anastrozole (ARIMIDEX) 1 MG tablet, Take 1 tablet by mouth Daily., Disp: 90 tablet, Rfl: 3  •  budesonide-formoterol (SYMBICORT) 80-4.5 MCG/ACT inhaler, Inhale 2 puffs 2 (Two) Times a Day., Disp: 1 inhaler, Rfl: 12  •  Calcium Carb-Cholecalciferol (CALCIUM-VITAMIN D) 600-400 MG-UNIT tablet, Take 1 tablet by mouth 2 (Two) Times a Day., Disp: , Rfl:   •  cycloSPORINE (RESTASIS) 0.05 % ophthalmic emulsion, 1 drop Every 12 (Twelve) Hours., Disp: , Rfl:   •  diphenhydrAMINE (BENADRYL) 25 mg capsule, Take 25 mg by mouth Daily As Needed., Disp: , Rfl:   •  ibuprofen (ADVIL,MOTRIN) 200 MG tablet, Take 200 mg by mouth As Needed., Disp: , Rfl:   •  lisinopril (PRINIVIL,ZESTRIL) 20 MG tablet, TAKE 1 TABLET BY MOUTH DAILY, Disp: 90 tablet, Rfl: 0  •  neomycin-polymyxin-dexamethasone (MAXITROL) 3.5-92004-0.1 ophthalmic suspension, Administer 1 drop into the left eye 3 (Three) Times a Day., Disp: , Rfl:   •  olopatadine (PATANOL) 0.1 % ophthalmic solution, Administer 1 drop to both eyes 2 (Two) Times a Day., Disp: 5 mL, Rfl: 2  •  oxybutynin (DITROPAN) 5 MG tablet, TAKE 1 TABLET BY MOUTH TWICE DAILY, Disp: 180 tablet, Rfl: 1  •  thiamine (VITAMINE B-1) 100 MG tablet,  Take 100 mg by mouth Daily., Disp: , Rfl:   •  gabapentin (NEURONTIN) 300 MG capsule, Take 1 capsule by mouth 3 (Three) Times a Day., Disp: 45 capsule, Rfl: 1  No current facility-administered medications for this visit.     Facility-Administered Medications Ordered in Other Visits:   •  heparin flush (porcine) 100 UNIT/ML injection 500 Units, 500 Units, Intravenous, PRN, Shanda Torres MD, 500 Units at 04/16/18 1504  •  heparin flush (porcine) 100 UNIT/ML injection 500 Units, 500 Units, Intravenous, PRN, Shanda Torres MD, 500 Units at 06/12/18 1445  •  heparin flush (porcine) 100 UNIT/ML injection 500 Units, 500 Units, Intravenous, PRN, Shanda Torres MD, 500 Units at 06/13/19 0936  •  sodium chloride 0.9 % flush 10 mL, 10 mL, Intravenous, PRN, Shanda Torres MD, 10 mL at 04/16/18 1503  •  sodium chloride 0.9 % flush 10 mL, 10 mL, Intravenous, PRN, Shanda Torres MD, 10 mL at 06/12/18 1445  •  sodium chloride 0.9 % flush 10 mL, 10 mL, Intravenous, PRN, Shanda Torres MD, 10 mL at 06/13/19 0936    No problems with medications.  Refills if needed done    Allergies   Allergen Reactions   • Benzonatate Hives and Itching       Review of Systems  GENERAL:  Active/slower with limits, speed, stamina for age and acute fatigue.  Sleep is ok. No fever now.  SKIN: No other rash/skin lesion of concern:   ENDO:  No syncope, near or diaphoretic sweaty spells.  No download..  HEENT: No recent head injury; less sinus area headache.  No vision change.  No chronic hearing loss.  Ears with fullness without pain/drainage.  No sore throat now.  No significant nasal/sinus congestion/drainage. No epistaxis.   CHEST: No chest wall tenderness or mass.  No usual significant cough, and usual without wheeze.  No SOB; no hemoptysis.  CV: No chest pain, palpitations, ankle edema.  GI: No heartburn, dysphagia.  No abdominal pain, diarrhea, constipation.  No rectal  bleeding, or melena.    :  Voids without dysuria, or  incontinence to completion.  ORTHO: No painful/swollen joints but various on /off sore.  Occ sore neck or back.  No acute neck or back pain without recent injury.  NEURO: No dizziness, weakness of extremities.  Same mild numbness/paresthesias LE.  Now L forehead paresthesias.   PSYCH: No memory loss.  Mood good; mild anxious, depressed but/and not suicidal.  Tries to tolerate stress .   Screening:  Mammogram: bilateral masectomy  Bone density: Bone d-deca/[facility]/T hip -0.5 LS +1.4- no change; maybe 2-5 yr  Low dose CT chest:Tobacco-smoker/age 17/1-2ppd/dc age 60:  CTs with breast cancer:   GI: Colon-avm-div/[facility]/12.19.17  Prostate: NA  Usual lab order  6m CBC, CMP, A1c  12m CBC, CMP, A1c, LIPID, TSH, Vit D       Lab Results:  Results for orders placed or performed in visit on 06/13/19   Comprehensive Metabolic Panel   Result Value Ref Range    Glucose 119 (H) 70 - 100 mg/dL    BUN 15 5 - 21 mg/dL    Creatinine 0.61 0.50 - 1.40 mg/dL    Sodium 134 (L) 135 - 145 mmol/L    Potassium 4.4 3.5 - 5.3 mmol/L    Chloride 99 98 - 110 mmol/L    CO2 26.0 24.0 - 31.0 mmol/L    Calcium 9.4 8.4 - 10.4 mg/dL    Total Protein 7.0 6.3 - 8.7 g/dL    Albumin 4.30 3.50 - 5.00 g/dL    ALT (SGPT) 20 0 - 54 U/L    AST (SGOT) 34 7 - 45 U/L    Alkaline Phosphatase 90 24 - 120 U/L    Total Bilirubin 0.7 0.1 - 1.0 mg/dL    eGFR Non African Amer 95 >60 mL/min/1.73    Globulin 2.7 gm/dL    A/G Ratio 1.6 1.1 - 2.5 g/dL    BUN/Creatinine Ratio 24.6 7.0 - 25.0    Anion Gap 9.0 4.0 - 13.0 mmol/L   Cancer Antigen 27.29   Result Value Ref Range    CA 27.29 25.2 0.0 - 38.6 U/mL   Cancer Antigen 15-3   Result Value Ref Range    CA 15-3 29.6 (H) 0.0 - 25.0 U/mL   CBC Auto Differential   Result Value Ref Range    WBC 6.75 4.80 - 10.80 10*3/mm3    RBC 4.11 (L) 4.20 - 5.40 10*6/mm3    Hemoglobin 12.7 12.0 - 16.0 g/dL    Hematocrit 37.1 37.0 - 47.0 %    MCV 90.3 82.0 - 98.0 fL    MCH 30.9 28.0 -  32.0 pg    MCHC 34.2 33.0 - 36.0 g/dL    RDW 12.7 12.0 - 15.0 %    RDW-SD 42.2 40.0 - 54.0 fl    MPV 10.8 6.0 - 12.0 fL    Platelets 165 130 - 400 10*3/mm3    Neutrophil % 70.3 39.0 - 78.0 %    Lymphocyte % 19.6 15.0 - 45.0 %    Monocyte % 7.9 4.0 - 12.0 %    Eosinophil % 1.5 0.0 - 4.0 %    Basophil % 0.3 0.0 - 2.0 %    Immature Grans % 0.4 0.0 - 5.0 %    Neutrophils, Absolute 4.75 1.87 - 8.40 10*3/mm3    Lymphocytes, Absolute 1.32 0.72 - 4.86 10*3/mm3    Monocytes, Absolute 0.53 0.19 - 1.30 10*3/mm3    Eosinophils, Absolute 0.10 0.00 - 0.70 10*3/mm3    Basophils, Absolute 0.02 0.00 - 0.20 10*3/mm3    Immature Grans, Absolute 0.03 0.00 - 0.05 10*3/mm3    nRBC 0.0 0.0 - 0.2 /100 WBC   Gold Top - SST   Result Value Ref Range    Extra Tube Hold for add-ons.        A1C:  Lab Results - Last 18 Months   Lab Units 06/13/19  0849 12/13/18  1131   HEMOGLOBIN A1C % 6.2* 5.9     PSA:No results for input(s): PSA in the last 00469 hours.  CBC:  Lab Results - Last 18 Months   Lab Units 06/13/19  0849 12/13/18  1131 06/12/18  1351 04/16/18  1424 03/26/18  0847   WBC 10*3/mm3 6.75 8.01 7.72 10.69 9.89   HEMOGLOBIN g/dL 12.7 14.8 14.1 15.1 14.7   HEMATOCRIT % 37.1 43.4 41.8 44.5 46.6   PLATELETS 10*3/mm3 165 180 185 204 193      BMP/CMP:  Lab Results - Last 18 Months   Lab Units 06/13/19  0849 12/13/18  1131 06/13/18  1329 06/12/18  1351 04/16/18  1424 03/26/18  0847   SODIUM mmol/L 134* 137  --  141 139 143   POTASSIUM mmol/L 4.4 4.1  --  4.1 4.4 3.9   CHLORIDE mmol/L 99 97*  --  101 100 101   TOTAL CO2 mmol/L  --   --   --   --   --  25.0   CO2 mmol/L 26.0 27.0  --  29.0 29.0  --    GLUCOSE mg/dL  --   --   --   --   --  127*   BUN mg/dL 15 15  --  14 15 13   CREATININE mg/dL 0.61 0.56 0.70 0.52 0.55 0.46*   EGFR IF NONAFRICN AM mL/min/1.73 95 106  --  115 108 132   EGFR IF AFRICN AM mL/min/1.73  --   --   --   --   --  >150   CALCIUM mg/dL 9.4 9.6  --  9.9 10.1 10.1     HEPATIC:  Lab Results - Last 18 Months   Lab Units  "06/13/19  0849 12/13/18  1131 06/12/18  1351 04/16/18  1424   ALT (SGPT) U/L 20 <15 16 28   AST (SGOT) U/L 34 28 25 27   ALK PHOS U/L 90 104 97 101     THYROID:  Lab Results - Last 18 Months   Lab Units 12/13/18  1131   TSH mIU/mL 1.920       Objective   /70 (BP Location: Left arm, Patient Position: Sitting, Cuff Size: Adult)   Pulse 60   Temp 98.5 °F (36.9 °C) (Oral)   Resp 18   Ht 160 cm (63\")   Wt 85.7 kg (189 lb)   LMP  (LMP Unknown)   SpO2 93%   Breastfeeding? No   BMI 33.48 kg/m²   Body mass index is 33.48 kg/m².    Physical Exam  GENERAL:  Well nourished/developed in no acute distress but frequent cough; at times paroxysmal.   SKIN: Turgor excellent, without wound, rash, lesion  HEENT: Normal cephalic without trauma. L forehead hyperesthetic to light touch.  Pupils equal round reactive to light. Extraocular motions full without nystagmus. L eyelid hangs down.   External canals nonobstructive nontender without reddness. Tymphatic membranes dull with irwin structures intact.   Oral cavity without growths, exudates, and moist.  Posterior pharynx without mass, obstruction, redness.  No thyromegaly, mass, tenderness, definite lymphadenopathy and supple.   CV: Regular rhythm.  No murmur, gallop,  edema. Posterior pulses intact.  No carotid bruits.  CHEST: No chest wall tenderness or mass.   LUNGS: Symmetric motion with clear to auscultation.  No dullness to percussion  ABD: Soft, nontender without mass.   ORTHO: Symmetric extremities without swelling/point tenderness.  Full gross range of motion.   NEURO: CN 2-12 grossly intact except L eyelid.  Otherwise symmetric facies and UE/LE. 3/5 strength throughout.   Otherwise nonfocal use extremities. Speech clear.     PSYCH: Oriented x 3.  Pleasant calm, well kept.  Purposeful/directed conservation with intact short/long gross memory.    Assessment/Plan     1. Cough    2. History of shingles    3. Malignant neoplasm of female breast, unspecified estrogen " "receptor status, unspecified laterality, unspecified site of breast (CMS/[facility])    4. Osteopenia, unspecified location    5. Controlled type 2 diabetes mellitus without complication, without long-term current use of insulin (CMS/[facility])    6. Essential hypertension    7. Chronic back pain, unspecified back location, unspecified back pain laterality    8. Shoulder pain, unspecified chronicity, unspecified laterality    9. Chronic obstructive pulmonary disease, unspecified COPD type (CMS/[facility])        Rx: reviewed/changes:  New Medications Ordered This Visit   Medications   • Tiotropium Bromide Monohydrate (SPIRIVA RESPIMAT) 1.25 MCG/ACT aerosol solution inhaler     Sig: Inhale 2 puffs Daily.     Dispense:  1 inhaler     Refill:  0   • triamcinolone acetonide (KENALOG-40) injection 40 mg   • lidocaine (XYLOCAINE) 1 % injection 1 mL       LAB/Testing/Referrals: reviewed/orders:   Today:   No orders of the defined types were placed in this encounter.    Chronic/recurrent labs above or change to:   same     Discussions:   Body mass index is 33.48 kg/m².  Patient's Body mass index is 33.48 kg/m². BMI is above normal parameters. Recommendations include: exercise counseling, nutrition counseling and as before.    Tobacco use reviewed:  Non-smoker    The risks and benefits of the shoulder injection were explained to the patient.  He was warned of pain during the procedure and after, the possibilty of infection, the possibility of no improvement and/or poor result, further expense of visits, referrals, antibiotics, hospitalization, surgery and possible bruising. He expressed verbal understanding and agreed to proceed.    PROCEDURE:  Followed the procedure from \"A Practical Guide to Joint and Soft Tissue Injection and Aspiration\" Hiren Lott 2005 pages 19-22.  The patient was placed in a seated position and the subacromial aspect of the R shoulder was cleansed with alcohol and prepped with Betadine.    Using a posterior approach and " sterile technique, local anesthesia was obtained using 1cc 2% Xylocaine without epinephrine.  When adequate anesthesia was achieved, the subacromial space was injected with a preparation of 1cc 2% Xylocaine and 1cc Kenalog 40mg/cc using a 25g 1 1/2 inch needle.  The solution flowed smoothly into the space.  The needle was withdrawn and a sterile adhesive bandage was applied.  The shoulder was taken through a full range of motion to distribute the steroid solution throughout the shoulder joint.    The patient tolerated the procedure well and was advised to avoid excessive use of the shoulder over the next two weeks.  He was asked to immediately report fever, increasing pain or drainage from the injection site.      Patient Instructions   A new shingles vaccine (a shot to lower your chance of catching shingles) is now available (shingrix).  This vaccine is the second vaccine created for this purpose; we have had zostavax for years.  Shingrix provides a much better and longer immunity for shingles than zostavax.  For this and other reasons Shingrix can be started at age 50.  If you have had zostavax in the past; you can still take Shingrix.      This vaccine is not paid for in a doctor's office by medicare, medicaid and probably most insurances.  Like zostavax; this is covered in drug stores.  This is a vaccine that if you chose to get you need to get at a drug store that gives vaccines (like [facility], [facility] and [facility].      If you get this vaccine; please let us know so we can record this on your record here.     ########################        Follow up: Return for lab/Dr Tinoco 6m.  Future Appointments   Date Time Provider Department Center   9/12/2019  9:30 AM [facility] ONC PAD NURSE [facility] ONC PAD PAD   12/10/2019 11:00 AM [facility] CANCER CTR LAB [facility] CCLAB PAD   12/10/2019 11:30 AM Shanda Torres MD W ONC PAD PAD   12/12/2019 11:30 AM Conrado Tinoco MD W PC METR None

## 2019-07-01 RX ORDER — OXYBUTYNIN CHLORIDE 5 MG/1
TABLET ORAL
Qty: 180 TABLET | Refills: 3 | Status: SHIPPED | OUTPATIENT
Start: 2019-07-01 | End: 2020-06-22

## 2019-07-07 DIAGNOSIS — I10 HYPERTENSION, UNSPECIFIED TYPE: Chronic | ICD-10-CM

## 2019-07-08 RX ORDER — LISINOPRIL 20 MG/1
20 TABLET ORAL DAILY
Qty: 90 TABLET | Refills: 3 | Status: SHIPPED | OUTPATIENT
Start: 2019-07-08 | End: 2020-06-22

## 2019-09-16 ENCOUNTER — CLINICAL SUPPORT (OUTPATIENT)
Dept: ONCOLOGY | Facility: CLINIC | Age: 74
End: 2019-09-16

## 2019-09-16 DIAGNOSIS — C50.512 MALIGNANT NEOPLASM OF LOWER-OUTER QUADRANT OF LEFT FEMALE BREAST, UNSPECIFIED ESTROGEN RECEPTOR STATUS (HCC): Primary | ICD-10-CM

## 2019-09-16 RX ORDER — SODIUM CHLORIDE 0.9 % (FLUSH) 0.9 %
10 SYRINGE (ML) INJECTION AS NEEDED
Status: DISCONTINUED | OUTPATIENT
Start: 2019-09-16 | End: 2019-09-16 | Stop reason: HOSPADM

## 2019-09-16 RX ORDER — SODIUM CHLORIDE 0.9 % (FLUSH) 0.9 %
10 SYRINGE (ML) INJECTION AS NEEDED
Status: CANCELLED | OUTPATIENT
Start: 2019-09-16

## 2019-09-16 RX ADMIN — Medication 10 ML: at 09:19

## 2019-11-08 ENCOUNTER — RESULTS ENCOUNTER (OUTPATIENT)
Dept: FAMILY MEDICINE CLINIC | Facility: CLINIC | Age: 74
End: 2019-11-08

## 2019-11-08 DIAGNOSIS — E11.9 CONTROLLED TYPE 2 DIABETES MELLITUS WITHOUT COMPLICATION, WITHOUT LONG-TERM CURRENT USE OF INSULIN (HCC): Chronic | ICD-10-CM

## 2019-12-12 DIAGNOSIS — C50.512 MALIGNANT NEOPLASM OF LOWER-OUTER QUADRANT OF LEFT FEMALE BREAST, UNSPECIFIED ESTROGEN RECEPTOR STATUS (HCC): Primary | ICD-10-CM

## 2019-12-16 ENCOUNTER — OFFICE VISIT (OUTPATIENT)
Dept: ONCOLOGY | Facility: CLINIC | Age: 74
End: 2019-12-16

## 2019-12-16 ENCOUNTER — APPOINTMENT (OUTPATIENT)
Dept: LAB | Facility: HOSPITAL | Age: 74
End: 2019-12-16

## 2019-12-16 VITALS
OXYGEN SATURATION: 93 % | HEART RATE: 51 BPM | BODY MASS INDEX: 33.86 KG/M2 | WEIGHT: 191.1 LBS | TEMPERATURE: 97.1 F | RESPIRATION RATE: 16 BRPM | HEIGHT: 63 IN | DIASTOLIC BLOOD PRESSURE: 83 MMHG | SYSTOLIC BLOOD PRESSURE: 140 MMHG

## 2019-12-16 DIAGNOSIS — C50.512 MALIGNANT NEOPLASM OF LOWER-OUTER QUADRANT OF LEFT FEMALE BREAST, UNSPECIFIED ESTROGEN RECEPTOR STATUS (HCC): Primary | ICD-10-CM

## 2019-12-16 DIAGNOSIS — C50.912 MALIGNANT NEOPLASM OF LEFT BREAST IN FEMALE, ESTROGEN RECEPTOR POSITIVE, UNSPECIFIED SITE OF BREAST (HCC): Primary | ICD-10-CM

## 2019-12-16 DIAGNOSIS — Z17.0 MALIGNANT NEOPLASM OF LEFT BREAST IN FEMALE, ESTROGEN RECEPTOR POSITIVE, UNSPECIFIED SITE OF BREAST (HCC): Primary | ICD-10-CM

## 2019-12-16 DIAGNOSIS — C50.512 MALIGNANT NEOPLASM OF LOWER-OUTER QUADRANT OF LEFT FEMALE BREAST, UNSPECIFIED ESTROGEN RECEPTOR STATUS (HCC): ICD-10-CM

## 2019-12-16 PROBLEM — H53.009 AMBLYOPIA: Status: ACTIVE | Noted: 2018-08-06

## 2019-12-16 PROBLEM — B02.39: Status: ACTIVE | Noted: 2019-05-09

## 2019-12-16 PROBLEM — H02.839 DERMATOCHALASIS: Status: ACTIVE | Noted: 2018-08-06

## 2019-12-16 PROBLEM — H26.499 AFTER-CATARACT WITH VISION OBSCURED: Status: ACTIVE | Noted: 2018-08-06

## 2019-12-16 PROBLEM — Z96.1 PSEUDOPHAKIA: Status: ACTIVE | Noted: 2018-08-06

## 2019-12-16 PROBLEM — H00.029 HORDEOLUM INTERNUM: Status: ACTIVE | Noted: 2018-08-06

## 2019-12-16 PROBLEM — H18.599 OTHER HEREDITARY CORNEAL DYSTROPHIES: Status: ACTIVE | Noted: 2018-08-06

## 2019-12-16 LAB
ALBUMIN SERPL-MCNC: 4.2 G/DL (ref 3.5–5.2)
ALBUMIN/GLOB SERPL: 1.5 G/DL
ALP SERPL-CCNC: 109 U/L (ref 39–117)
ALT SERPL W P-5'-P-CCNC: 8 U/L (ref 1–33)
ANION GAP SERPL CALCULATED.3IONS-SCNC: 11 MMOL/L (ref 5–15)
AST SERPL-CCNC: 18 U/L (ref 1–32)
BASOPHILS # BLD AUTO: 0.03 10*3/MM3 (ref 0–0.2)
BASOPHILS NFR BLD AUTO: 0.4 % (ref 0–1.5)
BILIRUB SERPL-MCNC: 0.2 MG/DL (ref 0.2–1.2)
BUN BLD-MCNC: 16 MG/DL (ref 8–23)
BUN/CREAT SERPL: 30.2 (ref 7–25)
CALCIUM SPEC-SCNC: 9.5 MG/DL (ref 8.6–10.5)
CHLORIDE SERPL-SCNC: 103 MMOL/L (ref 98–107)
CHOLEST SERPL-MCNC: 133 MG/DL (ref 0–200)
CO2 SERPL-SCNC: 26 MMOL/L (ref 22–29)
CREAT BLD-MCNC: 0.53 MG/DL (ref 0.57–1)
DEPRECATED RDW RBC AUTO: 41.6 FL (ref 37–54)
EOSINOPHIL # BLD AUTO: 0.18 10*3/MM3 (ref 0–0.4)
EOSINOPHIL NFR BLD AUTO: 2.2 % (ref 0.3–6.2)
ERYTHROCYTE [DISTWIDTH] IN BLOOD BY AUTOMATED COUNT: 12.6 % (ref 12.3–15.4)
GFR SERPL CREATININE-BSD FRML MDRD: 112 ML/MIN/1.73
GLOBULIN UR ELPH-MCNC: 2.8 GM/DL
GLUCOSE BLD-MCNC: 111 MG/DL (ref 65–99)
HBA1C MFR BLD: 6.3 % (ref 4.8–5.6)
HCT VFR BLD AUTO: 38.9 % (ref 34–46.6)
HDLC SERPL-MCNC: 41 MG/DL (ref 40–60)
HGB BLD-MCNC: 12.9 G/DL (ref 12–15.9)
HOLD SPECIMEN: NORMAL
IMM GRANULOCYTES # BLD AUTO: 0.01 10*3/MM3 (ref 0–0.05)
IMM GRANULOCYTES NFR BLD AUTO: 0.1 % (ref 0–0.5)
LDLC SERPL CALC-MCNC: 72 MG/DL (ref 0–100)
LDLC/HDLC SERPL: 1.76 {RATIO}
LYMPHOCYTES # BLD AUTO: 2.68 10*3/MM3 (ref 0.7–3.1)
LYMPHOCYTES NFR BLD AUTO: 33 % (ref 19.6–45.3)
MCH RBC QN AUTO: 30.4 PG (ref 26.6–33)
MCHC RBC AUTO-ENTMCNC: 33.2 G/DL (ref 31.5–35.7)
MCV RBC AUTO: 91.5 FL (ref 79–97)
MONOCYTES # BLD AUTO: 0.44 10*3/MM3 (ref 0.1–0.9)
MONOCYTES NFR BLD AUTO: 5.4 % (ref 5–12)
NEUTROPHILS # BLD AUTO: 4.79 10*3/MM3 (ref 1.7–7)
NEUTROPHILS NFR BLD AUTO: 58.9 % (ref 42.7–76)
NRBC BLD AUTO-RTO: 0 /100 WBC (ref 0–0.2)
PLATELET # BLD AUTO: 168 10*3/MM3 (ref 140–450)
PMV BLD AUTO: 10.8 FL (ref 6–12)
POTASSIUM BLD-SCNC: 4.4 MMOL/L (ref 3.5–5.2)
PROT SERPL-MCNC: 7 G/DL (ref 6–8.5)
RBC # BLD AUTO: 4.25 10*6/MM3 (ref 3.77–5.28)
SODIUM BLD-SCNC: 140 MMOL/L (ref 136–145)
TRIGL SERPL-MCNC: 100 MG/DL (ref 0–150)
VLDLC SERPL-MCNC: 20 MG/DL
WBC NRBC COR # BLD: 8.13 10*3/MM3 (ref 3.4–10.8)

## 2019-12-16 PROCEDURE — 80053 COMPREHEN METABOLIC PANEL: CPT | Performed by: FAMILY MEDICINE

## 2019-12-16 PROCEDURE — 83036 HEMOGLOBIN GLYCOSYLATED A1C: CPT | Performed by: FAMILY MEDICINE

## 2019-12-16 PROCEDURE — 85025 COMPLETE CBC W/AUTO DIFF WBC: CPT | Performed by: INTERNAL MEDICINE

## 2019-12-16 PROCEDURE — 80061 LIPID PANEL: CPT | Performed by: FAMILY MEDICINE

## 2019-12-16 PROCEDURE — 99215 OFFICE O/P EST HI 40 MIN: CPT | Performed by: INTERNAL MEDICINE

## 2019-12-16 PROCEDURE — 36415 COLL VENOUS BLD VENIPUNCTURE: CPT | Performed by: INTERNAL MEDICINE

## 2019-12-16 RX ORDER — HEPARIN SODIUM (PORCINE) LOCK FLUSH IV SOLN 100 UNIT/ML 100 UNIT/ML
500 SOLUTION INTRAVENOUS AS NEEDED
Status: CANCELLED | OUTPATIENT
Start: 2019-12-16

## 2019-12-16 RX ORDER — SODIUM CHLORIDE 0.9 % (FLUSH) 0.9 %
10 SYRINGE (ML) INJECTION AS NEEDED
Status: CANCELLED | OUTPATIENT
Start: 2019-12-16

## 2019-12-16 RX ORDER — SODIUM CHLORIDE 0.9 % (FLUSH) 0.9 %
10 SYRINGE (ML) INJECTION AS NEEDED
Status: DISCONTINUED | OUTPATIENT
Start: 2019-12-16 | End: 2020-04-24

## 2019-12-16 RX ADMIN — Medication 10 ML: at 10:24

## 2019-12-16 NOTE — PROGRESS NOTES
[facility]  HEMATOLOGY & ONCOLOGY        Subjective     VISIT DIAGNOSIS:   No diagnosis found.    REASON FOR VISIT:     Chief Complaint   Patient presents with   • Breast Cancer     HERE FOR 6 MONTHS        HEMATOLOGY / ONCOLOGY HISTORY:   Oncology/Hematology History    Ms. Sanchez is a pleasant 71 year old  female with the diagnosis of stage IIA left breast carcinoma, ER/WV and HER2  positive 3+.  She is status post bilateral mastectomies and has completed 5 cycles of adjuvant systemic chemotherapy with taxotere, carboplatin and  Herceptin. She is presently receiving Herceptin 6 mg/kg every 3 weeks for a total dosing of 1 year with last dose planned for 2015.  Ms. Sanchez is also presently taking hormonal manipulation therapy with Arimidex 1 mg p.o. daily and this will be taken for 10 years, this  was initiated on 2015.  She had completed Herceptin. She had completed breast reconstruction process. We will give her a refill on her Arimidex. She is due for  her bone density in 2016.        Breast cancer-L/valente masectomy 2016 Initial Diagnosis     Breast cancer         Cancer Staging Information:  No matching staging information was found for the patient.      INTERVAL HISTORY  Patient ID: Ira Sanchez is a 74 y.o. year old female with hormone positive breast cancer on hormonal manipulation and tolerates Arimidex very well with minimal side effects.  Denies any chest wall lesions, denies double vision, dizziness, nausea or vomiting, unintentional weight loss, night sweats.  She does have an active life.  --  recently  from head and neck ca.   --noticed a right neck mass. Denies any mouth sores, head sores or infection. Denies fever. Has been there >2 weeks.  --6/15/18: pt here to review neck CT done or palpable GISELLA. No ct correlate,incidental thyroid nodule.  --19: no issues or concern. Denies chest lesion, sob,cp,n/v, abdominal pain, new back  pain, focal weakness, dizziness, night sweats, weight loss. The rest of ros unremarkable.    Past Medical History:   Past Medical History:   Diagnosis Date   • Bradycardia    • Breast cancer (CMS/HCC)    • Colon polyp    • Diabetes mellitus (CMS/HCC)    • Hypertension    • Peripheral neuropathy    • Urinary incontinence      Past Surgical History:   Past Surgical History:   Procedure Laterality Date   • APPENDECTOMY     • BREAST SURGERY     • CATARACT EXTRACTION Bilateral    • DILATATION AND CURETTAGE     • KNEE SURGERY     • MASTECTOMY Bilateral    • NIPPLE TATTOO  2016   • PORTACATH PLACEMENT     • THROAT SURGERY       Social History:   Social History     Socioeconomic History   • Marital status:      Spouse name: Horace   • Number of children: 0   • Years of education: 12.5   • Highest education level: Not on file   Occupational History   • Occupation: retired     Comment: [employer]   Tobacco Use   • Smoking status: Former Smoker     Packs/day: 2.00     Years: 37.00     Pack years: 74.00     Types: Cigarettes     Start date: 1963     Last attempt to quit: 2000     Years since quittin.2   • Smokeless tobacco: Never Used   Substance and Sexual Activity   • Alcohol use: No   • Drug use: No   • Sexual activity: Defer     Family History:   Family History   Problem Relation Age of Onset   • Hypertension Mother    • Heart failure Mother    • Cancer Father         colon   • Colon cancer Father    • Cancer Maternal Aunt         breast   • No Known Problems Maternal Grandmother    • No Known Problems Maternal Grandfather    • No Known Problems Paternal Grandmother    • No Known Problems Paternal Grandfather    • Cancer Maternal Aunt         breast   • Cancer Maternal Aunt         breast       Review of Systems   Constitutional: Negative.    HENT: Negative.    Eyes: Negative.    Respiratory: Negative.    Cardiovascular: Negative.    Gastrointestinal: Negative.    Endocrine:  Negative.    Genitourinary: Negative.    Musculoskeletal: Positive for arthralgias. Negative for myalgias.        Neck lump   Allergic/Immunologic: Negative.    Neurological: Negative.    Hematological: Negative.    Psychiatric/Behavioral: Negative.         Performance Status:  Asymptomatic    Medications:    Current Outpatient Medications   Medication Sig Dispense Refill   • amLODIPine (NORVASC) 5 MG tablet TAKE 1 TABLET BY MOUTH DAILY 90 tablet 3   • anastrozole (ARIMIDEX) 1 MG tablet Take 1 tablet by mouth Daily. 90 tablet 3   • budesonide-formoterol (SYMBICORT) 80-4.5 MCG/ACT inhaler Inhale 2 puffs 2 (Two) Times a Day. 1 inhaler 12   • Calcium Carb-Cholecalciferol (CALCIUM-VITAMIN D) 600-400 MG-UNIT tablet Take 1 tablet by mouth 2 (Two) Times a Day.     • cycloSPORINE (RESTASIS) 0.05 % ophthalmic emulsion 1 drop Every 12 (Twelve) Hours.     • diphenhydrAMINE (BENADRYL) 25 mg capsule Take 25 mg by mouth Daily As Needed.     • gabapentin (NEURONTIN) 300 MG capsule Take 1 capsule by mouth 3 (Three) Times a Day. 45 capsule 1   • ibuprofen (ADVIL,MOTRIN) 200 MG tablet Take 200 mg by mouth As Needed.     • lisinopril (PRINIVIL,ZESTRIL) 20 MG tablet TAKE 1 TABLET BY MOUTH DAILY 90 tablet 3   • neomycin-polymyxin-dexamethasone (MAXITROL) 3.5-91122-5.1 ophthalmic suspension Administer 1 drop into the left eye 3 (Three) Times a Day.     • olopatadine (PATANOL) 0.1 % ophthalmic solution Administer 1 drop to both eyes 2 (Two) Times a Day. 5 mL 2   • oxybutynin (DITROPAN) 5 MG tablet TAKE 1 TABLET BY MOUTH TWICE DAILY 180 tablet 3   • thiamine (VITAMINE B-1) 100 MG tablet Take 100 mg by mouth Daily.     • Tiotropium Bromide Monohydrate (SPIRIVA RESPIMAT) 1.25 MCG/ACT aerosol solution inhaler Inhale 2 puffs Daily. 1 inhaler 0     No current facility-administered medications for this visit.      Facility-Administered Medications Ordered in Other Visits   Medication Dose Route Frequency Provider Last Rate Last Dose   • heparin  "flush (porcine) 100 UNIT/ML injection 500 Units  500 Units Intravenous PRN Shanda Torres MD   500 Units at 04/16/18 1504   • heparin flush (porcine) 100 UNIT/ML injection 500 Units  500 Units Intravenous PRN Shanda Torres MD   500 Units at 06/12/18 1445   • heparin flush (porcine) 100 UNIT/ML injection 500 Units  500 Units Intravenous PRN Shanda Torres MD   500 Units at 06/13/19 0936   • sodium chloride 0.9 % flush 10 mL  10 mL Intravenous PRN Shanda Torres MD   10 mL at 04/16/18 1503   • sodium chloride 0.9 % flush 10 mL  10 mL Intravenous PRN Shanda Torres MD   10 mL at 06/12/18 1445   • sodium chloride 0.9 % flush 10 mL  10 mL Intravenous PRN Shanda Torres MD   10 mL at 06/13/19 0936       ALLERGIES:    Allergies   Allergen Reactions   • Benzonatate Hives and Itching       Objective      Vitals:    12/16/19 0915   BP: 140/83   Pulse: 51   Resp: 16   Temp: 97.1 °F (36.2 °C)   SpO2: 93%   Weight: 86.7 kg (191 lb 1.6 oz)   Height: 160 cm (63\")   PainSc: 0-No pain         Current Status 12/16/2019   ECOG score 0         Physical Examremains the same    General Appearance: Patient is awake, alert, oriented and in no acute distress. Patient is welldeveloped, wellnourished, and appears stated age.  HEENT: Normocephalic. Sclerae clear, conjunctiva pink, extraocular movements intact, pupils, round, reactive to light and  accommodation. Mouth and throat are clear with moist oral mucosa.  NECK: right neck lump slightly diminished, no jugular venous distention, thyroid not enlarged.  LYMPH: No cervical, supraclavicular, axillary, or inguinal lymphadenopathy.  CHEST: Equal bilateral expansion, AP  diameter normal, resonant percussion note  LUNGS: Good air movement, no rales, rhonchi, rubs or wheezes with auscultation  CARDIO: Regular sinus rhythm, no murmurs, gallops or rubs.  ABDOMEN: Nondistended, soft, No tenderness, no guarding, no " rebound, No hepatosplenomegaly. No abdominal masses. Bowel sounds positive. No hernia  GENITALIA: Not examined.  BREASTS: Bilateral mastectomy with reconstruction.  Well-healed.  No skin lesions.  MUSKEL: No joint swelling, decreased motion, or inflammation  EXTREMS: No edema, clubbing, cyanosis, No varicose veins.  NEURO: Grossly nonfocal, Gait is coordinated and smooth, Cognition is preserved.  SKIN: No rashes, no ecchymoses, no petechia.  PSYCH: Oriented to time, place and person. Memory is preserved. Mood and affect appear normal  RECENT LABS:  Orders Only on 12/12/2019   Component Date Value Ref Range Status   • Glucose 12/16/2019 111* 65 - 99 mg/dL Final   • BUN 12/16/2019 16  8 - 23 mg/dL Final   • Creatinine 12/16/2019 0.53* 0.57 - 1.00 mg/dL Final   • Sodium 12/16/2019 140  136 - 145 mmol/L Final   • Potassium 12/16/2019 4.4  3.5 - 5.2 mmol/L Final   • Chloride 12/16/2019 103  98 - 107 mmol/L Final   • CO2 12/16/2019 26.0  22.0 - 29.0 mmol/L Final   • Calcium 12/16/2019 9.5  8.6 - 10.5 mg/dL Final   • Total Protein 12/16/2019 7.0  6.0 - 8.5 g/dL Final   • Albumin 12/16/2019 4.20  3.50 - 5.20 g/dL Final   • ALT (SGPT) 12/16/2019 8  1 - 33 U/L Final   • AST (SGOT) 12/16/2019 18  1 - 32 U/L Final   • Alkaline Phosphatase 12/16/2019 109  39 - 117 U/L Final   • Total Bilirubin 12/16/2019 0.2  0.2 - 1.2 mg/dL Final   • eGFR Non African Amer 12/16/2019 112  >60 mL/min/1.73 Final   • Globulin 12/16/2019 2.8  gm/dL Final   • A/G Ratio 12/16/2019 1.5  g/dL Final   • BUN/Creatinine Ratio 12/16/2019 30.2* 7.0 - 25.0 Final   • Anion Gap 12/16/2019 11.0  5.0 - 15.0 mmol/L Final   • WBC 12/16/2019 8.13  3.40 - 10.80 10*3/mm3 Final   • RBC 12/16/2019 4.25  3.77 - 5.28 10*6/mm3 Final   • Hemoglobin 12/16/2019 12.9  12.0 - 15.9 g/dL Final   • Hematocrit 12/16/2019 38.9  34.0 - 46.6 % Final   • MCV 12/16/2019 91.5  79.0 - 97.0 fL Final   • MCH 12/16/2019 30.4  26.6 - 33.0 pg Final   • MCHC 12/16/2019 33.2  31.5 - 35.7 g/dL  Final   • RDW 12/16/2019 12.6  12.3 - 15.4 % Final   • RDW-SD 12/16/2019 41.6  37.0 - 54.0 fl Final   • MPV 12/16/2019 10.8  6.0 - 12.0 fL Final   • Platelets 12/16/2019 168  140 - 450 10*3/mm3 Final   • Neutrophil % 12/16/2019 58.9  42.7 - 76.0 % Final   • Lymphocyte % 12/16/2019 33.0  19.6 - 45.3 % Final   • Monocyte % 12/16/2019 5.4  5.0 - 12.0 % Final   • Eosinophil % 12/16/2019 2.2  0.3 - 6.2 % Final   • Basophil % 12/16/2019 0.4  0.0 - 1.5 % Final   • Immature Grans % 12/16/2019 0.1  0.0 - 0.5 % Final   • Neutrophils, Absolute 12/16/2019 4.79  1.70 - 7.00 10*3/mm3 Final   • Lymphocytes, Absolute 12/16/2019 2.68  0.70 - 3.10 10*3/mm3 Final   • Monocytes, Absolute 12/16/2019 0.44  0.10 - 0.90 10*3/mm3 Final   • Eosinophils, Absolute 12/16/2019 0.18  0.00 - 0.40 10*3/mm3 Final   • Basophils, Absolute 12/16/2019 0.03  0.00 - 0.20 10*3/mm3 Final   • Immature Grans, Absolute 12/16/2019 0.01  0.00 - 0.05 10*3/mm3 Final   • nRBC 12/16/2019 0.0  0.0 - 0.2 /100 WBC Final       RADIOLOGY:  No results found.         Assessment/Plan   72-year-old female diagnosed Stage IIa left-sided breast cancer, HR+/HER2+, status post prophylactic mastectomy on the right side and bilateral breast reconstruction.  All this in 2014.  She is status post chemotherapy adjuvantly and currently on Arimidex 1 mg daily to complete a total of 5 years.        Patient Active Problem List   Diagnosis   • Obesity   • Breast cancer-L/valente masectomy 2014   • Overactive bladder   • Diabetes type 2, controlled (CMS/HCC)   • Hypertension   • Osteopenia 2018-? 2 to 5y   • Neuropathy-chemo/better   • Osteoarthritis of multiple joints   • Malignant neoplasm of lower-outer quadrant of left female breast (CMS/HCC)   • Family history of breast cancer   • Malignant neoplasm of upper-outer quadrant of female breast (CMS/HCC)   • History of bilateral mastectomy   • Wellness examination-done   • Shoulder pain   • Chronic back pain   • Laboratory test*   •  Bradycardia   • Cough: copd   • Allergic rhinitis   • Menopause   • History of shingles   • COPD (chronic obstructive pulmonary disease) (CMS/HCC)   • Abnormal mammogram   • After-cataract with vision obscured   • Amblyopia   • Cellulitis, trunk   • Dermatitis of eyelid due to herpes zoster   • Dermatochalasis   • Hordeolum internum   • Other hereditary corneal dystrophies   • Pseudophakia          1.ER positive breast cancer: Status post bilateral mastectomy.  No evidence of disease recurrence on physical exam or review of labs done today.  Per NCCN guidelines no indication for tumor markers.  -- omayra arimidex started 2015. COmpletes 5 yrs in Nov 2019.We discussed 10yrs versus 5 years. She elected to stop   -she just got refill for the next 3 months. She will stop after that  -labs reviewed  Cr nl, lft nl, Wbc 8.13, hg 12.9, plt 168. Cr and lft all normal.  rtc in a year    2. Osteoarthritis: Follow with primary care.    3. Osteopenia: Continue ca+ vitamin D next DEXA scan 9/18.    4.  Health maintenance: She is previously been a smoker and more recently has undergone spiral screening CT scan shows no evidence of any cancer at least in the chest and the chest wall area.  5. Right neck lump: ct neck ordered showed some small neck LNs, no CT coorelate of palpable GISELLA, incidental thyroid nodule. We wull not pursue these further as they ere benign unless things change.             Shanda Torres MD    12/16/2019    9:33 AM

## 2019-12-20 ENCOUNTER — OFFICE VISIT (OUTPATIENT)
Dept: FAMILY MEDICINE CLINIC | Facility: CLINIC | Age: 74
End: 2019-12-20

## 2019-12-20 VITALS
SYSTOLIC BLOOD PRESSURE: 134 MMHG | RESPIRATION RATE: 18 BRPM | TEMPERATURE: 98.3 F | HEIGHT: 63 IN | HEART RATE: 52 BPM | DIASTOLIC BLOOD PRESSURE: 74 MMHG | WEIGHT: 194 LBS | OXYGEN SATURATION: 96 % | BODY MASS INDEX: 34.38 KG/M2

## 2019-12-20 DIAGNOSIS — I10 ESSENTIAL HYPERTENSION: Chronic | ICD-10-CM

## 2019-12-20 DIAGNOSIS — Z00.00 WELLNESS EXAMINATION: ICD-10-CM

## 2019-12-20 DIAGNOSIS — E11.9 CONTROLLED TYPE 2 DIABETES MELLITUS WITHOUT COMPLICATION, WITHOUT LONG-TERM CURRENT USE OF INSULIN (HCC): Chronic | ICD-10-CM

## 2019-12-20 DIAGNOSIS — M54.9 CHRONIC BACK PAIN, UNSPECIFIED BACK LOCATION, UNSPECIFIED BACK PAIN LATERALITY: ICD-10-CM

## 2019-12-20 DIAGNOSIS — M54.50 ACUTE LEFT-SIDED LOW BACK PAIN WITHOUT SCIATICA: Primary | ICD-10-CM

## 2019-12-20 DIAGNOSIS — G89.29 CHRONIC BACK PAIN, UNSPECIFIED BACK LOCATION, UNSPECIFIED BACK PAIN LATERALITY: ICD-10-CM

## 2019-12-20 PROCEDURE — G0439 PPPS, SUBSEQ VISIT: HCPCS | Performed by: FAMILY MEDICINE

## 2019-12-20 PROCEDURE — 99213 OFFICE O/P EST LOW 20 MIN: CPT | Performed by: FAMILY MEDICINE

## 2019-12-20 NOTE — PROGRESS NOTES
"Subjective   Ira Sanchez is a 74 y.o. female presenting with chief complaint of:   Chief Complaint   Patient presents with   • Medicare Wellness-subsequent   • Hypertension   • Back Pain     \"my back hurts alot\" pointing to Q back       History of Present Illness :  Alone.       Has multiple chronic problems to consider that might have a bearing on today's issues;  an interval appointment.       Chronic/acute problems reviewed today:   1. Acute left-sided low back pain without sciatica: chronic/variable on/off pain back but now more towards L lower back.  Worse with bending.  No desire xrays/testing; not that bad.    2. Essential hypertension: Chronic/stable. Stable here/if home blood pressures.  No significant chest pain, SOB, LE edema, orthopnea, near syncope, dizziness/light headness.      3. Controlled type 2 diabetes mellitus without complication, without long-term current use of insulin (CMS/[facility]): Chronic/stable.  No problem/pattern hypoglycemia/hyperglycemia manifest by poly- dypsia, phagia, uria, or sweats, diaphoretic episodes, syncope/near.     4. Chronic back pain, unspecified back location, unspecified back pain laterality: : chronic/variable 0-3/10 lower back pain with infrequent/same occ radiation to LE.  No change LE numbness.  Has bladder/bowel control. No desire to change approach of care.      5. Wellness examination-done: Chronic ongoing over time screening or advice for general health care/wellness.        Has an/another acute issue today: none.    The following portions of the patient's history were reviewed and updated as appropriate: allergies, current medications, past family history, past medical history, past social history, past surgical history and problem list.      Current Outpatient Medications:   •  amLODIPine (NORVASC) 5 MG tablet, TAKE 1 TABLET BY MOUTH DAILY, Disp: 90 tablet, Rfl: 3  •  Calcium Carb-Cholecalciferol (CALCIUM-VITAMIN D) 600-400 MG-UNIT tablet, Take 1 tablet by " mouth 2 (Two) Times a Day., Disp: , Rfl:   •  diphenhydrAMINE (BENADRYL) 25 mg capsule, Take 25 mg by mouth Daily As Needed., Disp: , Rfl:   •  ibuprofen (ADVIL,MOTRIN) 200 MG tablet, Take 200 mg by mouth As Needed., Disp: , Rfl:   •  lisinopril (PRINIVIL,ZESTRIL) 20 MG tablet, TAKE 1 TABLET BY MOUTH DAILY, Disp: 90 tablet, Rfl: 3  •  oxybutynin (DITROPAN) 5 MG tablet, TAKE 1 TABLET BY MOUTH TWICE DAILY, Disp: 180 tablet, Rfl: 3  •  thiamine (VITAMINE B-1) 100 MG tablet, Take 100 mg by mouth Daily., Disp: , Rfl:   •  budesonide-formoterol (SYMBICORT) 80-4.5 MCG/ACT inhaler, Inhale 2 puffs 2 (Two) Times a Day., Disp: 1 inhaler, Rfl: 12  •  cycloSPORINE (RESTASIS) 0.05 % ophthalmic emulsion, 1 drop Every 12 (Twelve) Hours., Disp: , Rfl:   •  olopatadine (PATANOL) 0.1 % ophthalmic solution, Administer 1 drop to both eyes 2 (Two) Times a Day., Disp: 5 mL, Rfl: 2  No current facility-administered medications for this visit.     Facility-Administered Medications Ordered in Other Visits:   •  heparin flush (porcine) 100 UNIT/ML injection 500 Units, 500 Units, Intravenous, PRN, Shanda Torres MD, 500 Units at 04/16/18 1504  •  heparin flush (porcine) 100 UNIT/ML injection 500 Units, 500 Units, Intravenous, PRN, Shanda Torres MD, 500 Units at 06/12/18 1445  •  heparin flush (porcine) 100 UNIT/ML injection 500 Units, 500 Units, Intravenous, PRN, Shanda Torres MD, 500 Units at 06/13/19 0936  •  heparin flush (porcine) 100 UNIT/ML injection 500 Units, 500 Units, Intravenous, PRN, Shanda Torres MD, 500 Units at 12/16/19 1024  •  sodium chloride 0.9 % flush 10 mL, 10 mL, Intravenous, PRN, Shanda Torres MD, 10 mL at 04/16/18 1503  •  sodium chloride 0.9 % flush 10 mL, 10 mL, Intravenous, PRNBrian Obiageli Chinaka, MD, 10 mL at 06/12/18 1445  •  sodium chloride 0.9 % flush 10 mL, 10 mL, Intravenous, PRN, Shanda Torres MD, 10 mL at 06/13/19  0936  •  sodium chloride 0.9 % flush 10 mL, 10 mL, Intravenous, PRN, Shanda Torres MD, 10 mL at 12/16/19 1024    No problems with medications.  Refills if needed done    Allergies   Allergen Reactions   • Benzonatate Hives and Itching       Review of Systems  GENERAL:  Active/slower with limits, speed, stamina for age and acute fatigue.  Sleep is ok. No fever now.  SKIN: No other rash/skin lesion of concern:   ENDO:  No syncope, near or diaphoretic sweaty spells.  No download..  HEENT: No recent head injury; less sinus area headache.  No vision change.  No chronic hearing loss.  Ears with fullness without pain/drainage.  No sore throat now.  No significant nasal/sinus congestion/drainage. No epistaxis.   CHEST: No chest wall tenderness or mass.  No usual significant cough, and usual without wheeze.  No SOB; no hemoptysis.  CV: No chest pain, palpitations, ankle edema.  GI: No heartburn, dysphagia.  No abdominal pain, diarrhea, constipation.  No rectal bleeding, or melena.    :  Voids without dysuria, or  incontinence to completion.  ORTHO: No painful/swollen joints but various on /off sore.  Occ same/sore neck or back.  No acute neck; increased L lower back pain without recent injury.  NEURO: No dizziness, weakness of extremities.  Same mild numbness/paresthesias LE.  PSYCH: No memory loss.  Mood good; mild anxious, depressed but/and not suicidal.  Tries to tolerate stress .   Screening:  Mammogram: bilateral masectomy  Bone density: Bone d-deca/[facility]/T hip -0.5 LS +1.4/12.18.18;  no change; maybe 2-5 yr  Low dose CT chest:Tobacco-smoker/age 17/1-2ppd/dc age 60:  CTs with breast cancer:   GI: Colon-avm-div/Shieben/[facility]/12.19.17/5y  Prostate: NA  Usual lab order  6m CBC, CMP, A1c  12m CBC, CMP, A1c, LIPID, TSH, Vit D    Lab Results:  Results for orders placed or performed in visit on 12/16/19   Gold Top - SST   Result Value Ref Range    Extra Tube Hold for add-ons.        A1C:  Lab Results - Last 18  "Months   Lab Units 12/16/19  0907 06/13/19  0849 12/13/18  1131   HEMOGLOBIN A1C % 6.30* 6.2* 5.9     PSA:No results for input(s): PSA in the last 06313 hours.  CBC:  Lab Results - Last 18 Months   Lab Units 12/16/19  0907 06/13/19  0849 12/13/18  1131   WBC 10*3/mm3 8.13 6.75 8.01   HEMOGLOBIN g/dL 12.9 12.7 14.8   HEMATOCRIT % 38.9 37.1 43.4   PLATELETS 10*3/mm3 168 165 180      BMP/CMP:  Lab Results - Last 18 Months   Lab Units 12/16/19  0907 06/13/19  0849 12/13/18  1131   SODIUM mmol/L 140 134* 137   POTASSIUM mmol/L 4.4 4.4 4.1   CHLORIDE mmol/L 103 99 97*   CO2 mmol/L 26.0 26.0 27.0   BUN mg/dL 16 15 15   CREATININE mg/dL 0.53* 0.61 0.56   EGFR IF NONAFRICN AM mL/min/1.73 112 95 106   CALCIUM mg/dL 9.5 9.4 9.6     HEPATIC:  Lab Results - Last 18 Months   Lab Units 12/16/19  0907 06/13/19  0849 12/13/18  1131   ALT (SGPT) U/L 8 20 <15   AST (SGOT) U/L 18 34 28   ALK PHOS U/L 109 90 104     THYROID:  Lab Results - Last 18 Months   Lab Units 12/13/18  1131   TSH mIU/mL 1.920       Objective   /74 (BP Location: Left arm, Patient Position: Sitting, Cuff Size: Large Adult)   Pulse 52   Temp 98.3 °F (36.8 °C) (Oral)   Resp 18   Ht 160 cm (63\")   Wt 88 kg (194 lb)   LMP  (LMP Unknown)   SpO2 96%   Breastfeeding No   BMI 34.37 kg/m²   Body mass index is 34.37 kg/m².    Physical Exam  GENERAL:  Well nourished/developed in no acute distress but frequent cough; at times paroxysmal.   SKIN: Turgor excellent, without wound, rash, lesion  HEENT: Normal cephalic without trauma. Pupils equal round reactive to light. Extraocular motions full without nystagmus.  External canals nonobstructive nontender without reddness. Tymphatic membranes dull with irwin structures intact.   Oral cavity without growths, exudates, and moist.  Posterior pharynx without mass, obstruction, redness.  No thyromegaly, mass, tenderness, definite lymphadenopathy and supple.   CV: Regular rhythm.  No murmur, gallop,  edema. Posterior " pulses intact.  No carotid bruits.  CHEST: No chest wall tenderness or mass.   LUNGS: Symmetric motion with clear to auscultation.  No dullness to percussion  ABD: Soft, nontender without mass.   ORTHO: Symmetric extremities without swelling/point tenderness.  Full gross range of motion.   NEURO: CN 2-12 grossly intact except L eyelid.  Otherwise symmetric facies and UE/LE. 3/5 strength throughout.   Otherwise nonfocal use extremities. Speech clear.     PSYCH: Oriented x 3.  Pleasant calm, well kept.  Purposeful/directed conservation with intact short/long gross memory    Assessment/Plan     1. Acute left-sided low back pain without sciatica    2. Essential hypertension    3. Controlled type 2 diabetes mellitus without complication, without long-term current use of insulin (CMS/[facility])    4. Chronic back pain, unspecified back location, unspecified back pain laterality    5. Wellness examination-done      Rx: reviewed/changes:  No orders of the defined types were placed in this encounter.    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  Patient's Body mass index is 34.37 kg/m². BMI is above normal parameters. Recommendations include: exercise counseling, nutrition counseling and as before.    Tobacco use reviewed:    Non-smoker    There are no Patient Instructions on file for this visit.    Follow up: Return for lab;, Dr Tinoco-, 6 m;.  Future Appointments   Date Time Provider Department Center   3/16/2020  9:30 AM [facility] NURSE [facility] PAD PAD   6/8/2020  8:30 AM LAB [facility] [place] [facility] None   6/15/2020  9:30 AM [facility] P[facility] NURSE [facility] PAD PAD   6/22/2020  1:00 PM Conrado Tinoco MD [facility] PC METR None   9/14/2020  9:30 AM  [facility] PAD    12/14/2020  9:30 AM  PAD CANCER CTR LAB  PAD CCLAB PAD   12/14/2020 10:00 AM Shanda Torres MD [facility] ONC PAD PAD

## 2019-12-23 NOTE — PROGRESS NOTES
"The ABCs of the Annual Wellness Visit  Subsequent Medicare Wellness Visit    Chief Complaint   Patient presents with   • Medicare Wellness-subsequent   • Hypertension   • Back Pain     \"my back hurts alot\" pointing to LLQ back       Subjective   History of Present Illness:  Ira Sanchez is a 74 y.o. female who presents for a Subsequent Medicare Wellness Visit.    HEALTH RISK ASSESSMENT    Recent Hospitalizations:  No hospitalization(s) within the last year.    Current Medical Providers:  Patient Care Team:  Conrado Tinoco MD as PCP - General    Smoking Status:  Social History     Tobacco Use   Smoking Status Former Smoker   • Packs/day: 2.00   • Years: 37.00   • Pack years: 74.00   • Types: Cigarettes   • Start date: 1963   • Last attempt to quit: 2000   • Years since quittin.2   Smokeless Tobacco Never Used       Alcohol Consumption:  Social History     Substance and Sexual Activity   Alcohol Use No       Depression Screen:   PHQ-2/PHQ-9 Depression Screening 2019   Little interest or pleasure in doing things 0   Feeling down, depressed, or hopeless 0   Trouble falling or staying asleep, or sleeping too much -   Feeling tired or having little energy -   Poor appetite or overeating -   Feeling bad about yourself - or that you are a failure or have let yourself or your family down -   Trouble concentrating on things, such as reading the newspaper or watching television -   Moving or speaking so slowly that other people could have noticed. Or the opposite - being so fidgety or restless that you have been moving around a lot more than usual -   Thoughts that you would be better off dead, or of hurting yourself in some way -   Total Score 0   If you checked off any problems, how difficult have these problems made it for you to do your work, take care of things at home, or get along with other people? -       Fall Risk Screen:  EVAN Fall Risk Assessment has not been completed.    Health " Habits and Functional and Cognitive Screening:  Functional & Cognitive Status 12/20/2019   Do you have difficulty preparing food and eating? No   Do you have difficulty bathing yourself, getting dressed or grooming yourself? No   Do you have difficulty using the toilet? No   Do you have difficulty moving around from place to place? No   Do you have trouble with steps or getting out of a bed or a chair? No   Current Diet Well Balanced Diet   Dental Exam Not up to date   Eye Exam Up to date   Exercise (times per week) 7 times per week   Current Exercise Activities Include Walking   Do you need help using the phone?  No   Are you deaf or do you have serious difficulty hearing?  No   Do you need help with transportation? No   Do you need help shopping? No   Do you need help preparing meals?  No   Do you need help with housework?  No   Do you need help with laundry? No   Do you need help taking your medications? No   Do you need help managing money? No   Do you ever drive or ride in a car without wearing a seat belt? No   Have you felt unusual stress, anger or loneliness in the last month? No   Who do you live with? Alone   If you need help, do you have trouble finding someone available to you? No   Have you been bothered in the last four weeks by sexual problems? No   Do you have difficulty concentrating, remembering or making decisions? No         Does the patient have evidence of cognitive impairment? No    Asprin use counseling:Does not need ASA (and currently is not on it)    Age-appropriate Screening Schedule:  Refer to the list below for future screening recommendations based on patient's age, sex and/or medical conditions. Orders for these recommended tests are listed in the plan section. The patient has been provided with a written plan.    Health Maintenance   Topic Date Due   • URINE MICROALBUMIN  1945   • TDAP/TD VACCINES (1 - Tdap) 02/21/1954   • ZOSTER VACCINE (1 of 2) 02/21/1993   • PNEUMOCOCCAL  VACCINES (65+ LOW/MEDIUM RISK) (2 of 2 - PPSV23) 11/19/2018   • MAMMOGRAM  06/05/2020   • HEMOGLOBIN A1C  06/16/2020   • DIABETIC EYE EXAM  08/13/2020   • DXA SCAN  12/18/2020   • COLONOSCOPY  12/19/2022   • INFLUENZA VACCINE  Completed          The following portions of the patient's history were reviewed and updated as appropriate: allergies, current medications, past family history, past medical history, past social history, past surgical history and problem list.    Outpatient Medications Prior to Visit   Medication Sig Dispense Refill   • amLODIPine (NORVASC) 5 MG tablet TAKE 1 TABLET BY MOUTH DAILY 90 tablet 3   • Calcium Carb-Cholecalciferol (CALCIUM-VITAMIN D) 600-400 MG-UNIT tablet Take 1 tablet by mouth 2 (Two) Times a Day.     • diphenhydrAMINE (BENADRYL) 25 mg capsule Take 25 mg by mouth Daily As Needed.     • ibuprofen (ADVIL,MOTRIN) 200 MG tablet Take 200 mg by mouth As Needed.     • lisinopril (PRINIVIL,ZESTRIL) 20 MG tablet TAKE 1 TABLET BY MOUTH DAILY 90 tablet 3   • oxybutynin (DITROPAN) 5 MG tablet TAKE 1 TABLET BY MOUTH TWICE DAILY 180 tablet 3   • thiamine (VITAMINE B-1) 100 MG tablet Take 100 mg by mouth Daily.     • budesonide-formoterol (SYMBICORT) 80-4.5 MCG/ACT inhaler Inhale 2 puffs 2 (Two) Times a Day. 1 inhaler 12   • cycloSPORINE (RESTASIS) 0.05 % ophthalmic emulsion 1 drop Every 12 (Twelve) Hours.     • olopatadine (PATANOL) 0.1 % ophthalmic solution Administer 1 drop to both eyes 2 (Two) Times a Day. 5 mL 2   • anastrozole (ARIMIDEX) 1 MG tablet Take 1 tablet by mouth Daily. 90 tablet 3   • gabapentin (NEURONTIN) 300 MG capsule Take 1 capsule by mouth 3 (Three) Times a Day. 45 capsule 1   • neomycin-polymyxin-dexamethasone (MAXITROL) 3.5-22766-3.1 ophthalmic suspension Administer 1 drop into the left eye 3 (Three) Times a Day.     • Tiotropium Bromide Monohydrate (SPIRIVA RESPIMAT) 1.25 MCG/ACT aerosol solution inhaler Inhale 2 puffs Daily. 1 inhaler 0     Facility-Administered  Medications Prior to Visit   Medication Dose Route Frequency Provider Last Rate Last Dose   • heparin flush (porcine) 100 UNIT/ML injection 500 Units  500 Units Intravenous PRN hSanda Torres MD   500 Units at 04/16/18 1504   • heparin flush (porcine) 100 UNIT/ML injection 500 Units  500 Units Intravenous PRN Shanda Torres MD   500 Units at 06/12/18 1445   • heparin flush (porcine) 100 UNIT/ML injection 500 Units  500 Units Intravenous PRN Shanda Torres MD   500 Units at 06/13/19 0936   • heparin flush (porcine) 100 UNIT/ML injection 500 Units  500 Units Intravenous PRN Shanda Torres MD   500 Units at 12/16/19 1024   • sodium chloride 0.9 % flush 10 mL  10 mL Intravenous PRN Shanda Torres MD   10 mL at 04/16/18 1503   • sodium chloride 0.9 % flush 10 mL  10 mL Intravenous PRN Shanda Torres MD   10 mL at 06/12/18 1445   • sodium chloride 0.9 % flush 10 mL  10 mL Intravenous PRN WillyewuiroShanda MD   10 mL at 06/13/19 0936   • sodium chloride 0.9 % flush 10 mL  10 mL Intravenous PRN Shanda Torres MD   10 mL at 12/16/19 1024       Patient Active Problem List   Diagnosis   • Obesity   • Breast cancer-L/valente masectomy 2014   • Overactive bladder   • Diabetes type 2, controlled (CMS/HCC)   • Hypertension   • Osteopenia 2018-? 2 to 5y   • Neuropathy-chemo/better   • Osteoarthritis of multiple joints   • Malignant neoplasm of lower-outer quadrant of left female breast (CMS/HCC)   • Family history of breast cancer   • Malignant neoplasm of upper-outer quadrant of female breast (CMS/HCC)   • History of bilateral mastectomy   • Wellness examination-done   • Shoulder pain   • Chronic back pain   • Laboratory test*   • Bradycardia   • Cough: copd   • Allergic rhinitis   • Menopause   • History of shingles   • COPD (chronic obstructive pulmonary disease) (CMS/HCC)   • Abnormal mammogram   • After-cataract with vision  obscured   • Amblyopia   • Cellulitis, trunk   • Dermatitis of eyelid due to herpes zoster   • Dermatochalasis   • Hordeolum internum   • Other hereditary corneal dystrophies   • Pseudophakia       Advanced Care Planning:  Patient does not have an advance directive - not interested in additional information    Review of Systems    Compared to one year ago, the patient feels her physical health is the same.  Compared to one year ago, the patient feels her mental health is the same.    GENERAL:  Active/slower with limits, speed, stamina for age and acute fatigue.  Sleep is ok. No fever now.  SKIN: No other rash/skin lesion of concern:   ENDO:  No syncope, near or diaphoretic sweaty spells.  No download..  HEENT: No recent head injury; less sinus area headache.  No vision change.  No chronic hearing loss.  Ears with fullness without pain/drainage.  No sore throat now.  No significant nasal/sinus congestion/drainage. No epistaxis.   CHEST: No chest wall tenderness or mass.  No usual significant cough, and usual without wheeze.  No SOB; no hemoptysis.  CV: No chest pain, palpitations, ankle edema.  GI: No heartburn, dysphagia.  No abdominal pain, diarrhea, constipation.  No rectal bleeding, or melena.    :  Voids without dysuria, or  incontinence to completion.  ORTHO: No painful/swollen joints but various on /off sore.  Occ same/sore neck or back.  No acute neck; increased L lower back pain without recent injury.  NEURO: No dizziness, weakness of extremities.  Same mild numbness/paresthesias LE.  PSYCH: No memory loss.  Mood good; mild anxious, depressed but/and not suicidal.  Tries to tolerate stress .   Screening:  Mammogram: bilateral masectomy  Bone density: Bone d-deca/[facility]/T hip -0.5 LS +1.4/12.18.18;  no change; maybe 2-5 yr  Low dose CT chest:Tobacco-smoker/age 17/1-2ppd/dc age 60:  CTs with breast cancer:   GI: Colon-avm-div/[facility]/12.19.17/5y  Prostate: NA  Usual lab order  6m CBC, CMP, A1c  12m CBC,  "CMP, A1c, LIPID, TSH, Vit D    Reviewed chart for potential of high risk medication in the elderly: yes  Reviewed chart for potential of harmful drug interactions in the elderly:yes    Objective         Vitals:    12/20/19 1205   BP: 134/74   BP Location: Left arm   Patient Position: Sitting   Cuff Size: Large Adult   Pulse: 52   Resp: 18   Temp: 98.3 °F (36.8 °C)   TempSrc: Oral   SpO2: 96%   Weight: 88 kg (194 lb)   Height: 160 cm (63\")   PainSc:   8   PainLoc: Back       Body mass index is 34.37 kg/m².  Discussed the patient's BMI with her. The BMI is above average; BMI management plan is completed.    Physical Exam  GENERAL:  Well nourished/developed in no acute distress but frequent cough; at times paroxysmal.   SKIN: Turgor excellent, without wound, rash, lesion  HEENT: Normal cephalic without trauma. Pupils equal round reactive to light. Extraocular motions full without nystagmus.  External canals nonobstructive nontender without reddness. Tymphatic membranes dull with irwin structures intact.   Oral cavity without growths, exudates, and moist.  Posterior pharynx without mass, obstruction, redness.  No thyromegaly, mass, tenderness, definite lymphadenopathy and supple.   CV: Regular rhythm.  No murmur, gallop,  edema. Posterior pulses intact.  No carotid bruits.  CHEST: No chest wall tenderness or mass.   LUNGS: Symmetric motion with clear to auscultation.  No dullness to percussion  ABD: Soft, nontender without mass.   ORTHO: Symmetric extremities without swelling/point tenderness.  Full gross range of motion.   NEURO: CN 2-12 grossly intact except L eyelid.  Otherwise symmetric facies and UE/LE. 3/5 strength throughout.   Otherwise nonfocal use extremities. Speech clear.     PSYCH: Oriented x 3.  Pleasant calm, well kept.  Purposeful/directed conservation with intact short/long gross memory    Lab Results   Component Value Date    TRIG 100 12/16/2019    HDL 41 12/16/2019    LDL 72 12/16/2019    VLDL 20 " 12/16/2019    HGBA1C 6.30 (H) 12/16/2019        Assessment/Plan   Medicare Risks and Personalized Health Plan  CMS Preventative Services Quick Reference  advanced directives; screening/vaccine for age    The above risks/problems have been discussed with the patient.  Pertinent information has been shared with the patient in the After Visit Summary.  Follow up plans and orders are seen below in the Assessment/Plan Section.    Diagnoses and all orders for this visit:    1. Acute left-sided low back pain without sciatica (Primary)  -     XR Spine Lumbar 2 or 3 View; Future    2. Essential hypertension    3. Controlled type 2 diabetes mellitus without complication, without long-term current use of insulin (CMS/[facility])    4. Chronic back pain, unspecified back location, unspecified back pain laterality    5. Wellness examination-done      Follow Up:  Return for lab;, Dr Tinoco-, 6 m;.     An After Visit Summary and PPPS were given to the patient.

## 2019-12-30 DIAGNOSIS — R42 DIZZY: ICD-10-CM

## 2019-12-30 DIAGNOSIS — R03.0 ELEVATED BLOOD PRESSURE READING: ICD-10-CM

## 2019-12-30 RX ORDER — AMLODIPINE BESYLATE 5 MG/1
5 TABLET ORAL DAILY
Qty: 90 TABLET | Refills: 3 | Status: SHIPPED | OUTPATIENT
Start: 2019-12-30 | End: 2020-12-11

## 2020-02-07 DIAGNOSIS — M54.50 ACUTE LEFT-SIDED LOW BACK PAIN WITHOUT SCIATICA: ICD-10-CM

## 2020-02-24 ENCOUNTER — OFFICE VISIT (OUTPATIENT)
Dept: FAMILY MEDICINE CLINIC | Facility: CLINIC | Age: 75
End: 2020-02-24

## 2020-02-24 VITALS
DIASTOLIC BLOOD PRESSURE: 78 MMHG | OXYGEN SATURATION: 96 % | HEIGHT: 63 IN | SYSTOLIC BLOOD PRESSURE: 124 MMHG | RESPIRATION RATE: 16 BRPM | TEMPERATURE: 97.7 F | WEIGHT: 190 LBS | HEART RATE: 58 BPM | BODY MASS INDEX: 33.66 KG/M2

## 2020-02-24 DIAGNOSIS — M54.9 CHRONIC BACK PAIN, UNSPECIFIED BACK LOCATION, UNSPECIFIED BACK PAIN LATERALITY: Primary | ICD-10-CM

## 2020-02-24 DIAGNOSIS — G89.29 CHRONIC BACK PAIN, UNSPECIFIED BACK LOCATION, UNSPECIFIED BACK PAIN LATERALITY: Primary | ICD-10-CM

## 2020-02-24 DIAGNOSIS — R21 RASH: ICD-10-CM

## 2020-02-24 DIAGNOSIS — R30.0 DYSURIA: ICD-10-CM

## 2020-02-24 DIAGNOSIS — L30.9 DERMATITIS: ICD-10-CM

## 2020-02-24 PROCEDURE — 99213 OFFICE O/P EST LOW 20 MIN: CPT | Performed by: FAMILY MEDICINE

## 2020-02-24 RX ORDER — CLOTRIMAZOLE AND BETAMETHASONE DIPROPIONATE 10; .64 MG/G; MG/G
CREAM TOPICAL 2 TIMES DAILY
Qty: 15 G | Refills: 0 | Status: SHIPPED | OUTPATIENT
Start: 2020-02-24 | End: 2020-06-22

## 2020-02-24 RX ORDER — NYSTATIN 100000 [USP'U]/G
POWDER TOPICAL 3 TIMES DAILY
Qty: 45 G | Refills: 0 | Status: SHIPPED | OUTPATIENT
Start: 2020-02-24 | End: 2020-06-22

## 2020-02-24 RX ORDER — CELECOXIB 200 MG/1
200 CAPSULE ORAL DAILY
Qty: 30 CAPSULE | Refills: 2 | Status: SHIPPED | OUTPATIENT
Start: 2020-02-24 | End: 2020-05-26

## 2020-02-24 NOTE — PATIENT INSTRUCTIONS
NSAID Education/Counseling:  We discussed the risks and benefits of Non-steroidal anti-inflammatories (NSAIDs), which include GI, renal, and cardiovascular toxicity. We discussed the risk for stomach ulcers, and the need to stop the drug or add a PPI if GI symptoms develop. We discussed the increased incidence of hypertension and cardiovascnlar events in patients taking NSAIDs We discussed the role the drugs may play in worsening renal disease, if present.     ########################     Ok with chiropracter    ########################    Use the cream today only 1 week; then stop and use the powder daily    ########################

## 2020-02-24 NOTE — PROGRESS NOTES
"Subjective   Ira Sanchez is a 75 y.o. female presenting with chief complaint of:   Chief Complaint   Patient presents with   • Follow-up     \"xray of my back\"   • Urinary Tract Infection     \"sometimes i burn down there\"   • Wound Check     \"where i had surgery when i was 14, i still have problems with it, and need something to put on it\"       History of Present Illness :  Alone.  Here for primarily an acute and acute/chronic issues.       Has multiple chronic problems to consider that might have a bearing on today's issues; not an interval appointment.       Chronic/acute problems reviewed today:   1. Chronic back pain, unspecified back location, unspecified back pain laterality: : chronic/variable 0-3/10 lower back pain with infrequent/rare radiation to LE.  No change LE numbness.  Has bladder/bowel control. ? What to do/to change approach of care.  Recent plain films significant DDD/DJD.      2. Dysuria: chronic/variable occ dysuria.  No fever, hematuria.    3. Dermatitis: chronic/variable rash appendix scar; area that rubs on clothing.    4. Rash: same     Has an/another acute issue today: none.    The following portions of the patient's history were reviewed and updated as appropriate: allergies, current medications, past family history, past medical history, past social history, past surgical history and problem list.      Current Outpatient Medications:   •  amLODIPine (NORVASC) 5 MG tablet, TAKE 1 TABLET BY MOUTH DAILY, Disp: 90 tablet, Rfl: 3  •  Calcium Carb-Cholecalciferol (CALCIUM-VITAMIN D) 600-400 MG-UNIT tablet, Take 1 tablet by mouth 2 (Two) Times a Day., Disp: , Rfl:   •  cycloSPORINE (RESTASIS) 0.05 % ophthalmic emulsion, 1 drop Every 12 (Twelve) Hours., Disp: , Rfl:   •  diphenhydrAMINE (BENADRYL) 25 mg capsule, Take 25 mg by mouth Daily As Needed., Disp: , Rfl:   •  ibuprofen (ADVIL,MOTRIN) 200 MG tablet, Take 200 mg by mouth As Needed., Disp: , Rfl:   •  lisinopril (PRINIVIL,ZESTRIL) 20 MG " tablet, TAKE 1 TABLET BY MOUTH DAILY, Disp: 90 tablet, Rfl: 3  •  oxybutynin (DITROPAN) 5 MG tablet, TAKE 1 TABLET BY MOUTH TWICE DAILY, Disp: 180 tablet, Rfl: 3  •  thiamine (VITAMINE B-1) 100 MG tablet, Take 100 mg by mouth Daily., Disp: , Rfl:   •  budesonide-formoterol (SYMBICORT) 80-4.5 MCG/ACT inhaler, Inhale 2 puffs 2 (Two) Times a Day., Disp: 1 inhaler, Rfl: 12  •  olopatadine (PATANOL) 0.1 % ophthalmic solution, Administer 1 drop to both eyes 2 (Two) Times a Day., Disp: 5 mL, Rfl: 2  No current facility-administered medications for this visit.     Facility-Administered Medications Ordered in Other Visits:   •  heparin flush (porcine) 100 UNIT/ML injection 500 Units, 500 Units, Intravenous, PRN, Shanda Torres MD, 500 Units at 04/16/18 1504  •  heparin flush (porcine) 100 UNIT/ML injection 500 Units, 500 Units, Intravenous, PRN, Shanda Torres MD, 500 Units at 06/12/18 1445  •  heparin flush (porcine) 100 UNIT/ML injection 500 Units, 500 Units, Intravenous, PRN, Shanda Torres MD, 500 Units at 06/13/19 0936  •  heparin flush (porcine) 100 UNIT/ML injection 500 Units, 500 Units, Intravenous, PRN, Shanda Torres MD, 500 Units at 12/16/19 1024  •  sodium chloride 0.9 % flush 10 mL, 10 mL, Intravenous, PRN, Shanda Torres MD, 10 mL at 04/16/18 1503  •  sodium chloride 0.9 % flush 10 mL, 10 mL, Intravenous, PRN, Shanda Torres MD, 10 mL at 06/12/18 1445  •  sodium chloride 0.9 % flush 10 mL, 10 mL, Intravenous, PRN, Shanda Torres MD, 10 mL at 06/13/19 0936  •  sodium chloride 0.9 % flush 10 mL, 10 mL, Intravenous, PRN, Shanda Torres MD, 10 mL at 12/16/19 1024    No problems with medications.  Refills if needed done    Allergies   Allergen Reactions   • Benzonatate Hives and Itching       Review of Systems  GENERAL:  Active/slower with limits, speed, stamina for age, and occ back pain.  Sleep is ok. No fever  now.  SKIN: No other rash/skin lesion of concern:   ENDO:  No syncope, near or diaphoretic sweaty spells.  No download..  HEENT: No recent head injury; less sinus area headache.  No vision change.  No chronic hearing loss.  Ears with fullness without pain/drainage.  No sore throat now.  No significant nasal/sinus congestion/drainage. No epistaxis.   CHEST: No chest wall tenderness or mass.  No usual significant cough, and usual without wheeze.  No SOB; no hemoptysis.  CV: No chest pain, palpitations, ankle edema.  GI: No heartburn, dysphagia.  No abdominal pain, diarrhea, constipation.  No rectal bleeding, or melena.    :  Voids with occ on/off dysuria, without incontinence to completion.  ORTHO: No painful/swollen joints but various on /off sore.  Occ same/sore neck or back.  No acute neck; increased L lower back pain without recent injury.  NEURO: No dizziness, weakness of extremities.  Same mild numbness/paresthesias LE.  PSYCH: No memory loss.  Mood good; mild anxious, depressed but/and not suicidal.  Tries to tolerate stress .   Screening:  Mammogram: bilateral masectomy  Bone density: Bone d-deca/[facility]/T hip -0.5 LS +1.4/12.18.18;  no change; maybe 2-5 yr  Low dose CT chest:Tobacco-smoker/age 17/1-2ppd/dc age 60:  CTs with breast cancer:   GI: Colon-avm-div/[facility]ieben/MM/12.19.17/5y  Prostate: NA  Usual lab order  6m CBC, CMP, A1c  12m CBC, CMP, A1c, LIPID, TSH, Vit D    Lab Results:  Results for orders placed or performed in visit on 12/16/19   [facility]   Result Value Ref Range    Extra Tube Hold for add-ons.        A1C:  Lab Results - Last 18 Months   Lab Units 12/16/19  0907 06/13/19  0849 12/13/18  1131   HEMOGLOBIN A1C % 6.30* 6.2* 5.9     PSA:No results for input(s): PSA in the last 13941 hours.  CBC:  Lab Results - Last 18 Months   Lab Units 12/16/19  0907 06/13/19  0849 12/13/18  1131   WBC 10*3/mm3 8.13 6.75 8.01   HEMOGLOBIN g/dL 12.9 12.7 14.8   HEMATOCRIT % 38.9 37.1 43.4   PLATELETS 10*3/mm3  "168 165 180      BMP/CMP:  Lab Results - Last 18 Months   Lab Units 12/16/19  0907 06/13/19  0849 12/13/18  1131   SODIUM mmol/L 140 134* 137   POTASSIUM mmol/L 4.4 4.4 4.1   CHLORIDE mmol/L 103 99 97*   CO2 mmol/L 26.0 26.0 27.0   BUN mg/dL 16 15 15   CREATININE mg/dL 0.53* 0.61 0.56   EGFR IF NONAFRICN AM mL/min/1.73 112 95 106   CALCIUM mg/dL 9.5 9.4 9.6     HEPATIC:  Lab Results - Last 18 Months   Lab Units 12/16/19  0907 06/13/19  0849 12/13/18  1131   ALT (SGPT) U/L 8 20 <15   AST (SGOT) U/L 18 34 28   ALK PHOS U/L 109 90 104     THYROID:  Lab Results - Last 18 Months   Lab Units 12/13/18  1131   TSH mIU/mL 1.920       Objective   /78 (BP Location: Right arm, Patient Position: Sitting, Cuff Size: Large Adult)   Pulse 58   Temp 97.7 °F (36.5 °C) (Oral)   Resp 16   Ht 160 cm (63\")   Wt 86.2 kg (190 lb)   LMP  (LMP Unknown)   SpO2 96%   Breastfeeding No   BMI 33.66 kg/m²   Body mass index is 33.66 kg/m².    Physical Exam  GENERAL:  Well nourished/developed in no acute distress but frequent cough; at times paroxysmal.   SKIN: Turgor excellent, without wound, rash, lesion other than PHOTO-appendix scar.   HEENT: Normal cephalic without trauma. Pupils equal round reactive to light. Extraocular motions full without nystagmus.  External canals nonobstructive nontender without reddness. Tymphatic membranes dull with irwin structures intact.   Oral cavity without growths, exudates, and moist.  Posterior pharynx without mass, obstruction, redness.  No thyromegaly, mass, tenderness, definite lymphadenopathy and supple.   CV: Regular rhythm.  No murmur, gallop,  edema. Posterior pulses intact.  No carotid bruits.  CHEST: No chest wall tenderness or mass.   LUNGS: Symmetric motion with clear to auscultation.  No dullness to percussion  ABD: Soft, nontender without mass.   ORTHO: Symmetric extremities without swelling/point tenderness.  Full gross range of motion. Symmetric LE.  Neg straight leg raising.  Neg " Patricks maneuver.  Back is straight/mild lordosis.  Lower back sore; no point tender.    NEURO: CN 2-12 grossly intact except L eyelid mild droop.  Otherwise symmetric facies and UE/LE. 3/5 strength throughout.   Otherwise nonfocal use extremities. Speech clear.     PSYCH: Oriented x 3.  Pleasant calm, well kept.  Purposeful/directed conservation with intact short/long gross memory    Assessment/Plan     1. Chronic back pain, unspecified back location, unspecified back pain laterality    2. Dysuria    3. Dermatitis    4. Rash        Rx: reviewed/changes:  New Medications Ordered This Visit   Medications   • celecoxib (CELEBREX) 200 MG capsule     Sig: Take 1 capsule by mouth Daily.     Dispense:  30 capsule     Refill:  2   • clotrimazole-betamethasone (LOTRISONE) 1-0.05 % cream     Sig: Apply  topically to the appropriate area as directed 2 (Two) Times a Day.     Dispense:  15 g     Refill:  0   • nystatin (MYCOSTATIN) 203802 UNIT/GM powder     Sig: Apply  topically to the appropriate area as directed 3 (Three) Times a Day.     Dispense:  45 g     Refill:  0       LAB/Testing/Referrals: reviewed/orders:   Today:   Orders Placed This Encounter   Procedures   • Urinalysis With Culture If Indicated -     Chronic/recurrent labs above or change to:   same     Discussions:   Start with chiropracter and closely monitored daily celebrex  Body mass index is 33.66 kg/m².  Patient's Body mass index is 33.66 kg/m². BMI is above normal parameters. Recommendations include: exercise counseling, nutrition counseling and as able.    Tobacco use reviewed:    Non-smoker  Ira Sanchez  reports that she quit smoking about 19 years ago. Her smoking use included cigarettes. She started smoking about 57 years ago. She has a 74.00 pack-year smoking history. She has never used smokeless tobacco..    Patient Instructions   NSAID Education/Counseling:  We discussed the risks and benefits of Non-steroidal anti-inflammatories (NSAIDs), which  include GI, renal, and cardiovascular toxicity. We discussed the risk for stomach ulcers, and the need to stop the drug or add a PPI if GI symptoms develop. We discussed the increased incidence of hypertension and cardiovascnlar events in patients taking NSAIDs We discussed the role the drugs may play in worsening renal disease, if present.     ########################     Ok with chiropracter    ########################    Use the cream today only 1 week; then stop and use the powder daily    ########################        Follow up: Return for lab;, Dr Tinoco-2 to 3m.  Future Appointments   Date Time Provider Department Center   3/16/2020  9:30 AM [facility] NURSE [facility]   5/18/2020 11:30 AM Conrado Tinoco MD [facility] None   6/8/2020  8:30 AM LAB [facility] [place] [facility] None   6/15/2020  9:30 AM [facility] NURSE [facility]   6/22/2020  1:00 PM Conrado Tinoco MD [facility] None   9/14/2020  9:30 AM  [facility]  [facility]   12/14/2020  9:30 AM  [facility]  [facility]   12/14/2020 10:00 AM Shanda Torres MD [facility]

## 2020-02-25 LAB
APPEARANCE UR: CLEAR
BACTERIA #/AREA URNS HPF: NORMAL /HPF
BILIRUB UR QL STRIP: NEGATIVE
COLOR UR: YELLOW
EPI CELLS #/AREA URNS HPF: NORMAL /HPF (ref 0–10)
GLUCOSE UR QL: NEGATIVE
HGB UR QL STRIP: NEGATIVE
KETONES UR QL STRIP: NEGATIVE
LEUKOCYTE ESTERASE UR QL STRIP: NEGATIVE
MICRO URNS: NORMAL
MICRO URNS: NORMAL
MUCOUS THREADS URNS QL MICRO: PRESENT /HPF
NITRITE UR QL STRIP: NEGATIVE
PH UR STRIP: 5 [PH] (ref 5–7.5)
PROT UR QL STRIP: NEGATIVE
RBC #/AREA URNS HPF: NORMAL /HPF (ref 0–2)
SP GR UR: 1.02 (ref 1–1.03)
URINALYSIS REFLEX: NORMAL
UROBILINOGEN UR STRIP-MCNC: 0.2 MG/DL (ref 0.2–1)
WBC #/AREA URNS HPF: NORMAL /HPF (ref 0–5)

## 2020-04-24 ENCOUNTER — INFUSION (OUTPATIENT)
Dept: ONCOLOGY | Facility: CLINIC | Age: 75
End: 2020-04-24

## 2020-04-24 DIAGNOSIS — Z45.2 ENCOUNTER FOR CARE RELATED TO PORT-A-CATH: Primary | ICD-10-CM

## 2020-04-24 PROCEDURE — 96523 IRRIG DRUG DELIVERY DEVICE: CPT | Performed by: INTERNAL MEDICINE

## 2020-04-24 RX ORDER — SODIUM CHLORIDE 0.9 % (FLUSH) 0.9 %
10 SYRINGE (ML) INJECTION AS NEEDED
Status: DISCONTINUED | OUTPATIENT
Start: 2020-04-24 | End: 2020-04-24 | Stop reason: HOSPADM

## 2020-04-24 RX ORDER — HEPARIN SODIUM (PORCINE) LOCK FLUSH IV SOLN 100 UNIT/ML 100 UNIT/ML
500 SOLUTION INTRAVENOUS AS NEEDED
Status: CANCELLED | OUTPATIENT
Start: 2020-04-24

## 2020-04-24 RX ORDER — SODIUM CHLORIDE 0.9 % (FLUSH) 0.9 %
10 SYRINGE (ML) INJECTION AS NEEDED
Status: CANCELLED | OUTPATIENT
Start: 2020-04-24

## 2020-04-24 RX ORDER — HEPARIN SODIUM (PORCINE) LOCK FLUSH IV SOLN 100 UNIT/ML 100 UNIT/ML
500 SOLUTION INTRAVENOUS AS NEEDED
Status: DISCONTINUED | OUTPATIENT
Start: 2020-04-24 | End: 2020-04-24 | Stop reason: HOSPADM

## 2020-04-24 RX ADMIN — Medication 10 ML: at 08:58

## 2020-04-24 RX ADMIN — HEPARIN SODIUM (PORCINE) LOCK FLUSH IV SOLN 100 UNIT/ML 500 UNITS: 100 SOLUTION at 09:00

## 2020-04-29 ENCOUNTER — TELEPHONE (OUTPATIENT)
Dept: ONCOLOGY | Facility: CLINIC | Age: 75
End: 2020-04-29

## 2020-04-29 NOTE — TELEPHONE ENCOUNTER
Shanda Torres MD    Pt called wanting to cancel appointment on:  06/15  Due to: its suppose to be schdule on the 24        Thanks

## 2020-05-23 DIAGNOSIS — M54.9 CHRONIC BACK PAIN, UNSPECIFIED BACK LOCATION, UNSPECIFIED BACK PAIN LATERALITY: ICD-10-CM

## 2020-05-23 DIAGNOSIS — G89.29 CHRONIC BACK PAIN, UNSPECIFIED BACK LOCATION, UNSPECIFIED BACK PAIN LATERALITY: ICD-10-CM

## 2020-05-26 RX ORDER — CELECOXIB 200 MG/1
200 CAPSULE ORAL DAILY
Qty: 30 CAPSULE | Refills: 5 | Status: SHIPPED | OUTPATIENT
Start: 2020-05-26 | End: 2022-06-23

## 2020-06-05 DIAGNOSIS — Z00.00 WELLNESS EXAMINATION: ICD-10-CM

## 2020-06-05 DIAGNOSIS — E11.9 CONTROLLED TYPE 2 DIABETES MELLITUS WITHOUT COMPLICATION, WITHOUT LONG-TERM CURRENT USE OF INSULIN (HCC): ICD-10-CM

## 2020-06-05 DIAGNOSIS — I10 ESSENTIAL HYPERTENSION: Primary | ICD-10-CM

## 2020-06-05 DIAGNOSIS — M85.80 OSTEOPENIA, UNSPECIFIED LOCATION: ICD-10-CM

## 2020-06-08 ENCOUNTER — LAB (OUTPATIENT)
Dept: FAMILY MEDICINE CLINIC | Facility: CLINIC | Age: 75
End: 2020-06-08

## 2020-06-08 ENCOUNTER — RESULTS ENCOUNTER (OUTPATIENT)
Dept: FAMILY MEDICINE CLINIC | Facility: CLINIC | Age: 75
End: 2020-06-08

## 2020-06-08 DIAGNOSIS — I10 ESSENTIAL HYPERTENSION: ICD-10-CM

## 2020-06-08 DIAGNOSIS — E11.9 CONTROLLED TYPE 2 DIABETES MELLITUS WITHOUT COMPLICATION, WITHOUT LONG-TERM CURRENT USE OF INSULIN (HCC): ICD-10-CM

## 2020-06-08 DIAGNOSIS — Z00.00 WELLNESS EXAMINATION: ICD-10-CM

## 2020-06-08 DIAGNOSIS — M85.80 OSTEOPENIA, UNSPECIFIED LOCATION: ICD-10-CM

## 2020-06-09 LAB
ALBUMIN SERPL-MCNC: 4.2 G/DL (ref 3.5–5.2)
ALBUMIN/GLOB SERPL: 1.8 G/DL
ALP SERPL-CCNC: 105 U/L (ref 39–117)
ALT SERPL-CCNC: 8 U/L (ref 1–33)
AST SERPL-CCNC: 19 U/L (ref 1–32)
BASOPHILS # BLD AUTO: 0.04 10*3/MM3 (ref 0–0.2)
BASOPHILS NFR BLD AUTO: 0.6 % (ref 0–1.5)
BILIRUB SERPL-MCNC: 0.5 MG/DL (ref 0.2–1.2)
BUN SERPL-MCNC: 18 MG/DL (ref 8–23)
BUN/CREAT SERPL: 25 (ref 7–25)
CALCIUM SERPL-MCNC: 9.5 MG/DL (ref 8.6–10.5)
CHLORIDE SERPL-SCNC: 103 MMOL/L (ref 98–107)
CO2 SERPL-SCNC: 24.4 MMOL/L (ref 22–29)
CREAT SERPL-MCNC: 0.72 MG/DL (ref 0.57–1)
EOSINOPHIL # BLD AUTO: 0.11 10*3/MM3 (ref 0–0.4)
EOSINOPHIL NFR BLD AUTO: 1.6 % (ref 0.3–6.2)
ERYTHROCYTE [DISTWIDTH] IN BLOOD BY AUTOMATED COUNT: 12.6 % (ref 12.3–15.4)
GLOBULIN SER CALC-MCNC: 2.3 GM/DL
GLUCOSE SERPL-MCNC: 123 MG/DL (ref 65–99)
HBA1C MFR BLD: 6.3 % (ref 4.8–5.6)
HCT VFR BLD AUTO: 38.8 % (ref 34–46.6)
HCV AB S/CO SERPL IA: 0.3 S/CO RATIO (ref 0–0.9)
HGB BLD-MCNC: 12.7 G/DL (ref 12–15.9)
IMM GRANULOCYTES # BLD AUTO: 0.02 10*3/MM3 (ref 0–0.05)
IMM GRANULOCYTES NFR BLD AUTO: 0.3 % (ref 0–0.5)
LYMPHOCYTES # BLD AUTO: 2.15 10*3/MM3 (ref 0.7–3.1)
LYMPHOCYTES NFR BLD AUTO: 30.5 % (ref 19.6–45.3)
MCH RBC QN AUTO: 30.5 PG (ref 26.6–33)
MCHC RBC AUTO-ENTMCNC: 32.7 G/DL (ref 31.5–35.7)
MCV RBC AUTO: 93.3 FL (ref 79–97)
MONOCYTES # BLD AUTO: 0.56 10*3/MM3 (ref 0.1–0.9)
MONOCYTES NFR BLD AUTO: 8 % (ref 5–12)
NEUTROPHILS # BLD AUTO: 4.16 10*3/MM3 (ref 1.7–7)
NEUTROPHILS NFR BLD AUTO: 59 % (ref 42.7–76)
NRBC BLD AUTO-RTO: 0 /100 WBC (ref 0–0.2)
PLATELET # BLD AUTO: 171 10*3/MM3 (ref 140–450)
POTASSIUM SERPL-SCNC: 4.5 MMOL/L (ref 3.5–5.2)
PROT SERPL-MCNC: 6.5 G/DL (ref 6–8.5)
RBC # BLD AUTO: 4.16 10*6/MM3 (ref 3.77–5.28)
SODIUM SERPL-SCNC: 139 MMOL/L (ref 136–145)
WBC # BLD AUTO: 7.04 10*3/MM3 (ref 3.4–10.8)

## 2020-06-20 DIAGNOSIS — I10 HYPERTENSION, UNSPECIFIED TYPE: Chronic | ICD-10-CM

## 2020-06-22 ENCOUNTER — OFFICE VISIT (OUTPATIENT)
Dept: FAMILY MEDICINE CLINIC | Facility: CLINIC | Age: 75
End: 2020-06-22

## 2020-06-22 VITALS
SYSTOLIC BLOOD PRESSURE: 122 MMHG | TEMPERATURE: 98.5 F | DIASTOLIC BLOOD PRESSURE: 74 MMHG | HEART RATE: 56 BPM | OXYGEN SATURATION: 96 % | WEIGHT: 190 LBS | RESPIRATION RATE: 16 BRPM | BODY MASS INDEX: 33.66 KG/M2 | HEIGHT: 63 IN

## 2020-06-22 DIAGNOSIS — M85.80 OSTEOPENIA, UNSPECIFIED LOCATION: ICD-10-CM

## 2020-06-22 DIAGNOSIS — E11.9 CONTROLLED TYPE 2 DIABETES MELLITUS WITHOUT COMPLICATION, WITHOUT LONG-TERM CURRENT USE OF INSULIN (HCC): Chronic | ICD-10-CM

## 2020-06-22 DIAGNOSIS — M15.9 OSTEOARTHRITIS OF MULTIPLE JOINTS, UNSPECIFIED OSTEOARTHRITIS TYPE: Chronic | ICD-10-CM

## 2020-06-22 DIAGNOSIS — G89.29 CHRONIC BACK PAIN, UNSPECIFIED BACK LOCATION, UNSPECIFIED BACK PAIN LATERALITY: ICD-10-CM

## 2020-06-22 DIAGNOSIS — J44.9 CHRONIC OBSTRUCTIVE PULMONARY DISEASE, UNSPECIFIED COPD TYPE (HCC): ICD-10-CM

## 2020-06-22 DIAGNOSIS — R00.1 BRADYCARDIA: ICD-10-CM

## 2020-06-22 DIAGNOSIS — M54.9 CHRONIC BACK PAIN, UNSPECIFIED BACK LOCATION, UNSPECIFIED BACK PAIN LATERALITY: ICD-10-CM

## 2020-06-22 DIAGNOSIS — I10 ESSENTIAL HYPERTENSION: Primary | Chronic | ICD-10-CM

## 2020-06-22 DIAGNOSIS — G62.9 NEUROPATHY: ICD-10-CM

## 2020-06-22 PROCEDURE — 99214 OFFICE O/P EST MOD 30 MIN: CPT | Performed by: FAMILY MEDICINE

## 2020-06-22 RX ORDER — LISINOPRIL 20 MG/1
20 TABLET ORAL DAILY
Qty: 90 TABLET | Refills: 3 | Status: SHIPPED | OUTPATIENT
Start: 2020-06-22 | End: 2021-06-07

## 2020-06-22 RX ORDER — OXYBUTYNIN CHLORIDE 5 MG/1
TABLET ORAL
Qty: 180 TABLET | Refills: 3 | Status: SHIPPED | OUTPATIENT
Start: 2020-06-22 | End: 2021-03-08 | Stop reason: ALTCHOICE

## 2020-06-22 NOTE — PATIENT INSTRUCTIONS
A1C:  Lab Results - Last 18 Months   Lab Units 06/08/20  0705 12/16/19  0907 06/13/19  0849   HEMOGLOBIN A1C % 6.30* 6.30* 6.2*       Your A1c pattern/today is above for your review    A1c values:   <5.5 what we see on a normal/non-diabetic person  6.5 what it takes to be diagnosed with diabetes AND what most endocrinologist recommend if you are a diabetic  7.5 what the American diabetic association says you should be.      ########################

## 2020-06-22 NOTE — PROGRESS NOTES
Subjective   Ira Sanchez is a 75 y.o. female presenting with chief complaint of:   Chief Complaint   Patient presents with   • Follow-up     back pain       History of Present Illness :  Alone.       Has multiple chronic problems to consider that might have a bearing on today's issues;  an interval appointment.       Chronic/acute problems reviewed today:   1. Essential hypertension: Chronic/stable. Stable here past/no recent home blood pressures.  No significant chest pain, SOB, LE edema, orthopnea, near syncope, dizziness/light headness.   Recent Vitals       12/20/2019 2/24/2020 6/22/2020       BP:  134/74  124/78  122/74     Pulse:  52  58  56     Temp:  98.3 °F (36.8 °C)  97.7 °F (36.5 °C)  98.5 °F (36.9 °C)     Weight:  88 kg (194 lb)  86.2 kg (190 lb)  86.2 kg (190 lb)     BMI (Calculated):  34.4  33.7  33.7            2. Chronic obstructive pulmonary disease, unspecified COPD type (CMS/[facility]): Chronic/stable mild occ cough, sob, wheeze.  Rx helps.   No smoking.       3. Bradycardia: Chronic/stable.   History of bradycardia.  Rhythm currently seems controlled.  Medications seem tolerated.  No syncope near syncope.     4. Controlled type 2 diabetes mellitus without complication, without long-term current use of insulin (CMS/[facility]): Chronic/stable.  No problem/pattern hypoglycemia/hyperglycemia manifest by poly- dypsia, phagia, uria, or sweats, diaphoretic episodes, syncope/near.     5. Neuropathy-chemo/better: chronic/stable history of some numbness or tingling in the toes that are actually not bothering her as much now.   6. Chronic back pain, unspecified back location, unspecified back pain laterality : chronic/variable 0-2/10 lower back pain with infrequent/no radiation to UE/LE.  No change LE numbness.  Has bladder/bowel control. No desire to change approach of care.  Paces her activity and therefore can control her level of discomfort.   7. Osteopenia, unspecified location Chronic/stable.  Last bone  density/if below.   Has had Rx.  Ok when due further bone density screening when due.      8. Osteoarthritis of multiple joints, unspecified osteoarthritis type Chronic/stable.  Various on/off joint pains/soreness/stiffness.  Particular joint problems with back.  No joint swelling.  Treats mainly with reduced activity, Rx listed, Tylenol; occ NSAIDs; no current injections.        Has an/another acute issue today: none.    The following portions of the patient's history were reviewed and updated as appropriate: allergies, current medications, past family history, past medical history, past social history, past surgical history and problem list.      Current Outpatient Medications:   •  amLODIPine (NORVASC) 5 MG tablet, TAKE 1 TABLET BY MOUTH DAILY, Disp: 90 tablet, Rfl: 3  •  Calcium Carb-Cholecalciferol (CALCIUM-VITAMIN D) 600-400 MG-UNIT tablet, Take 1 tablet by mouth 2 (Two) Times a Day., Disp: , Rfl:   •  celecoxib (CeleBREX) 200 MG capsule, TAKE 1 CAPSULE BY MOUTH DAILY, Disp: 30 capsule, Rfl: 5  •  cycloSPORINE (RESTASIS) 0.05 % ophthalmic emulsion, 1 drop Every 12 (Twelve) Hours., Disp: , Rfl:   •  diphenhydrAMINE (BENADRYL) 25 mg capsule, Take 25 mg by mouth Daily As Needed., Disp: , Rfl:   •  ibuprofen (ADVIL,MOTRIN) 200 MG tablet, Take 200 mg by mouth As Needed., Disp: , Rfl:   •  lisinopril (PRINIVIL,ZESTRIL) 20 MG tablet, TAKE 1 TABLET BY MOUTH DAILY, Disp: 90 tablet, Rfl: 3  •  oxybutynin (DITROPAN) 5 MG tablet, TAKE 1 TABLET BY MOUTH TWICE DAILY, Disp: 180 tablet, Rfl: 3  •  thiamine (VITAMINE B-1) 100 MG tablet, Take 100 mg by mouth Daily., Disp: , Rfl:   No current facility-administered medications for this visit.     Facility-Administered Medications Ordered in Other Visits:   •  heparin flush (porcine) 100 UNIT/ML injection 500 Units, 500 Units, Intravenous, PRN, Shanda Torres MD, 500 Units at 04/16/18 1504  •  heparin flush (porcine) 100 UNIT/ML injection 500 Units, 500 Units,  Intravenous, PRN, Shanda Torres MD, 500 Units at 06/12/18 1445  •  sodium chloride 0.9 % flush 10 mL, 10 mL, Intravenous, PRN, Shanda Torres MD, 10 mL at 04/16/18 1503  •  sodium chloride 0.9 % flush 10 mL, 10 mL, Intravenous, PRN, Shanda Torres MD, 10 mL at 06/12/18 1445    No problems with medications.  Refills if needed done    Allergies   Allergen Reactions   • Benzonatate Hives and Itching       Review of Systems  GENERAL:  Active/slower with limits, speed, stamina for age, and occ back pain.  Sleep is ok. No fever now.  SKIN: No other rash/skin lesion of concern:   ENDO:  No syncope, near or diaphoretic sweaty spells.  No download..  HEENT: No recent head injury; less sinus area headache.  No vision change.  No chronic hearing loss.  Ears with fullness without pain/drainage.  No sore throat now.  No significant nasal/sinus congestion/drainage. No epistaxis.   CHEST: No chest wall tenderness or mass.  No cough, and no wheeze.  No SOB; no hemoptysis.  CV: No chest pain, palpitations, ankle edema.  GI: No heartburn, dysphagia.  No abdominal pain, diarrhea, constipation.  No rectal bleeding, or melena.    :  Voids with occ on/off dysuria, without incontinence to completion.  ORTHO: No painful/swollen joints but various on /off sore.  Occ same/sore neck or back.  No acute neck or lower back pain without recent injury.  NEURO: No dizziness, weakness of extremities.  Same mild numbness/paresthesias LE.  PSYCH: No memory loss.  Mood good; mild anxious, depressed but/and not suicidal.  Tries to tolerate stress .   Screening:  Mammogram: bilateral masectomy  Bone density: Bone d-deca/[facility]/T hip -0.5 LS +1.4/12.18.18;  no change; maybe 2-5 yr  Low dose CT chest:Tobacco-smoker/age 17/1-2ppd/dc age 60:  CTs with breast cancer:   GI: Colon-avm-div/[facility]/12.19.17/5y  Prostate: NA  Usual lab order  6m CBC, CMP, A1c  12m CBC, CMP, A1c, LIPID, TSH, Vit D      Lab  Results:  Results for orders placed or performed in visit on 06/08/20   Comprehensive metabolic panel   Result Value Ref Range    Glucose 123 (H) 65 - 99 mg/dL    BUN 18 8 - 23 mg/dL    Creatinine 0.72 0.57 - 1.00 mg/dL    eGFR Non African Am 79 >60 mL/min/1.73    eGFR African Am 95 >60 mL/min/1.73    BUN/Creatinine Ratio 25.0 7.0 - 25.0    Sodium 139 136 - 145 mmol/L    Potassium 4.5 3.5 - 5.2 mmol/L    Chloride 103 98 - 107 mmol/L    Total CO2 24.4 22.0 - 29.0 mmol/L    Calcium 9.5 8.6 - 10.5 mg/dL    Total Protein 6.5 6.0 - 8.5 g/dL    Albumin 4.20 3.50 - 5.20 g/dL    Globulin 2.3 gm/dL    A/G Ratio 1.8 g/dL    Total Bilirubin 0.5 0.2 - 1.2 mg/dL    Alkaline Phosphatase 105 39 - 117 U/L    AST (SGOT) 19 1 - 32 U/L    ALT (SGPT) 8 1 - 33 U/L   Hemoglobin A1c   Result Value Ref Range    Hemoglobin A1C 6.30 (H) 4.80 - 5.60 %   Hepatitis C Antibody   Result Value Ref Range    Hep C Virus Ab 0.3 0.0 - 0.9 s/co ratio   CBC & Differential   Result Value Ref Range    WBC 7.04 3.40 - 10.80 10*3/mm3    RBC 4.16 3.77 - 5.28 10*6/mm3    Hemoglobin 12.7 12.0 - 15.9 g/dL    Hematocrit 38.8 34.0 - 46.6 %    MCV 93.3 79.0 - 97.0 fL    MCH 30.5 26.6 - 33.0 pg    MCHC 32.7 31.5 - 35.7 g/dL    RDW 12.6 12.3 - 15.4 %    Platelets 171 140 - 450 10*3/mm3    Neutrophil Rel % 59.0 42.7 - 76.0 %    Lymphocyte Rel % 30.5 19.6 - 45.3 %    Monocyte Rel % 8.0 5.0 - 12.0 %    Eosinophil Rel % 1.6 0.3 - 6.2 %    Basophil Rel % 0.6 0.0 - 1.5 %    Neutrophils Absolute 4.16 1.70 - 7.00 10*3/mm3    Lymphocytes Absolute 2.15 0.70 - 3.10 10*3/mm3    Monocytes Absolute 0.56 0.10 - 0.90 10*3/mm3    Eosinophils Absolute 0.11 0.00 - 0.40 10*3/mm3    Basophils Absolute 0.04 0.00 - 0.20 10*3/mm3    Immature Granulocyte Rel % 0.3 0.0 - 0.5 %    Immature Grans Absolute 0.02 0.00 - 0.05 10*3/mm3    nRBC 0.0 0.0 - 0.2 /100 WBC       A1C:  Lab Results - Last 18 Months   Lab Units 06/08/20  0705 12/16/19  0907 06/13/19  0849   HEMOGLOBIN A1C % 6.30* 6.30* 6.2*  "    PSA:No results for input(s): PSA in the last 59075 hours.  CBC:  Lab Results - Last 18 Months   Lab Units 06/08/20  0705 12/16/19  0907 06/13/19  0849   WBC 10*3/mm3 7.04 8.13 6.75   HEMOGLOBIN g/dL 12.7 12.9 12.7   HEMATOCRIT % 38.8 38.9 37.1   PLATELETS 10*3/mm3 171 168 165      BMP/CMP:  Lab Results - Last 18 Months   Lab Units 06/08/20  0705 12/16/19  0907 06/13/19  0849   SODIUM mmol/L 139 140 134*   POTASSIUM mmol/L 4.5 4.4 4.4   CHLORIDE mmol/L 103 103 99   TOTAL CO2 mmol/L 24.4  --   --    CO2 mmol/L  --  26.0 26.0   GLUCOSE mg/dL 123*  --   --    BUN mg/dL 18 16 15   CREATININE mg/dL 0.72 0.53* 0.61   EGFR IF NONAFRICN AM mL/min/1.73 79 112 95   EGFR IF AFRICN AM mL/min/1.73 95  --   --    CALCIUM mg/dL 9.5 9.5 9.4     HEPATIC:  Lab Results - Last 18 Months   Lab Units 06/08/20  0705 12/16/19  0907 06/13/19  0849   ALT (SGPT) U/L 8 8 20   AST (SGOT) U/L 19 18 34   ALK PHOS U/L 105 109 90       Objective   /74   Pulse 56   Temp 98.5 °F (36.9 °C)   Resp 16   Ht 160 cm (63\")   Wt 86.2 kg (190 lb)   LMP  (LMP Unknown)   SpO2 96%   Breastfeeding No   BMI 33.66 kg/m²   Body mass index is 33.66 kg/m².     2/7/2020 she had plain films at [place] which showed no acute osseous abnormality and atherosclerotic vascular disease and severe degenerative changes of the spinal processes.      Physical Exam  GENERAL:  Well nourished/developed in no acute distress but frequent cough; at times paroxysmal.   SKIN: Turgor excellent, without wound, rash, lesion.  HEENT: Normal cephalic without trauma. Pupils equal round reactive to light. Extraocular motions full without nystagmus.  External canals nonobstructive nontender without reddness. Tymphatic membranes dull with irwin structures intact.   Oral cavity without growths, exudates, and moist.  Posterior pharynx without mass, obstruction, redness.  No thyromegaly, mass, tenderness, definite lymphadenopathy and supple.   CV: Regular rhythm.  No murmur, gallop,  " edema. Posterior pulses intact.  No carotid bruits.  CHEST: No chest wall tenderness or mass.   LUNGS: Symmetric motion with clear to auscultation.  No dullness to percussion  ABD: Soft, nontender without mass.   ORTHO: Symmetric extremities without swelling/point tenderness.  Full gross range of motion. Symmetric LE.  Neg straight leg raising.  Neg Patricks maneuver.  Back is straight/mild lordosis.  Lower back sore; no point tender.    NEURO: CN 2-12 grossly intact.   Otherwise symmetric facies and UE/LE. 3/5 strength throughout.   Otherwise nonfocal use extremities. Speech clear.      Assessment/Plan     1. Essential hypertension    2. Chronic obstructive pulmonary disease, unspecified COPD type (CMS/[facility])    3. Bradycardia    4. Controlled type 2 diabetes mellitus without complication, without long-term current use of insulin (CMS/[facility])    5. Neuropathy-chemo/better    6. Chronic back pain, unspecified back location, unspecified back pain laterality    7. Osteopenia, unspecified location    8. Osteoarthritis of multiple joints, unspecified osteoarthritis type      Rx: reviewed/changes:  No orders of the defined types were placed in this encounter.    LAB/Testing/Referrals: reviewed/orders:   Today:   No orders of the defined types were placed in this encounter.    Chronic/recurrent labs above or change to:   same     Discussions:   Pace activity  Work with chiropracter  Try to lose weight; would be helpful for health but also back  Report increasing pain, persistant LE weakness, numbness or dysfunction bladder/bowel.    Body mass index is 33.66 kg/m².  Patient's Body mass index is 33.66 kg/m². BMI is above normal parameters. Recommendations include: exercise counseling, nutrition counseling and for age/condition.    Tobacco use reviewed:    Non-smoker  Iralena Sanchez  reports that she quit smoking about 19 years ago. Her smoking use included cigarettes. She started smoking about 57 years ago. She has a 74.00  pack-year smoking history. She has never used smokeless tobacco..    Patient Instructions       A1C:  Lab Results - Last 18 Months   Lab Units 06/08/20  0705 12/16/19  0907 06/13/19  0849   HEMOGLOBIN A1C % 6.30* 6.30* 6.2*       Your A1c pattern/today is above for your review    A1c values:   <5.5 what we see on a normal/non-diabetic person  6.5 what it takes to be diagnosed with diabetes AND what most endocrinologist recommend if you are a diabetic  7.5 what the American diabetic association says you should be.      ########################            Follow up: Return for lab;, Dr Tinoco-, 6 m;.  Future Appointments   Date Time Provider Department Center   6/24/2020  8:30 AM [facility] NURSE [facility]   9/14/2020  9:30 AM  [facility]  [facility]   12/14/2020  9:30 AM  [facility]  [facility]   12/14/2020 10:00 AM Shanda Torres MD [facility]

## 2020-06-24 ENCOUNTER — CLINICAL SUPPORT (OUTPATIENT)
Dept: ONCOLOGY | Facility: CLINIC | Age: 75
End: 2020-06-24

## 2020-06-24 DIAGNOSIS — Z45.2 ENCOUNTER FOR CARE RELATED TO PORT-A-CATH: Primary | ICD-10-CM

## 2020-06-24 PROCEDURE — 99211 OFF/OP EST MAY X REQ PHY/QHP: CPT | Performed by: NURSE PRACTITIONER

## 2020-06-24 RX ORDER — HEPARIN SODIUM (PORCINE) LOCK FLUSH IV SOLN 100 UNIT/ML 100 UNIT/ML
500 SOLUTION INTRAVENOUS AS NEEDED
Status: DISCONTINUED | OUTPATIENT
Start: 2020-06-24 | End: 2020-06-24 | Stop reason: HOSPADM

## 2020-06-24 RX ORDER — SODIUM CHLORIDE 0.9 % (FLUSH) 0.9 %
10 SYRINGE (ML) INJECTION AS NEEDED
Status: CANCELLED | OUTPATIENT
Start: 2020-06-24

## 2020-06-24 RX ORDER — HEPARIN SODIUM (PORCINE) LOCK FLUSH IV SOLN 100 UNIT/ML 100 UNIT/ML
500 SOLUTION INTRAVENOUS AS NEEDED
Status: CANCELLED | OUTPATIENT
Start: 2020-06-24

## 2020-06-24 RX ORDER — SODIUM CHLORIDE 0.9 % (FLUSH) 0.9 %
10 SYRINGE (ML) INJECTION AS NEEDED
Status: DISCONTINUED | OUTPATIENT
Start: 2020-06-24 | End: 2020-06-24 | Stop reason: HOSPADM

## 2020-06-24 RX ADMIN — Medication 10 ML: at 07:46

## 2020-06-24 RX ADMIN — HEPARIN SODIUM (PORCINE) LOCK FLUSH IV SOLN 100 UNIT/ML 500 UNITS: 100 SOLUTION at 07:48

## 2020-08-21 ENCOUNTER — CLINICAL SUPPORT (OUTPATIENT)
Dept: ONCOLOGY | Facility: CLINIC | Age: 75
End: 2020-08-21

## 2020-08-21 DIAGNOSIS — Z45.2 ENCOUNTER FOR CARE RELATED TO PORT-A-CATH: Primary | ICD-10-CM

## 2020-08-21 RX ORDER — SODIUM CHLORIDE 0.9 % (FLUSH) 0.9 %
10 SYRINGE (ML) INJECTION AS NEEDED
Status: DISCONTINUED | OUTPATIENT
Start: 2020-08-21 | End: 2020-08-21 | Stop reason: HOSPADM

## 2020-08-21 RX ORDER — HEPARIN SODIUM (PORCINE) LOCK FLUSH IV SOLN 100 UNIT/ML 100 UNIT/ML
500 SOLUTION INTRAVENOUS AS NEEDED
Status: CANCELLED | OUTPATIENT
Start: 2020-08-21

## 2020-08-21 RX ORDER — HEPARIN SODIUM (PORCINE) LOCK FLUSH IV SOLN 100 UNIT/ML 100 UNIT/ML
500 SOLUTION INTRAVENOUS AS NEEDED
Status: DISCONTINUED | OUTPATIENT
Start: 2020-08-21 | End: 2020-08-21 | Stop reason: HOSPADM

## 2020-08-21 RX ORDER — SODIUM CHLORIDE 0.9 % (FLUSH) 0.9 %
10 SYRINGE (ML) INJECTION AS NEEDED
Status: CANCELLED | OUTPATIENT
Start: 2020-08-21

## 2020-08-21 RX ADMIN — Medication 10 ML: at 10:28

## 2020-08-21 RX ADMIN — HEPARIN SODIUM (PORCINE) LOCK FLUSH IV SOLN 100 UNIT/ML 500 UNITS: 100 SOLUTION at 10:29

## 2020-09-14 NOTE — TELEPHONE ENCOUNTER
Notified pt about Rx    Rx done       Xenograft Text: The defect edges were debeveled with a #15 scalpel blade.  Given the location of the defect, shape of the defect and the proximity to free margins a xenograft was deemed most appropriate.  The graft was then trimmed to fit the size of the defect.  The graft was then placed in the primary defect and oriented appropriately.

## 2020-10-09 ENCOUNTER — FLU SHOT (OUTPATIENT)
Dept: FAMILY MEDICINE CLINIC | Facility: CLINIC | Age: 75
End: 2020-10-09

## 2020-10-09 DIAGNOSIS — Z23 NEED FOR INFLUENZA VACCINATION: ICD-10-CM

## 2020-10-09 PROCEDURE — G0008 ADMIN INFLUENZA VIRUS VAC: HCPCS | Performed by: FAMILY MEDICINE

## 2020-10-09 PROCEDURE — 90694 VACC AIIV4 NO PRSRV 0.5ML IM: CPT | Performed by: FAMILY MEDICINE

## 2020-12-11 DIAGNOSIS — R42 DIZZY: ICD-10-CM

## 2020-12-11 DIAGNOSIS — R03.0 ELEVATED BLOOD PRESSURE READING: ICD-10-CM

## 2020-12-11 DIAGNOSIS — C50.512 MALIGNANT NEOPLASM OF LOWER-OUTER QUADRANT OF LEFT FEMALE BREAST, UNSPECIFIED ESTROGEN RECEPTOR STATUS (HCC): Primary | ICD-10-CM

## 2020-12-11 RX ORDER — AMLODIPINE BESYLATE 5 MG/1
5 TABLET ORAL DAILY
Qty: 90 TABLET | Refills: 3 | Status: SHIPPED | OUTPATIENT
Start: 2020-12-11 | End: 2021-12-02 | Stop reason: SDUPTHER

## 2020-12-14 ENCOUNTER — OFFICE VISIT (OUTPATIENT)
Dept: ONCOLOGY | Facility: CLINIC | Age: 75
End: 2020-12-14

## 2020-12-14 ENCOUNTER — LAB (OUTPATIENT)
Dept: LAB | Facility: HOSPITAL | Age: 75
End: 2020-12-14

## 2020-12-14 VITALS
RESPIRATION RATE: 16 BRPM | TEMPERATURE: 98.2 F | HEIGHT: 63 IN | WEIGHT: 197 LBS | BODY MASS INDEX: 34.91 KG/M2 | HEART RATE: 52 BPM | SYSTOLIC BLOOD PRESSURE: 154 MMHG | OXYGEN SATURATION: 93 % | DIASTOLIC BLOOD PRESSURE: 88 MMHG

## 2020-12-14 DIAGNOSIS — J44.9 CHRONIC OBSTRUCTIVE PULMONARY DISEASE, UNSPECIFIED COPD TYPE (HCC): ICD-10-CM

## 2020-12-14 DIAGNOSIS — Z17.0 MALIGNANT NEOPLASM OF UPPER-OUTER QUADRANT OF LEFT BREAST IN FEMALE, ESTROGEN RECEPTOR POSITIVE (HCC): ICD-10-CM

## 2020-12-14 DIAGNOSIS — C50.412 MALIGNANT NEOPLASM OF UPPER-OUTER QUADRANT OF LEFT BREAST IN FEMALE, ESTROGEN RECEPTOR POSITIVE (HCC): ICD-10-CM

## 2020-12-14 DIAGNOSIS — E55.9 VITAMIN D DEFICIENCY, UNSPECIFIED: ICD-10-CM

## 2020-12-14 DIAGNOSIS — Z78.0 MENOPAUSE: ICD-10-CM

## 2020-12-14 DIAGNOSIS — Z00.00 WELLNESS EXAMINATION: ICD-10-CM

## 2020-12-14 DIAGNOSIS — I10 ESSENTIAL HYPERTENSION: Primary | Chronic | ICD-10-CM

## 2020-12-14 DIAGNOSIS — Z45.2 ENCOUNTER FOR CARE RELATED TO PORT-A-CATH: Primary | ICD-10-CM

## 2020-12-14 DIAGNOSIS — C50.512 MALIGNANT NEOPLASM OF LOWER-OUTER QUADRANT OF LEFT FEMALE BREAST, UNSPECIFIED ESTROGEN RECEPTOR STATUS (HCC): Primary | ICD-10-CM

## 2020-12-14 DIAGNOSIS — E11.9 CONTROLLED TYPE 2 DIABETES MELLITUS WITHOUT COMPLICATION, WITHOUT LONG-TERM CURRENT USE OF INSULIN (HCC): ICD-10-CM

## 2020-12-14 DIAGNOSIS — M15.9 OSTEOARTHRITIS OF MULTIPLE JOINTS, UNSPECIFIED OSTEOARTHRITIS TYPE: Chronic | ICD-10-CM

## 2020-12-14 LAB
ALBUMIN SERPL-MCNC: 4.4 G/DL (ref 3.5–5.2)
ALBUMIN/GLOB SERPL: 1.8 G/DL
ALP SERPL-CCNC: 114 U/L (ref 39–117)
ALT SERPL W P-5'-P-CCNC: 8 U/L (ref 1–33)
ANION GAP SERPL CALCULATED.3IONS-SCNC: 9 MMOL/L (ref 5–15)
AST SERPL-CCNC: 20 U/L (ref 1–32)
BASOPHILS # BLD AUTO: 0.02 10*3/MM3 (ref 0–0.2)
BASOPHILS NFR BLD AUTO: 0.3 % (ref 0–1.5)
BILIRUB SERPL-MCNC: 0.4 MG/DL (ref 0–1.2)
BUN SERPL-MCNC: 19 MG/DL (ref 8–23)
BUN/CREAT SERPL: 34.5 (ref 7–25)
CALCIUM SPEC-SCNC: 9.2 MG/DL (ref 8.6–10.5)
CANCER AG15-3 SERPL-ACNC: 27.2 U/ML
CHLORIDE SERPL-SCNC: 105 MMOL/L (ref 98–107)
CO2 SERPL-SCNC: 26 MMOL/L (ref 22–29)
CREAT SERPL-MCNC: 0.55 MG/DL (ref 0.57–1)
DEPRECATED RDW RBC AUTO: 43.5 FL (ref 37–54)
EOSINOPHIL # BLD AUTO: 0.13 10*3/MM3 (ref 0–0.4)
EOSINOPHIL NFR BLD AUTO: 1.7 % (ref 0.3–6.2)
ERYTHROCYTE [DISTWIDTH] IN BLOOD BY AUTOMATED COUNT: 12.6 % (ref 12.3–15.4)
GFR SERPL CREATININE-BSD FRML MDRD: 107 ML/MIN/1.73
GLOBULIN UR ELPH-MCNC: 2.4 GM/DL
GLUCOSE SERPL-MCNC: 108 MG/DL (ref 65–99)
HCT VFR BLD AUTO: 39.6 % (ref 34–46.6)
HGB BLD-MCNC: 12.8 G/DL (ref 12–15.9)
HOLD SPECIMEN: NORMAL
IMM GRANULOCYTES # BLD AUTO: 0.04 10*3/MM3 (ref 0–0.05)
IMM GRANULOCYTES NFR BLD AUTO: 0.5 % (ref 0–0.5)
LYMPHOCYTES # BLD AUTO: 2.41 10*3/MM3 (ref 0.7–3.1)
LYMPHOCYTES NFR BLD AUTO: 32 % (ref 19.6–45.3)
MCH RBC QN AUTO: 30.2 PG (ref 26.6–33)
MCHC RBC AUTO-ENTMCNC: 32.3 G/DL (ref 31.5–35.7)
MCV RBC AUTO: 93.4 FL (ref 79–97)
MONOCYTES # BLD AUTO: 0.37 10*3/MM3 (ref 0.1–0.9)
MONOCYTES NFR BLD AUTO: 4.9 % (ref 5–12)
NEUTROPHILS NFR BLD AUTO: 4.55 10*3/MM3 (ref 1.7–7)
NEUTROPHILS NFR BLD AUTO: 60.6 % (ref 42.7–76)
NRBC BLD AUTO-RTO: 0 /100 WBC (ref 0–0.2)
PLATELET # BLD AUTO: 169 10*3/MM3 (ref 140–450)
PMV BLD AUTO: 10.9 FL (ref 6–12)
POTASSIUM SERPL-SCNC: 4.5 MMOL/L (ref 3.5–5.2)
PROT SERPL-MCNC: 6.8 G/DL (ref 6–8.5)
RBC # BLD AUTO: 4.24 10*6/MM3 (ref 3.77–5.28)
SODIUM SERPL-SCNC: 140 MMOL/L (ref 136–145)
WBC # BLD AUTO: 7.52 10*3/MM3 (ref 3.4–10.8)

## 2020-12-14 PROCEDURE — 36415 COLL VENOUS BLD VENIPUNCTURE: CPT

## 2020-12-14 PROCEDURE — 99214 OFFICE O/P EST MOD 30 MIN: CPT | Performed by: NURSE PRACTITIONER

## 2020-12-14 PROCEDURE — 80053 COMPREHEN METABOLIC PANEL: CPT

## 2020-12-14 PROCEDURE — 85025 COMPLETE CBC W/AUTO DIFF WBC: CPT

## 2020-12-14 PROCEDURE — 86300 IMMUNOASSAY TUMOR CA 15-3: CPT

## 2020-12-14 RX ORDER — HEPARIN SODIUM (PORCINE) LOCK FLUSH IV SOLN 100 UNIT/ML 100 UNIT/ML
500 SOLUTION INTRAVENOUS AS NEEDED
Status: DISCONTINUED | OUTPATIENT
Start: 2020-12-14 | End: 2020-12-24 | Stop reason: HOSPADM

## 2020-12-14 RX ORDER — SODIUM CHLORIDE 0.9 % (FLUSH) 0.9 %
10 SYRINGE (ML) INJECTION AS NEEDED
Status: DISCONTINUED | OUTPATIENT
Start: 2020-12-14 | End: 2020-12-24 | Stop reason: HOSPADM

## 2020-12-14 RX ORDER — SODIUM CHLORIDE 0.9 % (FLUSH) 0.9 %
10 SYRINGE (ML) INJECTION AS NEEDED
Status: CANCELLED | OUTPATIENT
Start: 2020-12-14

## 2020-12-14 RX ORDER — HEPARIN SODIUM (PORCINE) LOCK FLUSH IV SOLN 100 UNIT/ML 100 UNIT/ML
500 SOLUTION INTRAVENOUS AS NEEDED
Status: CANCELLED | OUTPATIENT
Start: 2020-12-14

## 2020-12-14 RX ADMIN — Medication 10 ML: at 11:00

## 2020-12-14 RX ADMIN — HEPARIN SODIUM (PORCINE) LOCK FLUSH IV SOLN 100 UNIT/ML 500 UNITS: 100 SOLUTION at 11:03

## 2020-12-14 NOTE — PROGRESS NOTES
[facility]  HEMATOLOGY & ONCOLOGY    [facility] [place] HEMATOLOGY AND ONCOLOGY  [address]  [place] [address] [phone]    Patient Name: Ira Sanchez  Encounter Date: 12/14/2020  YOB: 1945  Patient Number: [id number]    Chief Complaint   Patient presents with   • Breast Cancer     Here for f/u       REASON FOR VISIT: Mrs Sanchez is a 76yo female patient here today ih follow up for left breast cancer that was diagnosed in 2014.  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  It was ER+, NH+ and Her 2 emerita +.  She subsequently underwent bilateral mastectomies and her AJCC TNM staging was [id number], Stage IIA.     She then underwent 5 cycles of Carboplatin, Taxotere and Herceptin followed by a year of Herceptin therapy.  She then was placed on hormonal manipulation with Arimidex in June 2015.     Her last BMD was 12/18/18 and normal. CBC today is normal with WBC of 7.52, Hgb 12.8 and Platelet of 169,000.  CMP was stable as well. Markers are pending.     She feels well today and without complaints. She denies any fever or chills. She has no N/V/D or abdominal pain. She has had no shortness of breath or chest pain.       Oncology/Hematology History Overview Note   Ms. Sanchez is a pleasant 71 year old  female with the diagnosis of stage IIA left breast carcinoma, ER/NH and HER2  positive 3+.  She is status post bilateral mastectomies and has completed 5 cycles of adjuvant systemic chemotherapy with taxotere, carboplatin and  Herceptin. She is presently receiving Herceptin 6 mg/kg every 3 weeks for a total dosing of 1 year with last dose planned for 12/09/2015.  Ms. Sanchez is also presently taking hormonal manipulation therapy with Arimidex 1 mg p.o. daily and this will be taken for 10 years, this  was initiated on 06/03/2015.  She had completed Herceptin. She had completed breast  reconstruction process. We will give her a refill on her Arimidex. She is due for  her bone density in September 2016.     Breast cancer-L/valente masectomy 2014 9/29/2016 Initial Diagnosis    Breast cancer           PAST MEDICAL HISTORY:  ALLERGIES:  Allergies   Allergen Reactions   • Benzonatate Hives and Itching       CURRENT MEDICATIONS:  Outpatient Encounter Medications as of 12/14/2020   Medication Sig Dispense Refill   • amLODIPine (NORVASC) 5 MG tablet TAKE 1 TABLET BY MOUTH DAILY 90 tablet 3   • Calcium Carb-Cholecalciferol (CALCIUM-VITAMIN D) 600-400 MG-UNIT tablet Take 1 tablet by mouth 2 (Two) Times a Day.     • celecoxib (CeleBREX) 200 MG capsule TAKE 1 CAPSULE BY MOUTH DAILY 30 capsule 5   • cycloSPORINE (RESTASIS) 0.05 % ophthalmic emulsion 1 drop Every 12 (Twelve) Hours.     • diphenhydrAMINE (BENADRYL) 25 mg capsule Take 25 mg by mouth Daily As Needed.     • ibuprofen (ADVIL,MOTRIN) 200 MG tablet Take 200 mg by mouth As Needed.     • lisinopril (PRINIVIL,ZESTRIL) 20 MG tablet TAKE 1 TABLET BY MOUTH DAILY 90 tablet 3   • oxybutynin (DITROPAN) 5 MG tablet TAKE 1 TABLET BY MOUTH TWICE DAILY 180 tablet 3   • thiamine (VITAMINE B-1) 100 MG tablet Take 100 mg by mouth Daily.       Facility-Administered Encounter Medications as of 12/14/2020   Medication Dose Route Frequency Provider Last Rate Last Admin   • heparin flush (porcine) 100 UNIT/ML injection 500 Units  500 Units Intravenous PRN Shanda Torres MD   500 Units at 04/16/18 1504   • heparin flush (porcine) 100 UNIT/ML injection 500 Units  500 Units Intravenous PRN Shanda Torres MD   500 Units at 06/12/18 1445   • sodium chloride 0.9 % flush 10 mL  10 mL Intravenous PRN Shanda Torres MD   10 mL at 04/16/18 1503   • sodium chloride 0.9 % flush 10 mL  10 mL Intravenous PRN Shanda Torres MD   10 mL at 06/12/18 1445       ADULT ILLNESSES:  Patient Active Problem List   Diagnosis Code   • Obesity E66.9    • Breast cancer-L/valente masectomy 2014 C50.919   • Overactive bladder N32.81   • Diabetes type 2, controlled (CMS/HCC) E11.9   • Hypertension I10   • Osteopenia 2018-? 2 to 5y M85.80   • Neuropathy-chemo/better G62.9   • Osteoarthritis of multiple joints M15.9   • Malignant neoplasm of lower-outer quadrant of left female breast (CMS/HCC) C50.512   • Family history of breast cancer Z80.3   • Malignant neoplasm of upper-outer quadrant of female breast (CMS/HCC) C50.419   • History of bilateral mastectomy Z90.13   • Wellness examination-done Z00.00   • Shoulder pain M25.519   • Chronic back pain M54.9, G89.29   • Laboratory test* Z01.89   • Bradycardia R00.1   • Cough: copd R05   • Allergic rhinitis J30.9   • Menopause Z78.0   • History of shingles Z86.19   • COPD (chronic obstructive pulmonary disease) (CMS/[facility]) J44.9   • Abnormal mammogram R92.8   • After-cataract with vision obscured H26.499   • Amblyopia H53.009   • Cellulitis, trunk L03.319   • Dermatitis of eyelid due to herpes zoster B02.39   • Dermatochalasis H02.839   • Hordeolum internum H00.029   • 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   • Pseudophakia Z96.1   • Rash R21   • Encounter for care related to Port-a-Cath Z45.2       SURGERIES:  Past Surgical History:   Procedure Laterality Date   • APPENDECTOMY     • BREAST SURGERY     • CATARACT EXTRACTION Bilateral    • DILATATION AND CURETTAGE     • KNEE SURGERY     • MASTECTOMY Bilateral    • NIPPLE TATTOO  04/12/2016   • PORTACATH PLACEMENT     • THROAT SURGERY         HEALTH MAINTENANCE ITEMS:    Last Completed Colonoscopy       Status Date      COLONOSCOPY Done 12/19/2017 SCANNED - COLONOSCOPY     Negative; 5 year recall          Last Completed Mammogram       Status Date      MAMMOGRAM Declined 6/5/2018 Bilateral Mastectomy           FAMILY HISTORY:  Family History   Problem Relation Age of Onset   • Hypertension Mother    • Heart failure Mother    • Cancer Father         colon   • Colon  cancer Father    • Cancer Maternal Aunt         breast   • No Known Problems Maternal Grandmother    • No Known Problems Maternal Grandfather    • No Known Problems Paternal Grandmother    • No Known Problems Paternal Grandfather    • Cancer Maternal Aunt         breast   • Cancer Maternal Aunt         breast       SOCIAL HISTORY:  Social History     Socioeconomic History   • Marital status:      Spouse name: Horace   • Number of children: 0   • Years of education: 12.5   • Highest education level: Not on file   Occupational History   • Occupation: retired     Comment: [employer]   Tobacco Use   • Smoking status: Former Smoker     Packs/day: 2.00     Years: 37.00     Pack years: 74.00     Types: Cigarettes     Start date: 1963     Quit date: 2000     Years since quittin.2   • Smokeless tobacco: Never Used   Substance and Sexual Activity   • Alcohol use: No   • Drug use: No   • Sexual activity: Defer       REVIEW OF SYSTEMS:  Review of Systems   Constitutional: Negative for activity change, appetite change, chills, diaphoresis, fatigue, fever and unexpected weight loss.   HENT: Negative for ear pain, nosebleeds, sinus pressure, sore throat and voice change.    Eyes: Negative for blurred vision, double vision, pain and visual disturbance.   Respiratory: Negative for cough and shortness of breath.    Cardiovascular: Negative for chest pain, palpitations and leg swelling.   Gastrointestinal: Negative for abdominal pain, anal bleeding, blood in stool, constipation, diarrhea, nausea and vomiting.   Endocrine: Negative for heat intolerance, polydipsia and polyuria.   Genitourinary: Negative for dysuria, frequency, hematuria, urgency and urinary incontinence.   Musculoskeletal: Positive for arthralgias. Negative for myalgias.   Skin: Negative for rash and skin lesions.   Neurological: Negative for dizziness, tremors, seizures, syncope, speech difficulty, weakness and headache.  "  Hematological: Negative for adenopathy. Does not bruise/bleed easily.   Psychiatric/Behavioral: Negative for dysphoric mood, sleep disturbance, suicidal ideas and depressed mood.       /88   Pulse 52   Temp 98.2 °F (36.8 °C) (Temporal)   Resp 16   Ht 160 cm (63\")   Wt 89.4 kg (197 lb)   LMP  (LMP Unknown)   SpO2 93%   Breastfeeding No   BMI 34.90 kg/m²  Body surface area is 1.92 meters squared.  Pain Score    12/14/20 0959   PainSc: 0-No pain       Physical Exam:  Physical Exam  Constitutional:       Appearance: Normal appearance. She is well-developed.   HENT:      Head: Atraumatic.   Eyes:      General: No scleral icterus.     Pupils: Pupils are equal, round, and reactive to light.   Neck:      Musculoskeletal: Neck supple.      Trachea: Trachea normal.   Cardiovascular:      Rate and Rhythm: Normal rate and regular rhythm.      Heart sounds: No murmur. No friction rub. No gallop.    Pulmonary:      Effort: Pulmonary effort is normal.      Breath sounds: Normal breath sounds. No wheezing, rhonchi or rales.   Chest:      Breasts:         Right: Absent.         Left: Absent.   Abdominal:      Palpations: Abdomen is soft.      Tenderness: There is no abdominal tenderness. There is no guarding or rebound.   Lymphadenopathy:      Cervical: No cervical adenopathy.      Upper Body:      Right upper body: No supraclavicular or axillary adenopathy.      Left upper body: No supraclavicular or axillary adenopathy.   Neurological:      Mental Status: She is alert and oriented to person, place, and time.      Sensory: No sensory deficit.   Psychiatric:         Judgment: Judgment normal.         Ira S Laura reports a pain score of 0.   Patient's Body mass index is 34.9 kg/m². BMI is above normal parameters. Recommendations include: defer to pcp.      LABS    Lab Results - Last 18 Months   Lab Units 12/14/20  0937 06/08/20  0705 12/16/19  0907   HEMOGLOBIN g/dL 12.8 12.7 12.9   HEMATOCRIT % 39.6 38.8 38.9 "   MCV fL 93.4 93.3 91.5   WBC 10*3/mm3 7.52 7.04 8.13   RDW % 12.6 12.6 12.6   MPV fL 10.9  --  10.8   PLATELETS 10*3/mm3 169 171 168   IMM GRAN % % 0.5  --  0.1   NEUTROS ABS 10*3/mm3 4.55 4.16 4.79   LYMPHS ABS 10*3/mm3 2.41 2.15 2.68   MONOS ABS 10*3/mm3 0.37 0.56 0.44   EOS ABS 10*3/mm3 0.13 0.11 0.18   BASOS ABS 10*3/mm3 0.02 0.04 0.03   IMMATURE GRANS (ABS) 10*3/mm3 0.04  --  0.01   NRBC /100 WBC 0.0 0.0 0.0       Lab Results - Last 18 Months   Lab Units 12/14/20  0937 06/08/20  0705 12/16/19  0907   GLUCOSE mg/dL 108*  --  111*   SODIUM mmol/L 140 139 140   POTASSIUM mmol/L 4.5 4.5 4.4   TOTAL CO2 mmol/L  --  24.4  --    CO2 mmol/L 26.0  --  26.0   CHLORIDE mmol/L 105 103 103   ANION GAP mmol/L 9.0  --  11.0   CREATININE mg/dL 0.55* 0.72 0.53*   BUN mg/dL 19 18 16   BUN / CREAT RATIO  34.5* 25.0 30.2*   CALCIUM mg/dL 9.2 9.5 9.5   EGFR IF NONAFRICN AM mL/min/1.73 107 79 112   ALK PHOS U/L 114 105 109   TOTAL PROTEIN g/dL 6.8  --  7.0   ALT (SGPT) U/L 8 8 8   AST (SGOT) U/L 20 19 18   BILIRUBIN mg/dL 0.4 0.5 0.2   ALBUMIN g/dL 4.40 4.20 4.20   GLOBULIN gm/dL 2.4  --  2.8     ASSESSMENT  1.  Left breast carcinoma diagnosed in October 2014  · Stage: AJCC TNM [id number], Stage IIA  · Treatment: Bilateral mastectomies, Adjuvant chemotherapy with 5 cycles of Carbplatin Taxotere Herceptin followed by a total of one year of herceptin. Hormonal manipulation starting in June 2015 to present.  · Disease status: No evidence of disease  2.  Osteoarthritis - stable followed by pcp  3.  Normal BMD study in Dec 2018, takes Ca+D  4.  Hypertension controlled with medication. BP stable at 154/88.     PLAN  1.  Reviewed lab results obtained today showing the normal cbc, cmp  2.  Reviewed BMD from December 2018 with the patient  3.  Continue the Arimidex daily. Dr Torres advised patient to take it for 10 years and patient plans to do so.   4.  Continue to follow with pcp and other specialists  5.  Return in 6 mo for follow up and  with preoffice cbc, cmp, ca 27-29 ad cea    I spent 24 total minutes, face-to-face, caring for Ira today.  Greater than 50% of this time involved counseling and/or coordination of care as documented within this note regarding the patient's illness(es), pros and cons of various treatment options, instructions and/or risk reduction.    Tamela Doherty, APRN  12/14/2020

## 2020-12-15 LAB — CANCER AG27-29 SERPL-ACNC: 33.8 U/ML (ref 0–38.6)

## 2020-12-17 ENCOUNTER — TELEPHONE (OUTPATIENT)
Dept: ONCOLOGY | Facility: CLINIC | Age: 75
End: 2020-12-17

## 2020-12-17 NOTE — TELEPHONE ENCOUNTER
Notified pt by v/m    ----- Message from REBEL Shelley sent at 12/16/2020  4:37 PM CST -----  Please let her know that her markers are stable

## 2021-01-04 ENCOUNTER — OFFICE VISIT (OUTPATIENT)
Dept: FAMILY MEDICINE CLINIC | Facility: CLINIC | Age: 76
End: 2021-01-04

## 2021-01-04 VITALS
HEIGHT: 63 IN | TEMPERATURE: 97.1 F | DIASTOLIC BLOOD PRESSURE: 76 MMHG | RESPIRATION RATE: 16 BRPM | SYSTOLIC BLOOD PRESSURE: 132 MMHG | HEART RATE: 64 BPM | BODY MASS INDEX: 34.3 KG/M2 | OXYGEN SATURATION: 95 % | WEIGHT: 193.6 LBS

## 2021-01-04 DIAGNOSIS — C50.919 MALIGNANT NEOPLASM OF FEMALE BREAST, UNSPECIFIED ESTROGEN RECEPTOR STATUS, UNSPECIFIED LATERALITY, UNSPECIFIED SITE OF BREAST (HCC): Chronic | ICD-10-CM

## 2021-01-04 DIAGNOSIS — I10 ESSENTIAL HYPERTENSION: Primary | Chronic | ICD-10-CM

## 2021-01-04 DIAGNOSIS — E11.9 CONTROLLED TYPE 2 DIABETES MELLITUS WITHOUT COMPLICATION, WITHOUT LONG-TERM CURRENT USE OF INSULIN (HCC): Chronic | ICD-10-CM

## 2021-01-04 DIAGNOSIS — M15.9 OSTEOARTHRITIS OF MULTIPLE JOINTS, UNSPECIFIED OSTEOARTHRITIS TYPE: Chronic | ICD-10-CM

## 2021-01-04 DIAGNOSIS — J44.9 CHRONIC OBSTRUCTIVE PULMONARY DISEASE, UNSPECIFIED COPD TYPE (HCC): ICD-10-CM

## 2021-01-04 PROCEDURE — 99214 OFFICE O/P EST MOD 30 MIN: CPT | Performed by: FAMILY MEDICINE

## 2021-01-04 NOTE — PROGRESS NOTES
Subjective   Ira Sanchez is a 75 y.o. female presenting with chief complaint of:   Chief Complaint   Patient presents with   • Follow-up     6mos       History of Present Illness :  Alone.       Has multiple chronic problems to consider that might have a bearing on today's issues;  an interval appointment.       Chronic/acute problems reviewed today:   1. Essential hypertension Chronic/stable. Stable here past/no recent home blood pressures.  No significant chest pain, SOB, LE edema, orthopnea, near syncope, dizziness/light headness.   Recent Vitals       6/22/2020 12/14/2020 1/4/2021       BP:  122/74  154/88  132/76     Pulse:  56  52  64     Temp:  98.5 °F (36.9 °C)  98.2 °F (36.8 °C)  97.1 °F (36.2 °C)     Weight:  86.2 kg (190 lb)  89.4 kg (197 lb)  87.8 kg (193 lb 9.6 oz)     BMI (Calculated):  33.7  34.9  34.3            2. Controlled type 2 diabetes mellitus without complication, without long-term current use of insulin (CMS/HCC) Chronic/stable. No problem/pattern hypoglycemia/hyperglycemia manifest by poly- dypsia, phagia, uria, or sweats, diaphoretic episodes, syncope/near.     3. Chronic obstructive pulmonary disease, unspecified COPD type (CMS/HCC) Chronic/stable mild occ cough, sob, wheeze.  Rx not needed..   No smoking.       4. Osteoarthritis of multiple joints, unspecified osteoarthritis type Chronic/stable.  Various on/off joint pains/soreness/stiffness.  Particular joint problems with various.  No joint swelling.  Treats mainly with reduced activity, Rx listed, Tylenol.  No problems NSAIDs, and no injections.      5. Malignant neoplasm of female breast, unspecified estrogen receptor status, unspecified laterality, unspecified site of breast (CMS/HCC) chronic stable; last visit with oncology she was told no obvious disease is present.  She does not think she has a follow-up; I see on chart review a suggestion to come back in 6 months.     Has an/another acute issue today: none.    The following  portions of the patient's history were reviewed and updated as appropriate: allergies, current medications, past family history, past medical history, past social history, past surgical history and problem list.      Current Outpatient Medications:   •  amLODIPine (NORVASC) 5 MG tablet, TAKE 1 TABLET BY MOUTH DAILY, Disp: 90 tablet, Rfl: 3  •  Calcium Carb-Cholecalciferol (CALCIUM-VITAMIN D) 600-400 MG-UNIT tablet, Take 1 tablet by mouth 2 (Two) Times a Day., Disp: , Rfl:   •  celecoxib (CeleBREX) 200 MG capsule, TAKE 1 CAPSULE BY MOUTH DAILY, Disp: 30 capsule, Rfl: 5  •  cycloSPORINE (RESTASIS) 0.05 % ophthalmic emulsion, 1 drop Every 12 (Twelve) Hours., Disp: , Rfl:   •  diphenhydrAMINE (BENADRYL) 25 mg capsule, Take 25 mg by mouth Daily As Needed., Disp: , Rfl:   •  ibuprofen (ADVIL,MOTRIN) 200 MG tablet, Take 200 mg by mouth As Needed., Disp: , Rfl:   •  lisinopril (PRINIVIL,ZESTRIL) 20 MG tablet, TAKE 1 TABLET BY MOUTH DAILY, Disp: 90 tablet, Rfl: 3  •  oxybutynin (DITROPAN) 5 MG tablet, TAKE 1 TABLET BY MOUTH TWICE DAILY, Disp: 180 tablet, Rfl: 3  •  thiamine (VITAMINE B-1) 100 MG tablet, Take 100 mg by mouth Daily., Disp: , Rfl:   No current facility-administered medications for this visit.     Facility-Administered Medications Ordered in Other Visits:   •  heparin flush (porcine) 100 UNIT/ML injection 500 Units, 500 Units, Intravenous, PRN, Shanda Torres MD, 500 Units at 04/16/18 1504  •  heparin flush (porcine) 100 UNIT/ML injection 500 Units, 500 Units, Intravenous, PRN, Shanda Torres MD, 500 Units at 06/12/18 1445  •  sodium chloride 0.9 % flush 10 mL, 10 mL, Intravenous, PRN, Shanda Torres MD, 10 mL at 04/16/18 1503  •  sodium chloride 0.9 % flush 10 mL, 10 mL, Intravenous, PRN, Shanda Torres MD, 10 mL at 06/12/18 0479    No problems with medications.    Allergies   Allergen Reactions   • Benzonatate Hives and Itching       Review of  Systems  GENERAL:  Active/slower with limits, speed, stamina for age, and occ back pain.  Sleep is ok. No fever now/recent.  SKIN: No rash/skin lesion of concern unless above/below.   ENDO:  No syncope, near or diaphoretic sweaty spells.  No download..  HEENT: No recent head injury; less sinus area headache.  No vision change.  No chronic hearing loss.  Ears with fullness without pain/drainage.  No sore throat now.  No significant nasal/sinus congestion/drainage. No epistaxis.   CHEST: No chest wall tenderness or mass.  No cough, and no wheeze.  No SOB; no hemoptysis.  CV: No chest pain, palpitations, ankle edema.  GI: No heartburn, dysphagia.  No abdominal pain, diarrhea, constipation.  No rectal bleeding, or melena.    :  Voids with occ on/off dysuria, without incontinence to completion.  ORTHO: No painful/swollen joints but various on /off sore.  Occ same/sore neck or back.  No acute neck or lower back pain without recent injury.  NEURO: No dizziness, weakness of extremities.  Same mild numbness/paresthesias LE.  PSYCH: No memory loss.  Mood good; mild anxious, depressed but/and not suicidal.  Tries to tolerate stress .   Screening:  Mammogram: bilateral masectomy  Bone density: Bone d-deca/[facility]/T hip -0.5 LS +1.4/12.18.18;  no change; maybe 2-5 yr  Low dose CT chest:Tobacco-smoker/age 17/1-2ppd/dc age 60:  CTs with breast cancer:   GI: Colon-avm-div/[facility]/12.19.17/5y  Prostate: NA  Usual lab order  6m CBC, CMP, A1c  12m CBC, CMP, A1c, LIPID, TSH, Vit D    Data reviewed:   Recent admit/ER/MD visits: with oncology recent    Lab Results:  Results for orders placed or performed in visit on 12/14/20   Comprehensive Metabolic Panel    Specimen: Blood   Result Value Ref Range    Glucose 108 (H) 65 - 99 mg/dL    BUN 19 8 - 23 mg/dL    Creatinine 0.55 (L) 0.57 - 1.00 mg/dL    Sodium 140 136 - 145 mmol/L    Potassium 4.5 3.5 - 5.2 mmol/L    Chloride 105 98 - 107 mmol/L    CO2 26.0 22.0 - 29.0 mmol/L    Calcium 9.2  8.6 - 10.5 mg/dL    Total Protein 6.8 6.0 - 8.5 g/dL    Albumin 4.40 3.50 - 5.20 g/dL    ALT (SGPT) 8 1 - 33 U/L    AST (SGOT) 20 1 - 32 U/L    Alkaline Phosphatase 114 39 - 117 U/L    Total Bilirubin 0.4 0.0 - 1.2 mg/dL    eGFR Non African Amer 107 >60 mL/min/1.73    Globulin 2.4 gm/dL    A/G Ratio 1.8 g/dL    BUN/Creatinine Ratio 34.5 (H) 7.0 - 25.0    Anion Gap 9.0 5.0 - 15.0 mmol/L   Cancer Antigen 27.29    Specimen: Blood   Result Value Ref Range    CA 27.29 33.8 0.0 - 38.6 U/mL   Cancer Antigen 15-3    Specimen: Blood   Result Value Ref Range    CA 15-3 27.2 (H) <=25.0 U/mL   CBC Auto Differential    Specimen: Blood   Result Value Ref Range    WBC 7.52 3.40 - 10.80 10*3/mm3    RBC 4.24 3.77 - 5.28 10*6/mm3    Hemoglobin 12.8 12.0 - 15.9 g/dL    Hematocrit 39.6 34.0 - 46.6 %    MCV 93.4 79.0 - 97.0 fL    MCH 30.2 26.6 - 33.0 pg    MCHC 32.3 31.5 - 35.7 g/dL    RDW 12.6 12.3 - 15.4 %    RDW-SD 43.5 37.0 - 54.0 fl    MPV 10.9 6.0 - 12.0 fL    Platelets 169 140 - 450 10*3/mm3    Neutrophil % 60.6 42.7 - 76.0 %    Lymphocyte % 32.0 19.6 - 45.3 %    Monocyte % 4.9 (L) 5.0 - 12.0 %    Eosinophil % 1.7 0.3 - 6.2 %    Basophil % 0.3 0.0 - 1.5 %    Immature Grans % 0.5 0.0 - 0.5 %    Neutrophils, Absolute 4.55 1.70 - 7.00 10*3/mm3    Lymphocytes, Absolute 2.41 0.70 - 3.10 10*3/mm3    Monocytes, Absolute 0.37 0.10 - 0.90 10*3/mm3    Eosinophils, Absolute 0.13 0.00 - 0.40 10*3/mm3    Basophils, Absolute 0.02 0.00 - 0.20 10*3/mm3    Immature Grans, Absolute 0.04 0.00 - 0.05 10*3/mm3    nRBC 0.0 0.0 - 0.2 /100 WBC   Gold Top - SST   Result Value Ref Range    Extra Tube Hold for add-ons.        A1C:  Lab Results - Last 18 Months   Lab Units 06/08/20  0705 12/16/19  0907   HEMOGLOBIN A1C % 6.30* 6.30*     PSA:No results for input(s): PSA in the last 43578 hours.  CBC:  Lab Results - Last 18 Months   Lab Units 12/14/20  0937 06/08/20  0705 12/16/19  0907   WBC 10*3/mm3 7.52 7.04 8.13   HEMOGLOBIN g/dL 12.8 12.7 12.9  "  HEMATOCRIT % 39.6 38.8 38.9   PLATELETS 10*3/mm3 169 171 168      BMP/CMP:  Lab Results - Last 18 Months   Lab Units 12/14/20  0937 06/08/20  0705 12/16/19  0907   SODIUM mmol/L 140 139 140   POTASSIUM mmol/L 4.5 4.5 4.4   CHLORIDE mmol/L 105 103 103   TOTAL CO2 mmol/L  --  24.4  --    CO2 mmol/L 26.0  --  26.0   GLUCOSE mg/dL  --  123*  --    BUN mg/dL 19 18 16   CREATININE mg/dL 0.55* 0.72 0.53*   EGFR IF NONAFRICN AM mL/min/1.73 107 79 112   EGFR IF AFRICN AM mL/min/1.73  --  95  --    CALCIUM mg/dL 9.2 9.5 9.5     HEPATIC:  Lab Results - Last 18 Months   Lab Units 12/14/20 0937 06/08/20  0705 12/16/19  0907   ALT (SGPT) U/L 8 8 8   AST (SGOT) U/L 20 19 18   ALK PHOS U/L 114 105 109     THYROID:No results for input(s): TSH, FREET4, FTI in the last 44633 hours.    Invalid input(s): FREET3, T3, T4, TEUP,  TOTALT4    Objective   /76   Pulse 64   Temp 97.1 °F (36.2 °C) (Infrared)   Resp 16   Ht 160 cm (63\")   Wt 87.8 kg (193 lb 9.6 oz)   LMP  (LMP Unknown)   SpO2 95%   Breastfeeding No   BMI 34.29 kg/m²   Body mass index is 34.29 kg/m².    Recent Vitals       6/22/2020 12/14/2020 1/4/2021       BP:  122/74  154/88  132/76     Pulse:  56  52  64     Temp:  98.5 °F (36.9 °C)  98.2 °F (36.8 °C)  97.1 °F (36.2 °C)     Weight:  86.2 kg (190 lb)  89.4 kg (197 lb)  87.8 kg (193 lb 9.6 oz)     BMI (Calculated):  33.7  34.9  34.3           Physical Exam  Vitals signs and nursing note reviewed.   Constitutional:       General: She is not in acute distress.     Appearance: She is obese.   HENT:      Head: Normocephalic.      Mouth/Throat:      Mouth: Mucous membranes are moist.   Eyes:      Extraocular Movements: Extraocular movements intact.      Conjunctiva/sclera: Conjunctivae normal.      Pupils: Pupils are equal, round, and reactive to light.   Neck:      Musculoskeletal: Normal range of motion and neck supple. No muscular tenderness.   Cardiovascular:      Rate and Rhythm: Normal rate and regular rhythm. "      Heart sounds: Normal heart sounds.   Pulmonary:      Effort: Pulmonary effort is normal.      Breath sounds: Normal breath sounds.   Abdominal:      Palpations: Abdomen is soft.      Tenderness: There is no abdominal tenderness.   Musculoskeletal: Normal range of motion.         General: No deformity.   Neurological:      General: No focal deficit present.      Mental Status: She is alert and oriented to person, place, and time.      Cranial Nerves: No cranial nerve deficit.      Motor: No weakness.      Gait: Gait normal.   Psychiatric:         Mood and Affect: Mood normal.         Behavior: Behavior normal.         Thought Content: Thought content normal.         Judgment: Judgment normal.       Assessment/Plan     1. Essential hypertension    2. Controlled type 2 diabetes mellitus without complication, without long-term current use of insulin (CMS/[facility])    3. Chronic obstructive pulmonary disease, unspecified COPD type (CMS/[facility])    4. Osteoarthritis of multiple joints, unspecified osteoarthritis type    5. Malignant neoplasm of female breast, unspecified estrogen receptor status, unspecified laterality, unspecified site of breast (CMS/[facility])        Discussion: Her weight remains a problem.  Fortunately her blood sugar blood pressure cardiac exam remained unremarkable.  She has mild degenerative disease and significant risk for that.  She is very confused on what she supposed to do about her next oncology visit.  We are having difficulty getting an A1c when she has her oncology visit    Medical decision issues:   Data review above:   Rx: reviewed and decisions:   Same Rx for now     Orders placed:   LAB/Testing/Referrals: reviewed/orders:   Today: none  No orders of the defined types were placed in this encounter.    Chronic/recurrent labs above or change to:   Want A1c; asked to come in and get paper order before her visit to oncology     Discussion with external provider: note sent to /oncology to please  reach out and be sure she gets a 6m appontment.     Health maintance:   Body mass index is 34.29 kg/m².  Patient's Body mass index is 34.29 kg/m². BMI is above normal parameters. Recommendations include: exercise counseling and nutrition counseling.      Tobacco use reviewed:    Ira Sanchez  reports that she quit smoking about 20 years ago. Her smoking use included cigarettes. She started smoking about 57 years ago. She has a 74.00 pack-year smoking history. She has never used smokeless tobacco..     There are no Patient Instructions on file for this visit.    Follow up: Return for Dr Tinoco-, 6 m;.  Future Appointments   Date Time Provider Department Center   3/8/2021 10:00 AM [facility] NURSE [facility]   7/12/2021  9:30 AM Conrado Tinoco MD [facility]

## 2021-02-08 ENCOUNTER — TELEPHONE (OUTPATIENT)
Dept: ONCOLOGY | Facility: CLINIC | Age: 76
End: 2021-02-08

## 2021-02-08 NOTE — TELEPHONE ENCOUNTER
Caller: Ira    Relationship to patient: Pt    Best call back number: [phone]     Type of visit: 6 month follow up and labs    Requested date: Sometime in June. Reqesting Monday or Friday, early morning      
Diagnostic testing not indicated for today's encounter

## 2021-02-22 NOTE — TELEPHONE ENCOUNTER
----- Message from Conrado Tinoco MD sent at 2/7/2017  8:31 PM CST -----  1yr repeat  ----- Message -----     From: Dary Rodriguez Criselda     Sent: 2/7/2017   2:24 PM       To: Conrado Tinoco MD        RE: CT of Lung done at [facility]/[place]   Lymphedema

## 2021-03-01 ENCOUNTER — OFFICE VISIT (OUTPATIENT)
Dept: FAMILY MEDICINE CLINIC | Facility: CLINIC | Age: 76
End: 2021-03-01

## 2021-03-01 VITALS
OXYGEN SATURATION: 96 % | TEMPERATURE: 98.2 F | HEIGHT: 63 IN | SYSTOLIC BLOOD PRESSURE: 118 MMHG | WEIGHT: 198.6 LBS | RESPIRATION RATE: 16 BRPM | BODY MASS INDEX: 35.19 KG/M2 | DIASTOLIC BLOOD PRESSURE: 70 MMHG | HEART RATE: 66 BPM

## 2021-03-01 DIAGNOSIS — Z71.85 VACCINE COUNSELING: ICD-10-CM

## 2021-03-01 DIAGNOSIS — C50.919 MALIGNANT NEOPLASM OF FEMALE BREAST, UNSPECIFIED ESTROGEN RECEPTOR STATUS, UNSPECIFIED LATERALITY, UNSPECIFIED SITE OF BREAST (HCC): Chronic | ICD-10-CM

## 2021-03-01 DIAGNOSIS — R06.02 SHORTNESS OF BREATH: ICD-10-CM

## 2021-03-01 DIAGNOSIS — R97.8 ELEVATED CA 15-3 LEVEL: ICD-10-CM

## 2021-03-01 DIAGNOSIS — R60.0 BILATERAL LEG EDEMA: ICD-10-CM

## 2021-03-01 PROCEDURE — 99214 OFFICE O/P EST MOD 30 MIN: CPT | Performed by: FAMILY MEDICINE

## 2021-03-01 NOTE — PROGRESS NOTES
Subjective   Ira Sanchez is a 76 y.o. female presenting with chief complaint of:   Chief Complaint   Patient presents with   • Foot Swelling     noticed feet and legs have been swollen for about a month now       History of Present Illness :  Alone.       Has multiple chronic problems to consider that might have a bearing on today's issues; not an interval appointment.       Chronic/acute problems reviewed today:   1. Vaccine counseling Chronic/ongoing need to review pro/cons for various vaccinations based on age, health issues.  Needs vaccination today for COVID19.  To this point she is reluctant to consider vaccination.  Brother recently had COVID19.      2. Bilateral leg edema she has just developed a tendency for both legs to swell towards the end of the day.  Is associated with the usual mild degree of exertional shortness of breath.  She does not know if she is taking ibuprofen and Celebrex even though they are on her med list   3. Shortness of breath Chronic/stable:  SOB worse with exertion/ok at rest.  Contributing diagnosis: .     4. Malignant neoplasm of female breast, unspecified estrogen receptor status, unspecified laterality, unspecified site of breast (CMS/HCC) chronic several years ago history of breast cancer.  Has upcoming appointment with [facility] oncology.  There have been no concerns to this point   5. Elevated CA 15-3 level her last 2 CA 15-3 level since June of last year have been elevated slightly.  Notation from [facility] says this is stable.  The patient was not aware of they were slightly elevated.  She denies breast masses or chest masses.     Has an/another acute issue today: none.    The following portions of the patient's history were reviewed and updated as appropriate: allergies, current medications, past family history, past medical history, past social history, past surgical history and problem list.      Current Outpatient Medications:   •  amLODIPine (NORVASC) 5 MG tablet,  TAKE 1 TABLET BY MOUTH DAILY, Disp: 90 tablet, Rfl: 3  •  Calcium Carb-Cholecalciferol (CALCIUM-VITAMIN D) 600-400 MG-UNIT tablet, Take 1 tablet by mouth 2 (Two) Times a Day., Disp: , Rfl:   •  celecoxib (CeleBREX) 200 MG capsule, TAKE 1 CAPSULE BY MOUTH DAILY, Disp: 30 capsule, Rfl: 5  •  cycloSPORINE (RESTASIS) 0.05 % ophthalmic emulsion, 1 drop Every 12 (Twelve) Hours., Disp: , Rfl:   •  diphenhydrAMINE (BENADRYL) 25 mg capsule, Take 25 mg by mouth Daily As Needed., Disp: , Rfl:   •  ibuprofen (ADVIL,MOTRIN) 200 MG tablet, Take 200 mg by mouth As Needed., Disp: , Rfl:   •  lisinopril (PRINIVIL,ZESTRIL) 20 MG tablet, TAKE 1 TABLET BY MOUTH DAILY, Disp: 90 tablet, Rfl: 3  •  oxybutynin (DITROPAN) 5 MG tablet, TAKE 1 TABLET BY MOUTH TWICE DAILY, Disp: 180 tablet, Rfl: 3  •  thiamine (VITAMINE B-1) 100 MG tablet, Take 100 mg by mouth Daily., Disp: , Rfl:   No current facility-administered medications for this visit.     Facility-Administered Medications Ordered in Other Visits:   •  heparin flush (porcine) 100 UNIT/ML injection 500 Units, 500 Units, Intravenous, PRN, Shanda Torres MD, 500 Units at 04/16/18 1504  •  heparin flush (porcine) 100 UNIT/ML injection 500 Units, 500 Units, Intravenous, PRN, Shanda Torres MD, 500 Units at 06/12/18 1445  •  sodium chloride 0.9 % flush 10 mL, 10 mL, Intravenous, PRN, Shanda Torres MD, 10 mL at 04/16/18 1503  •  sodium chloride 0.9 % flush 10 mL, 10 mL, Intravenous, PRN, Shanda Torres MD, 10 mL at 06/12/18 1445    No problems with medications.    Allergies   Allergen Reactions   • Benzonatate Hives and Itching       Review of Systems  GENERAL:  Active/slower with limits, speed, stamina for age, and occ back pain.  Sleep is ok. No fever now.  SKIN: No other rash/skin lesion of concern:   ENDO:  No syncope, near or diaphoretic sweaty spells.  No download..  HEENT: No recent head injury; less sinus area headache.  No vision  change.  No chronic hearing loss.  Ears with fullness without pain/drainage.  No sore throat now.  No significant nasal/sinus congestion/drainage. No epistaxis.   CHEST: No chest wall tenderness or mass.  No cough, and no wheeze.  No SOB; no hemoptysis.  CV: No chest pain, palpitations, ankle edema.  GI: No heartburn, dysphagia.  No abdominal pain, diarrhea, constipation.  No rectal bleeding, or melena.    :  Voids with occ on/off dysuria, without incontinence to completion.  ORTHO: No painful/swollen joints but various on /off sore.  Occ same/sore neck or back.  No acute neck or lower back pain without recent injury.  NEURO: No dizziness, weakness of extremities.  Same mild numbness/paresthesias LE.  PSYCH: No memory loss.  Mood good; mild anxious, depressed but/and not suicidal.  Tries to tolerate stress .   Screening:  Mammogram: bilateral masectomy  Bone density: Bone d-deca/[facility]/T hip -0.5 LS +1.4/12.18.18;  no change; maybe 2-5 yr  Low dose CT chest:Tobacco-smoker/age 17/1-2ppd/dc age 60:  CTs with breast cancer:   GI: Colon-avm-div/[facility]ie[facility]/12.19.17/5y  Prostate: NA  Usual lab order  6m CBC, CMP, A1c  12m CBC, CMP, A1c, LIPID, TSH, Vit D    Data reviewed:   Recent admit/ER/MD visits: oncology notes    Lab Results:  Results for orders placed or performed in visit on 12/14/20   Comprehensive Metabolic Panel    Specimen: Blood   Result Value Ref Range    Glucose 108 (H) 65 - 99 mg/dL    BUN 19 8 - 23 mg/dL    Creatinine 0.55 (L) 0.57 - 1.00 mg/dL    Sodium 140 136 - 145 mmol/L    Potassium 4.5 3.5 - 5.2 mmol/L    Chloride 105 98 - 107 mmol/L    CO2 26.0 22.0 - 29.0 mmol/L    Calcium 9.2 8.6 - 10.5 mg/dL    Total Protein 6.8 6.0 - 8.5 g/dL    Albumin 4.40 3.50 - 5.20 g/dL    ALT (SGPT) 8 1 - 33 U/L    AST (SGOT) 20 1 - 32 U/L    Alkaline Phosphatase 114 39 - 117 U/L    Total Bilirubin 0.4 0.0 - 1.2 mg/dL    eGFR Non African Amer 107 >60 mL/min/1.73    Globulin 2.4 gm/dL    A/G Ratio 1.8 g/dL    BUN/Creatinine  Ratio 34.5 (H) 7.0 - 25.0    Anion Gap 9.0 5.0 - 15.0 mmol/L   Cancer Antigen 27.29    Specimen: Blood   Result Value Ref Range    CA 27.29 33.8 0.0 - 38.6 U/mL   Cancer Antigen 15-3    Specimen: Blood   Result Value Ref Range    CA 15-3 27.2 (H) <=25.0 U/mL   CBC Auto Differential    Specimen: Blood   Result Value Ref Range    WBC 7.52 3.40 - 10.80 10*3/mm3    RBC 4.24 3.77 - 5.28 10*6/mm3    Hemoglobin 12.8 12.0 - 15.9 g/dL    Hematocrit 39.6 34.0 - 46.6 %    MCV 93.4 79.0 - 97.0 fL    MCH 30.2 26.6 - 33.0 pg    MCHC 32.3 31.5 - 35.7 g/dL    RDW 12.6 12.3 - 15.4 %    RDW-SD 43.5 37.0 - 54.0 fl    MPV 10.9 6.0 - 12.0 fL    Platelets 169 140 - 450 10*3/mm3    Neutrophil % 60.6 42.7 - 76.0 %    Lymphocyte % 32.0 19.6 - 45.3 %    Monocyte % 4.9 (L) 5.0 - 12.0 %    Eosinophil % 1.7 0.3 - 6.2 %    Basophil % 0.3 0.0 - 1.5 %    Immature Grans % 0.5 0.0 - 0.5 %    Neutrophils, Absolute 4.55 1.70 - 7.00 10*3/mm3    Lymphocytes, Absolute 2.41 0.70 - 3.10 10*3/mm3    Monocytes, Absolute 0.37 0.10 - 0.90 10*3/mm3    Eosinophils, Absolute 0.13 0.00 - 0.40 10*3/mm3    Basophils, Absolute 0.02 0.00 - 0.20 10*3/mm3    Immature Grans, Absolute 0.04 0.00 - 0.05 10*3/mm3    nRBC 0.0 0.0 - 0.2 /100 WBC   Gold Top - SST   Result Value Ref Range    Extra Tube Hold for add-ons.        A1C:  Lab Results - Last 18 Months   Lab Units 06/08/20  0705 12/16/19  0907   HEMOGLOBIN A1C % 6.30* 6.30*     LIPID:  Lab Results - Last 18 Months   Lab Units 12/16/19  0907   LDL CHOL mg/dL 72   HDL CHOL mg/dL 41   TRIGLYCERIDES mg/dL 100     PSA:No results for input(s): PSA in the last 68458 hours.  CBC:  Lab Results - Last 18 Months   Lab Units 12/14/20  0937 06/08/20  0705 12/16/19  0907   WBC 10*3/mm3 7.52 7.04 8.13   HEMOGLOBIN g/dL 12.8 12.7 12.9   HEMATOCRIT % 39.6 38.8 38.9   PLATELETS 10*3/mm3 169 171 168      BMP/CMP:  Lab Results - Last 18 Months   Lab Units 12/14/20  0937 06/08/20  0705 12/16/19  0907   SODIUM mmol/L 140 139 140   POTASSIUM  "mmol/L 4.5 4.5 4.4   CHLORIDE mmol/L 105 103 103   TOTAL CO2 mmol/L  --  24.4  --    CO2 mmol/L 26.0  --  26.0   GLUCOSE mg/dL  --  123*  --    BUN mg/dL 19 18 16   CREATININE mg/dL 0.55* 0.72 0.53*   EGFR IF NONAFRICN AM mL/min/1.73 107 79 112   EGFR IF AFRICN AM mL/min/1.73  --  95  --    CALCIUM mg/dL 9.2 9.5 9.5     HEPATIC:  Lab Results - Last 18 Months   Lab Units 12/14/20  0937 06/08/20  0705 12/16/19  0907   ALT (SGPT) U/L 8 8 8   AST (SGOT) U/L 20 19 18   ALK PHOS U/L 114 105 109     THYROID:No results for input(s): TSH, FREET4, FTI in the last 76233 hours.    Invalid input(s): FREET3, T3, T4, TEUP,  TOTALT4    Objective   /70   Pulse 66   Temp 98.2 °F (36.8 °C) (Infrared)   Resp 16   Ht 160 cm (63\")   Wt 90.1 kg (198 lb 9.6 oz)   LMP  (LMP Unknown)   SpO2 96%   Breastfeeding No   BMI 35.18 kg/m²   Body mass index is 35.18 kg/m².    Recent Vitals       12/14/2020 1/4/2021 3/1/2021       BP:  154/88  132/76  118/70     Pulse:  52  64  66     Temp:  98.2 °F (36.8 °C)  97.1 °F (36.2 °C)  98.2 °F (36.8 °C)     Weight:  89.4 kg (197 lb)  87.8 kg (193 lb 9.6 oz)  90.1 kg (198 lb 9.6 oz)     BMI (Calculated):  34.9  34.3  35.2           Physical Exam  GENERAL:  Well nourished/developed in no acute distress but frequent cough; at times paroxysmal.   SKIN: Turgor excellent, without wound, rash, lesion other than PHOTO-appendix scar.   HEENT: Normal cephalic without trauma. Pupils equal round reactive to light. Extraocular motions full without nystagmus.  External canals nonobstructive nontender without reddness. Tymphatic membranes dull with irwin structures intact.   Oral cavity without growths, exudates, and moist.  Posterior pharynx without mass, obstruction, redness.  No thyromegaly, mass, tenderness, definite lymphadenopathy and supple.   CV: Regular rhythm.  No murmur, gallop: trace mild pitting equal LE  edema. Posterior pulses intact.  No carotid bruits.  CHEST: No chest wall tenderness or " mass.   LUNGS: Symmetric motion with clear to auscultation.  No dullness to percussion  ABD: Soft, nontender without mass.   ORTHO: Symmetric extremities without swelling/point tenderness.  Full gross range of motion. Symmetric LE.  Neg straight leg raising.  Neg Patricks maneuver.  Back is straight/mild lordosis.  Lower back sore; no point tender.    NEURO: CN 2-12 grossly intact except L eyelid mild droop.  Otherwise symmetric facies and UE/LE. 3/5 strength throughout.   Otherwise nonfocal use extremities. Speech clear.      Assessment/Plan     1. Vaccine counseling    2. Bilateral leg edema    3. Shortness of breath    4. Malignant neoplasm of female breast, unspecified estrogen receptor status, unspecified laterality, unspecified site of breast (CMS/HCC)    5. Elevated CA 15-3 level        Discussion:  Some of the biggest reasons for leg edema include side effects of medication such as Celebrex ibuprofen or Norvasc.  It can also be associated with liver or renal disease; it can be associated with obesity lower extremity venous insufficiency or congestive heart failure.  She is agreeable to labs today; and the testing ordered  She is advised to go home and check her medications looking for Celebrex and ibuprofen and stopping those  She is advised to discuss with oncology her slightly elevated CA 15-3    Medical decision issues:   Data review above:   Rx: reviewed and decisions:   Stop celebrex, ibuprofen    Visit today involved intensive drug monitoring: ie potential to cause serious morbidity or death.  No orders of the defined types were placed in this encounter.      Orders placed:   LAB/Testing/Referrals: reviewed/orders:   Today:   Orders Placed This Encounter   Procedures   • US Venous Doppler Lower Extremity Bilateral (duplex)   • BNP   • TSH   • Comprehensive Metabolic Panel   • Adult Transthoracic Echo Complete W/ Cont if Necessary Per Protocol   • CBC & Differential     Chronic/recurrent labs above or  change to:   same     Health maintenance:   Body mass index is 35.18 kg/m².  Patient's Body mass index is 35.18 kg/m². BMI is above normal parameters. Recommendations include: exercise counseling, nutrition counseling and always a health goal.      Tobacco use reviewed:  None    Patient Instructions     Current Outpatient Medications:   •  celecoxib (CeleBREX) 200 MG capsule, TAKE 1 CAPSULE BY MOUTH DAILY, Disp: 30 capsule, Rfl: 5  •  ibuprofen (ADVIL,MOTRIN) 200 MG tablet, Take 200 mg by mouth As Needed., Disp: , Rfl:         Follow up: Return for Dr Tinoco-.  Future Appointments   Date Time Provider Department Center   3/8/2021 10:00 AM [facility] NURSE [facility] PAD PAD   4/1/2021 11:15 AM Cornado Tinoco MD [facility]   6/7/2021  8:00 AM [facility] LAB [facility]   6/7/2021  8:30 AM Tamela Doherty APRN [facility] PAD PAD   7/12/2021  9:30 AM Conrado Tinoco MD [facility]

## 2021-03-01 NOTE — PATIENT INSTRUCTIONS
Current Outpatient Medications:   •  celecoxib (CeleBREX) 200 MG capsule, TAKE 1 CAPSULE BY MOUTH DAILY, Disp: 30 capsule, Rfl: 5  •  ibuprofen (ADVIL,MOTRIN) 200 MG tablet, Take 200 mg by mouth As Needed., Disp: , Rfl:

## 2021-03-02 LAB
ALBUMIN SERPL-MCNC: 4.5 G/DL (ref 3.7–4.7)
ALBUMIN/GLOB SERPL: 1.7 {RATIO} (ref 1.2–2.2)
ALP SERPL-CCNC: 121 IU/L (ref 39–117)
ALT SERPL-CCNC: 9 IU/L (ref 0–32)
AST SERPL-CCNC: 18 IU/L (ref 0–40)
BASOPHILS # BLD AUTO: 0 X10E3/UL (ref 0–0.2)
BASOPHILS NFR BLD AUTO: 0 %
BILIRUB SERPL-MCNC: 0.3 MG/DL (ref 0–1.2)
BNP SERPL-MCNC: 26.6 PG/ML (ref 0–100)
BUN SERPL-MCNC: 14 MG/DL (ref 8–27)
BUN/CREAT SERPL: 19 (ref 12–28)
CALCIUM SERPL-MCNC: 9.6 MG/DL (ref 8.7–10.3)
CHLORIDE SERPL-SCNC: 101 MMOL/L (ref 96–106)
CO2 SERPL-SCNC: 23 MMOL/L (ref 20–29)
CREAT SERPL-MCNC: 0.74 MG/DL (ref 0.57–1)
EOSINOPHIL # BLD AUTO: 0.1 X10E3/UL (ref 0–0.4)
EOSINOPHIL NFR BLD AUTO: 2 %
ERYTHROCYTE [DISTWIDTH] IN BLOOD BY AUTOMATED COUNT: 12.4 % (ref 11.7–15.4)
GLOBULIN SER CALC-MCNC: 2.6 G/DL (ref 1.5–4.5)
GLUCOSE SERPL-MCNC: 106 MG/DL (ref 65–99)
HCT VFR BLD AUTO: 41.6 % (ref 34–46.6)
HGB BLD-MCNC: 13.5 G/DL (ref 11.1–15.9)
IMM GRANULOCYTES # BLD AUTO: 0 X10E3/UL (ref 0–0.1)
IMM GRANULOCYTES NFR BLD AUTO: 1 %
LYMPHOCYTES # BLD AUTO: 2.5 X10E3/UL (ref 0.7–3.1)
LYMPHOCYTES NFR BLD AUTO: 30 %
MCH RBC QN AUTO: 29.9 PG (ref 26.6–33)
MCHC RBC AUTO-ENTMCNC: 32.5 G/DL (ref 31.5–35.7)
MCV RBC AUTO: 92 FL (ref 79–97)
MONOCYTES # BLD AUTO: 0.5 X10E3/UL (ref 0.1–0.9)
MONOCYTES NFR BLD AUTO: 6 %
NEUTROPHILS # BLD AUTO: 5 X10E3/UL (ref 1.4–7)
NEUTROPHILS NFR BLD AUTO: 61 %
PLATELET # BLD AUTO: 202 X10E3/UL (ref 150–450)
POTASSIUM SERPL-SCNC: 4.6 MMOL/L (ref 3.5–5.2)
PROT SERPL-MCNC: 7.1 G/DL (ref 6–8.5)
RBC # BLD AUTO: 4.51 X10E6/UL (ref 3.77–5.28)
SODIUM SERPL-SCNC: 140 MMOL/L (ref 134–144)
TSH SERPL DL<=0.005 MIU/L-ACNC: 2.18 UIU/ML (ref 0.45–4.5)
WBC # BLD AUTO: 8.1 X10E3/UL (ref 3.4–10.8)

## 2021-03-02 RX ORDER — POTASSIUM CHLORIDE 750 MG/1
10 TABLET, FILM COATED, EXTENDED RELEASE ORAL DAILY
Qty: 90 TABLET | Refills: 1 | Status: SHIPPED | OUTPATIENT
Start: 2021-03-02 | End: 2021-04-05

## 2021-03-02 RX ORDER — FUROSEMIDE 20 MG/1
20 TABLET ORAL DAILY
Qty: 90 TABLET | Refills: 1 | Status: SHIPPED | OUTPATIENT
Start: 2021-03-02 | End: 2021-04-05

## 2021-03-02 NOTE — TELEPHONE ENCOUNTER
----- Message from Conrado Tinoco MD sent at 3/2/2021 10:40 AM CST -----  May start lasix 20/KCL 10 qd  While waiting on echo  Extra BMP one week after starting this

## 2021-03-04 DIAGNOSIS — N32.81 OVERACTIVE BLADDER: Primary | ICD-10-CM

## 2021-03-04 NOTE — TELEPHONE ENCOUNTER
PATIENT CALLED IN WITH QUESTIONS REGARDING HER MEDICATIONS. PATIENT IS REQUESTING TO SPEAK TO SOMEONE CLINICAL. PLEASE ADVISE.     CALLBACK # [phone]

## 2021-03-05 DIAGNOSIS — R06.02 SHORTNESS OF BREATH: ICD-10-CM

## 2021-03-05 DIAGNOSIS — R60.0 BILATERAL LEG EDEMA: ICD-10-CM

## 2021-03-05 NOTE — TELEPHONE ENCOUNTER
"Spoke to patient in the office and advised that she can take her lasix at bedtime and will find out if she can go to the daily oxybutin xl \"im taking oxybutin 5 mg twice a day to help control how often I have to go, then I got the lasix prescription and I think they are working against each other?\"    Also advised will wait for her testing to come back and ask Dr Tinoco about her concerns, pt advised \"I did not get a vm yesterday?\"  "

## 2021-03-08 ENCOUNTER — LAB (OUTPATIENT)
Dept: FAMILY MEDICINE CLINIC | Facility: CLINIC | Age: 76
End: 2021-03-08

## 2021-03-08 ENCOUNTER — INFUSION (OUTPATIENT)
Dept: ONCOLOGY | Facility: CLINIC | Age: 76
End: 2021-03-08

## 2021-03-08 DIAGNOSIS — Z45.2 ENCOUNTER FOR CARE RELATED TO PORT-A-CATH: Primary | ICD-10-CM

## 2021-03-08 DIAGNOSIS — I10 ESSENTIAL HYPERTENSION: Primary | ICD-10-CM

## 2021-03-08 DIAGNOSIS — E11.9 CONTROLLED TYPE 2 DIABETES MELLITUS WITHOUT COMPLICATION, WITHOUT LONG-TERM CURRENT USE OF INSULIN (HCC): ICD-10-CM

## 2021-03-08 RX ORDER — OXYBUTYNIN CHLORIDE 10 MG/1
10 TABLET, EXTENDED RELEASE ORAL DAILY
Qty: 90 TABLET | Refills: 1 | Status: SHIPPED | OUTPATIENT
Start: 2021-03-08 | End: 2021-09-08 | Stop reason: SDUPTHER

## 2021-03-08 RX ORDER — HEPARIN SODIUM (PORCINE) LOCK FLUSH IV SOLN 100 UNIT/ML 100 UNIT/ML
500 SOLUTION INTRAVENOUS AS NEEDED
Status: DISCONTINUED | OUTPATIENT
Start: 2021-03-08 | End: 2021-03-08 | Stop reason: HOSPADM

## 2021-03-08 RX ORDER — SODIUM CHLORIDE 0.9 % (FLUSH) 0.9 %
10 SYRINGE (ML) INJECTION AS NEEDED
Status: CANCELLED | OUTPATIENT
Start: 2021-03-08

## 2021-03-08 RX ORDER — HEPARIN SODIUM (PORCINE) LOCK FLUSH IV SOLN 100 UNIT/ML 100 UNIT/ML
500 SOLUTION INTRAVENOUS AS NEEDED
Status: CANCELLED | OUTPATIENT
Start: 2021-03-08

## 2021-03-08 RX ORDER — SODIUM CHLORIDE 0.9 % (FLUSH) 0.9 %
10 SYRINGE (ML) INJECTION AS NEEDED
Status: DISCONTINUED | OUTPATIENT
Start: 2021-03-08 | End: 2021-03-08 | Stop reason: HOSPADM

## 2021-03-08 RX ADMIN — Medication 10 ML: at 09:13

## 2021-03-08 RX ADMIN — HEPARIN SODIUM (PORCINE) LOCK FLUSH IV SOLN 100 UNIT/ML 500 UNITS: 100 SOLUTION at 09:13

## 2021-03-08 NOTE — TELEPHONE ENCOUNTER
Attempted to call pt and vm left that new rx for the extended release to be sent to [facility], as we have discussed this, TRC if more information is needed

## 2021-03-09 LAB
BUN SERPL-MCNC: 20 MG/DL (ref 8–23)
BUN/CREAT SERPL: 26.7 (ref 7–25)
CALCIUM SERPL-MCNC: 9.9 MG/DL (ref 8.6–10.5)
CHLORIDE SERPL-SCNC: 98 MMOL/L (ref 98–107)
CO2 SERPL-SCNC: 25.1 MMOL/L (ref 22–29)
CREAT SERPL-MCNC: 0.75 MG/DL (ref 0.57–1)
GLUCOSE SERPL-MCNC: 127 MG/DL (ref 65–99)
POTASSIUM SERPL-SCNC: 4.6 MMOL/L (ref 3.5–5.2)
SODIUM SERPL-SCNC: 136 MMOL/L (ref 136–145)

## 2021-04-05 ENCOUNTER — OFFICE VISIT (OUTPATIENT)
Dept: FAMILY MEDICINE CLINIC | Facility: CLINIC | Age: 76
End: 2021-04-05

## 2021-04-05 VITALS
OXYGEN SATURATION: 96 % | DIASTOLIC BLOOD PRESSURE: 78 MMHG | HEIGHT: 63 IN | TEMPERATURE: 98 F | HEART RATE: 61 BPM | BODY MASS INDEX: 34.66 KG/M2 | WEIGHT: 195.6 LBS | RESPIRATION RATE: 18 BRPM | SYSTOLIC BLOOD PRESSURE: 126 MMHG

## 2021-04-05 DIAGNOSIS — I10 ESSENTIAL HYPERTENSION: Chronic | ICD-10-CM

## 2021-04-05 DIAGNOSIS — R05.9 COUGH: ICD-10-CM

## 2021-04-05 DIAGNOSIS — M54.9 CHRONIC BACK PAIN, UNSPECIFIED BACK LOCATION, UNSPECIFIED BACK PAIN LATERALITY: ICD-10-CM

## 2021-04-05 DIAGNOSIS — J44.9 CHRONIC OBSTRUCTIVE PULMONARY DISEASE, UNSPECIFIED COPD TYPE (HCC): ICD-10-CM

## 2021-04-05 DIAGNOSIS — R60.9 EDEMA, UNSPECIFIED TYPE: ICD-10-CM

## 2021-04-05 DIAGNOSIS — J30.9 ALLERGIC RHINITIS, UNSPECIFIED SEASONALITY, UNSPECIFIED TRIGGER: ICD-10-CM

## 2021-04-05 DIAGNOSIS — G89.29 CHRONIC BACK PAIN, UNSPECIFIED BACK LOCATION, UNSPECIFIED BACK PAIN LATERALITY: ICD-10-CM

## 2021-04-05 DIAGNOSIS — E11.9 CONTROLLED TYPE 2 DIABETES MELLITUS WITHOUT COMPLICATION, WITHOUT LONG-TERM CURRENT USE OF INSULIN (HCC): Chronic | ICD-10-CM

## 2021-04-05 PROCEDURE — 99213 OFFICE O/P EST LOW 20 MIN: CPT | Performed by: FAMILY MEDICINE

## 2021-04-05 RX ORDER — POTASSIUM CHLORIDE 750 MG/1
20 TABLET, FILM COATED, EXTENDED RELEASE ORAL DAILY
Qty: 90 TABLET | Refills: 1 | Status: SHIPPED
Start: 2021-04-05 | End: 2021-07-12

## 2021-04-05 RX ORDER — FUROSEMIDE 20 MG/1
40 TABLET ORAL DAILY
Qty: 90 TABLET | Refills: 1
Start: 2021-04-05 | End: 2021-07-12

## 2021-04-05 NOTE — PROGRESS NOTES
Subjective   Ira Sanchez is a 76 y.o. female presenting with chief complaint of:   Chief Complaint   Patient presents with   • Edema     ankle and feet swelling        History of Present Illness :  Alone.  Here for primarily her concern with leg edema.  Has since had venous scan no clot; and placed on lasix.  Still waiting on echo.       Has multiple chronic problems to consider that might have a bearing on today's issues; somewhat an interval appointment.       Chronic/acute problems reviewed today:   1. Chronic obstructive pulmonary disease, unspecified COPD type (CMS/[facility]) Chronic/stable mild occ cough, sob, wheeze.  Rx helps when used.   No smoking.       2. Allergic rhinitis, unspecified seasonality, unspecified trigger Chronic/variable; seasonal worse.   On/off eye, sinus, nasal congestion and drainage.  Rx helps/when used.  Aware of options additional medications, testing and not interested.     3. Essential hypertension Chronic/stable. Stable here past/no recent home blood pressures.  No significant chest pain, SOB, LE edema, orthopnea, near syncope, dizziness/light headness.   Recent Vitals       1/4/2021 3/1/2021 4/5/2021       BP:  132/76  118/70  126/78     Pulse:  64  66  61     Temp:  97.1 °F (36.2 °C)  98.2 °F (36.8 °C)  98 °F (36.7 °C)     Weight:  87.8 kg (193 lb 9.6 oz)  90.1 kg (198 lb 9.6 oz)  88.7 kg (195 lb 9.6 oz)     BMI (Calculated):  34.3  35.2  34.7            4. Controlled type 2 diabetes mellitus without complication, without long-term current use of insulin (CMS/[facility]) Chronic/stable.  No problem/pattern hypoglycemia/hyperglycemia manifest by poly- dypsia, phagia, uria, or sweats, diaphoretic episodes, syncope/near.     5. Cough: copd chronic on and off cough; no hemoptysis or chest pain   6. Chronic back pain, unspecified back location, unspecified back pain laterality : chronic/variable 0-3/10 lower back pain with infrequent/same radiation to UE/LE.  No change LE numbness.  Has  bladder/bowel control. No desire pain mangement, surgery to change approach of care.      7. Edema, unspecified type chronic recent issues with increased leg edema now being treated with low-dose Lasix.  Echo still pending.     Has an/another acute issue today: none    The following portions of the patient's history were reviewed and updated as appropriate: allergies, current medications, past family history, past medical history, past social history, past surgical history and problem list.      Current Outpatient Medications:   •  amLODIPine (NORVASC) 5 MG tablet, TAKE 1 TABLET BY MOUTH DAILY, Disp: 90 tablet, Rfl: 3  •  Calcium Carb-Cholecalciferol (CALCIUM-VITAMIN D) 600-400 MG-UNIT tablet, Take 1 tablet by mouth 2 (Two) Times a Day., Disp: , Rfl:   •  celecoxib (CeleBREX) 200 MG capsule, TAKE 1 CAPSULE BY MOUTH DAILY, Disp: 30 capsule, Rfl: 5 she did not recognize  •  cycloSPORINE (RESTASIS) 0.05 % ophthalmic emulsion, 1 drop Every 12 (Twelve) Hours., Disp: , Rfl:   •  diphenhydrAMINE (BENADRYL) 25 mg capsule, Take 25 mg by mouth Daily As Needed., Disp: , Rfl:   •  furosemide (Lasix) 20 MG tablet, Take 1 tablet by mouth Daily., Disp: 90 tablet, Rfl: 1  •  ibuprofen (ADVIL,MOTRIN) 200 MG tablet, Take 200 mg by mouth As Needed., Disp: , Rfl: on/off back pain; more lately.    •  lisinopril (PRINIVIL,ZESTRIL) 20 MG tablet, TAKE 1 TABLET BY MOUTH DAILY, Disp: 90 tablet, Rfl: 3  •  oxybutynin XL (Ditropan XL) 10 MG 24 hr tablet, Take 1 tablet by mouth Daily., Disp: 90 tablet, Rfl: 1  •  potassium chloride 10 MEQ CR tablet, Take 1 tablet by mouth Daily., Disp: 90 tablet, Rfl: 1  •  thiamine (VITAMINE B-1) 100 MG tablet, Take 100 mg by mouth Daily., Disp: , Rfl:   No current facility-administered medications for this visit.    No problems with medications.    Allergies   Allergen Reactions   • Benzonatate Hives and Itching       Review of Systems  GENERAL:  Active/slower with limits, speed, stamina for age, and occ back  pain.  Sleep is ok. No fever now.  SKIN: No other rash/skin lesion of concern.   ENDO:  No syncope, near or diaphoretic sweaty spells.  No download..  HEENT: No recent head injury; less sinus area headache.  No vision change.  No chronic hearing loss.  Ears with fullness without pain/drainage.  No sore throat now.  No significant nasal/sinus congestion/drainage. No epistaxis.   CHEST: No chest wall tenderness or mass.  No cough, and no wheeze.  No SOB; no hemoptysis.  CV: No chest pain, palpitations; new/stable ankle edema.  GI: No heartburn, dysphagia.  No abdominal pain, diarrhea, constipation.  No rectal bleeding, or melena. :  Voids with occ on/off dysuria, without incontinence to completion.  ORTHO: No painful/swollen joints but various on /off sore.  Occ same/sore neck or back.  No acute neck or lower back pain without recent injury.  NEURO: No dizziness, weakness of extremities.  Same mild numbness/paresthesias LE.  PSYCH: No memory loss.  Mood good; mild anxious, depressed but/and not suicidal.  Tries to tolerate stress .   Screening:  Mammogram: bilateral masectomy  Bone density: Bone d-deca/[facility]/T hip -0.5 LS +1.4/12.18.18;  no change; maybe 2-5 yr  Low dose CT chest:Tobacco-smoker/age 17/1-2ppd/dc age 60:  CTs with breast cancer:   GI: Colon-avm-div/[facility]/12.19.17/5y  Prostate: NA  Usual lab order  6m CBC, CMP, A1c  12m CBC, CMP, A1c, LIPID, TSH, Vit D    Data reviewed:   Last cardiac testing:   Echo: ordered    LE venous scan; no clot/3.1.2021        Lab Results:  Results for orders placed or performed in visit on 03/08/21   Basic Metabolic Panel    Specimen: Blood   Result Value Ref Range    Glucose 127 (H) 65 - 99 mg/dL    BUN 20 8 - 23 mg/dL    Creatinine 0.75 0.57 - 1.00 mg/dL    eGFR Non African Am 75 >60 mL/min/1.73    eGFR African Am 91 >60 mL/min/1.73    BUN/Creatinine Ratio 26.7 (H) 7.0 - 25.0    Sodium 136 136 - 145 mmol/L    Potassium 4.6 3.5 - 5.2 mmol/L    Chloride 98 98 - 107 mmol/L  "   Total CO2 25.1 22.0 - 29.0 mmol/L    Calcium 9.9 8.6 - 10.5 mg/dL       A1C:  Lab Results - Last 18 Months   Lab Units 06/08/20  0705 12/16/19  0907   HEMOGLOBIN A1C % 6.30* 6.30*     LIPID:  Lab Results - Last 18 Months   Lab Units 12/16/19  0907   LDL CHOL mg/dL 72   HDL CHOL mg/dL 41   TRIGLYCERIDES mg/dL 100     PSA:No results for input(s): PSA in the last 35363 hours.  CBC:  Lab Results - Last 18 Months   Lab Units 03/01/21  0822 12/14/20  0937 06/08/20  0705 12/16/19  0907   WBC x10E3/uL 8.1 7.52 7.04 8.13   HEMOGLOBIN g/dL 13.5 12.8 12.7 12.9   HEMATOCRIT % 41.6 39.6 38.8 38.9   PLATELETS x10E3/uL 202 169 171 168      BMP/CMP:  Lab Results - Last 18 Months   Lab Units 03/08/21  1215 03/01/21  0822 12/14/20  0937 06/08/20  0705 12/16/19  0907   SODIUM mmol/L 136 140 140 139 140   POTASSIUM mmol/L 4.6 4.6 4.5 4.5 4.4   CHLORIDE mmol/L 98 101 105 103 103   TOTAL CO2 mmol/L 25.1 23  --  24.4  --    CO2 mmol/L  --   --  26.0  --  26.0   GLUCOSE mg/dL 127* 106*  --  123*  --    BUN mg/dL 20 14 19 18 16   CREATININE mg/dL 0.75 0.74 0.55* 0.72 0.53*   EGFR IF NONAFRICN AM mL/min/1.73 75 78 107 79 112   EGFR IF AFRICN AM mL/min/1.73 91 90  --  95  --    CALCIUM mg/dL 9.9 9.6 9.2 9.5 9.5     HEPATIC:  Lab Results - Last 18 Months   Lab Units 03/01/21  0822 12/14/20  0937 06/08/20  0705 12/16/19  0907   ALT (SGPT) IU/L 9 8 8 8   AST (SGOT) IU/L 18 20 19 18   ALK PHOS IU/L 121* 114 105 109     THYROID:  Lab Results - Last 18 Months   Lab Units 03/01/21  0822   TSH uIU/mL 2.180       Objective   /78   Pulse 61   Temp 98 °F (36.7 °C) (Infrared)   Resp 18   Ht 160 cm (63\")   Wt 88.7 kg (195 lb 9.6 oz)   LMP  (LMP Unknown)   SpO2 96%   Breastfeeding No   BMI 34.65 kg/m²   Body mass index is 34.65 kg/m².    Recent Vitals       1/4/2021 3/1/2021 4/5/2021       BP:  132/76  118/70  126/78     Pulse:  64  66  61     Temp:  97.1 °F (36.2 °C)  98.2 °F (36.8 °C)  98 °F (36.7 °C)     Weight:  87.8 kg (193 lb 9.6 " oz)  90.1 kg (198 lb 9.6 oz)  88.7 kg (195 lb 9.6 oz)     BMI (Calculated):  34.3  35.2  34.7           Physical Exam  GENERAL:  Well nourished/developed in no acute distress but frequent cough; at times paroxysmal.   SKIN: Turgor excellent, without wound, rash, lesion other than PHOTO-appendix scar.   HEENT: Normal cephalic without trauma. Pupils equal round reactive to light. Extraocular motions full without nystagmus.  External canals nonobstructive nontender without reddness. Tymphatic membranes dull with irwin structures intact.   Oral cavity without growths, exudates, and moist.  Posterior pharynx without mass, obstruction, redness.  No thyromegaly, mass, tenderness, definite lymphadenopathy and supple.   CV: Regular rhythm.  No murmur, gallop,  edema. Posterior pulses intact.  No carotid bruits.  CHEST: No chest wall tenderness or mass.   LUNGS: Symmetric motion with clear to auscultation.  No dullness to percussion  ABD: Soft, nontender without mass.   ORTHO: Symmetric extremities without swelling/point tenderness.  Full gross range of motion. Symmetric LE.  Neg straight leg raising.  Neg Patricks maneuver.  Back is straight/mild lordosis.  Lower back sore; no point tender.    NEURO: CN 2-12 grossly intact except L eyelid mild droop.  Otherwise symmetric facies and UE/LE. 3/5 strength throughout.   Otherwise nonfocal use extremities. Speech clear.    Assessment/Plan     1. Chronic obstructive pulmonary disease, unspecified COPD type (CMS/[facility])    2. Allergic rhinitis, unspecified seasonality, unspecified trigger    3. Essential hypertension    4. Controlled type 2 diabetes mellitus without complication, without long-term current use of insulin (CMS/[facility])    5. Cough: copd    6. Chronic back pain, unspecified back location, unspecified back pain laterality    7. Edema, unspecified type      Discussion:  Would like to see echo before significant new changes  May increase lasix one more step    Medical decision  issues:   Data review above:   Rx: reviewed and decisions:     New Medications Ordered This Visit   Medications   • potassium chloride 10 MEQ CR tablet     Sig: Take 2 tablets by mouth Daily.     Dispense:  90 tablet     Refill:  1   • furosemide (Lasix) 20 MG tablet     Sig: Take 2 tablets by mouth Daily.     Dispense:  90 tablet     Refill:  1     Orders placed:   LAB/Testing/Referrals: reviewed/orders:   Today:   No orders of the defined types were placed in this encounter.    Chronic/recurrent labs above or change to:   same    Health maintenance:   Body mass index is 34.65 kg/m².  Patient's Body mass index is 34.65 kg/m². BMI is above normal parameters. Recommendations include: exercise counseling and nutrition counseling.    Tobacco use reviewed:  Non-smoker  Ira Sanchez  reports that she quit smoking about 20 years ago. Her smoking use included cigarettes. She started smoking about 58 years ago. She has a 74.00 pack-year smoking history. She has never used smokeless tobacco..     There are no Patient Instructions on file for this visit.    Follow up: Return for lab;, Dr Tinoco-, as planned;.  Future Appointments   Date Time Provider Department Center   4/23/2021 10:00 AM PAD ECHO ROOM 2 [facility] CARDI [facility]   6/7/2021  8:00 AM [facility] CANCER CTR LAB [facility] CCLAB [facility]   6/7/2021  8:30 AM Tamela Doherty APRN [facility]   7/12/2021  9:30 AM Conrado Tinoco MD [facility] METR [facility]

## 2021-04-23 ENCOUNTER — HOSPITAL ENCOUNTER (OUTPATIENT)
Dept: CARDIOLOGY | Facility: HOSPITAL | Age: 76
Discharge: HOME | End: 2021-04-23
Admitting: FAMILY MEDICINE

## 2021-04-23 VITALS
WEIGHT: 195 LBS | HEIGHT: 63 IN | DIASTOLIC BLOOD PRESSURE: 55 MMHG | SYSTOLIC BLOOD PRESSURE: 129 MMHG | BODY MASS INDEX: 34.55 KG/M2

## 2021-04-23 DIAGNOSIS — R06.02 SHORTNESS OF BREATH: ICD-10-CM

## 2021-04-23 DIAGNOSIS — R60.0 BILATERAL LEG EDEMA: ICD-10-CM

## 2021-04-23 LAB
BH CV ECHO MEAS - AO MAX PG (FULL): 24.7 MMHG
BH CV ECHO MEAS - AO MAX PG: 28.5 MMHG
BH CV ECHO MEAS - AO MEAN PG (FULL): 8 MMHG
BH CV ECHO MEAS - AO MEAN PG: 10 MMHG
BH CV ECHO MEAS - AO V2 MAX: 267 CM/SEC
BH CV ECHO MEAS - AO V2 MEAN: 144 CM/SEC
BH CV ECHO MEAS - AO V2 VTI: 54.7 CM
BH CV ECHO MEAS - AVA(I,A): 1.4 CM^2
BH CV ECHO MEAS - AVA(I,D): 1.4 CM^2
BH CV ECHO MEAS - AVA(V,A): 1.2 CM^2
BH CV ECHO MEAS - AVA(V,D): 1.2 CM^2
BH CV ECHO MEAS - BSA(HAYCOCK): 2 M^2
BH CV ECHO MEAS - BSA: 1.9 M^2
BH CV ECHO MEAS - BZI_BMI: 34.5 KILOGRAMS/M^2
BH CV ECHO MEAS - BZI_METRIC_HEIGHT: 160 CM
BH CV ECHO MEAS - BZI_METRIC_WEIGHT: 88.5 KG
BH CV ECHO MEAS - EDV(MOD-SP4): 92.2 ML
BH CV ECHO MEAS - EF(MOD-SP4): 67.2 %
BH CV ECHO MEAS - ESV(MOD-SP4): 30.2 ML
BH CV ECHO MEAS - LAT PEAK E' VEL: 6.3 CM/SEC
BH CV ECHO MEAS - LV DIASTOLIC VOL/BSA (35-75): 48.2 ML/M^2
BH CV ECHO MEAS - LV MAX PG: 3.9 MMHG
BH CV ECHO MEAS - LV MEAN PG: 2 MMHG
BH CV ECHO MEAS - LV SYSTOLIC VOL/BSA (12-30): 15.8 ML/M^2
BH CV ECHO MEAS - LV V1 MAX: 98.3 CM/SEC
BH CV ECHO MEAS - LV V1 MEAN: 66.4 CM/SEC
BH CV ECHO MEAS - LV V1 VTI: 24 CM
BH CV ECHO MEAS - LVLD AP4: 7.4 CM
BH CV ECHO MEAS - LVLS AP4: 5.3 CM
BH CV ECHO MEAS - LVOT AREA (M): 3.1 CM^2
BH CV ECHO MEAS - LVOT AREA: 3.1 CM^2
BH CV ECHO MEAS - LVOT DIAM: 2 CM
BH CV ECHO MEAS - MED PEAK E' VEL: 5.44 CM/SEC
BH CV ECHO MEAS - MV A MAX VEL: 96.5 CM/SEC
BH CV ECHO MEAS - MV DEC SLOPE: 300 CM/SEC^2
BH CV ECHO MEAS - MV DEC TIME: 0.3 SEC
BH CV ECHO MEAS - MV E MAX VEL: 88.8 CM/SEC
BH CV ECHO MEAS - MV E/A: 0.92
BH CV ECHO MEAS - RAP SYSTOLE: 5 MMHG
BH CV ECHO MEAS - RVSP: 27.5 MMHG
BH CV ECHO MEAS - SI(LVOT): 39.4 ML/M^2
BH CV ECHO MEAS - SI(MOD-SP4): 32.4 ML/M^2
BH CV ECHO MEAS - SV(LVOT): 75.4 ML
BH CV ECHO MEAS - SV(MOD-SP4): 62 ML
BH CV ECHO MEAS - TR MAX VEL: 237 CM/SEC
BH CV ECHO MEASUREMENTS AVERAGE E/E' RATIO: 15.13
LEFT ATRIUM VOLUME INDEX: 26.3 ML/M2
LEFT ATRIUM VOLUME: 50.3 CM3
MAXIMAL PREDICTED HEART RATE: 142 BPM
STRESS TARGET HR: 121 BPM

## 2021-04-23 PROCEDURE — 93306 TTE W/DOPPLER COMPLETE: CPT

## 2021-04-23 PROCEDURE — 93306 TTE W/DOPPLER COMPLETE: CPT | Performed by: INTERNAL MEDICINE

## 2021-04-23 PROCEDURE — 25010000002 PERFLUTREN 6.52 MG/ML SUSPENSION: Performed by: FAMILY MEDICINE

## 2021-04-23 RX ADMIN — PERFLUTREN 8.48 MG: 6.52 INJECTION, SUSPENSION INTRAVENOUS at 10:48

## 2021-04-26 PROBLEM — I38 VALVULAR HEART DISEASE: Status: ACTIVE | Noted: 2021-04-26

## 2021-04-26 PROBLEM — I50.9 CONGESTIVE HEART FAILURE: Status: ACTIVE | Noted: 2021-04-26

## 2021-04-29 NOTE — PROGRESS NOTES
"Notified pt and stated 'I stick with what he said to begin with\" meaning taking her lasix, and limit her salt intake, pt will call back if not helping or will talk to Dr Tinoco on her next office visit"

## 2021-05-25 ENCOUNTER — TELEPHONE (OUTPATIENT)
Dept: FAMILY MEDICINE CLINIC | Facility: CLINIC | Age: 76
End: 2021-05-25

## 2021-05-25 DIAGNOSIS — R60.9 EDEMA, UNSPECIFIED TYPE: ICD-10-CM

## 2021-05-25 DIAGNOSIS — E11.9 CONTROLLED TYPE 2 DIABETES MELLITUS WITHOUT COMPLICATION, WITHOUT LONG-TERM CURRENT USE OF INSULIN (HCC): Primary | Chronic | ICD-10-CM

## 2021-05-25 NOTE — TELEPHONE ENCOUNTER
Called dr bah directly    Difficult stick  Gets labs out of office  Needs lab order for upcoming labs to

## 2021-06-03 DIAGNOSIS — C50.412 MALIGNANT NEOPLASM OF UPPER-OUTER QUADRANT OF LEFT BREAST IN FEMALE, ESTROGEN RECEPTOR POSITIVE (HCC): Primary | ICD-10-CM

## 2021-06-03 DIAGNOSIS — Z17.0 MALIGNANT NEOPLASM OF UPPER-OUTER QUADRANT OF LEFT BREAST IN FEMALE, ESTROGEN RECEPTOR POSITIVE (HCC): Primary | ICD-10-CM

## 2021-06-05 DIAGNOSIS — I10 HYPERTENSION, UNSPECIFIED TYPE: Chronic | ICD-10-CM

## 2021-06-07 ENCOUNTER — LAB (OUTPATIENT)
Dept: LAB | Facility: HOSPITAL | Age: 76
End: 2021-06-07

## 2021-06-07 ENCOUNTER — OFFICE VISIT (OUTPATIENT)
Dept: ONCOLOGY | Facility: CLINIC | Age: 76
End: 2021-06-07

## 2021-06-07 VITALS
SYSTOLIC BLOOD PRESSURE: 132 MMHG | DIASTOLIC BLOOD PRESSURE: 82 MMHG | OXYGEN SATURATION: 98 % | BODY MASS INDEX: 34.5 KG/M2 | TEMPERATURE: 98.4 F | WEIGHT: 194.7 LBS | RESPIRATION RATE: 16 BRPM | HEIGHT: 63 IN | HEART RATE: 52 BPM

## 2021-06-07 DIAGNOSIS — R60.9 EDEMA, UNSPECIFIED TYPE: ICD-10-CM

## 2021-06-07 DIAGNOSIS — Z17.0 MALIGNANT NEOPLASM OF LOWER-OUTER QUADRANT OF LEFT BREAST OF FEMALE, ESTROGEN RECEPTOR POSITIVE (HCC): ICD-10-CM

## 2021-06-07 DIAGNOSIS — I10 ESSENTIAL HYPERTENSION: Chronic | ICD-10-CM

## 2021-06-07 DIAGNOSIS — Z45.2 ENCOUNTER FOR CARE RELATED TO PORT-A-CATH: ICD-10-CM

## 2021-06-07 DIAGNOSIS — C50.512 MALIGNANT NEOPLASM OF LOWER-OUTER QUADRANT OF LEFT BREAST OF FEMALE, ESTROGEN RECEPTOR POSITIVE (HCC): ICD-10-CM

## 2021-06-07 DIAGNOSIS — C50.919 MALIGNANT NEOPLASM OF FEMALE BREAST, UNSPECIFIED ESTROGEN RECEPTOR STATUS, UNSPECIFIED LATERALITY, UNSPECIFIED SITE OF BREAST (HCC): Primary | Chronic | ICD-10-CM

## 2021-06-07 DIAGNOSIS — M54.9 CHRONIC BACK PAIN, UNSPECIFIED BACK LOCATION, UNSPECIFIED BACK PAIN LATERALITY: ICD-10-CM

## 2021-06-07 DIAGNOSIS — C50.412 MALIGNANT NEOPLASM OF UPPER-OUTER QUADRANT OF LEFT BREAST IN FEMALE, ESTROGEN RECEPTOR POSITIVE (HCC): ICD-10-CM

## 2021-06-07 DIAGNOSIS — E11.9 CONTROLLED TYPE 2 DIABETES MELLITUS WITHOUT COMPLICATION, WITHOUT LONG-TERM CURRENT USE OF INSULIN (HCC): Chronic | ICD-10-CM

## 2021-06-07 DIAGNOSIS — Z17.0 MALIGNANT NEOPLASM OF UPPER-OUTER QUADRANT OF LEFT BREAST IN FEMALE, ESTROGEN RECEPTOR POSITIVE (HCC): ICD-10-CM

## 2021-06-07 DIAGNOSIS — G89.29 CHRONIC BACK PAIN, UNSPECIFIED BACK LOCATION, UNSPECIFIED BACK PAIN LATERALITY: ICD-10-CM

## 2021-06-07 LAB
ALBUMIN SERPL-MCNC: 4 G/DL (ref 3.5–5.2)
ALBUMIN/GLOB SERPL: 1.3 G/DL
ALP SERPL-CCNC: 108 U/L (ref 39–117)
ALT SERPL W P-5'-P-CCNC: 11 U/L (ref 1–33)
ANION GAP SERPL CALCULATED.3IONS-SCNC: 10 MMOL/L (ref 5–15)
AST SERPL-CCNC: 26 U/L (ref 1–32)
BASOPHILS # BLD AUTO: 0.04 10*3/MM3 (ref 0–0.2)
BASOPHILS NFR BLD AUTO: 0.5 % (ref 0–1.5)
BILIRUB SERPL-MCNC: 0.3 MG/DL (ref 0–1.2)
BUN SERPL-MCNC: 20 MG/DL (ref 8–23)
BUN/CREAT SERPL: 27 (ref 7–25)
CALCIUM SPEC-SCNC: 10.5 MG/DL (ref 8.6–10.5)
CEA SERPL-MCNC: 5.02 NG/ML
CHLORIDE SERPL-SCNC: 102 MMOL/L (ref 98–107)
CO2 SERPL-SCNC: 27 MMOL/L (ref 22–29)
CREAT SERPL-MCNC: 0.74 MG/DL (ref 0.57–1)
DEPRECATED RDW RBC AUTO: 42.5 FL (ref 37–54)
EOSINOPHIL # BLD AUTO: 0.15 10*3/MM3 (ref 0–0.4)
EOSINOPHIL NFR BLD AUTO: 2 % (ref 0.3–6.2)
ERYTHROCYTE [DISTWIDTH] IN BLOOD BY AUTOMATED COUNT: 13 % (ref 12.3–15.4)
GFR SERPL CREATININE-BSD FRML MDRD: 76 ML/MIN/1.73
GLOBULIN UR ELPH-MCNC: 3.1 GM/DL
GLUCOSE SERPL-MCNC: 116 MG/DL (ref 65–99)
HBA1C MFR BLD: 6.1 % (ref 4.8–5.6)
HCT VFR BLD AUTO: 36.9 % (ref 34–46.6)
HGB BLD-MCNC: 12.4 G/DL (ref 12–15.9)
LYMPHOCYTES # BLD AUTO: 2.06 10*3/MM3 (ref 0.7–3.1)
LYMPHOCYTES NFR BLD AUTO: 27.7 % (ref 19.6–45.3)
MCH RBC QN AUTO: 30.4 PG (ref 26.6–33)
MCHC RBC AUTO-ENTMCNC: 33.6 G/DL (ref 31.5–35.7)
MCV RBC AUTO: 90.4 FL (ref 79–97)
MONOCYTES # BLD AUTO: 0.47 10*3/MM3 (ref 0.1–0.9)
MONOCYTES NFR BLD AUTO: 6.3 % (ref 5–12)
NEUTROPHILS NFR BLD AUTO: 4.69 10*3/MM3 (ref 1.7–7)
NEUTROPHILS NFR BLD AUTO: 63 % (ref 42.7–76)
PLATELET # BLD AUTO: 161 10*3/MM3 (ref 140–450)
PMV BLD AUTO: 10.9 FL (ref 6–12)
POTASSIUM SERPL-SCNC: 5 MMOL/L (ref 3.5–5.2)
PROT SERPL-MCNC: 7.1 G/DL (ref 6–8.5)
RBC # BLD AUTO: 4.08 10*6/MM3 (ref 3.77–5.28)
SMALL PLATELETS BLD QL SMEAR: ADEQUATE
SODIUM SERPL-SCNC: 139 MMOL/L (ref 136–145)
WBC # BLD AUTO: 7.45 10*3/MM3 (ref 3.4–10.8)

## 2021-06-07 PROCEDURE — 86300 IMMUNOASSAY TUMOR CA 15-3: CPT

## 2021-06-07 PROCEDURE — 83036 HEMOGLOBIN GLYCOSYLATED A1C: CPT

## 2021-06-07 PROCEDURE — 99214 OFFICE O/P EST MOD 30 MIN: CPT | Performed by: NURSE PRACTITIONER

## 2021-06-07 PROCEDURE — 85007 BL SMEAR W/DIFF WBC COUNT: CPT

## 2021-06-07 PROCEDURE — 80053 COMPREHEN METABOLIC PANEL: CPT

## 2021-06-07 PROCEDURE — 82378 CARCINOEMBRYONIC ANTIGEN: CPT

## 2021-06-07 PROCEDURE — 85025 COMPLETE CBC W/AUTO DIFF WBC: CPT

## 2021-06-07 PROCEDURE — 36415 COLL VENOUS BLD VENIPUNCTURE: CPT

## 2021-06-07 RX ORDER — HEPARIN SODIUM (PORCINE) LOCK FLUSH IV SOLN 100 UNIT/ML 100 UNIT/ML
500 SOLUTION INTRAVENOUS AS NEEDED
Status: DISCONTINUED | OUTPATIENT
Start: 2021-06-07 | End: 2021-06-08 | Stop reason: HOSPADM

## 2021-06-07 RX ORDER — LISINOPRIL 20 MG/1
20 TABLET ORAL DAILY
Qty: 90 TABLET | Refills: 3 | Status: SHIPPED | OUTPATIENT
Start: 2021-06-07 | End: 2022-01-18 | Stop reason: ALTCHOICE

## 2021-06-07 RX ORDER — HEPARIN SODIUM (PORCINE) LOCK FLUSH IV SOLN 100 UNIT/ML 100 UNIT/ML
500 SOLUTION INTRAVENOUS AS NEEDED
Status: CANCELLED | OUTPATIENT
Start: 2021-06-07

## 2021-06-07 RX ORDER — SODIUM CHLORIDE 0.9 % (FLUSH) 0.9 %
10 SYRINGE (ML) INJECTION AS NEEDED
Status: DISCONTINUED | OUTPATIENT
Start: 2021-06-07 | End: 2021-06-08 | Stop reason: HOSPADM

## 2021-06-07 RX ORDER — SODIUM CHLORIDE 0.9 % (FLUSH) 0.9 %
10 SYRINGE (ML) INJECTION AS NEEDED
Status: CANCELLED | OUTPATIENT
Start: 2021-06-07

## 2021-06-07 RX ADMIN — Medication 10 ML: at 08:32

## 2021-06-07 RX ADMIN — HEPARIN SODIUM (PORCINE) LOCK FLUSH IV SOLN 100 UNIT/ML 500 UNITS: 100 SOLUTION at 08:34

## 2021-06-07 NOTE — TELEPHONE ENCOUNTER
Requested Prescriptions     Pending Prescriptions Disp Refills   • lisinopril (PRINIVIL,ZESTRIL) 20 MG tablet [Pharmacy Med Name: LISINOPRIL 20MG TABLETS] 90 tablet 3     Sig: TAKE 1 TABLET BY MOUTH DAILY

## 2021-06-07 NOTE — PROGRESS NOTES
"[facility]  HEMATOLOGY & ONCOLOGY    [facility] HEMATOLOGY AND ONCOLOGY  [address]  [place] [facility] [address] [phone]    Patient Name: Ira Sanchez  Encounter Date: 6/7/2021  YOB: 1945  Patient Number: [id number]    Chief Complaint   Patient presents with   • Breast Cancer     Here for f/u       REASON FOR VISIT: Mrs Sanchez is a 77yo female patient here today ih follow up for left breast cancer that was diagnosed in 2014.  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  It was ER+, OR+ and Her 2 emerita +.  She subsequently underwent bilateral mastectomies and her AJCC TNM staging was [id number], Stage IIA.     She then underwent 5 cycles of Carboplatin, Taxotere and Herceptin followed by a year of Herceptin therapy.  She then was placed on hormonal manipulation with Arimidex in June 2015 and completed 5 years in 2020. She has continued to show no sign of recurrence.      She presents today feeling well.  She has a \"bad back\" but otherwise she is feeling well. She has had some swelling in her ankles and feet and now on lasix. She has had no abdominal pain, nausea or vomiting.  She denies any fever or chills.  Her weight is stable.      Her last BMD was 12/18/18 and normal. CBC today is normal with WBC of 7.45, Hgb 12.4 and Platelet of 161,000.  CMP was stable as well. Markers are pending. Her previous CA 27-29 was stable at 33.8.  A CEA has not been obtained since May 2017 it was 3.87.      Oncology/Hematology History Overview Note   Ms. Sanchez is a pleasant 71 year old  female with the diagnosis of stage IIA left breast carcinoma, ER/OR and HER2  positive 3+.  She is status post bilateral mastectomies and has completed 5 cycles of adjuvant systemic chemotherapy with taxotere, carboplatin and  Herceptin. She is presently receiving Herceptin 6 mg/kg every 3 weeks for a total " dosing of 1 year with last dose planned for 12/09/2015.  Ms. Sanchez is also presently taking hormonal manipulation therapy with Arimidex 1 mg p.o. daily and this will be taken for 10 years, this  was initiated on 06/03/2015.  She had completed Herceptin. She had completed breast reconstruction process. We will give her a refill on her Arimidex. She is due for  her bone density in September 2016.     Breast cancer-L/valente masectomy 2014 9/29/2016 Initial Diagnosis    Breast cancer           PAST MEDICAL HISTORY:  ALLERGIES:  Allergies   Allergen Reactions   • Benzonatate Hives and Itching       CURRENT MEDICATIONS:  Outpatient Encounter Medications as of 6/7/2021   Medication Sig Dispense Refill   • amLODIPine (NORVASC) 5 MG tablet TAKE 1 TABLET BY MOUTH DAILY 90 tablet 3   • Calcium Carb-Cholecalciferol (CALCIUM-VITAMIN D) 600-400 MG-UNIT tablet Take 1 tablet by mouth 2 (Two) Times a Day.     • celecoxib (CeleBREX) 200 MG capsule TAKE 1 CAPSULE BY MOUTH DAILY 30 capsule 5   • cycloSPORINE (RESTASIS) 0.05 % ophthalmic emulsion 1 drop Every 12 (Twelve) Hours.     • diphenhydrAMINE (BENADRYL) 25 mg capsule Take 25 mg by mouth Daily As Needed.     • furosemide (Lasix) 20 MG tablet Take 2 tablets by mouth Daily. 90 tablet 1   • ibuprofen (ADVIL,MOTRIN) 200 MG tablet Take 200 mg by mouth As Needed.     • oxybutynin XL (Ditropan XL) 10 MG 24 hr tablet Take 1 tablet by mouth Daily. 90 tablet 1   • potassium chloride 10 MEQ CR tablet Take 2 tablets by mouth Daily. 90 tablet 1   • thiamine (VITAMINE B-1) 100 MG tablet Take 100 mg by mouth Daily.     • [DISCONTINUED] lisinopril (PRINIVIL,ZESTRIL) 20 MG tablet TAKE 1 TABLET BY MOUTH DAILY 90 tablet 3     Facility-Administered Encounter Medications as of 6/7/2021   Medication Dose Route Frequency Provider Last Rate Last Admin   • heparin flush (porcine) 100 UNIT/ML injection 500 Units  500 Units Intravenous PRN Shanda Torres MD   500 Units at 04/16/18 2084   •  heparin flush (porcine) 100 UNIT/ML injection 500 Units  500 Units Intravenous PRN Shanda Torres MD   500 Units at 06/12/18 1445   • sodium chloride 0.9 % flush 10 mL  10 mL Intravenous PRN Shanda Torres MD   10 mL at 04/16/18 1503   • sodium chloride 0.9 % flush 10 mL  10 mL Intravenous PRN Shanda Torres MD   10 mL at 06/12/18 1445       ADULT ILLNESSES:  Patient Active Problem List   Diagnosis Code   • Obesity E66.9   • Breast cancer-L/valente masectomy 2014 C50.919   • Overactive bladder N32.81   • Diabetes type 2, controlled (CMS/[facility]) E11.9   • Hypertension I10   • Osteopenia 2018-? 2 to 5y M85.80   • Neuropathy-chemo/better G62.9   • Primary generalized (osteo)arthritis M15.0   • Malignant neoplasm of lower-outer quadrant of left female breast (CMS/HCC) C50.512   • Family history of breast cancer Z80.3   • Malignant neoplasm of upper-outer quadrant of female breast (CMS/[facility]) C50.419   • History of bilateral mastectomy Z90.13   • Wellness examination-done Z00.00   • Shoulder pain M25.519   • Chronic back pain M54.9, G89.29   • Laboratory test* Z01.89   • Bradycardia R00.1   • Cough: copd R05   • Allergic rhinitis J30.9   • Menopause Z78.0   • History of shingles Z86.19   • COPD (chronic obstructive pulmonary disease) (CMS/[facility]) J44.9   • Abnormal mammogram R92.8   • After-cataract with vision obscured H26.499   • Amblyopia H53.009   • Cellulitis, trunk L03.319   • Dermatitis of eyelid due to herpes zoster B02.39   • Dermatochalasis H02.839   • Hordeolum internum H00.029   • 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   • Pseudophakia Z96.1   • Rash R21   • Encounter for care related to Port-a-Cath Z45.2   • Edema R60.9   • Congestive heart failure (CMS/HCC) I50.9   • Valvular heart disease: AS I38       SURGERIES:  Past Surgical History:   Procedure Laterality Date   • APPENDECTOMY     • BREAST SURGERY     • CATARACT EXTRACTION Bilateral    • DILATATION AND CURETTAGE     •  KNEE SURGERY     • MASTECTOMY Bilateral    • NIPPLE TATTOO  2016   • PORTACATH PLACEMENT     • THROAT SURGERY         HEALTH MAINTENANCE ITEMS:    Last Completed Colonoscopy       Status Date      COLONOSCOPY Done 2017 SCANNED - COLONOSCOPY     Negative; 5 year recall        FAMILY HISTORY:  Family History   Problem Relation Age of Onset   • Hypertension Mother    • Heart failure Mother    • Cancer Father         colon   • Colon cancer Father    • Cancer Maternal Aunt         breast   • No Known Problems Maternal Grandmother    • No Known Problems Maternal Grandfather    • No Known Problems Paternal Grandmother    • No Known Problems Paternal Grandfather    • Cancer Maternal Aunt         breast   • Cancer Maternal Aunt         breast       SOCIAL HISTORY:  Social History     Socioeconomic History   • Marital status:      Spouse name: Horace   • Number of children: 0   • Years of education: 12.5   • Highest education level: Not on file   Tobacco Use   • Smoking status: Former Smoker     Packs/day: 2.00     Years: 37.00     Pack years: 74.00     Types: Cigarettes     Start date: 1963     Quit date: 2000     Years since quittin.7   • Smokeless tobacco: Never Used   Substance and Sexual Activity   • Alcohol use: No   • Drug use: No   • Sexual activity: Defer       REVIEW OF SYSTEMS:  Review of Systems   Constitutional: Negative for activity change, appetite change, chills, diaphoresis, fatigue, fever and unexpected weight loss.   HENT: Negative for ear pain, nosebleeds, sinus pressure, sore throat and voice change.    Eyes: Negative for blurred vision, double vision, pain and visual disturbance.   Respiratory: Negative for cough and shortness of breath.    Cardiovascular: Positive for leg swelling (bilateral). Negative for chest pain and palpitations.   Gastrointestinal: Negative for abdominal pain, anal bleeding, blood in stool, constipation, diarrhea, nausea and vomiting.   Endocrine:  "Negative for heat intolerance, polydipsia and polyuria.   Genitourinary: Negative for dysuria, frequency, hematuria, urgency and urinary incontinence.   Musculoskeletal: Positive for arthralgias and back pain (chronic). Negative for myalgias.   Skin: Negative for rash and skin lesions.   Neurological: Negative for dizziness, tremors, seizures, syncope, speech difficulty, weakness and headache.   Hematological: Negative for adenopathy. Does not bruise/bleed easily.   Psychiatric/Behavioral: Negative for dysphoric mood, sleep disturbance, suicidal ideas and depressed mood.       /82   Pulse 52   Temp 98.4 °F (36.9 °C) (Temporal)   Resp 16   Ht 160 cm (63\")   Wt 88.3 kg (194 lb 11.2 oz)   LMP  (LMP Unknown)   SpO2 98%   Breastfeeding No   BMI 34.49 kg/m²  Body surface area is 1.91 meters squared.  Pain Score    06/07/21 0840   PainSc: 0-No pain       Physical Exam:  Physical Exam  Constitutional:       Appearance: Normal appearance. She is well-developed.   HENT:      Head: Atraumatic.   Eyes:      General: No scleral icterus.     Pupils: Pupils are equal, round, and reactive to light.   Neck:      Trachea: Trachea normal.   Cardiovascular:      Rate and Rhythm: Normal rate and regular rhythm.      Heart sounds: No murmur heard.     Pulmonary:      Effort: Pulmonary effort is normal.      Breath sounds: Normal breath sounds. No wheezing, rhonchi or rales.   Chest:      Breasts:         Right: Absent.         Left: Absent.   Abdominal:      Palpations: Abdomen is soft.      Tenderness: There is no abdominal tenderness. There is no guarding or rebound.   Musculoskeletal:      Cervical back: Neck supple.   Lymphadenopathy:      Cervical: No cervical adenopathy.      Upper Body:      Right upper body: No supraclavicular or axillary adenopathy.      Left upper body: No supraclavicular or axillary adenopathy.   Skin:     General: Skin is warm and dry.   Neurological:      Mental Status: She is alert and " oriented to person, place, and time.      Sensory: No sensory deficit.   Psychiatric:         Judgment: Judgment normal.         Ira Sanchez reports a pain score of 0.   Patient's Body mass index is 34.49 kg/m². BMI is above normal parameters. Recommendations include: defer to pcp.      LABS    Lab Results - Last 18 Months   Lab Units 06/07/21  0832 03/01/21  0822 12/14/20  0937 06/08/20  0705 12/16/19  0907   HEMOGLOBIN g/dL 12.4 13.5 12.8 12.7 12.9   HEMATOCRIT % 36.9 41.6 39.6 38.8 38.9   MCV fL 90.4 92 93.4 93.3 91.5   WBC 10*3/mm3 7.45 8.1 7.52 7.04 8.13   RDW % 13.0 12.4 12.6 12.6 12.6   MPV fL 10.9  --  10.9  --  10.8   PLATELETS 10*3/mm3 161 202 169 171 168   IMM GRAN % %  --   --  0.5  --  0.1   NEUTROS ABS x10E3/uL  --  5.0 4.55 4.16 4.79   LYMPHS ABS x10E3/uL  --  2.5 2.41 2.15 2.68   MONOS ABS x10E3/uL  --  0.5 0.37 0.56 0.44   EOS ABS x10E3/uL  --  0.1 0.13 0.11 0.18   BASOS ABS x10E3/uL  --  0.0 0.02 0.04 0.03   IMMATURE GRANS (ABS) 10*3/mm3  --   --  0.04  --  0.01   NRBC /100 WBC  --   --  0.0 0.0 0.0       Lab Results - Last 18 Months   Lab Units 03/08/21  1215 03/01/21  0822 12/14/20  0937 06/08/20  0705 12/16/19  0907   GLUCOSE mg/dL  --   --  108*  --  111*   SODIUM mmol/L 136 140 140 139 140   POTASSIUM mmol/L 4.6 4.6 4.5 4.5 4.4   TOTAL CO2 mmol/L 25.1 23  --  24.4  --    CO2 mmol/L  --   --  26.0  --  26.0   CHLORIDE mmol/L 98 101 105 103 103   ANION GAP mmol/L  --   --  9.0  --  11.0   CREATININE mg/dL 0.75 0.74 0.55* 0.72 0.53*   BUN mg/dL 20 14 19 18 16   BUN / CREAT RATIO  26.7* 19 34.5* 25.0 30.2*   CALCIUM mg/dL 9.9 9.6 9.2 9.5 9.5   EGFR IF NONAFRICN AM mL/min/1.73 75 78 107 79 112   ALK PHOS IU/L  --  121* 114 105 109   TOTAL PROTEIN g/dL  --   --  6.8  --  7.0   ALT (SGPT) IU/L  --  9 8 8 8   AST (SGOT) IU/L  --  18 20 19 18   BILIRUBIN mg/dL  --  0.3 0.4 0.5 0.2   ALBUMIN g/dL  --  4.5 4.40 4.20 4.20   GLOBULIN gm/dL  --   --  2.4  --  2.8     ASSESSMENT  1.  Left breast carcinoma  "diagnosed in October 2014  · Stage: AJCC TNM [id number], Stage IIA  · Treatment: Bilateral mastectomies, Adjuvant chemotherapy with 5 cycles of Carbplatin Taxotere Herceptin followed by a total of one year of herceptin. Hormonal manipulation starting in June 2015 to present.  · Disease status: Clinically no evidence of disease. Previous CA 27-29 normal. Todays pending.  2.  Osteoarthritis/Chronic back pain - stable followed by pcp  3.  Normal BMD study in Dec 2018, takes Ca+D  4.  Hypertension controlled with medication. BP stable at 132/82  5.  Peripheral edema bilaterally, now on lasix per pcp.  Patient states that she does \"drink a lot of diet coke and does not limit her salt intake.\"   6.  Hematologically stable with normal cbc, cmp  7.  Colonoscopy UTD as of 2017 and normal; recommended 5 year recall    PLAN  1.  Reviewed lab results obtained today showing the normal cbc, cmp  2.  Reviewed BMD from December 2018 with the patient  3.  Limit salt intake and elevated LEs   4.  Continue to follow with pcp and other specialists  5.  Await pending labs and make further recommendations at that time.   5.  Return in 6 mo for follow up and with preoffice cbc, cmp, ca 27-29 ad cea    I spent 32 minutes caring for Ira on this date of service. This time includes time spent by me in the following activities: preparing for the visit, reviewing tests, performing a medically appropriate examination and/or evaluation, counseling and educating the patient/family/caregiver, ordering medications, tests, or procedures and documenting information in the medical record.       Tamela Doherty, APRN  06/07/2021        "

## 2021-06-08 ENCOUNTER — TELEPHONE (OUTPATIENT)
Dept: ONCOLOGY | Facility: CLINIC | Age: 76
End: 2021-06-08

## 2021-06-08 DIAGNOSIS — Z17.0 MALIGNANT NEOPLASM OF UPPER-OUTER QUADRANT OF LEFT BREAST IN FEMALE, ESTROGEN RECEPTOR POSITIVE (HCC): Primary | ICD-10-CM

## 2021-06-08 DIAGNOSIS — C50.412 MALIGNANT NEOPLASM OF UPPER-OUTER QUADRANT OF LEFT BREAST IN FEMALE, ESTROGEN RECEPTOR POSITIVE (HCC): Primary | ICD-10-CM

## 2021-06-08 LAB — CANCER AG27-29 SERPL-ACNC: 29.9 U/ML (ref 0–38.6)

## 2021-06-08 NOTE — TELEPHONE ENCOUNTER
----- Message from REBEL Shelley sent at 6/8/2021  8:42 AM CDT -----  Please let Mrs Sanchez know that her CA 27-29 is remaining normal.  A CEA was obtained as it hasn't been in a few years and it is up slightly.  Many things can elevate this so please as her to have a repeat CEA in 4-6 weeks to assure it is stable.

## 2021-06-08 NOTE — TELEPHONE ENCOUNTER
Called patient and reviewed labs results.  Informed that Ca 27-29 was normal but CEA was slightly elevated.  Instructed patient that these can be elevated for several reasons and that REBEL Dennis would like to repeat it in 6 weeks.  Appointment has been set up for repeat CEA on 07/19 at 0900.  Patient v/u and agreeable to plan.

## 2021-07-12 ENCOUNTER — OFFICE VISIT (OUTPATIENT)
Dept: FAMILY MEDICINE CLINIC | Facility: CLINIC | Age: 76
End: 2021-07-12

## 2021-07-12 VITALS
DIASTOLIC BLOOD PRESSURE: 78 MMHG | SYSTOLIC BLOOD PRESSURE: 128 MMHG | BODY MASS INDEX: 34.38 KG/M2 | TEMPERATURE: 97.8 F | WEIGHT: 194 LBS | HEIGHT: 63 IN | OXYGEN SATURATION: 96 % | HEART RATE: 92 BPM | RESPIRATION RATE: 16 BRPM

## 2021-07-12 DIAGNOSIS — I50.9 CONGESTIVE HEART FAILURE, UNSPECIFIED HF CHRONICITY, UNSPECIFIED HEART FAILURE TYPE (HCC): ICD-10-CM

## 2021-07-12 DIAGNOSIS — M15.0 PRIMARY GENERALIZED (OSTEO)ARTHRITIS: ICD-10-CM

## 2021-07-12 DIAGNOSIS — L84 CORNS AND CALLOSITIES: ICD-10-CM

## 2021-07-12 DIAGNOSIS — I10 ESSENTIAL HYPERTENSION: Chronic | ICD-10-CM

## 2021-07-12 DIAGNOSIS — E11.9 CONTROLLED TYPE 2 DIABETES MELLITUS WITHOUT COMPLICATION, WITHOUT LONG-TERM CURRENT USE OF INSULIN (HCC): Chronic | ICD-10-CM

## 2021-07-12 DIAGNOSIS — R05.9 COUGH: ICD-10-CM

## 2021-07-12 DIAGNOSIS — R10.32 LEFT LOWER QUADRANT ABDOMINAL PAIN: ICD-10-CM

## 2021-07-12 DIAGNOSIS — J44.9 CHRONIC OBSTRUCTIVE PULMONARY DISEASE, UNSPECIFIED COPD TYPE (HCC): ICD-10-CM

## 2021-07-12 DIAGNOSIS — I38 VALVULAR HEART DISEASE: ICD-10-CM

## 2021-07-12 PROCEDURE — 99214 OFFICE O/P EST MOD 30 MIN: CPT | Performed by: FAMILY MEDICINE

## 2021-07-12 NOTE — PATIENT INSTRUCTIONS
Check your medications for celebrex; should not take that with motrin and like meds    ########################

## 2021-07-12 NOTE — PROGRESS NOTES
Subjective   Ira Sanchez is a 76 y.o. female presenting with chief complaint of:   Chief Complaint   Patient presents with   • COPD     6mo follow up    • Cough       History of Present Illness :  Alone.       Has multiple chronic problems to consider that might have a bearing on today's issues;  an interval appointment.       Chronic/acute problems reviewed today:   1. Essential hypertension Chronic/stable. Stable here past/no recent home blood pressures.  No significant chest pain, SOB, LE edema, orthopnea, near syncope, dizziness/light headness.   Recent Vitals       4/23/2021 6/7/2021 7/12/2021       BP:  129/55  132/82  128/78     Pulse:  --  52  92     Temp:  --  98.4 °F (36.9 °C)  97.8 °F (36.6 °C)     Weight:  88.5 kg (195 lb)  88.3 kg (194 lb 11.2 oz)  88 kg (194 lb)     BMI (Calculated):  34.6  34.5  34.4            2. Congestive heart failure, unspecified HF chronicity, unspecified heart failure type Chronic/stable.  Denies significant sob, orthopnea, leg edema, weight gain.  Aware of influence diet/salt and watching weight at home.       3. Controlled type 2 diabetes mellitus without complication, without long-term current use of insulin (CMS/HCC) Chronic/stable.  No problem/pattern hypoglycemia/hyperglycemia manifest by poly- dypsia, phagia, uria, or sweats, diaphoretic episodes, syncope/near.     4. Valvular heart disease: AS chronic/stable symptoms of no chest pain, SOB, edema with past echos showing valvular changes of: AS.      5. Primary generalized (osteo)arthritis Chronic/stable.  Various on/off joint pains/soreness/stiffness.  Particular joint problems with various.  No joint swelling.  Treats mainly with reduced activity, Rx listed, Tylenol.  No  NSAIDs, and no injections.      6. Cough: copd chronic cough; no change.  Dry cough.   7. Chronic obstructive pulmonary disease, unspecified COPD type (CMS/HCC) Chronic/stable mild occ cough, sob, wheeze.  Rx not encouraged.   No smoking.       8.  Left lower quadrant abdominal pain 1 to 2 weeks of discomfort in left lower quadrant.  Comes and goes.  No melena or blood in the stool.  No dysuria   9. Corns and callosities getting areas on her feet that are hard; would like to see a podiatrist.     Has an/another acute issue today: none.    The following portions of the patient's history were reviewed and updated as appropriate: allergies, current medications, past family history, past medical history, past social history, past surgical history and problem list.      Current Outpatient Medications:   •  amLODIPine (NORVASC) 5 MG tablet, TAKE 1 TABLET BY MOUTH DAILY, Disp: 90 tablet, Rfl: 3  •  Calcium Carb-Cholecalciferol (CALCIUM-VITAMIN D) 600-400 MG-UNIT tablet, Take 1 tablet by mouth 2 (Two) Times a Day., Disp: , Rfl:   •  celecoxib (CeleBREX) 200 MG capsule, TAKE 1 CAPSULE BY MOUTH DAILY, Disp: 30 capsule, Rfl: 5  •  cycloSPORINE (RESTASIS) 0.05 % ophthalmic emulsion, 1 drop Every 12 (Twelve) Hours., Disp: , Rfl:   •  diphenhydrAMINE (BENADRYL) 25 mg capsule, Take 25 mg by mouth Daily As Needed., Disp: , Rfl:   •  ibuprofen (ADVIL,MOTRIN) 200 MG tablet, Take 200 mg by mouth As Needed., Disp: , Rfl:   •  lisinopril (PRINIVIL,ZESTRIL) 20 MG tablet, TAKE 1 TABLET BY MOUTH DAILY, Disp: 90 tablet, Rfl: 3  •  oxybutynin XL (Ditropan XL) 10 MG 24 hr tablet, Take 1 tablet by mouth Daily., Disp: 90 tablet, Rfl: 1  •  thiamine (VITAMINE B-1) 100 MG tablet, Take 100 mg by mouth Daily., Disp: , Rfl:   No current facility-administered medications for this visit.    Facility-Administered Medications Ordered in Other Visits:   •  heparin flush (porcine) 100 UNIT/ML injection 500 Units, 500 Units, Intravenous, PRN, Shanda Torres MD, 500 Units at 04/16/18 1504  •  heparin flush (porcine) 100 UNIT/ML injection 500 Units, 500 Units, Intravenous, PRN, Shanda Torres MD, 500 Units at 06/12/18 1445  •  sodium chloride 0.9 % flush 10 mL, 10 mL,  Intravenous, PRN, Shanda Torres MD, 10 mL at 04/16/18 1503  •  sodium chloride 0.9 % flush 10 mL, 10 mL, Intravenous, PRN, Shanda Torres MD, 10 mL at 06/12/18 1445    No problems with medications.    Allergies   Allergen Reactions   • Benzonatate Hives and Itching       Review of Systems  GENERAL:  Active/slower with limits, speed, stamina for age, and occ back pain.  Sleep is ok. No fever now.  SKIN: No other rash/skin lesion of concern:   ENDO:  No syncope, near or diaphoretic sweaty spells.  No download..  HEENT: No recent head injury; less sinus area headache.  No vision change.  No chronic hearing loss.  Ears with fullness without pain/drainage.  No sore throat now.  No significant nasal/sinus congestion/drainage. No epistaxis.   CHEST: No chest wall tenderness or mass.  Same occ/dry cough, and no wheeze.  No SOB; no hemoptysis.  CV: No chest pain, palpitations, ankle edema.  GI: No heartburn, dysphagia.  No other abdominal pain, diarrhea, constipation.  No rectal bleeding, or melena.    :  Voids with occ on/off dysuria, without incontinence to completion.  ORTHO: No painful/swollen joints but various on /off sore.  Occ same/sore neck or back.  No acute neck or lower back pain without recent injury.  NEURO: No dizziness, weakness of extremities.  Same mild numbness/paresthesias LE.  PSYCH: No memory loss.  Mood good; mild anxious, depressed but/and not suicidal.  Tries to tolerate stress .   Screening:  Mammogram: bilateral masectomy  Bone density: Bone d-deca/[facility]/T hip -0.5 LS +1.4/12.18.18;  no change; maybe 2-5 yr  Low dose CT chest:Tobacco-smoker/age 17/1-2ppd/dc age 60:  CTs with breast cancer:   GI: Colon-avm-div/[facility]ie[facility]/12.19.17/5y  Prostate: NA  Usual lab order  6m CBC, CMP, A1c  12m CBC, CMP, A1c, LIPID, TSH, Vit D    Data reviewed:   Last cardiac testing:   Echo:   Results for orders placed during the hospital encounter of 04/23/21    Adult Transthoracic Echo Complete  W/ Cont if Necessary Per Protocol    Interpretation Summary  · Technically difficult study.  · Left ventricular ejection fraction appears to be 66 - 70%. Left ventricular systolic function is normal.  · Left ventricular diastolic function is consistent with (grade I) impaired relaxation.  · Normal right ventricular cavity size and systolic function noted.  · Mild aortic valve stenosis is present.  · Estimated right ventricular systolic pressure from tricuspid regurgitation is normal (<35 mmHg).    Lab Results:  Results for orders placed or performed in visit on 06/07/21   Hemoglobin A1c    Specimen: Blood   Result Value Ref Range    Hemoglobin A1C 6.10 (H) 4.80 - 5.60 %   Comprehensive Metabolic Panel    Specimen: Blood   Result Value Ref Range    Glucose 116 (H) 65 - 99 mg/dL    BUN 20 8 - 23 mg/dL    Creatinine 0.74 0.57 - 1.00 mg/dL    Sodium 139 136 - 145 mmol/L    Potassium 5.0 3.5 - 5.2 mmol/L    Chloride 102 98 - 107 mmol/L    CO2 27.0 22.0 - 29.0 mmol/L    Calcium 10.5 8.6 - 10.5 mg/dL    Total Protein 7.1 6.0 - 8.5 g/dL    Albumin 4.00 3.50 - 5.20 g/dL    ALT (SGPT) 11 1 - 33 U/L    AST (SGOT) 26 1 - 32 U/L    Alkaline Phosphatase 108 39 - 117 U/L    Total Bilirubin 0.3 0.0 - 1.2 mg/dL    eGFR Non African Amer 76 >60 mL/min/1.73    Globulin 3.1 gm/dL    A/G Ratio 1.3 g/dL    BUN/Creatinine Ratio 27.0 (H) 7.0 - 25.0    Anion Gap 10.0 5.0 - 15.0 mmol/L   CEA    Specimen: Blood   Result Value Ref Range    CEA 5.02 ng/mL   Cancer Antigen 27.29    Specimen: Blood   Result Value Ref Range    CA 27.29 29.9 0.0 - 38.6 U/mL   CBC Auto Differential    Specimen: Blood   Result Value Ref Range    WBC 7.45 3.40 - 10.80 10*3/mm3    RBC 4.08 3.77 - 5.28 10*6/mm3    Hemoglobin 12.4 12.0 - 15.9 g/dL    Hematocrit 36.9 34.0 - 46.6 %    MCV 90.4 79.0 - 97.0 fL    MCH 30.4 26.6 - 33.0 pg    MCHC 33.6 31.5 - 35.7 g/dL    RDW 13.0 12.3 - 15.4 %    RDW-SD 42.5 37.0 - 54.0 fl    MPV 10.9 6.0 - 12.0 fL    Platelets 161 140 - 450  10*3/mm3    Neutrophil % 63.0 42.7 - 76.0 %    Lymphocyte % 27.7 19.6 - 45.3 %    Monocyte % 6.3 5.0 - 12.0 %    Eosinophil % 2.0 0.3 - 6.2 %    Basophil % 0.5 0.0 - 1.5 %    Neutrophils, Absolute 4.69 1.70 - 7.00 10*3/mm3    Lymphocytes, Absolute 2.06 0.70 - 3.10 10*3/mm3    Monocytes, Absolute 0.47 0.10 - 0.90 10*3/mm3    Eosinophils, Absolute 0.15 0.00 - 0.40 10*3/mm3    Basophils, Absolute 0.04 0.00 - 0.20 10*3/mm3   Scan Slide    Specimen: Blood   Result Value Ref Range    Platelet Estimate Adequate Normal       A1C:  Lab Results - Last 18 Months   Lab Units 06/07/21  0832 06/08/20  0705   HEMOGLOBIN A1C % 6.10* 6.30*     LIPID:No results for input(s): CHLPL, LDL, HDL, TRIG in the last 99944 hours.  PSA:No results for input(s): PSA in the last 73342 hours.  CBC:  Lab Results - Last 18 Months   Lab Units 06/07/21  0832 03/01/21  0822 12/14/20  0937 06/08/20  0705   WBC 10*3/mm3 7.45 8.1 7.52 7.04   HEMOGLOBIN g/dL 12.4 13.5 12.8 12.7   HEMATOCRIT % 36.9 41.6 39.6 38.8   PLATELETS 10*3/mm3 161 202 169 171      BMP/CMP:  Lab Results - Last 18 Months   Lab Units 06/07/21  0832 03/08/21  1215 03/01/21  0822 12/14/20  0937 06/08/20  0705   SODIUM mmol/L 139 136 140 140 139   POTASSIUM mmol/L 5.0 4.6 4.6 4.5 4.5   CHLORIDE mmol/L 102 98 101 105 103   TOTAL CO2 mmol/L  --  25.1 23  --  24.4   CO2 mmol/L 27.0  --   --  26.0  --    GLUCOSE mg/dL  --  127* 106*  --  123*   BUN mg/dL 20 20 14 19 18   CREATININE mg/dL 0.74 0.75 0.74 0.55* 0.72   EGFR IF NONAFRICN AM mL/min/1.73 76 75 78 107 79   EGFR IF AFRICN AM mL/min/1.73  --  91 90  --  95   CALCIUM mg/dL 10.5 9.9 9.6 9.2 9.5     HEPATIC:  Lab Results - Last 18 Months   Lab Units 06/07/21  0832 03/01/21  0822 12/14/20  0937 06/08/20  0705   ALT (SGPT) U/L 11 9 8 8   AST (SGOT) U/L 26 18 20 19   ALK PHOS U/L 108 121* 114 105     THYROID:  Lab Results - Last 18 Months   Lab Units 03/01/21  0822   TSH uIU/mL 2.180       Objective   /78   Pulse 92   Temp 97.8 °F  "(36.6 °C) (Infrared)   Resp 16   Ht 160 cm (63\")   Wt 88 kg (194 lb)   LMP  (LMP Unknown)   SpO2 96%   BMI 34.37 kg/m²   Body mass index is 34.37 kg/m².    Recent Vitals       4/23/2021 6/7/2021 7/12/2021       BP:  129/55  132/82  128/78     Pulse:  --  52  92     Temp:  --  98.4 °F (36.9 °C)  97.8 °F (36.6 °C)     Weight:  88.5 kg (195 lb)  88.3 kg (194 lb 11.2 oz)  88 kg (194 lb)     BMI (Calculated):  34.6  34.5  34.4           Physical Exam  GENERAL:  Well nourished/developed in no acute distress but frequent cough; at times paroxysmal.   SKIN: Turgor excellent, without wound, rash, lesion or significance  HEENT: Normal cephalic without trauma. Pupils equal round reactive to light. Extraocular motions full without nystagmus.  External canals nonobstructive nontender without reddness. Tymphatic membranes dull with irwin structures intact.   Oral cavity without growths, exudates, and moist.  Posterior pharynx without mass, obstruction, redness.  No thyromegaly, mass, tenderness, definite lymphadenopathy and supple.   CV: Regular rhythm.  1/6 systolic murmur without gallop,  edema. Posterior pulses intact.  No carotid bruits.  CHEST: No chest wall tenderness or mass.   LUNGS: Symmetric motion with clear to auscultation.  No dullness to percussion  ABD: Soft, mild LLQ tender without mass.   ORTHO: Symmetric extremities without swelling/point tenderness.  Full gross range of motion. Symmetric LE.  Neg straight leg raising.  Neg Patricks maneuver.  Back is straight/mild lordosis.  Lower back sore; no point tender.    NEURO: CN 2-12 grossly intact except L eyelid mild droop.  Otherwise symmetric facies and UE/LE. 3/5 strength throughout.   Otherwise nonfocal use extremities. Speech clear.      Assessment/Plan     1. Essential hypertension    2. Congestive heart failure, unspecified HF chronicity, unspecified heart failure type (CMS/HCC)    3. Controlled type 2 diabetes mellitus without complication, without " long-term current use of insulin (CMS/HCC)    4. Valvular heart disease: AS    5. Primary generalized (osteo)arthritis    6. Cough: copd    7. Chronic obstructive pulmonary disease, unspecified COPD type (CMS/HCC)    8. Left lower quadrant abdominal pain    9. Corns and callosities        Discussion:  Start PFTs; maybe CT lung after  CT abdomen  Same Rx for now  Refer to podiatry; good idea for DM    Medical decision issues:   Data review above:   Rx: reviewed and decisions:   Same Rx for now   Visit today involved intensive drug monitoring: ie potential to cause serious morbidity or death:     Orders placed:   LAB/Testing/Referrals: reviewed/orders:   Today:   Orders Placed This Encounter   Procedures   • CT Abdomen Pelvis With Contrast   • Ambulatory Referral to Podiatry   • Pulmonary Function Test     Chronic/recurrent labs above or change to:   same     Health maintenance:   Body mass index is 34.37 kg/m².  Patient's Body mass index is 34.37 kg/m². indicating that she is obese (BMI >30). Obesity-related health conditions include the following: diabetes mellitus. Obesity is unchanged. BMI is is above average; BMI management plan is completed. We discussed portion control and increasing exercise..    Tobacco use reviewed:    Ira Sanchez  reports that she quit smoking about 20 years ago. Her smoking use included cigarettes. She started smoking about 58 years ago. She has a 74.00 pack-year smoking history. She has never used smokeless tobacco..       Patient Instructions   Check your medications for celebrex; should not take that with motrin and like meds    ########################        Follow up: Return for lab;, Dr Tinoco-, 6 m;.  Future Appointments   Date Time Provider Department Center   7/19/2021  8:00 AM  [facility]  [facility]   9/7/2021  8:30 AM [facility] NURSE [facility]   12/8/2021  8:00 AM [facility]  [facility]   12/8/2021  8:30 AM Tamela Doherty, REBEL  [facility]   1/12/2022  9:30 AM Conrado Tinoco MD [facility]

## 2021-07-19 ENCOUNTER — LAB (OUTPATIENT)
Dept: LAB | Facility: HOSPITAL | Age: 76
End: 2021-07-19

## 2021-07-19 DIAGNOSIS — Z17.0 MALIGNANT NEOPLASM OF UPPER-OUTER QUADRANT OF LEFT BREAST IN FEMALE, ESTROGEN RECEPTOR POSITIVE (HCC): ICD-10-CM

## 2021-07-19 DIAGNOSIS — C50.412 MALIGNANT NEOPLASM OF UPPER-OUTER QUADRANT OF LEFT BREAST IN FEMALE, ESTROGEN RECEPTOR POSITIVE (HCC): ICD-10-CM

## 2021-07-19 LAB — CEA SERPL-MCNC: 5.38 NG/ML

## 2021-07-19 PROCEDURE — 36415 COLL VENOUS BLD VENIPUNCTURE: CPT

## 2021-07-19 PROCEDURE — 82378 CARCINOEMBRYONIC ANTIGEN: CPT

## 2021-07-28 ENCOUNTER — TELEPHONE (OUTPATIENT)
Dept: FAMILY MEDICINE CLINIC | Facility: CLINIC | Age: 76
End: 2021-07-28

## 2021-07-28 ENCOUNTER — TELEPHONE (OUTPATIENT)
Dept: ONCOLOGY | Facility: CLINIC | Age: 76
End: 2021-07-28

## 2021-07-28 DIAGNOSIS — Z17.0 MALIGNANT NEOPLASM OF UPPER-OUTER QUADRANT OF LEFT BREAST IN FEMALE, ESTROGEN RECEPTOR POSITIVE (HCC): Primary | ICD-10-CM

## 2021-07-28 DIAGNOSIS — C50.412 MALIGNANT NEOPLASM OF UPPER-OUTER QUADRANT OF LEFT BREAST IN FEMALE, ESTROGEN RECEPTOR POSITIVE (HCC): Primary | ICD-10-CM

## 2021-07-28 DIAGNOSIS — R97.0 ELEVATED CEA: ICD-10-CM

## 2021-07-28 NOTE — TELEPHONE ENCOUNTER
Spoke to pt and wanted Dr Tinoco to know that she is having the test he wanted, and the cancer doctor is adding a ct of the chest, with the one for her abd.

## 2021-07-28 NOTE — TELEPHONE ENCOUNTER
Reviewed CEA with Tamela LUQUE.  Order given to do CT of chest with and without contrast add it on to when she has CT abd scheduled.  Reviewed CEA results with Ira and that Tamela wants to do CT of chest to make sure nothing is going on since CEA went up a little.  Verbalized understanding.

## 2021-07-28 NOTE — TELEPHONE ENCOUNTER
Caller: EDWARD    Relationship: PATIENT    Best call back number: [phone]     Caller requesting test results: ANYTIME    What test was performed: LABS    When was the test performed: 07/19    Where was the test performed: [facility]

## 2021-08-03 ENCOUNTER — TRANSCRIBE ORDERS (OUTPATIENT)
Dept: LAB | Facility: HOSPITAL | Age: 76
End: 2021-08-03

## 2021-08-03 DIAGNOSIS — Z01.818 PREOPERATIVE TESTING: Primary | ICD-10-CM

## 2021-08-03 NOTE — PROGRESS NOTES
[facility] - PODIATRY    Today's Date: 08/09/21    Patient Name: Ira Sanchez  MRN: [id number]  CSN: [id number]  PCP: Conrado Tinoco MD  Referring Provider: Conrado Tinoco MD    SUBJECTIVE     Chief Complaint   Patient presents with   • Establish Care     pcp07/12/2021- corns- pt states need for nail and callus care. pt states she does deal with swelling for about 5 months-pt pain 3/10- pt presents with calluses to both feet.      HPI: Ira Sanchez, a 76 y.o.female, comes to clinic as a(n) new patient complaining of foot pain and complaining of thickened toenails and calluses to both feet. Patient has h/o bradycardia, breast CA, colon polyp, DM, HTN, neuropathy. Patient presents for foot pain secondary to plantar foot calluses and also notes thickened, irregular toenail growth. Formerly states she was diabetic, however, is not on any medications and is no longer being followed for this. Notes bilateral plantar foot calluses under 5th met head that are painful. Admits pain at 3/10 level and described as stabbing, aching and sharp. Denies previous treatment. Denies any constitutional symptoms. No other pedal complaints at this time.    Past Medical History:   Diagnosis Date   • Bradycardia    • Breast cancer (CMS/HCC)    • Colon polyp    • Diabetes mellitus (CMS/HCC)    • Encounter for care related to Port-a-Cath 4/24/2020   • Hypertension    • Peripheral neuropathy    • Urinary incontinence      Past Surgical History:   Procedure Laterality Date   • APPENDECTOMY     • BREAST SURGERY     • CATARACT EXTRACTION Bilateral    • DILATATION AND CURETTAGE     • KNEE SURGERY     • MASTECTOMY Bilateral    • NIPPLE TATTOO  04/12/2016   • PORTACATH PLACEMENT     • THROAT SURGERY       Family History   Problem Relation Age of Onset   • Hypertension Mother    • Heart failure Mother    • Cancer Father         colon   • Colon cancer Father    • Cancer Maternal Aunt         breast   • No Known  Problems Maternal Grandmother    • No Known Problems Maternal Grandfather    • No Known Problems Paternal Grandmother    • No Known Problems Paternal Grandfather    • Cancer Maternal Aunt         breast   • Cancer Maternal Aunt         breast     Social History     Socioeconomic History   • Marital status:      Spouse name: Horace   • Number of children: 0   • Years of education: 12.5   • Highest education level: Not on file   Tobacco Use   • Smoking status: Former Smoker     Packs/day: 2.00     Years: 37.00     Pack years: 74.00     Types: Cigarettes     Start date: 1963     Quit date: 2000     Years since quittin.8   • Smokeless tobacco: Never Used   Vaping Use   • Vaping Use: Never used   Substance and Sexual Activity   • Alcohol use: No   • Drug use: No   • Sexual activity: Defer     Allergies   Allergen Reactions   • Benzonatate Hives and Itching     Current Outpatient Medications   Medication Sig Dispense Refill   • amLODIPine (NORVASC) 5 MG tablet TAKE 1 TABLET BY MOUTH DAILY 90 tablet 3   • Calcium Carb-Cholecalciferol (CALCIUM-VITAMIN D) 600-400 MG-UNIT tablet Take 1 tablet by mouth 2 (Two) Times a Day.     • celecoxib (CeleBREX) 200 MG capsule TAKE 1 CAPSULE BY MOUTH DAILY 30 capsule 5   • cycloSPORINE (RESTASIS) 0.05 % ophthalmic emulsion 1 drop Every 12 (Twelve) Hours.     • diphenhydrAMINE (BENADRYL) 25 mg capsule Take 25 mg by mouth Daily As Needed.     • ibuprofen (ADVIL,MOTRIN) 200 MG tablet Take 200 mg by mouth As Needed.     • lisinopril (PRINIVIL,ZESTRIL) 20 MG tablet TAKE 1 TABLET BY MOUTH DAILY 90 tablet 3   • oxybutynin XL (Ditropan XL) 10 MG 24 hr tablet Take 1 tablet by mouth Daily. 90 tablet 1   • thiamine (VITAMINE B-1) 100 MG tablet Take 100 mg by mouth Daily.       No current facility-administered medications for this visit.     Facility-Administered Medications Ordered in Other Visits   Medication Dose Route Frequency Provider Last Rate Last Admin   • heparin flush  (porcine) 100 UNIT/ML injection 500 Units  500 Units Intravenous PRN Shanda Torres MD   500 Units at 04/16/18 1504   • heparin flush (porcine) 100 UNIT/ML injection 500 Units  500 Units Intravenous PRN Shanda Torres MD   500 Units at 06/12/18 1445   • sodium chloride 0.9 % flush 10 mL  10 mL Intravenous PRN Shanda Torres MD   10 mL at 04/16/18 1503   • sodium chloride 0.9 % flush 10 mL  10 mL Intravenous PRN Shanda Torres MD   10 mL at 06/12/18 1445     Review of Systems   Constitutional: Negative for chills and fever.   HENT: Negative for congestion.    Respiratory: Negative for shortness of breath.    Cardiovascular: Negative for chest pain and leg swelling.   Gastrointestinal: Negative for constipation, diarrhea, nausea and vomiting.   Musculoskeletal: Positive for arthralgias. Negative for myalgias.   Skin: Negative for wound.        calluses   Neurological: Negative for numbness.       OBJECTIVE     Vitals:    08/09/21 0755   BP: 138/62   Pulse: (!) 46   SpO2: 97%       PHYSICAL EXAM  GEN:   Accompanied by none.     Foot/Ankle Exam:       General:   Appearance: appears stated age and healthy    Orientation: AAOx3    Affect: appropriate    Gait: unimpaired    Assistance: independent    Shoe Gear:  Casual shoes    VASCULAR      Right Foot Vascularity   Dorsalis pedis:  2+  Posterior tibial:  2+  Skin Temperature: warm    Edema Grading:  None  CFT:  3  Pedal Hair Growth:  Present  Varicosities: mild varicosities       Left Foot Vascularity   Dorsalis pedis:  2+  Posterior tibial:  2+  Skin Temperature: warm    Edema Grading:  None  CFT:  3  Pedal Hair Growth:  Present  Varicosities: mild varicosities        NEUROLOGIC     Right Foot Neurologic   Normal sensation    Light touch sensation:  Normal  Vibratory sensation:  Normal  Hot/Cold sensation: normal    Protective Sensation using [place]-Kayla Monofilament:  10     Left Foot Neurologic   Normal sensation     Light touch sensation:  Normal  Vibratory sensation:  Normal  Hot/cold sensation: normal    Protective Sensation using [place]-Kayla Monofilament:  10     MUSCULOSKELETAL      Right Foot Musculoskeletal   Ecchymosis:  None  Tenderness: right foot callus    Arch:  Normal  Hammertoe:  Second toe  Hallux valgus: No    Hallux limitus: No       Left Foot Musculoskeletal   Ecchymosis:  None  Tenderness: left foot callus    Arch:  Normal  Hallux valgus: No    Hallux limitus: No       MUSCLE STRENGTH     Right Foot Muscle Strength   Foot dorsiflexion:  5  Foot plantar flexion:  5  Foot inversion:  5  Foot eversion:  5     Left Foot Muscle Strength   Foot dorsiflexion:  5  Foot plantar flexion:  5  Foot inversion:  5  Foot eversion:  5     RANGE OF MOTION      Right Foot Range of Motion   Foot and ankle ROM within normal limits       Left Foot Range of Motion   Foot and ankle ROM within normal limits       DERMATOLOGIC     Right Foot Dermatologic   Skin: corn    Nails: onychomycosis and abnormally thick       Left Foot Dermatologic   Skin: corn    Nails: onychomycosis and abnormally thick       Image:       RADIOLOGY/NUCLEAR:  No results found.    LABORATORY/CULTURE RESULTS:      PATHOLOGY RESULTS:       ASSESSMENT/PLAN     Diagnoses and all orders for this visit:    1. Onychomycosis (Primary)    2. Corns and callosities    3. Foot pain, bilateral      Comprehensive lower extremity examination and evaluation was performed.  Discussed findings and treatment plan including risks, benefits, and treatment options with patient in detail. Patient agreed with treatment plan.  After verbal consent obtained, nail(s) x10 debrided of length and thickness with nail nipper without incidence  After verbal consent obtained, calluses x3 pared utilizing dermal curette and/or scalpel without incidence  Patient may maintain nails and calluses at home utilizing emery board or pumice stone between visits as needed  Reviewed at home  diabetic foot care including daily foot checks   Advised patient that given lack of qualifying condition that routine nail/callus care may not be covered. Recommended to check with insurance to see if this would be a covered service.  An After Visit Summary was printed and given to the patient at discharge, including (if requested) any available informative/educational handouts regarding diagnosis, treatment, or medications. All questions were answered to patient/family satisfaction. Should symptoms fail to improve or worsen they agree to call or return to clinic or to go to the Emergency Department. Discussed the importance of following up with any needed screening tests/labs/specialist appointments and any requested follow-up recommended by me today. Importance of maintaining follow-up discussed and patient accepts that missed appointments can delay diagnosis and potentially lead to worsening of conditions.  No follow-ups on file., or sooner if acute issues arise.    Lab Frequency Next Occurrence   Hemoglobin A1c Once 12/14/2021   Lipid Panel Once 12/14/2021   Vitamin D 25 Hydroxy Once 12/14/2021   MicroAlbumin, Urine, Random - Urine, Clean Catch Once 12/14/2021   TSH Once 12/14/2021   Pulmonary Function Test Once 07/26/2021   CT Abdomen Pelvis With Contrast Once 07/17/2021   CT Chest With & Without Contrast Once 08/10/2021       This document has been electronically signed by REBEL Hutchinson on August 9, 2021 09:13 CDT

## 2021-08-06 ENCOUNTER — TELEPHONE (OUTPATIENT)
Dept: PODIATRY | Facility: CLINIC | Age: 76
End: 2021-08-06

## 2021-08-06 NOTE — TELEPHONE ENCOUNTER
Spoke with pt regarding appt on 08/09/2021 with Guillermo LUQUE. Pt confirmed time and date of appt.

## 2021-08-09 ENCOUNTER — LAB (OUTPATIENT)
Dept: LAB | Facility: HOSPITAL | Age: 76
End: 2021-08-09

## 2021-08-09 ENCOUNTER — OFFICE VISIT (OUTPATIENT)
Dept: PODIATRY | Facility: CLINIC | Age: 76
End: 2021-08-09

## 2021-08-09 ENCOUNTER — TELEPHONE (OUTPATIENT)
Dept: ONCOLOGY | Facility: CLINIC | Age: 76
End: 2021-08-09

## 2021-08-09 VITALS
WEIGHT: 199.6 LBS | SYSTOLIC BLOOD PRESSURE: 138 MMHG | HEIGHT: 63 IN | HEART RATE: 46 BPM | DIASTOLIC BLOOD PRESSURE: 62 MMHG | BODY MASS INDEX: 35.37 KG/M2 | OXYGEN SATURATION: 97 %

## 2021-08-09 DIAGNOSIS — M79.672 FOOT PAIN, BILATERAL: ICD-10-CM

## 2021-08-09 DIAGNOSIS — M79.671 FOOT PAIN, BILATERAL: ICD-10-CM

## 2021-08-09 DIAGNOSIS — B35.1 ONYCHOMYCOSIS: Primary | ICD-10-CM

## 2021-08-09 DIAGNOSIS — L84 CORNS AND CALLOSITIES: ICD-10-CM

## 2021-08-09 LAB — SARS-COV-2 ORF1AB RESP QL NAA+PROBE: NOT DETECTED

## 2021-08-09 PROCEDURE — C9803 HOPD COVID-19 SPEC COLLECT: HCPCS | Performed by: FAMILY MEDICINE

## 2021-08-09 PROCEDURE — 11721 DEBRIDE NAIL 6 OR MORE: CPT | Performed by: NURSE PRACTITIONER

## 2021-08-09 PROCEDURE — 11056 PARNG/CUTG B9 HYPRKR LES 2-4: CPT | Performed by: NURSE PRACTITIONER

## 2021-08-09 PROCEDURE — U0004 COV-19 TEST NON-CDC HGH THRU: HCPCS | Performed by: FAMILY MEDICINE

## 2021-08-09 PROCEDURE — 99213 OFFICE O/P EST LOW 20 MIN: CPT | Performed by: NURSE PRACTITIONER

## 2021-08-09 NOTE — TELEPHONE ENCOUNTER
Caller: EDWARD RUSSO    Relationship to patient: SELF    Best call back number: [phone]    Patient is needing: TO R/S 9-7-2021 PORT FLUSH TO AN EARLY MORNING APPT ON A Monday AROUND 7:30 AM-8 AM.

## 2021-08-10 ENCOUNTER — HOSPITAL ENCOUNTER (OUTPATIENT)
Dept: CT IMAGING | Facility: HOSPITAL | Age: 76
Discharge: HOME | End: 2021-08-10

## 2021-08-10 ENCOUNTER — HOSPITAL ENCOUNTER (OUTPATIENT)
Dept: PULMONOLOGY | Facility: HOSPITAL | Age: 76
Discharge: HOME | End: 2021-08-10

## 2021-08-10 DIAGNOSIS — R97.0 ELEVATED CEA: ICD-10-CM

## 2021-08-10 DIAGNOSIS — Z17.0 MALIGNANT NEOPLASM OF UPPER-OUTER QUADRANT OF LEFT BREAST IN FEMALE, ESTROGEN RECEPTOR POSITIVE (HCC): ICD-10-CM

## 2021-08-10 DIAGNOSIS — R05.9 COUGH: ICD-10-CM

## 2021-08-10 DIAGNOSIS — R10.32 LEFT LOWER QUADRANT ABDOMINAL PAIN: ICD-10-CM

## 2021-08-10 DIAGNOSIS — C50.412 MALIGNANT NEOPLASM OF UPPER-OUTER QUADRANT OF LEFT BREAST IN FEMALE, ESTROGEN RECEPTOR POSITIVE (HCC): ICD-10-CM

## 2021-08-10 DIAGNOSIS — R93.89 ABNORMAL X-RAY: Primary | ICD-10-CM

## 2021-08-10 LAB
ARTERIAL PATENCY WRIST A: ABNORMAL
ATMOSPHERIC PRESS: 752 MMHG
BASE EXCESS BLDA CALC-SCNC: 1.5 MMOL/L (ref 0–2)
BDY SITE: ABNORMAL
BODY TEMPERATURE: 37 C
CREAT BLDA-MCNC: 0.7 MG/DL (ref 0.6–1.3)
HCO3 BLDA-SCNC: 26.7 MMOL/L (ref 20–26)
Lab: ABNORMAL
MODALITY: ABNORMAL
PCO2 BLDA: 43.6 MM HG (ref 35–45)
PCO2 TEMP ADJ BLD: 43.6 MM HG (ref 35–45)
PH BLDA: 7.4 PH UNITS (ref 7.35–7.45)
PH, TEMP CORRECTED: 7.4 PH UNITS (ref 7.35–7.45)
PO2 BLDA: 77.3 MM HG (ref 83–108)
PO2 TEMP ADJ BLD: 77.3 MM HG (ref 83–108)
SAO2 % BLDCOA: 96 % (ref 94–99)
VENTILATOR MODE: ABNORMAL

## 2021-08-10 PROCEDURE — 71270 CT THORAX DX C-/C+: CPT

## 2021-08-10 PROCEDURE — 36600 WITHDRAWAL OF ARTERIAL BLOOD: CPT

## 2021-08-10 PROCEDURE — 94726 PLETHYSMOGRAPHY LUNG VOLUMES: CPT | Performed by: INTERNAL MEDICINE

## 2021-08-10 PROCEDURE — 94729 DIFFUSING CAPACITY: CPT

## 2021-08-10 PROCEDURE — 94060 EVALUATION OF WHEEZING: CPT

## 2021-08-10 PROCEDURE — 94729 DIFFUSING CAPACITY: CPT | Performed by: INTERNAL MEDICINE

## 2021-08-10 PROCEDURE — 82803 BLOOD GASES ANY COMBINATION: CPT

## 2021-08-10 PROCEDURE — 25010000002 IOPAMIDOL 61 % SOLUTION: Performed by: NURSE PRACTITIONER

## 2021-08-10 PROCEDURE — 82565 ASSAY OF CREATININE: CPT

## 2021-08-10 PROCEDURE — 74177 CT ABD & PELVIS W/CONTRAST: CPT

## 2021-08-10 PROCEDURE — 94060 EVALUATION OF WHEEZING: CPT | Performed by: INTERNAL MEDICINE

## 2021-08-10 PROCEDURE — 94726 PLETHYSMOGRAPHY LUNG VOLUMES: CPT

## 2021-08-10 RX ORDER — ALBUTEROL SULFATE 2.5 MG/3ML
2.5 SOLUTION RESPIRATORY (INHALATION) ONCE
Status: COMPLETED | OUTPATIENT
Start: 2021-08-10 | End: 2021-08-10

## 2021-08-10 RX ADMIN — IOPAMIDOL 100 ML: 612 INJECTION, SOLUTION INTRAVENOUS at 08:45

## 2021-08-10 RX ADMIN — ALBUTEROL SULFATE 2.5 MG: 2.5 SOLUTION RESPIRATORY (INHALATION) at 09:41

## 2021-08-11 ENCOUNTER — APPOINTMENT (OUTPATIENT)
Dept: CT IMAGING | Facility: HOSPITAL | Age: 76
End: 2021-08-11

## 2021-08-12 DIAGNOSIS — R05.9 COUGH: Primary | ICD-10-CM

## 2021-08-12 RX ORDER — AZITHROMYCIN 250 MG/1
TABLET, FILM COATED ORAL
Qty: 6 TABLET | Refills: 0 | Status: SHIPPED | OUTPATIENT
Start: 2021-08-12 | End: 2021-08-31

## 2021-08-12 NOTE — PROGRESS NOTES
Notified pt and stated understanding, and does still have cough and wanted rx sent in and done, did not want to move appt up at this time, if doesn't improve will call back

## 2021-08-13 ENCOUNTER — TELEPHONE (OUTPATIENT)
Dept: ONCOLOGY | Facility: CLINIC | Age: 76
End: 2021-08-13

## 2021-08-13 NOTE — TELEPHONE ENCOUNTER
Notified Ira of CT scan results that showed 4 mm nodule right lower lobe and that will do another scan in 3 months to see if it is stable.  Ira stated that she has not had scans done anywhere else.

## 2021-08-13 NOTE — TELEPHONE ENCOUNTER
----- Message from REBEL Shelley sent at 8/12/2021  3:37 PM CDT -----  Please let Ira know that her CT of abdomen and chest only found a small 4mm nodule in the RLL and it will need to be followed to assure it is remaining the same.  We will need to repeat the scan in 3 months to note stability.  Please ask her if she has had any scans in a other facility that we can compare to.

## 2021-08-30 ENCOUNTER — INFUSION (OUTPATIENT)
Dept: ONCOLOGY | Facility: CLINIC | Age: 76
End: 2021-08-30

## 2021-08-30 DIAGNOSIS — Z45.2 ENCOUNTER FOR CARE RELATED TO PORT-A-CATH: Primary | ICD-10-CM

## 2021-08-30 PROCEDURE — 96523 IRRIG DRUG DELIVERY DEVICE: CPT | Performed by: NURSE PRACTITIONER

## 2021-08-30 RX ORDER — HEPARIN SODIUM (PORCINE) LOCK FLUSH IV SOLN 100 UNIT/ML 100 UNIT/ML
500 SOLUTION INTRAVENOUS AS NEEDED
Status: CANCELLED | OUTPATIENT
Start: 2021-08-30

## 2021-08-30 RX ORDER — SODIUM CHLORIDE 0.9 % (FLUSH) 0.9 %
10 SYRINGE (ML) INJECTION AS NEEDED
Status: CANCELLED | OUTPATIENT
Start: 2021-08-30

## 2021-08-30 RX ORDER — SODIUM CHLORIDE 0.9 % (FLUSH) 0.9 %
10 SYRINGE (ML) INJECTION AS NEEDED
Status: DISCONTINUED | OUTPATIENT
Start: 2021-08-30 | End: 2021-08-30 | Stop reason: HOSPADM

## 2021-08-30 RX ORDER — HEPARIN SODIUM (PORCINE) LOCK FLUSH IV SOLN 100 UNIT/ML 100 UNIT/ML
500 SOLUTION INTRAVENOUS AS NEEDED
Status: DISCONTINUED | OUTPATIENT
Start: 2021-08-30 | End: 2021-08-30 | Stop reason: HOSPADM

## 2021-08-30 RX ADMIN — HEPARIN SODIUM (PORCINE) LOCK FLUSH IV SOLN 100 UNIT/ML 500 UNITS: 100 SOLUTION at 09:35

## 2021-08-30 RX ADMIN — Medication 10 ML: at 09:35

## 2021-08-31 ENCOUNTER — OFFICE VISIT (OUTPATIENT)
Dept: FAMILY MEDICINE CLINIC | Facility: CLINIC | Age: 76
End: 2021-08-31

## 2021-08-31 VITALS
OXYGEN SATURATION: 97 % | BODY MASS INDEX: 35.26 KG/M2 | RESPIRATION RATE: 16 BRPM | HEIGHT: 63 IN | WEIGHT: 199 LBS | SYSTOLIC BLOOD PRESSURE: 128 MMHG | HEART RATE: 52 BPM | DIASTOLIC BLOOD PRESSURE: 82 MMHG | TEMPERATURE: 97.9 F

## 2021-08-31 DIAGNOSIS — I83.93 VARICOSE VEINS OF BOTH LOWER EXTREMITIES, UNSPECIFIED WHETHER COMPLICATED: ICD-10-CM

## 2021-08-31 DIAGNOSIS — R25.2 LEG CRAMPS: ICD-10-CM

## 2021-08-31 DIAGNOSIS — N32.81 OVERACTIVE BLADDER: ICD-10-CM

## 2021-08-31 DIAGNOSIS — I50.9 CONGESTIVE HEART FAILURE, UNSPECIFIED HF CHRONICITY, UNSPECIFIED HEART FAILURE TYPE (HCC): ICD-10-CM

## 2021-08-31 DIAGNOSIS — G89.29 CHRONIC BACK PAIN, UNSPECIFIED BACK LOCATION, UNSPECIFIED BACK PAIN LATERALITY: ICD-10-CM

## 2021-08-31 DIAGNOSIS — R60.9 EDEMA, UNSPECIFIED TYPE: ICD-10-CM

## 2021-08-31 DIAGNOSIS — M54.9 CHRONIC BACK PAIN, UNSPECIFIED BACK LOCATION, UNSPECIFIED BACK PAIN LATERALITY: ICD-10-CM

## 2021-08-31 DIAGNOSIS — G62.9 NEUROPATHY: ICD-10-CM

## 2021-08-31 PROCEDURE — 99213 OFFICE O/P EST LOW 20 MIN: CPT | Performed by: FAMILY MEDICINE

## 2021-08-31 RX ORDER — CYCLOBENZAPRINE HCL 5 MG
5 TABLET ORAL NIGHTLY
Qty: 30 TABLET | Refills: 1 | Status: SHIPPED | OUTPATIENT
Start: 2021-08-31

## 2021-08-31 NOTE — PROGRESS NOTES
Subjective   Ira Sanchez is a 76 y.o. female presenting with chief complaint of:   Chief Complaint   Patient presents with   • Edema     Pt states her ankles are swollen and painful at night   • Urinary Frequency     History of Present Illness :  Alone.  Here for primarily an acute issue today; called today with request to be seen due to leg swelling, cramps, pains.       Has multiple chronic problems to consider that might have a bearing on today's issues; not an interval appointment.       Chronic/acute problems reviewed today:   1. Edema, unspecified type chronic/variable.  Recently same.  Causes include: some relationship diastolic changes, obesity, pulmonary HTN; ? Degree of varicose veins/venous insufficiency.      2. Neuropathy-chemo/better chronic persistent lower extremity numbness; came after chemo.   3. Chronic back pain, unspecified back location, unspecified back pain laterality : chronic/variable 0-3/10 lower back pain with infrequent/same radiation to LE.  No change LE numbness.  Has bladder/bowel control. No desire for surgery to change approach of care.      4. Congestive heart failure, unspecified HF chronicity, unspecified heart failure type Chronic/stable.  Denies significant sob, orthopnea, leg edema, weight gain.  Aware of influence diet/salt and watching weight at home.       5. Leg cramps chronic on and off leg charley horse; had one last night and the left calf is still sore   6. Varicose veins of both lower extremities, unspecified whether complicated chronic slowly worsening visible blue areas of both lower extremities.     Has an/another acute issue today: none.    The following portions of the patient's history were reviewed and updated as appropriate: allergies, current medications, past family history, past medical history, past social history, past surgical history and problem list.      Current Outpatient Medications:   •  amLODIPine (NORVASC) 5 MG tablet, TAKE 1 TABLET BY MOUTH  DAILY, Disp: 90 tablet, Rfl: 3  •  Calcium Carb-Cholecalciferol (CALCIUM-VITAMIN D) 600-400 MG-UNIT tablet, Take 1 tablet by mouth 2 (Two) Times a Day., Disp: , Rfl:   •  celecoxib (CeleBREX) 200 MG capsule, TAKE 1 CAPSULE BY MOUTH DAILY, Disp: 30 capsule, Rfl: 5  •  cycloSPORINE (RESTASIS) 0.05 % ophthalmic emulsion, 1 drop Every 12 (Twelve) Hours., Disp: , Rfl:   •  diphenhydrAMINE (BENADRYL) 25 mg capsule, Take 25 mg by mouth Daily As Needed., Disp: , Rfl:   •  ibuprofen (ADVIL,MOTRIN) 200 MG tablet, Take 200 mg by mouth As Needed., Disp: , Rfl:   •  lisinopril (PRINIVIL,ZESTRIL) 20 MG tablet, TAKE 1 TABLET BY MOUTH DAILY, Disp: 90 tablet, Rfl: 3  •  oxybutynin XL (Ditropan XL) 10 MG 24 hr tablet, Take 1 tablet by mouth Daily., Disp: 90 tablet, Rfl: 1  •  thiamine (VITAMINE B-1) 100 MG tablet, Take 100 mg by mouth Daily., Disp: , Rfl:   No current facility-administered medications for this visit.    Facility-Administered Medications Ordered in Other Visits:   •  heparin flush (porcine) 100 UNIT/ML injection 500 Units, 500 Units, Intravenous, PRN, Shanda Torres MD, 500 Units at 04/16/18 1504  •  heparin flush (porcine) 100 UNIT/ML injection 500 Units, 500 Units, Intravenous, PRN, Shanda Torres MD, 500 Units at 06/12/18 1445  •  sodium chloride 0.9 % flush 10 mL, 10 mL, Intravenous, PRN, Shanda Torres MD, 10 mL at 04/16/18 1503  •  sodium chloride 0.9 % flush 10 mL, 10 mL, Intravenous, PRN, Shanda Torres MD, 10 mL at 06/12/18 1445    No problems with medications.    Allergies   Allergen Reactions   • Benzonatate Hives and Itching       Review of Systems  GENERAL:  Active/slower with limits, speed, stamina for age, and occ back pain.  Sleep is ok. No fever now.  SKIN: No other rash/skin lesion of concern:   ENDO:  No syncope, near or diaphoretic sweaty spells.  No download..  HEENT: No recent head injury; less sinus area headache.  No vision change.  No chronic  hearing loss.  Ears with fullness without pain/drainage.  No sore throat now.  No significant nasal/sinus congestion/drainage. No epistaxis.   CHEST: No chest wall tenderness or mass.  Same occ/dry cough, and no wheeze.  No SOB; no hemoptysis.  CV: No chest pain, palpitations; daily end of day mild pitting/ankle edema.  GI: No heartburn, dysphagia.  No other abdominal pain, diarrhea, constipation.  No rectal bleeding, or melena.    :  Voids with occ on/off dysuria, without incontinence to completion.  ORTHO: No painful/swollen joints but various on /off sore.  Occ same/sore neck or back.  No acute neck or lower back pain without recent injury.  NEURO: No dizziness, weakness of extremities.  Same mild numbness/paresthesias LE.  PSYCH: No memory loss.  Mood good; mild anxious, depressed but/and not suicidal.  Tries to tolerate stress .   Screening:  Mammogram: bilateral masectomy  Bone density: Bone d-deca/[facility]/T hip -0.5 LS +1.4/12.18.18;  no change; maybe 2-5 yr  Low dose CT chest:Tobacco-smoker/age 17/1-2ppd/dc age 60:  CTs with breast cancer:   GI: Colon-avm-div/[facility]ie[facility]/12.19.17/5y  Prostate: NA  Usual lab order  6m CBC, CMP, A1c  12m CBC, CMP, A1c, LIPID, TSH, Vit D    Data reviewed:   Last cardiac testing:   Echo:   Results for orders placed during the hospital encounter of 04/23/21    Adult Transthoracic Echo Complete W/ Cont if Necessary Per Protocol    Interpretation Summary  · Technically difficult study.  · Left ventricular ejection fraction appears to be 66 - 70%. Left ventricular systolic function is normal.  · Left ventricular diastolic function is consistent with (grade I) impaired relaxation.  · Normal right ventricular cavity size and systolic function noted.  · Mild aortic valve stenosis is present.  · Estimated right ventricular systolic pressure from tricuspid regurgitation is normal (<35 mmHg).    Lab Results:  Results for orders placed or performed during the hospital encounter of 08/10/21    Blood Gas, Arterial -    Specimen: Arterial Blood   Result Value Ref Range    Site Left Brachial     Dank's Test N/A     pH, Arterial 7.396 7.350 - 7.450 pH units    pCO2, Arterial 43.6 35.0 - 45.0 mm Hg    pO2, Arterial 77.3 (L) 83.0 - 108.0 mm Hg    HCO3, Arterial 26.7 (H) 20.0 - 26.0 mmol/L    Base Excess, Arterial 1.5 0.0 - 2.0 mmol/L    O2 Saturation, Arterial 96.0 94.0 - 99.0 %    Temperature 37.0 C    Barometric Pressure for Blood Gas 752 mmHg    Modality Room Air     Ventilator Mode NA     Collected by [id number]     pCO2, Temperature Corrected 43.6 35 - 45 mm Hg    pH, Temp Corrected 7.396 7.350 - 7.450 pH Units    pO2, Temperature Corrected 77.3 (L) 83 - 108 mm Hg       A1C:  Lab Results - Last 18 Months   Lab Units 06/07/21  0832 06/08/20  0705   HEMOGLOBIN A1C % 6.10* 6.30*     LIPID:No results for input(s): CHLPL, LDL, HDL, TRIG in the last 88319 hours.  PSA:No results for input(s): PSA in the last 58391 hours.  CBC:  Lab Results - Last 18 Months   Lab Units 06/07/21  0832 03/01/21  0822 12/14/20  0937 06/08/20  0705   WBC 10*3/mm3 7.45 8.1 7.52 7.04   HEMOGLOBIN g/dL 12.4 13.5 12.8 12.7   HEMATOCRIT % 36.9 41.6 39.6 38.8   PLATELETS 10*3/mm3 161 202 169 171      BMP/CMP:  Lab Results - Last 18 Months   Lab Units 08/10/21  0834 06/07/21  0832 03/08/21  1215 03/01/21  0822 12/14/20  0937 06/08/20  0705   SODIUM mmol/L  --  139 136 140 140 139   POTASSIUM mmol/L  --  5.0 4.6 4.6 4.5 4.5   CHLORIDE mmol/L  --  102 98 101 105 103   TOTAL CO2 mmol/L  --   --  25.1 23  --  24.4   CO2 mmol/L  --  27.0  --   --  26.0  --    GLUCOSE mg/dL  --   --  127* 106*  --  123*   BUN mg/dL  --  20 20 14 19 18   CREATININE mg/dL 0.70 0.74 0.75 0.74 0.55* 0.72   EGFR IF NONAFRICN AM mL/min/1.73  --  76 75 78 107 79   EGFR IF AFRICN AM mL/min/1.73  --   --  91 90  --  95   CALCIUM mg/dL  --  10.5 9.9 9.6 9.2 9.5     HEPATIC:  Lab Results - Last 18 Months   Lab Units 06/07/21  0832 03/01/21  0822 12/14/20  0937 06/08/20  0705  "  ALT (SGPT) U/L 11 9 8 8   AST (SGOT) U/L 26 18 20 19   ALK PHOS U/L 108 121* 114 105     THYROID:  Lab Results - Last 18 Months   Lab Units 03/01/21  0822   TSH uIU/mL 2.180       Objective   /82   Pulse 52   Temp 97.9 °F (36.6 °C) (Infrared)   Resp 16   Ht 160 cm (63\")   Wt 90.3 kg (199 lb)   LMP  (LMP Unknown)   SpO2 97%   BMI 35.25 kg/m²   Body mass index is 35.25 kg/m².    Recent Vitals       7/12/2021 8/9/2021 8/31/2021       BP:  128/78  138/62  128/82     Pulse:  92  (!) 46  52     Temp:  97.8 °F (36.6 °C)  --  97.9 °F (36.6 °C)     Weight:  88 kg (194 lb)  90.5 kg (199 lb 9.6 oz)  90.3 kg (199 lb)     BMI (Calculated):  34.4  35.4  35.3           Physical Exam  GENERAL:  Well nourished/developed in no acute distress but frequent cough; at times paroxysmal.   SKIN: Turgor excellent, without wound, rash, lesion or significance  HEENT: Normal cephalic without trauma. Pupils equal round reactive to light. Extraocular motions full without nystagmus.  External canals nonobstructive nontender without reddness. Tymphatic membranes dull with irwin structures intact.   Oral cavity without growths, exudates, and moist.  Posterior pharynx without mass, obstruction, redness.  No thyromegaly, mass, tenderness, definite lymphadenopathy and supple.   CV: Regular rhythm.  1/6 systolic murmur without gallop and trace ankle/LE edema. Posterior pulses intact.  No carotid bruits.  CHEST: No chest wall tenderness or mass.   LUNGS: Symmetric motion with clear to auscultation.  No dullness to percussion  ABD: Soft, non-tender without mass.   ORTHO: Symmetric extremities without swelling/point tenderness.  Full gross range of motion. Symmetric LE.  Neg straight leg raising.  Neg Patricks maneuver.  Back is straight/mild lordosis.  Lower back sore; no point tender.    NEURO: CN 2-12 grossly intact except L eyelid mild droop.  Otherwise symmetric facies and UE/LE. 3/5 strength throughout.   Otherwise nonfocal use " extremities. Speech clear.      Assessment/Plan     1. Edema, unspecified type    2. Neuropathy-chemo/better    3. Chronic back pain, unspecified back location, unspecified back pain laterality    4. Congestive heart failure, unspecified HF chronicity, unspecified heart failure type (CMS/HCC)    5. Leg cramps    6. Varicose veins of both lower extremities, unspecified whether complicated        Discussion:  Pro/con ditropan (if wants bid vs qd ok); discussed  Chance of venous insufficiency discussed; vascular referral to see  Noting she is off lasix  Difficulty treating asael horses; trial flexeril start 5 and maybe 10    Medical decision issues:   Data review above:   Rx: reviewed and decisions:   Same Rx for now with flexeril trial    New Medications Ordered This Visit   Medications   • cyclobenzaprine (FLEXERIL) 5 MG tablet     Sig: Take 1 tablet by mouth Every Night.     Dispense:  30 tablet     Refill:  1     Orders placed:   LAB/Testing/Referrals: reviewed/orders:   Today:   Orders Placed This Encounter   Procedures   • Ambulatory Referral to Vascular Surgery     Chronic/recurrent labs above or change to:   same     Health maintenance:   Body mass index is 35.25 kg/m².  Patient's Body mass index is 35.25 kg/m². indicating that she is obese (BMI >30). Obesity-related health conditions include the following: hypertension. Obesity is unchanged. BMI is is above average; BMI management plan is completed. We discussed portion control and increasing exercise..    Tobacco use reviewed:    rIa Sanchez  reports that she quit smoking about 20 years ago. Her smoking use included cigarettes. She started smoking about 58 years ago. She has a 74.00 pack-year smoking history. She has never used smokeless tobacco..     There are no Patient Instructions on file for this visit.    Follow up: Return for lab;, Dr Tinoco-, as planned;.  Future Appointments   Date Time Provider Department Center   9/14/2021  8:00 AM Bicking,  Mustapha BRITT DO [facility]   11/8/2021  8:00 AM PAD BIC CT 1  PAD CT BI PAD   12/8/2021  8:00 AM  [facility] LAB  [facility] CCL[facility] PAD   12/8/2021  8:30 AM Tamela Doherty APRN [facility]AD   1/12/2022  9:30 AM Conrado Tinoco MD [facility] MET[facility]AD

## 2021-09-08 DIAGNOSIS — N32.81 OVERACTIVE BLADDER: ICD-10-CM

## 2021-09-08 RX ORDER — OXYBUTYNIN CHLORIDE 10 MG/1
10 TABLET, EXTENDED RELEASE ORAL DAILY
Qty: 90 TABLET | Refills: 1 | Status: SHIPPED | OUTPATIENT
Start: 2021-09-08 | End: 2021-09-10

## 2021-09-08 NOTE — TELEPHONE ENCOUNTER
Caller: Ira Sanchez    Relationship: Self    Best call back number: [phone]    Medication needed:   Requested Prescriptions     Pending Prescriptions Disp Refills   • oxybutynin XL (Ditropan XL) 10 MG 24 hr tablet 90 tablet 1     Sig: Take 1 tablet by mouth Daily.       When do you need the refill by: ASAP     What additional details did the patient provide when requesting the medication: PATIENT WANTS TO GO BACK TO THE 5MG TO THE TWICE A DAY   PATIENT DOES NOT WANT THE POTASSIUM AND THE ONE TO MAKE HER GO       Does the patient have less than a 3 day supply:  [x] Yes  [] No    What is the patient's preferred pharmacy: [id number] - [facility] [address] AT [address] [facility] [phone] [facility] [phone] FX

## 2021-09-10 RX ORDER — OXYBUTYNIN CHLORIDE 10 MG/1
10 TABLET, EXTENDED RELEASE ORAL DAILY
Qty: 90 TABLET | Refills: 1 | OUTPATIENT
Start: 2021-09-10

## 2021-09-10 RX ORDER — FUROSEMIDE 20 MG/1
20 TABLET ORAL DAILY
Qty: 90 TABLET | Refills: 1 | OUTPATIENT
Start: 2021-09-10

## 2021-09-10 RX ORDER — OXYBUTYNIN CHLORIDE 5 MG/1
5 TABLET, EXTENDED RELEASE ORAL 2 TIMES DAILY
Qty: 180 TABLET | Refills: 1 | Status: SHIPPED | OUTPATIENT
Start: 2021-09-10 | End: 2022-01-03

## 2021-09-10 RX ORDER — POTASSIUM CHLORIDE 750 MG/1
10 TABLET, FILM COATED, EXTENDED RELEASE ORAL DAILY
Qty: 90 TABLET | Refills: 1 | OUTPATIENT
Start: 2021-09-10

## 2021-09-10 NOTE — TELEPHONE ENCOUNTER
Pt called and wants her ditropan xl change back to 5 mg bid, did advise that her insurance may not pay for two tablets? But will change back to her order of 5 mg xl bid

## 2021-09-13 ENCOUNTER — TELEPHONE (OUTPATIENT)
Dept: VASCULAR SURGERY | Facility: CLINIC | Age: 76
End: 2021-09-13

## 2021-09-13 NOTE — TELEPHONE ENCOUNTER
Spoke with Ms Sanchez reminding her of her appointment for Tuesday, September 14th, 2021 at 8 am with Dr London. Ms Sanchez confirmed she would be here.

## 2021-09-14 ENCOUNTER — PATIENT ROUNDING (BHMG ONLY) (OUTPATIENT)
Dept: VASCULAR SURGERY | Facility: CLINIC | Age: 76
End: 2021-09-14

## 2021-09-14 ENCOUNTER — OFFICE VISIT (OUTPATIENT)
Dept: VASCULAR SURGERY | Facility: CLINIC | Age: 76
End: 2021-09-14

## 2021-09-14 VITALS
SYSTOLIC BLOOD PRESSURE: 124 MMHG | DIASTOLIC BLOOD PRESSURE: 80 MMHG | OXYGEN SATURATION: 95 % | HEIGHT: 62 IN | WEIGHT: 199 LBS | BODY MASS INDEX: 36.62 KG/M2 | HEART RATE: 42 BPM

## 2021-09-14 DIAGNOSIS — I10 ESSENTIAL HYPERTENSION: ICD-10-CM

## 2021-09-14 DIAGNOSIS — I87.323 CHRONIC VENOUS HYPERTENSION WITH INFLAMMATION INVOLVING BOTH SIDES: Primary | ICD-10-CM

## 2021-09-14 DIAGNOSIS — I89.0 LYMPHEDEMA: ICD-10-CM

## 2021-09-14 PROCEDURE — 99214 OFFICE O/P EST MOD 30 MIN: CPT | Performed by: NURSE PRACTITIONER

## 2021-09-14 NOTE — PROGRESS NOTES
09/14/2021      Conrado Tinoco MD  [address] [place] [id number]    Ira Sanchez  1945    Chief Complaint   Patient presents with   • Establish Care     Referred over by Dr Tinoco for Bilateral Varicose Veins and Leg Cramps. Patient denies any stroke like symptoms.    • Former SMoker     Patient is a Former Smoker - Quit 09/2000   • Leg Swelling     patient states she has severe leg swelling since March of 2021 as well as leg cramps and pain    • Med Management     Verified medications from list patient brought in        Dear Conrado Tinoco MD:      HPI  I had the pleasure of seeing your patient Ira Sanchez in the office today.  Thank you kindly for this consultation.  As you recall, Ira Sanchez is a 76 y.o.  female who you are currently following for routine health maintenance.  She reports she has had significant leg swelling since March 2021.  She does take Norvasc as well.  She reports she has taken diuretics without significant relief.  She is having complaints of swelling, heaviness, tiredness, and severe leg cramping at night.      Past Medical History:   Diagnosis Date   • Bradycardia    • Breast cancer (HCC)    • Colon polyp    • Colon polyps    • Diabetes mellitus (HCC)    • Encounter for care related to Port-a-Cath 4/24/2020   • Hx of migraine headaches    • Hypertension    • Peripheral neuropathy    • Urinary incontinence        Past Surgical History:   Procedure Laterality Date   • APPENDECTOMY     • BREAST SURGERY     • CATARACT EXTRACTION Bilateral    • DILATATION AND CURETTAGE     • KNEE SURGERY     • MASTECTOMY Bilateral    • NIPPLE TATTOO  04/12/2016   • PORTACATH PLACEMENT     • THROAT SURGERY         Family History   Problem Relation Age of Onset   • Hypertension Mother    • Heart failure Mother    • Cancer Father         colon   • Colon cancer Father    • Cancer Maternal Aunt         breast   • No Known Problems Maternal Grandmother    • No Known Problems  Maternal Grandfather    • No Known Problems Paternal Grandmother    • No Known Problems Paternal Grandfather    • Cancer Maternal Aunt         breast   • Cancer Maternal Aunt         breast   • Cancer Other        Social History     Socioeconomic History   • Marital status:      Spouse name: Horace   • Number of children: 0   • Years of education: 12.5   Tobacco Use   • Smoking status: Former Smoker     Packs/day: 2.00     Years: 37.00     Pack years: 74.00     Types: Cigarettes     Start date: 1963     Quit date: 2000     Years since quittin.1   • Smokeless tobacco: Never Used   Vaping Use   • Vaping Use: Never used   Substance and Sexual Activity   • Alcohol use: No   • Drug use: No   • Sexual activity: Defer       Allergies   Allergen Reactions   • Benzonatate Hives and Itching       Current Outpatient Medications   Medication Instructions   • amLODIPine (NORVASC) 5 mg, Oral, Daily   • Calcium Carb-Cholecalciferol (CALCIUM-VITAMIN D) 600-400 MG-UNIT tablet 1 tablet, Oral, 2 Times Daily   • cefdinir (OMNICEF) 300 mg, Oral, 2 Times Daily   • celecoxib (CELEBREX) 200 mg, Oral, Daily   • cyclobenzaprine (FLEXERIL) 5 mg, Oral, Nightly   • cycloSPORINE (RESTASIS) 0.05 % ophthalmic emulsion 1 drop, Every 12 Hours   • diphenhydrAMINE (BENADRYL) 25 mg, Oral, Daily PRN   • ibuprofen (ADVIL,MOTRIN) 200 mg, Oral, As Needed   • lisinopril (PRINIVIL,ZESTRIL) 20 mg, Oral, Daily   • methylPREDNISolone (MEDROL) 4 MG dose pack Take as directed on package instructions.   • oxybutynin XL (DITROPAN-XL) 5 mg, Oral, 2 times daily   • thiamine (VITAMIN B-1) 100 mg, Oral, Daily        Review of Systems   Constitutional: Negative.    HENT: Negative.    Eyes: Negative.    Respiratory: Negative.    Cardiovascular: Positive for leg swelling.        Leg cramping   Gastrointestinal: Negative.    Endocrine: Negative.    Genitourinary: Negative.    Musculoskeletal: Negative.    Skin: Negative.    Allergic/Immunologic:  "Negative.    Neurological: Negative.    Hematological: Negative.    Psychiatric/Behavioral: Negative.        /80 (BP Location: Right arm, Patient Position: Sitting, Cuff Size: Adult)   Pulse (!) 42   Ht 157.5 cm (62\")   Wt 90.3 kg (199 lb)   LMP  (LMP Unknown)   SpO2 95%   BMI 36.40 kg/m²   Physical Exam  Vitals and nursing note reviewed.   Constitutional:       General: She is not in acute distress.     Appearance: Normal appearance. She is well-developed. She is obese. She is not diaphoretic.   HENT:      Head: Normocephalic and atraumatic.   Eyes:      General: No scleral icterus.     Pupils: Pupils are equal, round, and reactive to light.   Neck:      Thyroid: No thyromegaly.      Vascular: No carotid bruit or JVD.   Cardiovascular:      Rate and Rhythm: Normal rate and regular rhythm.      Pulses: Normal pulses.      Heart sounds: Normal heart sounds and S2 normal. No murmur heard.  No friction rub. No gallop.       Comments: Varicosities noted to bilateral lower extremities  Pulmonary:      Effort: Pulmonary effort is normal.      Breath sounds: Normal breath sounds.   Abdominal:      General: Bowel sounds are normal.      Palpations: Abdomen is soft.   Musculoskeletal:         General: Normal range of motion.      Cervical back: Normal range of motion and neck supple.      Right lower leg: Edema present.      Left lower leg: Edema present.   Skin:     General: Skin is warm and dry.      Comments: hyperpigmentation   Neurological:      General: No focal deficit present.      Mental Status: She is alert and oriented to person, place, and time.      Cranial Nerves: No cranial nerve deficit.   Psychiatric:         Mood and Affect: Mood normal.         Behavior: Behavior normal.         Thought Content: Thought content normal.         Judgment: Judgment normal.         No results found.    Patient Active Problem List   Diagnosis   • Obesity   • Breast cancer-L/valente masectomy 2014   • Overactive bladder "   • Diabetes type 2, controlled (HCC)   • Hypertension   • Osteopenia 2018-? 2 to 5y   • Neuropathy-chemo/better   • Primary generalized (osteo)arthritis   • Malignant neoplasm of lower-outer quadrant of left female breast (HCC)   • Family history of breast cancer   • Malignant neoplasm of upper-outer quadrant of female breast (HCC)   • History of bilateral mastectomy   • Wellness examination-done   • Shoulder pain   • Chronic back pain   • Laboratory test*   • Bradycardia   • Cough: copd   • Allergic rhinitis   • Menopause   • History of shingles   • COPD (chronic obstructive pulmonary disease) ([facility])   • Abnormal mammogram   • After-cataract with vision obscured   • Amblyopia   • Cellulitis, trunk   • Dermatitis of eyelid due to herpes zoster   • Dermatochalasis   • Hordeolum internum   • Other hereditary corneal dystrophies   • Pseudophakia   • Rash   • Encounter for care related to Port-a-Cath   • Edema:venous   • Congestive heart failure ([facility])   • Valvular heart disease: AS         ICD-10-CM ICD-9-CM   1. Chronic venous hypertension with inflammation involving both sides  I87.323 459.32   2. Essential hypertension  I10 401.9   3. Lymphedema  I89.0 457.1           Plan: After thoroughly evaluating Ira Sanchez, I believe the best course of action is to initially remain conservative from a vascular standpoint.  I would like her to continue wearing compression stockings in the 20-30 mm pressure gradient range.  I did instruct the patient on how to wear these on a daily basis.  I would like her to keep her legs elevated when she is not on them, and keep her legs well moisturized.  We will see the patient back in 3 months with a venous valvular insufficiency study. If the testing does show significant venous reflux, the patient may be a great candidate for endovenous closure as the patient's symptoms have significantly impacted their activities of daily living.   She does take Norvasc as well which could be a  contributing factor to her leg swelling.  I do think she is a great candidate for home lymphedema pumps.  She has tried more than 4 weeks of compression in the 20 to 30 mmHg range, exercise, and elevation without significant relief.  The patient can continue taking their current medication regimen as previously planned.  This was all discussed in full with complete understanding.    Thank you for allowing me to participate in the care of your patient.  Please do not hesitate with any questions or concerns.  I will keep you aware of any further encounters with Ira Sanchez.        Sincerely yours,         REBEL Crawford

## 2021-09-14 NOTE — PROGRESS NOTES
September 14, 2021    Hello, may I speak with Ira Sanchez?    My name is Nereyda       I am  with [facility]  [address], [address]  [place] [address] [phone].    Before we get started may I verify your date of birth? 1945    I am calling to officially welcome you to our practice and ask about your recent visit. Is this a good time to talk? YES     Tell me about your visit with us. What things went well?  Everything was fine.        We're always looking for ways to make our patients' experiences even better. Do you have recommendations on ways we may improve?  No    Overall were you satisfied with your first visit to our practice? Yes       I appreciate you taking the time to speak with me today. Is there anything else I can do for you? no      Thank you, and have a great day.

## 2021-10-11 ENCOUNTER — OFFICE VISIT (OUTPATIENT)
Dept: FAMILY MEDICINE CLINIC | Facility: CLINIC | Age: 76
End: 2021-10-11

## 2021-10-11 VITALS
TEMPERATURE: 96.8 F | DIASTOLIC BLOOD PRESSURE: 76 MMHG | HEIGHT: 62 IN | OXYGEN SATURATION: 96 % | HEART RATE: 45 BPM | SYSTOLIC BLOOD PRESSURE: 128 MMHG | BODY MASS INDEX: 36.44 KG/M2 | WEIGHT: 198 LBS

## 2021-10-11 DIAGNOSIS — R60.9 EDEMA, UNSPECIFIED TYPE: ICD-10-CM

## 2021-10-11 DIAGNOSIS — J44.9 CHRONIC OBSTRUCTIVE PULMONARY DISEASE, UNSPECIFIED COPD TYPE (HCC): ICD-10-CM

## 2021-10-11 DIAGNOSIS — R05.9 COUGH: ICD-10-CM

## 2021-10-11 DIAGNOSIS — J44.1 COPD EXACERBATION (HCC): ICD-10-CM

## 2021-10-11 DIAGNOSIS — M79.604 PAIN OF RIGHT LOWER EXTREMITY: ICD-10-CM

## 2021-10-11 PROCEDURE — 99214 OFFICE O/P EST MOD 30 MIN: CPT | Performed by: FAMILY MEDICINE

## 2021-10-11 RX ORDER — CEFDINIR 300 MG/1
300 CAPSULE ORAL 2 TIMES DAILY
Qty: 20 CAPSULE | Refills: 0 | Status: SHIPPED | OUTPATIENT
Start: 2021-10-11 | End: 2022-01-03

## 2021-10-11 RX ORDER — METHYLPREDNISOLONE 4 MG/1
TABLET ORAL
Qty: 1 EACH | Refills: 0 | Status: SHIPPED | OUTPATIENT
Start: 2021-10-11 | End: 2022-01-03

## 2021-10-11 NOTE — PROGRESS NOTES
Subjective   Ira Sanchez is a 76 y.o. female presenting with chief complaint of:   Chief Complaint   Patient presents with   • Cough     Pt states that she has had a productive cough since Friday.  She states that they are usually whitish in color.   • Edema     She states that she has had swelling in her feet.  Pt states that she does wear compression socks, but will be getting lymphedema pumps soon.   • Pain     Pt states that she is having pain in the right thigh and top of the right foot. Pt denies any warmth to the area.     History of Present Illness :  Alone.  Here for primarily an acute issue today; above.       Has multiple chronic problems to consider that might have a bearing on today's issues; somewhat an interval appointment.       Chronic/acute problems reviewed today:   1. Pain of right lower extremity chronic on and off lower right extremity discomfort.  At times of fullness when she has edema.  She also had a nodule feeling lateral leg just below the knee on the right.  Now that is gone away and its above the knee laterally.    2. Edema, unspecified type chronic variable.  She does use Norvasc.  Saw vascular and they thought she certainly had venous insufficiency and gave her support stockings to the knee 20 to 30 mmHg.  She says they are too tight at the top.  She has something like a lymphedema pump coming   3. Chronic obstructive pulmonary disease, unspecified COPD type (HCC) Chronic/stable mild occ cough, sob, wheeze.  Rx helps.   smoking.       4. Cough: copd acute worsening of cough.  Productive of yellow sputum.  She has had Covid vaccine/moderna but not booster.    5. COPD exacerbation (HCC) with above     Has an/another acute issue today: none.    The following portions of the patient's history were reviewed and updated as appropriate: allergies, current medications, past family history, past medical history, past social history, past surgical history and problem list.      Current  Outpatient Medications:   •  amLODIPine (NORVASC) 5 MG tablet, TAKE 1 TABLET BY MOUTH DAILY, Disp: 90 tablet, Rfl: 3  •  Calcium Carb-Cholecalciferol (CALCIUM-VITAMIN D) 600-400 MG-UNIT tablet, Take 1 tablet by mouth 2 (Two) Times a Day., Disp: , Rfl:   •  cycloSPORINE (RESTASIS) 0.05 % ophthalmic emulsion, 1 drop Every 12 (Twelve) Hours., Disp: , Rfl:   •  diphenhydrAMINE (BENADRYL) 25 mg capsule, Take 25 mg by mouth Daily As Needed., Disp: , Rfl:   •  ibuprofen (ADVIL,MOTRIN) 200 MG tablet, Take 200 mg by mouth As Needed., Disp: , Rfl:   •  lisinopril (PRINIVIL,ZESTRIL) 20 MG tablet, TAKE 1 TABLET BY MOUTH DAILY, Disp: 90 tablet, Rfl: 3  •  oxybutynin XL (DITROPAN-XL) 5 MG 24 hr tablet, Take 1 tablet by mouth 2 (two) times a day., Disp: 180 tablet, Rfl: 1  •  thiamine (VITAMINE B-1) 100 MG tablet, Take 100 mg by mouth Daily., Disp: , Rfl:   •  celecoxib (CeleBREX) 200 MG capsule, TAKE 1 CAPSULE BY MOUTH DAILY, Disp: 30 capsule, Rfl: 5  •  cyclobenzaprine (FLEXERIL) 5 MG tablet, Take 1 tablet by mouth Every Night., Disp: 30 tablet, Rfl: 1  No current facility-administered medications for this visit.    Facility-Administered Medications Ordered in Other Visits:   •  heparin flush (porcine) 100 UNIT/ML injection 500 Units, 500 Units, Intravenous, PRN, Shanda Torres MD, 500 Units at 04/16/18 1504  •  heparin flush (porcine) 100 UNIT/ML injection 500 Units, 500 Units, Intravenous, PRN, Shanda Torres MD, 500 Units at 06/12/18 1445  •  sodium chloride 0.9 % flush 10 mL, 10 mL, Intravenous, PRN, Shanda Torres MD, 10 mL at 04/16/18 1503  •  sodium chloride 0.9 % flush 10 mL, 10 mL, Intravenous, PRN, Shanda Torres MD, 10 mL at 06/12/18 8555    No problems with medications.    Allergies   Allergen Reactions   • Benzonatate Hives and Itching       Review of Systems  GENERAL:  Active/slower with limits, speed, stamina for age, and occ back pain.  Sleep is ok. No fever  now.  SKIN: No other rash/skin lesion of concern:   ENDO:  No syncope, near or diaphoretic sweaty spells.  No download..  HEENT: No recent head injury; less sinus area headache.  No vision change.  No chronic hearing loss.  Ears with fullness without pain/drainage.  No sore throat now.  No significant nasal/sinus congestion/drainage. No epistaxis.   CHEST: No chest wall tenderness or mass.  Same occ/dry cough, and no wheeze.  No SOB; no hemoptysis.  CV: No chest pain, palpitations; daily end of day mild pitting/ankle edema.  GI: No heartburn, dysphagia.  No other abdominal pain, diarrhea, constipation.  No rectal bleeding, or melena.    :  Voids with occ on/off dysuria, without incontinence to completion.  ORTHO: No painful/swollen joints but various on /off sore.  Occ same/sore neck or back.  No acute neck or lower back pain without recent injury.  NEURO: No dizziness, weakness of extremities.  Same mild numbness/paresthesias LE.  PSYCH: No memory loss.  Mood good; mild anxious, depressed but/and not suicidal.  Tries to tolerate stress .   Screening:  Mammogram: bilateral masectomy  Bone density: Bone d-deca/[facility]/T hip -0.5 LS +1.4/12.18.18;  no change; maybe 2-5 yr  Low dose CT chest:Tobacco-smoker/age 17/1-2ppd/dc age 60:  CTs with breast cancer:   GI: Colon-avm-div/[facility]ie[facility]/12.19.17/5y  Prostate: NA  Usual lab order  6m CBC, CMP, A1c  12m CBC, CMP, A1c, LIPID, TSH, Vit D     Data reviewed:   Last cardiac testing:   Echo:   Results for orders placed during the hospital encounter of 04/23/21     Adult Transthoracic Echo Complete W/ Cont if Necessary Per Protocol     Interpretation Summary  · Technically difficult study.  · Left ventricular ejection fraction appears to be 66 - 70%. Left ventricular systolic function is normal.  · Left ventricular diastolic function is consistent with (grade I) impaired relaxation.  · Normal right ventricular cavity size and systolic function noted.  · Mild aortic valve stenosis  is present.  · Estimated right ventricular systolic pressure from tricuspid regurgitation is normal (<35 mmHg).         Lab Results:  Results for orders placed or performed during the hospital encounter of 08/10/21   Blood Gas, Arterial -    Specimen: Arterial Blood   Result Value Ref Range    Site Left Brachial     Dank's Test N/A     pH, Arterial 7.396 7.350 - 7.450 pH units    pCO2, Arterial 43.6 35.0 - 45.0 mm Hg    pO2, Arterial 77.3 (L) 83.0 - 108.0 mm Hg    HCO3, Arterial 26.7 (H) 20.0 - 26.0 mmol/L    Base Excess, Arterial 1.5 0.0 - 2.0 mmol/L    O2 Saturation, Arterial 96.0 94.0 - 99.0 %    Temperature 37.0 C    Barometric Pressure for Blood Gas 752 mmHg    Modality Room Air     Ventilator Mode NA     Collected by [id number]     pCO2, Temperature Corrected 43.6 35 - 45 mm Hg    pH, Temp Corrected 7.396 7.350 - 7.450 pH Units    pO2, Temperature Corrected 77.3 (L) 83 - 108 mm Hg       A1C:  Lab Results - Last 18 Months   Lab Units 06/07/21  0832 06/08/20  0705   HEMOGLOBIN A1C % 6.10* 6.30*     LIPID:No results for input(s): CHLPL, LDL, HDL, TRIG in the last 23601 hours.  PSA:No results for input(s): PSA in the last 04022 hours.  CBC:  Lab Results - Last 18 Months   Lab Units 06/07/21  0832 03/01/21  0822 12/14/20  0937 06/08/20  0705   WBC 10*3/mm3 7.45 8.1 7.52 7.04   HEMOGLOBIN g/dL 12.4 13.5 12.8 12.7   HEMATOCRIT % 36.9 41.6 39.6 38.8   PLATELETS 10*3/mm3 161 202 169 171      BMP/CMP:  Lab Results - Last 18 Months   Lab Units 08/10/21  0834 06/07/21  0832 03/08/21  1215 03/01/21  0822 12/14/20  0937 06/08/20  0705   SODIUM mmol/L  --  139 136 140 140 139   POTASSIUM mmol/L  --  5.0 4.6 4.6 4.5 4.5   CHLORIDE mmol/L  --  102 98 101 105 103   TOTAL CO2 mmol/L  --   --  25.1 23  --  24.4   CO2 mmol/L  --  27.0  --   --  26.0  --    GLUCOSE mg/dL  --   --  127* 106*  --  123*   BUN mg/dL  --  20 20 14 19 18   CREATININE mg/dL 0.70 0.74 0.75 0.74 0.55* 0.72   EGFR IF NONAFRICN AM mL/min/1.73  --  76 75 78 107  "79   EGFR IF AFRICN AM mL/min/1.73  --   --  91 90  --  95   CALCIUM mg/dL  --  10.5 9.9 9.6 9.2 9.5     HEPATIC:  Lab Results - Last 18 Months   Lab Units 06/07/21  0832 03/01/21  0822 12/14/20  0937 06/08/20  0705   ALT (SGPT) U/L 11 9 8 8   AST (SGOT) U/L 26 18 20 19   ALK PHOS U/L 108 121* 114 105     THYROID:  Lab Results - Last 18 Months   Lab Units 03/01/21  0822   TSH uIU/mL 2.180       Objective   /76   Pulse (!) 45   Temp 96.8 °F (36 °C)   Ht 157.5 cm (62\")   Wt 89.8 kg (198 lb)   LMP  (LMP Unknown)   SpO2 96%   BMI 36.21 kg/m²   Body mass index is 36.21 kg/m².    Recent Vitals       8/31/2021 9/14/2021 10/11/2021       BP: 128/82 124/80 128/76     Pulse: 52 42 45     Temp: 97.9 °F (36.6 °C) -- 96.8 °F (36 °C)     Weight: 90.3 kg (199 lb) 90.3 kg (199 lb) 89.8 kg (198 lb)     BMI (Calculated): 35.3 36.4 36.2           Physical Exam  GENERAL:  Well nourished/developed in no acute distress but frequent cough; at times paroxysmal.   SKIN: Turgor excellent, without wound, rash, lesion or significance  HEENT: Normal cephalic without trauma. Pupils equal round reactive to light. Extraocular motions full without nystagmus.  External canals nonobstructive nontender without reddness. Tymphatic membranes dull with irwin structures intact.   Oral cavity without growths, exudates, and moist.  Posterior pharynx without mass, obstruction, redness.  No thyromegaly, mass, tenderness, definite lymphadenopathy and supple.   CV: Regular rhythm.  1/6 systolic murmur without gallop and trace ankle/LE edema. Posterior pulses intact.  No carotid bruits.  CHEST: No chest wall tenderness or mass.   LUNGS: Symmetric motion with clear to auscultation.  No dullness to percussion  ABD: Soft, non-tender without mass.   ORTHO: Symmetric extremities without swelling/point tenderness.  Full gross range of motion. Symmetric LE.  Neg straight leg raising.  Neg Patricks maneuver.  Back is straight/mild lordosis.  Lower back " sore; no point tender.    NEURO: CN 2-12 grossly intact except L eyelid mild droop.  Otherwise symmetric facies and UE/LE. 3/5 strength throughout.   Otherwise nonfocal use extremities. Speech clear.      Assessment/Plan     1. Pain of right lower extremity    2. Edema, unspecified type    3. Chronic obstructive pulmonary disease, unspecified COPD type (HCC)    4. Cough: copd    5. COPD exacerbation (HCC)      Discussion:  moderna booster when available  Can flu shot with the moderna if works best (no flu today due to medrol)   omnicef 7 day, medrol dose pack  Aware I am sending note to Henrietta/brian of her problems with her stockings and the pain of her leg (? Should proceed with pump with these areas of soreness)    Vaccine reviewed: today none; later covid booster, seasonal flu  Screening reviewed/updated    Medical decision issues:   Data review above:   Rx: reviewed and decisions:   Same Rx for now     Visit today involved chronic significant medical problems or differentials and/or intensive drug monitoring: ie potential to cause serious morbidity or death:   New Medications Ordered This Visit   Medications   • cefdinir (OMNICEF) 300 MG capsule     Sig: Take 1 capsule by mouth 2 (Two) Times a Day.     Dispense:  20 capsule     Refill:  0   • methylPREDNISolone (MEDROL) 4 MG dose pack     Sig: Take as directed on package instructions.     Dispense:  1 each     Refill:  0     Pharmacist-tell patient When using steroids Do not use anti-inflammatories such as Motrin/ibuprofen, Alleve/naprosyn, Mobic and like medications       Orders placed:   LAB/Testing/Referrals: reviewed/orders:   Today:   No orders of the defined types were placed in this encounter.    Chronic/recurrent labs above or change to:   same     Personal interpretation of tests/procedures:   Procedures   Discussion with external provider: message sent to Henrietta/vascular    Health maintenance:   Body mass index is 36.21 kg/m².  Patient's Body mass  index is 36.21 kg/m². indicating that she is obese (BMI >30). Obesity-related health conditions include the following: hypertension. Obesity is unchanged. BMI is is above average; BMI management plan is completed. We discussed portion control and increasing exercise..    Tobacco use reviewed:   Ira Sanchez  reports that she quit smoking about 21 years ago. Her smoking use included cigarettes. She started smoking about 58 years ago. She has a 74.00 pack-year smoking history. She has never used smokeless tobacco..      Patient Instructions   While on the medrol; dont take over the counter motrin, ibuprofen, alleve, naprosysn and like  Also, dont take the celebrex while on the medrol    You can go back to these once the medrol is over    ########################        Follow up: Return for lab;, Dr Tinoco-, as planned;.  Future Appointments   Date Time Provider Department Center   11/8/2021  8:00 AM [facility] BIC CT 1  PAD CT BI PAD   12/8/2021  8:00 AM  [facility]  [facility]   12/8/2021  8:30 AM Tamela Doherty APRN [facility] PAD   12/22/2021  8:00 AM [facility] US NIVAS CART 1  PAD US PAD   1/3/2022  9:00 AM Henrietta Olvera APRN [facility]AD   1/12/2022  9:30 AM Conrado Tinoco MD [facility] PAD

## 2021-10-15 ENCOUNTER — TELEPHONE (OUTPATIENT)
Dept: FAMILY MEDICINE CLINIC | Facility: CLINIC | Age: 76
End: 2021-10-15

## 2021-10-16 NOTE — TELEPHONE ENCOUNTER
"Tell patient that vascular says if her stockings feel too tight; she should get with them.  I think she may need fitting/custom stockings.         ----- Message from REBEL Crawford sent at 10/15/2021 11:02 AM CDT -----  They could be if she did not go get measured for them already.  She can use the pump.  ----- Message -----  From: Conrado Tinoco MD  Sent: 10/11/2021   9:05 PM CDT  To: REBEL Crawford; she is having problems with stocking too tight at the top.    Do they need to be more \"customized\" for her leg?   She also has sore area above knee/lateral (could not see a thrombosed varicosity but ?); she has this pump coming to wear and ? She should do this now?       "

## 2021-12-02 DIAGNOSIS — R42 DIZZY: ICD-10-CM

## 2021-12-02 DIAGNOSIS — R03.0 ELEVATED BLOOD PRESSURE READING: ICD-10-CM

## 2021-12-02 RX ORDER — AMLODIPINE BESYLATE 5 MG/1
5 TABLET ORAL DAILY
Qty: 90 TABLET | Refills: 3 | Status: SHIPPED | OUTPATIENT
Start: 2021-12-02 | End: 2022-09-21

## 2021-12-02 NOTE — TELEPHONE ENCOUNTER
Rx Refill Note  Requested Prescriptions     Signed Prescriptions Disp Refills   • amLODIPine (NORVASC) 5 MG tablet 90 tablet 3     Sig: Take 1 tablet by mouth Daily.     Authorizing Provider: ANDRE KAHN     Ordering User: FLAKITA CARO      Last office visit with prescribing clinician: 10/11/2021      Next office visit with prescribing clinician: 1/18/2022          {TIP  Is Refill Pharmacy correct?:23}  Flakita Caro MA  12/02/21, 15:27 CST

## 2021-12-03 NOTE — PROGRESS NOTES
[facility]  HEMATOLOGY & ONCOLOGY    [facility] [place] [facility] HEMATOLOGY AND ONCOLOGY  [address]  [facility] [address] [phone]    Patient Name: Ira Sanchez  Encounter Date: 6/7/2021  YOB: 1945  Patient Number: [id number]    REASON FOR VISIT: Mrs Sanchez is a 75yo female patient here today ih follow up for left breast cancer that was diagnosed in 2014.  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  It was ER+, KS+ and Her 2 emerita +.  She subsequently underwent bilateral mastectomies and her AJCC TNM staging was [id number], Stage IIA.     She then underwent 5 cycles of Carboplatin, Taxotere and Herceptin followed by a year of Herceptin therapy.  She then was placed on hormonal manipulation with Arimidex in June 2015 and completed 5 years in 2020.     I have reviewed the HPI and verified with the patient the accuracy of it. No changes to interval history since the information was documented. Adalberto Wallace MD 12/08/21     Oncology/Hematology History Overview Note   Ms. Sanchez is a pleasant 71 year old  female with the diagnosis of stage IIA left breast carcinoma, ER/KS and HER2  positive 3+.  She is status post bilateral mastectomies and has completed 5 cycles of adjuvant systemic chemotherapy with taxotere, carboplatin and  Herceptin. She is presently receiving Herceptin 6 mg/kg every 3 weeks for a total dosing of 1 year with last dose planned for 12/09/2015.  Ms. Sanchez is also presently taking hormonal manipulation therapy with Arimidex 1 mg p.o. daily and this will be taken for 10 years, this  was initiated on 06/03/2015.  She had completed Herceptin. She had completed breast reconstruction process. We will give her a refill on her Arimidex. She is due for  her bone density in September 2016.     Breast cancer-L/valente masectomy 2014 9/29/2016 Initial Diagnosis    Breast  cancer           PAST MEDICAL HISTORY:  ALLERGIES:  Allergies   Allergen Reactions   • Benzonatate Hives and Itching       CURRENT MEDICATIONS:  Outpatient Encounter Medications as of 12/8/2021   Medication Sig Dispense Refill   • amLODIPine (NORVASC) 5 MG tablet Take 1 tablet by mouth Daily. 90 tablet 3   • Calcium Carb-Cholecalciferol (CALCIUM-VITAMIN D) 600-400 MG-UNIT tablet Take 1 tablet by mouth 2 (Two) Times a Day.     • celecoxib (CeleBREX) 200 MG capsule TAKE 1 CAPSULE BY MOUTH DAILY 30 capsule 5   • cyclobenzaprine (FLEXERIL) 5 MG tablet Take 1 tablet by mouth Every Night. 30 tablet 1   • cycloSPORINE (RESTASIS) 0.05 % ophthalmic emulsion 1 drop Every 12 (Twelve) Hours.     • diphenhydrAMINE (BENADRYL) 25 mg capsule Take 25 mg by mouth Daily As Needed.     • ibuprofen (ADVIL,MOTRIN) 200 MG tablet Take 200 mg by mouth As Needed.     • lisinopril (PRINIVIL,ZESTRIL) 20 MG tablet TAKE 1 TABLET BY MOUTH DAILY 90 tablet 3   • oxybutynin XL (DITROPAN-XL) 5 MG 24 hr tablet Take 1 tablet by mouth 2 (two) times a day. 180 tablet 1   • thiamine (VITAMINE B-1) 100 MG tablet Take 100 mg by mouth Daily.     • cefdinir (OMNICEF) 300 MG capsule Take 1 capsule by mouth 2 (Two) Times a Day. 20 capsule 0   • methylPREDNISolone (MEDROL) 4 MG dose pack Take as directed on package instructions. 1 each 0   • [DISCONTINUED] amLODIPine (NORVASC) 5 MG tablet TAKE 1 TABLET BY MOUTH DAILY 90 tablet 3     Facility-Administered Encounter Medications as of 12/8/2021   Medication Dose Route Frequency Provider Last Rate Last Admin   • heparin flush (porcine) 100 UNIT/ML injection 500 Units  500 Units Intravenous PRN Shanda Torres MD   500 Units at 04/16/18 1504   • heparin flush (porcine) 100 UNIT/ML injection 500 Units  500 Units Intravenous PRN Shanda Torres MD   500 Units at 06/12/18 1445   • sodium chloride 0.9 % flush 10 mL  10 mL Intravenous PRN Shanda Torres MD   10 mL at 04/16/18 1503   •  sodium chloride 0.9 % flush 10 mL  10 mL Intravenous PRN Shanda Torres MD   10 mL at 06/12/18 1445       ADULT ILLNESSES:  Patient Active Problem List   Diagnosis Code   • Obesity E66.9   • Breast cancer-L/valente masectomy 2014 C50.919   • Overactive bladder N32.81   • Diabetes type 2, controlled ([facility]) E11.9   • Hypertension I10   • Osteopenia 2018-? 2 to 5y M85.80   • Neuropathy-chemo/better G62.9   • Primary generalized (osteo)arthritis M15.0   • Malignant neoplasm of lower-outer quadrant of left female breast (HCC) C50.512   • Family history of breast cancer Z80.3   • Malignant neoplasm of upper-outer quadrant of female breast (HCC) C50.419   • History of bilateral mastectomy Z90.13   • Wellness examination-done Z00.00   • Shoulder pain M25.519   • Chronic back pain M54.9, G89.29   • Laboratory test* Z01.89   • Bradycardia R00.1   • Cough: copd R05.9   • Allergic rhinitis J30.9   • Menopause Z78.0   • History of shingles Z86.19   • COPD (chronic obstructive pulmonary disease) ([facility]) J44.9   • Abnormal mammogram R92.8   • After-cataract with vision obscured H26.499   • Amblyopia H53.009   • Cellulitis, trunk L03.319   • Dermatitis of eyelid due to herpes zoster B02.39   • Dermatochalasis H02.839   • Hordeolum internum H00.029   • 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   • Pseudophakia Z96.1   • Rash R21   • Encounter for care related to Port-a-Cath Z45.2   • Edema:venous R60.9   • Congestive heart failure ([facility]) I50.9   • Valvular heart disease: AS I38       SURGERIES:  Past Surgical History:   Procedure Laterality Date   • APPENDECTOMY     • BREAST SURGERY     • CATARACT EXTRACTION Bilateral    • DILATATION AND CURETTAGE     • KNEE SURGERY     • MASTECTOMY Bilateral    • NIPPLE TATTOO  04/12/2016   • PORTACATH PLACEMENT     • THROAT SURGERY         HEALTH MAINTENANCE ITEMS:    Last Completed Colonoscopy       Status Date      COLONOSCOPY Done 12/19/2017 SCANNED - COLONOSCOPY     Negative; 5 year  "recall        FAMILY HISTORY:  Family History   Problem Relation Age of Onset   • Hypertension Mother    • Heart failure Mother    • Cancer Father         colon   • Colon cancer Father    • Cancer Maternal Aunt         breast   • No Known Problems Maternal Grandmother    • No Known Problems Maternal Grandfather    • No Known Problems Paternal Grandmother    • No Known Problems Paternal Grandfather    • Cancer Maternal Aunt         breast   • Cancer Maternal Aunt         breast   • Cancer Other        SOCIAL HISTORY:  Social History     Socioeconomic History   • Marital status:      Spouse name: Horace   • Number of children: 0   • Years of education: 12.5   Tobacco Use   • Smoking status: Former Smoker     Packs/day: 2.00     Years: 37.00     Pack years: 74.00     Types: Cigarettes     Start date: 1963     Quit date: 2000     Years since quittin.2   • Smokeless tobacco: Never Used   Vaping Use   • Vaping Use: Never used   Substance and Sexual Activity   • Alcohol use: No   • Drug use: No   • Sexual activity: Defer       REVIEW OF SYSTEMS:  Review of Systems   Constitutional: Negative for activity change, appetite change, chills, diaphoresis, fatigue, fever and unexpected weight loss.        Manages her personal ADLs, and to include chores, errands and driving.  Lives alone.  Is up and about, \"all the time.\"   HENT: Negative for ear pain, nosebleeds, sinus pressure, sore throat and voice change.    Eyes: Negative for blurred vision, double vision, pain and visual disturbance.   Respiratory: Negative for cough and shortness of breath.    Cardiovascular: Positive for leg swelling (bilateral). Negative for chest pain and palpitations.   Gastrointestinal: Negative for abdominal pain, anal bleeding, blood in stool, constipation, diarrhea, nausea and vomiting.   Endocrine: Negative for heat intolerance, polydipsia and polyuria.   Genitourinary: Negative for dysuria, frequency, hematuria, urgency and " "urinary incontinence.   Musculoskeletal: Positive for arthralgias (Lower back) and back pain (chronic). Negative for myalgias.   Skin: Negative for rash and skin lesions.   Neurological: Negative for dizziness, tremors, seizures, syncope, speech difficulty, weakness and headache.   Hematological: Negative for adenopathy. Does not bruise/bleed easily.   Psychiatric/Behavioral: Negative for dysphoric mood, sleep disturbance, suicidal ideas and depressed mood.       /88   Pulse 62   Temp 97.4 °F (36.3 °C)   Resp 16   Ht 157.5 cm (62\")   Wt 91.7 kg (202 lb 1.6 oz)   LMP  (LMP Unknown)   SpO2 96%   Breastfeeding No   BMI 36.96 kg/m²  Body surface area is 1.92 meters squared.  Pain Score    12/08/21 0907   PainSc: 0-No pain       Physical Exam:  Physical Exam  Constitutional:       Appearance: Normal appearance. She is well-developed.      Comments: Pleasant, cooperative, obese, modestly kept elderly female.  Ambulatory.    He has gained 8 pounds since her prior visit   HENT:      Head: Atraumatic.   Eyes:      General: No scleral icterus.     Pupils: Pupils are equal, round, and reactive to light.   Neck:      Trachea: Trachea normal.   Cardiovascular:      Rate and Rhythm: Normal rate and regular rhythm.      Heart sounds: No murmur heard.      Pulmonary:      Effort: Pulmonary effort is normal.      Breath sounds: Normal breath sounds. No wheezing, rhonchi or rales.   Chest:   Breasts:      Right: Absent. No axillary adenopathy or supraclavicular adenopathy.      Left: Absent. No axillary adenopathy or supraclavicular adenopathy.        Comments: Right breast is notable for reconstruction implant    Left breast notable for reconstruction implant    Port in the left upper chest is well seated.  We discussed removal of the port since it has been in for over 5 years.  The patient wants to think about it but feels like it is working well and it has not been giving her problems and it has been useful for " blood draws.  I encouraged that we remove it but she does not want to do it just yet.  Abdominal:      Palpations: Abdomen is soft.      Tenderness: There is no abdominal tenderness. There is no guarding or rebound.   Musculoskeletal:         General: Swelling present.      Cervical back: Neck supple.      Right lower leg: Edema (Trace ankle) present.      Left lower leg: Edema (Trace ankle) present.   Lymphadenopathy:      Cervical: No cervical adenopathy.      Right cervical: No superficial or deep cervical adenopathy.     Left cervical: No superficial or deep cervical adenopathy.      Upper Body:      Right upper body: No supraclavicular or axillary adenopathy.      Left upper body: No supraclavicular or axillary adenopathy.   Skin:     General: Skin is warm and dry.   Neurological:      Mental Status: She is alert and oriented to person, place, and time.      Sensory: No sensory deficit.      Gait: Gait normal.   Psychiatric:         Judgment: Judgment normal.       LABS    Lab Results - Last 18 Months   Lab Units 06/07/21  0832 03/01/21  0822 12/14/20  0937   HEMOGLOBIN g/dL 12.4 13.5 12.8   HEMATOCRIT % 36.9 41.6 39.6   MCV fL 90.4 92 93.4   WBC 10*3/mm3 7.45 8.1 7.52   RDW % 13.0 12.4 12.6   MPV fL 10.9  --  10.9   PLATELETS 10*3/mm3 161 202 169   IMM GRAN % %  --   --  0.5   NEUTROS ABS 10*3/mm3 4.69 5.0 4.55   LYMPHS ABS 10*3/mm3 2.06 2.5 2.41   MONOS ABS 10*3/mm3 0.47 0.5 0.37   EOS ABS 10*3/mm3 0.15 0.1 0.13   BASOS ABS 10*3/mm3 0.04 0.0 0.02   IMMATURE GRANS (ABS) 10*3/mm3  --   --  0.04   NRBC /100 WBC  --   --  0.0       Lab Results - Last 18 Months   Lab Units 08/10/21  0834 06/07/21  0832 03/08/21  1215 03/01/21  0822 12/14/20  0937   GLUCOSE mg/dL  --  116* 127* 106* 108*   SODIUM mmol/L  --  139 136 140 140   POTASSIUM mmol/L  --  5.0 4.6 4.6 4.5   TOTAL CO2 mmol/L  --   --  25.1 23  --    CO2 mmol/L  --  27.0  --   --  26.0   CHLORIDE mmol/L  --  102 98 101 105   ANION GAP mmol/L  --  10.0  --    --  9.0   CREATININE mg/dL 0.70 0.74 0.75 0.74 0.55*   BUN mg/dL  --  20 20 14 19   BUN / CREAT RATIO   --  27.0* 26.7* 19 34.5*   CALCIUM mg/dL  --  10.5 9.9 9.6 9.2   EGFR IF NONAFRICN AM mL/min/1.73  --  76 75 78 107   ALK PHOS U/L  --  108  --  121* 114   TOTAL PROTEIN g/dL  --  7.1  --   --  6.8   ALT (SGPT) U/L  --  11  --  9 8   AST (SGOT) U/L  --  26  --  18 20   BILIRUBIN mg/dL  --  0.3  --  0.3 0.4   ALBUMIN g/dL  --  4.00  --  4.5 4.40   GLOBULIN gm/dL  --  3.1  --   --  2.4     ASSESSMENT  1.  Left breast carcinoma diagnosed in October 2014  · Stage: AJCC TNM IIA [id number], ER/KY/HER-2 positive  · Treatment: Bilateral mastectomies, Adjuvant chemotherapy with 5 cycles of Carbplatin Taxotere Herceptin followed by a total of one year of herceptin. Hormonal manipulation starting in June 2015 -2020.  · Disease status:   · Clinically MYKE.    2.  Osteoarthritis/Chronic back pain - stable followed by pcp  3.  Hypertension controlled with medication.   4.  Obesity      PLAN  1.  Draw CBC with differential, CMP, CEA, CA 27-29 if not done  2.  Continue to follow with pcp and other specialists  3.  Await pending labs and make further recommendations at that time.   4.  Port flush every 8 weeks-due today  5.  Refer back to general surgery when she agrees to have her port removed.  6.  Return in 6 mo for follow up and with preoffice CBC, CMP, CEA, CA 27-29    I spent 30 minutes caring for Ira on this date of service. This time includes time spent by me in the following activities: preparing for the visit, reviewing tests, performing a medically appropriate examination and/or evaluation, counseling and educating the patient/family/caregiver, ordering medications, tests, or procedures and documenting information in the medical record.       Adalberto Wallace MD  06/07/2021

## 2021-12-08 ENCOUNTER — LAB (OUTPATIENT)
Dept: LAB | Facility: HOSPITAL | Age: 76
End: 2021-12-08

## 2021-12-08 ENCOUNTER — OFFICE VISIT (OUTPATIENT)
Dept: ONCOLOGY | Facility: CLINIC | Age: 76
End: 2021-12-08

## 2021-12-08 VITALS
HEART RATE: 62 BPM | SYSTOLIC BLOOD PRESSURE: 140 MMHG | DIASTOLIC BLOOD PRESSURE: 88 MMHG | WEIGHT: 202.1 LBS | TEMPERATURE: 97.4 F | HEIGHT: 62 IN | BODY MASS INDEX: 37.19 KG/M2 | OXYGEN SATURATION: 96 % | RESPIRATION RATE: 16 BRPM

## 2021-12-08 DIAGNOSIS — I10 ESSENTIAL HYPERTENSION: Chronic | ICD-10-CM

## 2021-12-08 DIAGNOSIS — C50.919 MALIGNANT NEOPLASM OF FEMALE BREAST, UNSPECIFIED ESTROGEN RECEPTOR STATUS, UNSPECIFIED LATERALITY, UNSPECIFIED SITE OF BREAST (HCC): Primary | Chronic | ICD-10-CM

## 2021-12-08 DIAGNOSIS — Z45.2 ENCOUNTER FOR CARE RELATED TO PORT-A-CATH: ICD-10-CM

## 2021-12-08 DIAGNOSIS — Z78.0 MENOPAUSE: ICD-10-CM

## 2021-12-08 DIAGNOSIS — E11.9 CONTROLLED TYPE 2 DIABETES MELLITUS WITHOUT COMPLICATION, WITHOUT LONG-TERM CURRENT USE OF INSULIN (HCC): ICD-10-CM

## 2021-12-08 DIAGNOSIS — C50.919 MALIGNANT NEOPLASM OF FEMALE BREAST, UNSPECIFIED ESTROGEN RECEPTOR STATUS, UNSPECIFIED LATERALITY, UNSPECIFIED SITE OF BREAST (HCC): ICD-10-CM

## 2021-12-08 DIAGNOSIS — E55.9 VITAMIN D DEFICIENCY, UNSPECIFIED: ICD-10-CM

## 2021-12-08 DIAGNOSIS — J44.9 CHRONIC OBSTRUCTIVE PULMONARY DISEASE, UNSPECIFIED COPD TYPE (HCC): ICD-10-CM

## 2021-12-08 DIAGNOSIS — Z00.00 WELLNESS EXAMINATION: ICD-10-CM

## 2021-12-08 LAB
25(OH)D3 SERPL-MCNC: 40.1 NG/ML
BASOPHILS # BLD AUTO: 0.04 10*3/MM3 (ref 0–0.2)
BASOPHILS NFR BLD AUTO: 0.5 % (ref 0–1.5)
CEA SERPL-MCNC: 4.69 NG/ML
CHOLEST SERPL-MCNC: 165 MG/DL (ref 0–200)
DEPRECATED RDW RBC AUTO: 47 FL (ref 37–54)
EOSINOPHIL # BLD AUTO: 0.13 10*3/MM3 (ref 0–0.4)
EOSINOPHIL NFR BLD AUTO: 1.6 % (ref 0.3–6.2)
ERYTHROCYTE [DISTWIDTH] IN BLOOD BY AUTOMATED COUNT: 13.8 % (ref 12.3–15.4)
HBA1C MFR BLD: 6.5 % (ref 4.8–5.6)
HCT VFR BLD AUTO: 41.6 % (ref 34–46.6)
HDLC SERPL-MCNC: 49 MG/DL (ref 40–60)
HGB BLD-MCNC: 13.7 G/DL (ref 12–15.9)
IMM GRANULOCYTES # BLD AUTO: 0.04 10*3/MM3 (ref 0–0.05)
IMM GRANULOCYTES NFR BLD AUTO: 0.5 % (ref 0–0.5)
LDLC SERPL CALC-MCNC: 100 MG/DL (ref 0–100)
LDLC/HDLC SERPL: 2.02 {RATIO}
LYMPHOCYTES # BLD AUTO: 2.19 10*3/MM3 (ref 0.7–3.1)
LYMPHOCYTES NFR BLD AUTO: 26.2 % (ref 19.6–45.3)
MCH RBC QN AUTO: 30.2 PG (ref 26.6–33)
MCHC RBC AUTO-ENTMCNC: 32.9 G/DL (ref 31.5–35.7)
MCV RBC AUTO: 91.8 FL (ref 79–97)
MONOCYTES # BLD AUTO: 0.42 10*3/MM3 (ref 0.1–0.9)
MONOCYTES NFR BLD AUTO: 5 % (ref 5–12)
NEUTROPHILS NFR BLD AUTO: 5.55 10*3/MM3 (ref 1.7–7)
NEUTROPHILS NFR BLD AUTO: 66.2 % (ref 42.7–76)
NRBC BLD AUTO-RTO: 0 /100 WBC (ref 0–0.2)
PLATELET # BLD AUTO: 169 10*3/MM3 (ref 140–450)
PMV BLD AUTO: 10.7 FL (ref 6–12)
RBC # BLD AUTO: 4.53 10*6/MM3 (ref 3.77–5.28)
TRIGL SERPL-MCNC: 86 MG/DL (ref 0–150)
TSH SERPL DL<=0.05 MIU/L-ACNC: 2.1 UIU/ML (ref 0.27–4.2)
VLDLC SERPL-MCNC: 16 MG/DL (ref 5–40)
WBC NRBC COR # BLD: 8.37 10*3/MM3 (ref 3.4–10.8)

## 2021-12-08 PROCEDURE — 82306 VITAMIN D 25 HYDROXY: CPT

## 2021-12-08 PROCEDURE — 84443 ASSAY THYROID STIM HORMONE: CPT

## 2021-12-08 PROCEDURE — 99214 OFFICE O/P EST MOD 30 MIN: CPT | Performed by: INTERNAL MEDICINE

## 2021-12-08 PROCEDURE — 83036 HEMOGLOBIN GLYCOSYLATED A1C: CPT

## 2021-12-08 PROCEDURE — 82378 CARCINOEMBRYONIC ANTIGEN: CPT

## 2021-12-08 PROCEDURE — 86300 IMMUNOASSAY TUMOR CA 15-3: CPT

## 2021-12-08 PROCEDURE — 85025 COMPLETE CBC W/AUTO DIFF WBC: CPT

## 2021-12-08 PROCEDURE — 80061 LIPID PANEL: CPT

## 2021-12-08 PROCEDURE — 36415 COLL VENOUS BLD VENIPUNCTURE: CPT

## 2021-12-08 RX ORDER — SODIUM CHLORIDE 0.9 % (FLUSH) 0.9 %
10 SYRINGE (ML) INJECTION AS NEEDED
Status: SHIPPED | OUTPATIENT
Start: 2021-12-08

## 2021-12-08 RX ORDER — HEPARIN SODIUM (PORCINE) LOCK FLUSH IV SOLN 100 UNIT/ML 100 UNIT/ML
500 SOLUTION INTRAVENOUS AS NEEDED
Status: CANCELLED | OUTPATIENT
Start: 2021-12-08

## 2021-12-08 RX ORDER — HEPARIN SODIUM (PORCINE) LOCK FLUSH IV SOLN 100 UNIT/ML 100 UNIT/ML
500 SOLUTION INTRAVENOUS AS NEEDED
Status: SHIPPED | OUTPATIENT
Start: 2021-12-08

## 2021-12-08 RX ORDER — SODIUM CHLORIDE 0.9 % (FLUSH) 0.9 %
10 SYRINGE (ML) INJECTION AS NEEDED
Status: CANCELLED | OUTPATIENT
Start: 2021-12-08

## 2021-12-08 RX ADMIN — HEPARIN SODIUM (PORCINE) LOCK FLUSH IV SOLN 100 UNIT/ML 500 UNITS: 100 SOLUTION at 09:48

## 2021-12-08 RX ADMIN — Medication 10 ML: at 09:48

## 2021-12-09 LAB — CANCER AG27-29 SERPL-ACNC: 30.3 U/ML (ref 0–38.6)

## 2021-12-22 ENCOUNTER — HOSPITAL ENCOUNTER (OUTPATIENT)
Dept: ULTRASOUND IMAGING | Facility: HOSPITAL | Age: 76
Discharge: HOME | End: 2021-12-22
Admitting: NURSE PRACTITIONER

## 2021-12-22 DIAGNOSIS — I87.323 CHRONIC VENOUS HYPERTENSION WITH INFLAMMATION INVOLVING BOTH SIDES: ICD-10-CM

## 2021-12-22 PROCEDURE — 93970 EXTREMITY STUDY: CPT | Performed by: SURGERY

## 2021-12-22 PROCEDURE — 93970 EXTREMITY STUDY: CPT

## 2021-12-30 ENCOUNTER — TELEPHONE (OUTPATIENT)
Dept: PODIATRY | Facility: CLINIC | Age: 76
End: 2021-12-30

## 2022-01-03 ENCOUNTER — OFFICE VISIT (OUTPATIENT)
Dept: VASCULAR SURGERY | Facility: CLINIC | Age: 77
End: 2022-01-03

## 2022-01-03 VITALS
HEIGHT: 62 IN | WEIGHT: 198 LBS | RESPIRATION RATE: 18 BRPM | OXYGEN SATURATION: 96 % | BODY MASS INDEX: 36.44 KG/M2 | HEART RATE: 51 BPM | DIASTOLIC BLOOD PRESSURE: 70 MMHG | SYSTOLIC BLOOD PRESSURE: 124 MMHG

## 2022-01-03 DIAGNOSIS — I87.323 CHRONIC VENOUS HYPERTENSION WITH INFLAMMATION INVOLVING BOTH SIDES: Primary | ICD-10-CM

## 2022-01-03 DIAGNOSIS — I10 PRIMARY HYPERTENSION: Chronic | ICD-10-CM

## 2022-01-03 PROCEDURE — 99214 OFFICE O/P EST MOD 30 MIN: CPT | Performed by: NURSE PRACTITIONER

## 2022-01-03 RX ORDER — OXYBUTYNIN CHLORIDE 10 MG/1
10 TABLET, EXTENDED RELEASE ORAL DAILY
COMMUNITY
Start: 2021-12-12 | End: 2022-06-21

## 2022-01-03 NOTE — PROGRESS NOTES
"1/3/2022       Conrado Tinoco MD  [address] [place] [id number]    Ira Sanchez  1945    Chief Complaint   Patient presents with   • Follow-up     3 month follow-up with US 12/22/2021 for Chronic Venous Hypertention with inflammation involving both sides.  Pt denies any pain in legs or ulcerations. Pt denies any stroke-type symptoms.   • Former smoker     Pt verified former smoker - quit 09/29/2000       Dear Conrado Tinoco MD       HPI  I had the pleasure of seeing your patient Ira Sanchez in the office today.   As you recall, Ira Sanchez is a 76 y.o.  female who you are following for routine health maintenance.  She has had significant leg swelling since March 2021.  She does take Norvasc as well which she feels like her legs have swollen since she began this medication a couple years ago.  She is also taking diuretics without significant relief.  She has complaints of swelling, heaviness, tiredness, and severe leg cramping at night.  She did have noninvasive testing performed today, which I did review in office.        Review of Systems   Constitutional: Negative.    HENT: Negative.    Eyes: Negative.    Respiratory: Negative.    Cardiovascular: Positive for leg swelling.        Leg cramping   Gastrointestinal: Negative.    Endocrine: Negative.    Genitourinary: Negative.    Musculoskeletal: Negative.    Skin: Negative.    Allergic/Immunologic: Negative.    Neurological: Negative.    Hematological: Negative.    Psychiatric/Behavioral: Negative.        /70 (BP Location: Left arm, Patient Position: Sitting, Cuff Size: Adult)   Pulse 51   Resp 18   Ht 157.5 cm (62\")   Wt 89.8 kg (198 lb)   LMP  (LMP Unknown)   SpO2 96%   BMI 36.21 kg/m²   Physical Exam  Vitals and nursing note reviewed.   Constitutional:       General: She is not in acute distress.     Appearance: Normal appearance. She is well-developed. She is not diaphoretic.   HENT:      Head: Normocephalic and " atraumatic.   Eyes:      General: No scleral icterus.     Pupils: Pupils are equal, round, and reactive to light.   Neck:      Thyroid: No thyromegaly.      Vascular: No carotid bruit or JVD.   Cardiovascular:      Rate and Rhythm: Normal rate and regular rhythm.      Pulses: Normal pulses.      Heart sounds: Normal heart sounds and S2 normal. No murmur heard.  No friction rub. No gallop.       Comments: Swelling with varicose veins to bilateral lower extremities  Pulmonary:      Effort: Pulmonary effort is normal.      Breath sounds: Normal breath sounds.   Abdominal:      General: Bowel sounds are normal.      Palpations: Abdomen is soft.   Musculoskeletal:         General: Normal range of motion.      Cervical back: Normal range of motion and neck supple.      Right lower leg: Edema present.      Left lower leg: Edema present.   Skin:     General: Skin is warm and dry.   Neurological:      General: No focal deficit present.      Mental Status: She is alert and oriented to person, place, and time.      Cranial Nerves: No cranial nerve deficit.   Psychiatric:         Mood and Affect: Mood normal.         Behavior: Behavior normal.         Thought Content: Thought content normal.         Judgment: Judgment normal.            Diagnostic data:    US Venous Doppler Lower Extremity Bilateral (duplex)    Result Date: 12/23/2021  Narrative: History: Bilateral lower extremity swelling  Comment: Venous valvular insufficiency testing was performed in both legs using duplex ultrasound with rapid cuff deflation technique.  The common femoral veins, popliteal veins, posterior tibial veins, greater saphenous veins, and lesser saphenous veins were interrogated bilaterally.   On the right, the greater saphenous vein at the junction measures 4.1 mm. In the mid thigh measures 2.6 mm. Above the knee measures 3.0 mm. The mid calf measures 3.1mm. The ankle measures 3.2 mm. There is greater than 0.5 seconds of reflux  from the junction  to the proximal thigh. Lesser saphenous vein is 2.2mm at the knee.  There is no evidence of significant reflux. There is no evidence of DVT.  On the left, the greater saphenous vein at the junction measures 5.0 mm. In the mid thigh measures 4.3 mm. Above the knee measured 3.0 mm. The mid calf measures 2.7 mm. The ankle measured 2.8 mm. There is greater than 0.5 seconds of reflux at the junction, and at the knee Lesser saphenous vein is 2.5mm at the knee.  There is no evidence of significant reflux. There is no evidence of DVT.      Impression: Significant reflux in the bilateral greater saphenous veins. There is no evidence of reflux in the bilateral lesser saphenous veins. There is no evidence of DVT in the bilateral lower extremity.  This report was finalized on 12/23/2021 15:40 by Dr. Francis Byrnes MD.      Patient Active Problem List   Diagnosis   • Obesity   • Breast cancer-L/valente masectomy 2014   • Overactive bladder   • Diabetes type 2, controlled ([facility])   • Hypertension   • Osteopenia 2018-? 2 to 5y   • Neuropathy-chemo/better   • Primary generalized (osteo)arthritis   • Malignant neoplasm of lower-outer quadrant of left female breast (HCC)   • Family history of breast cancer   • Malignant neoplasm of upper-outer quadrant of female breast (HCC)   • History of bilateral mastectomy   • Wellness examination-done   • Shoulder pain   • Chronic back pain   • Laboratory test*   • Bradycardia   • Cough: copd   • Allergic rhinitis   • Menopause   • History of shingles   • COPD (chronic obstructive pulmonary disease) ([facility])   • Abnormal mammogram   • After-cataract with vision obscured   • Amblyopia   • Cellulitis, trunk   • Dermatitis of eyelid due to herpes zoster   • Dermatochalasis   • Hordeolum internum   • Other hereditary corneal dystrophies   • Pseudophakia   • Rash   • Encounter for care related to Port-a-Cath   • Edema:venous   • Congestive heart failure (HCC)   • Valvular heart disease: AS         ICD-10-CM  ICD-9-CM   1. Chronic venous hypertension with inflammation involving both sides  I87.323 459.32   2. Primary hypertension  I10 401.9           Plan: After thoroughly evaluating Ira Sanchez, I believe the best course of action is to remain conservative from vascular surgery standpoint.  Currently she is doing well and wearing compression stockings on a daily basis.  This does improve her swelling and some of her symptoms.  I did review her testing which does show significant venous insufficiency to bilateral lower extremities.  We did discuss endovenous closure.  She has an upcoming appointment with her primary care provider and will discuss possible change from Norvasc to see if this is contributing to her swelling.  I will see her back in 3 months to see how she is doing and possibly discussed endovenous closure more at that time.  Her testing does show significant venous reflux and she would be a great candidate for endovenous closure.  I would like her to continue wearing compression stockings on a daily basis, keep her legs elevated when she is not on them, and keep them well moisturized.  The patient can continue taking their current medication regimen as previously planned.  This was all discussed in full with complete understanding.    Thank you for allowing me to participate in the care of your patient.  Please do not hesitate with any questions or concerns.  I will keep you aware of any further encounters with Ira Sanchez.        Sincerely yours,         REBEL Crawford

## 2022-01-18 ENCOUNTER — OFFICE VISIT (OUTPATIENT)
Dept: FAMILY MEDICINE CLINIC | Facility: CLINIC | Age: 77
End: 2022-01-18

## 2022-01-18 VITALS
HEIGHT: 62 IN | BODY MASS INDEX: 36.25 KG/M2 | OXYGEN SATURATION: 96 % | HEART RATE: 54 BPM | SYSTOLIC BLOOD PRESSURE: 128 MMHG | WEIGHT: 197 LBS | RESPIRATION RATE: 18 BRPM | TEMPERATURE: 98 F | DIASTOLIC BLOOD PRESSURE: 78 MMHG

## 2022-01-18 DIAGNOSIS — M15.0 PRIMARY GENERALIZED (OSTEO)ARTHRITIS: ICD-10-CM

## 2022-01-18 DIAGNOSIS — R05.9 COUGH: ICD-10-CM

## 2022-01-18 DIAGNOSIS — I50.9 CONGESTIVE HEART FAILURE, UNSPECIFIED HF CHRONICITY, UNSPECIFIED HEART FAILURE TYPE: ICD-10-CM

## 2022-01-18 DIAGNOSIS — C50.919 MALIGNANT NEOPLASM OF FEMALE BREAST, UNSPECIFIED ESTROGEN RECEPTOR STATUS, UNSPECIFIED LATERALITY, UNSPECIFIED SITE OF BREAST: Chronic | ICD-10-CM

## 2022-01-18 DIAGNOSIS — Z71.85 VACCINE COUNSELING: ICD-10-CM

## 2022-01-18 DIAGNOSIS — N32.81 OVERACTIVE BLADDER: Chronic | ICD-10-CM

## 2022-01-18 DIAGNOSIS — E11.9 CONTROLLED TYPE 2 DIABETES MELLITUS WITHOUT COMPLICATION, WITHOUT LONG-TERM CURRENT USE OF INSULIN: Chronic | ICD-10-CM

## 2022-01-18 DIAGNOSIS — J44.9 CHRONIC OBSTRUCTIVE PULMONARY DISEASE, UNSPECIFIED COPD TYPE: ICD-10-CM

## 2022-01-18 DIAGNOSIS — I38 VALVULAR HEART DISEASE: ICD-10-CM

## 2022-01-18 DIAGNOSIS — R00.1 BRADYCARDIA: ICD-10-CM

## 2022-01-18 DIAGNOSIS — R60.9 EDEMA, UNSPECIFIED TYPE: ICD-10-CM

## 2022-01-18 DIAGNOSIS — Z00.00 WELLNESS EXAMINATION: ICD-10-CM

## 2022-01-18 DIAGNOSIS — I10 PRIMARY HYPERTENSION: Chronic | ICD-10-CM

## 2022-01-18 DIAGNOSIS — N95.1 MENOPAUSAL STATE: ICD-10-CM

## 2022-01-18 PROCEDURE — 1159F MED LIST DOCD IN RCRD: CPT | Performed by: FAMILY MEDICINE

## 2022-01-18 PROCEDURE — 1126F AMNT PAIN NOTED NONE PRSNT: CPT | Performed by: FAMILY MEDICINE

## 2022-01-18 PROCEDURE — 99214 OFFICE O/P EST MOD 30 MIN: CPT | Performed by: FAMILY MEDICINE

## 2022-01-18 PROCEDURE — G0439 PPPS, SUBSEQ VISIT: HCPCS | Performed by: FAMILY MEDICINE

## 2022-01-18 PROCEDURE — 1170F FXNL STATUS ASSESSED: CPT | Performed by: FAMILY MEDICINE

## 2022-01-18 RX ORDER — BUDESONIDE 90 UG/1
1 AEROSOL, POWDER RESPIRATORY (INHALATION)
Qty: 1 EACH | Refills: 5 | Status: SHIPPED | OUTPATIENT
Start: 2022-01-18 | End: 2022-02-28 | Stop reason: CLARIF

## 2022-01-18 RX ORDER — LOSARTAN POTASSIUM 100 MG/1
100 TABLET ORAL DAILY
Qty: 90 TABLET | Refills: 2 | Status: SHIPPED | OUTPATIENT
Start: 2022-01-18 | End: 2022-09-16

## 2022-01-18 NOTE — PROGRESS NOTES
"Subjective   Ira Sanchez is a 76 y.o. female presenting with chief complaint of:   Chief Complaint   Patient presents with   • Medicare Wellness-subsequent     \"just a check up\"       History of Present Illness :  Alone.  Here for primarily review of her many chronic problems including HTN.  Also her yearly medicare wellness.       Has multiple chronic problems to consider that might have a bearing on today's issues;  an interval appointment.       Chronic/acute problems reviewed today:   1. Edema, unspecified type chronic variable but basically better than it was after she saw vascular; she frequently wears compressive stockings.  She has been offered surgery but she is not sure she wants to have it done.   2. Chronic obstructive pulmonary disease, unspecified COPD type (HCC) Chronic/stable mild occ cough, sob, wheeze.  Rx helps.   Past smoking.       3. Malignant neoplasm of female breast, unspecified estrogen receptor status, unspecified laterality, unspecified site of breast (HCC) history over 6 years ago of breast cancer.  This carries with her care at [facility] oncology as her doctors frequently change in her last visit she did not feel like her issues were addressed; she wants to transfer to another source of oncology care   4. Valvular heart disease: AS  chronic/stable symptoms of no chest pain, SOB, edema with past echos showing valvular changes of: AS.         5. Primary hypertension Chronic/stable. Stable here past/and occ home blood pressures.  No significant chest pain, SOB, LE edema, orthopnea, near syncope, dizziness/light headness.   Recent Vitals       12/8/2021 1/3/2022 1/18/2022       BP: 140/88 124/70 128/78     Pulse: 62 51 54     Temp: 97.4 °F (36.3 °C) -- 98 °F (36.7 °C)     Weight: 91.7 kg (202 lb 1.6 oz) 89.8 kg (198 lb) 89.4 kg (197 lb)     BMI (Calculated): 37 36.2 36            6. Congestive heart failure, unspecified HF chronicity, unspecified heart failure type (HCC) " Chronic/stable.  Denies significant sob, orthopnea, leg edema, weight gain.  Aware of influence diet/salt and watching weight at home.       7. Bradycardia a cardiac arrhythmia note.  Note history of Ditropan XL   8. Controlled type 2 diabetes mellitus without complication, without long-term current use of insulin (HCC)    9. Overactive bladder chronic history of overactive bladder improved with her current medications of Ditropan XL   10. Wellness examination-done Chronic ongoing over time screening or advice for general health care/wellness.      11. Primary generalized (osteo)arthritis Chronic/stable.  Various on/off joint pains/soreness/stiffness.  Particular joint problems with various.  No joint swelling.  Treats mainly with reduced activity, Rx listed, Tylenol.  No frequent NSAIDs, and no current injections.      12. Cough: copd chronic dry cough; off-and-on.   13. Menopausal state Chronic/stable.  Last bone density/if below.   Has had Rx.  Ok as recommended further bone density screening when due.      14. Vaccine counseling Chronic/ongoing need to review pro/cons for various vaccinations based on age, health issues.  Needs vaccination today for: see below.       Has an/another acute issue today: none.    The following portions of the patient's history were reviewed and updated as appropriate: allergies, current medications, past family history, past medical history, past social history, past surgical history and problem list.      Current Outpatient Medications:   •  amLODIPine (NORVASC) 5 MG tablet, Take 1 tablet by mouth Daily., Disp: 90 tablet, Rfl: 3  •  Calcium Carb-Cholecalciferol (CALCIUM-VITAMIN D) 600-400 MG-UNIT tablet, Take 1 tablet by mouth 2 (Two) Times a Day., Disp: , Rfl:   •  cycloSPORINE (RESTASIS) 0.05 % ophthalmic emulsion, 1 drop Every 12 (Twelve) Hours., Disp: , Rfl:   •  diphenhydrAMINE (BENADRYL) 25 mg capsule, Take 25 mg by mouth Daily As Needed., Disp: , Rfl:   •  ibuprofen  (ADVIL,MOTRIN) 200 MG tablet, Take 200 mg by mouth As Needed., Disp: , Rfl:   •  lisinopril (PRINIVIL,ZESTRIL) 20 MG tablet, TAKE 1 TABLET BY MOUTH DAILY, Disp: 90 tablet, Rfl: 3  •  oxybutynin XL (DITROPAN-XL) 10 MG 24 hr tablet, Take 10 mg by mouth Daily., Disp: , Rfl:   •  thiamine (VITAMINE B-1) 100 MG tablet, Take 100 mg by mouth Daily., Disp: , Rfl:   No current facility-administered medications for this visit.    Facility-Administered Medications Ordered in Other Visits:   •  heparin flush (porcine) 100 UNIT/ML injection 500 Units, 500 Units, Intravenous, PRN, Shanda Torres MD, 500 Units at 04/16/18 1504  •  heparin flush (porcine) 100 UNIT/ML injection 500 Units, 500 Units, Intravenous, PRN, Shanda Torres MD, 500 Units at 06/12/18 1445  •  heparin injection 500 Units, 500 Units, Intravenous, PRN, Tamela Doherty APRN, 500 Units at 12/08/21 0948  •  sodium chloride 0.9 % flush 10 mL, 10 mL, Intravenous, PRN, Shanda Torres MD, 10 mL at 04/16/18 1503  •  sodium chloride 0.9 % flush 10 mL, 10 mL, Intravenous, PRN, Shanda Torres MD, 10 mL at 06/12/18 1445  •  sodium chloride 0.9 % flush 10 mL, 10 mL, Intravenous, PRN, Tamela Doherty, APRN, 10 mL at 12/08/21 0948    No problems with medications.    Allergies   Allergen Reactions   • Benzonatate Hives and Itching       Review of Systems  GENERAL:  Active/slower with limits, speed, stamina for age, and occ back pain.  Sleep is ok. No fever now.  SKIN: No other rash/skin lesion of concern:   ENDO:  No syncope, near or diaphoretic sweaty spells.  No download..  HEENT: No recent head injury; stable occ headache.  No vision change.  No chronic hearing loss.  Ears without fullness without pain/drainage.  No sore throat now.  No significant nasal/sinus congestion/drainage. No epistaxis.   CHEST: No chest wall tenderness or mass.  Same occ/dry cough, and no wheeze.  No SOB; no hemoptysis.  CV: No chest  pain, palpitations; daily end of day mild pitting/ankle edema.  GI: No heartburn, dysphagia.  No other abdominal pain, diarrhea, constipation.  No rectal bleeding, or melena.    :  Voids with occ on/off dysuria, without incontinence to completion.  ORTHO: No painful/swollen joints but various on /off sore.  Occ same/sore neck or back.  No acute neck or lower back pain without recent injury.  NEURO: No dizziness, weakness of extremities.  Same mild numbness/paresthesias LE.  PSYCH: No memory loss.  Mood good; mild anxious, depressed but/and not suicidal.  Tries to tolerate stress .   Screening:  Mammogram: bilateral masectomy  Bone density: Bone d-deca/[facility]/T hip -0.5 LS +1.4/12.18.18;  no change; maybe 2-5 yr  Low dose CT chest:Tobacco-smoker/age 17/1-2ppd/dc age 60:  CTs with breast cancer:   GI: Colon-avm-div/[facility]ieben/[facility]/12.19.17/5y  Prostate: NA  Usual lab order  6m CBC, CMP, A1c  12m CBC, CMP, A1c, LIPID, TSH, Vit D     Data reviewed:   Recent admit/ER/MD visits: oncology  Last cardiac testing:   Echo:   Results for orders placed during the hospital encounter of 04/23/21    Adult Transthoracic Echo Complete W/ Cont if Necessary Per Protocol    Interpretation Summary  · Technically difficult study.  · Left ventricular ejection fraction appears to be 66 - 70%. Left ventricular systolic function is normal.  · Left ventricular diastolic function is consistent with (grade I) impaired relaxation.  · Normal right ventricular cavity size and systolic function noted.  · Mild aortic valve stenosis is present.  · Estimated right ventricular systolic pressure from tricuspid regurgitation is normal (<35 mmHg).        Lab Results:  Results for orders placed or performed in visit on 12/08/21   Hemoglobin A1c    Specimen: Blood   Result Value Ref Range    Hemoglobin A1C 6.50 (H) 4.80 - 5.60 %   Lipid Panel    Specimen: Blood   Result Value Ref Range    Total Cholesterol 165 0 - 200 mg/dL    Triglycerides 86 0 - 150 mg/dL     HDL Cholesterol 49 40 - 60 mg/dL    LDL Cholesterol  100 0 - 100 mg/dL    VLDL Cholesterol 16 5 - 40 mg/dL    LDL/HDL Ratio 2.02    Vitamin D 25 Hydroxy    Specimen: Blood   Result Value Ref Range    25 Hydroxy, Vitamin D 40.1 ng/ml   TSH    Specimen: Blood   Result Value Ref Range    TSH 2.100 0.270 - 4.200 uIU/mL   CEA    Specimen: Blood   Result Value Ref Range    CEA 4.69 ng/mL   Cancer Antigen 27.29    Specimen: Blood   Result Value Ref Range    CA 27.29 30.3 0.0 - 38.6 U/mL   CBC Auto Differential    Specimen: Blood   Result Value Ref Range    WBC 8.37 3.40 - 10.80 10*3/mm3    RBC 4.53 3.77 - 5.28 10*6/mm3    Hemoglobin 13.7 12.0 - 15.9 g/dL    Hematocrit 41.6 34.0 - 46.6 %    MCV 91.8 79.0 - 97.0 fL    MCH 30.2 26.6 - 33.0 pg    MCHC 32.9 31.5 - 35.7 g/dL    RDW 13.8 12.3 - 15.4 %    RDW-SD 47.0 37.0 - 54.0 fl    MPV 10.7 6.0 - 12.0 fL    Platelets 169 140 - 450 10*3/mm3    Neutrophil % 66.2 42.7 - 76.0 %    Lymphocyte % 26.2 19.6 - 45.3 %    Monocyte % 5.0 5.0 - 12.0 %    Eosinophil % 1.6 0.3 - 6.2 %    Basophil % 0.5 0.0 - 1.5 %    Immature Grans % 0.5 0.0 - 0.5 %    Neutrophils, Absolute 5.55 1.70 - 7.00 10*3/mm3    Lymphocytes, Absolute 2.19 0.70 - 3.10 10*3/mm3    Monocytes, Absolute 0.42 0.10 - 0.90 10*3/mm3    Eosinophils, Absolute 0.13 0.00 - 0.40 10*3/mm3    Basophils, Absolute 0.04 0.00 - 0.20 10*3/mm3    Immature Grans, Absolute 0.04 0.00 - 0.05 10*3/mm3    nRBC 0.0 0.0 - 0.2 /100 WBC       A1C:  Lab Results - Last 18 Months   Lab Units 12/08/21  1006 06/07/21  0832   HEMOGLOBIN A1C % 6.50* 6.10*     GLUCOSE:  Lab Results - Last 18 Months   Lab Units 06/07/21  0832 03/08/21  1215 03/01/21  0822 12/14/20  0937   GLUCOSE mg/dL 116* 127* 106* 108*     LIPID:  Lab Results - Last 18 Months   Lab Units 12/08/21  1006   LDL CHOL mg/dL 100   HDL CHOL mg/dL 49   TRIGLYCERIDES mg/dL 86     PSA:No results for input(s): PSA in the last 86524 hours.  CBC:  Lab Results - Last 18 Months   Lab Units 12/08/21  1006  "06/07/21  0832 03/01/21  0822 12/14/20  0937   WBC 10*3/mm3 8.37 7.45 8.1 7.52   HEMOGLOBIN g/dL 13.7 12.4 13.5 12.8   HEMATOCRIT % 41.6 36.9 41.6 39.6   PLATELETS 10*3/mm3 169 161 202 169      BMP/CMP:  Lab Results - Last 18 Months   Lab Units 08/10/21  0834 06/07/21  0832 03/08/21  1215 03/01/21  0822 12/14/20  0937   SODIUM mmol/L  --  139 136 140 140   POTASSIUM mmol/L  --  5.0 4.6 4.6 4.5   CHLORIDE mmol/L  --  102 98 101 105   TOTAL CO2 mmol/L  --   --  25.1 23  --    CO2 mmol/L  --  27.0  --   --  26.0   GLUCOSE mg/dL  --  116* 127* 106* 108*   BUN mg/dL  --  20 20 14 19   CREATININE mg/dL 0.70 0.74 0.75 0.74 0.55*   EGFR IF NONAFRICN AM mL/min/1.73  --  76 75 78 107   EGFR IF AFRICN AM mL/min/1.73  --   --  91 90  --    CALCIUM mg/dL  --  10.5 9.9 9.6 9.2     HEPATIC:  Lab Results - Last 18 Months   Lab Units 06/07/21  0832 03/01/21  0822 12/14/20  0937   ALT (SGPT) U/L 11 9 8   AST (SGOT) U/L 26 18 20   ALK PHOS U/L 108 121* 114     THYROID:  Lab Results - Last 18 Months   Lab Units 12/08/21  1006 03/01/21  0822   TSH uIU/mL 2.100 2.180       Objective   /78   Pulse 54   Temp 98 °F (36.7 °C) (Infrared)   Resp 18   Ht 157.5 cm (62\")   Wt 89.4 kg (197 lb)   LMP  (LMP Unknown)   SpO2 96%   BMI 36.03 kg/m²   Body mass index is 36.03 kg/m².      Recent Vitals       12/8/2021 1/3/2022 1/18/2022       BP: 140/88 124/70 128/78     Pulse: 62 51 54     Temp: 97.4 °F (36.3 °C) -- 98 °F (36.7 °C)     Weight: 91.7 kg (202 lb 1.6 oz) 89.8 kg (198 lb) 89.4 kg (197 lb)     BMI (Calculated): 37 36.2 36           Physical Exam  GENERAL:  Well nourished/developed in no acute distress  SKIN: Turgor excellent, without wound, rash, lesion or significance  HEENT: Normal cephalic without trauma. Pupils equal round reactive to light. Extraocular motions full without nystagmus.  External canals nonobstructive nontender without reddness. Tymphatic membranes dull with irwin structures intact.   Oral cavity without " growths, exudates, and moist.  Posterior pharynx without mass, obstruction, redness.  No thyromegaly, mass, tenderness, definite lymphadenopathy and supple.   CV: Regular rhythm.  1/6 systolic murmur without gallop and trace ankle/LE edema. Posterior pulses intact.  No carotid bruits.  CHEST: No chest wall tenderness or mass.   LUNGS: Symmetric motion with clear to auscultation.  No dullness to percussion  ABD: Soft, non-tender without mass.   ORTHO: Symmetric extremities without swelling/point tenderness.  Full gross range of motion. Symmetric LE.  Neg straight leg raising.  Neg Patricks maneuver.  Back is straight/mild lordosis.  Lower back sore; no point tender.    NEURO: CN 2-12 grossly intact except L eyelid mild droop.  Otherwise symmetric facies and UE/LE. 3/5 strength throughout.   Otherwise nonfocal use extremities. Speech clear.      Assessment/Plan     1. Edema, unspecified type    2. Chronic obstructive pulmonary disease, unspecified COPD type (HCC)    3. Malignant neoplasm of female breast, unspecified estrogen receptor status, unspecified laterality, unspecified site of breast (HCC)    4. Valvular heart disease: AS    5. Primary hypertension    6. Congestive heart failure, unspecified HF chronicity, unspecified heart failure type (HCC)    7. Bradycardia    8. Controlled type 2 diabetes mellitus without complication, without long-term current use of insulin (HCC)    9. Overactive bladder    10. Wellness examination-done    11. Primary generalized (osteo)arthritis    12. Cough: copd    13. Menopausal state    14. Vaccine counseling        Discussion:  BP ok  DM/BS ok  Thyroid ok  Liver ok  CBC ok  Renal ok  Lipid ok  PSA NA    Cough maybe ace; change zestril 20 to cozaar 100-monitor bp through transition  Add pulmicor regular/daily  Referral to [facility]/oncology  Surgery as she desires    Wellness done   Vaccine reviewed: today none but flu advised; later Tdap, shingles and continue to monitor  Screening  reviewed/updated    Medical decision issues:   Data review above:   Rx: reviewed and decisions:   Same Rx for now except changes below    Visit today involved chronic significant medical problems or differentials and/or intensive drug monitoring: ie potential to cause serious morbidity or death:   New Medications Ordered This Visit   Medications   • losartan (Cozaar) 100 MG tablet     Sig: Take 1 tablet by mouth Daily.     Dispense:  90 tablet     Refill:  2   • budesonide (Pulmicort Flexhaler) 90 MCG/ACT inhaler     Sig: Inhale 1 puff 2 (Two) Times a Day.     Dispense:  1 each     Refill:  5       Orders placed:   LAB/Testing/Referrals: reviewed/orders:   Today:   Orders Placed This Encounter   Procedures   • DEXA Bone Density Axial   • Ambulatory Referral to Oncology     Chronic/recurrent labs above or change to:   same     Health maintenance:   Body mass index is 36.03 kg/m².  Patient's Body mass index is 36.03 kg/m². indicating that she is obese (BMI >30). Obesity-related health conditions include the following: hypertension. Obesity is unchanged. BMI is is above average; BMI management plan is completed. We discussed portion control and increasing exercise..    Tobacco use reviewed:   Ira Sanchez  reports that she quit smoking about 21 years ago. Her smoking use included cigarettes. She started smoking about 58 years ago. She has a 74.00 pack-year smoking history. She has never used smokeless tobacco..     Annual/wellness visit: also/today (see separate note)-medicare     There are no Patient Instructions on file for this visit.    Follow up: Return for see check out note: then lab;, Dr Tinoco-, 6 m;.  Future Appointments   Date Time Provider Department Center   1/24/2022  9:40 AM [facility] BIC DEXA 1  PAD DX BI PAD   3/9/2022  8:30 AM [facility] PAD NURSE [facility] PAD PAD   4/13/2022  9:00 AM Henrietta Olvera APRN [facility] VS PAD PAD   6/1/2022  8:00 AM  [facility] LAB  PAD CCLAB PAD   6/1/2022  8:30 AM [facility]  ONC PAD NURSE [facility] ON[facility] PAD PAD   6/8/2022 10:00 AM Adalberto Wallace MD [facility] ONC PAD PAD   7/18/2022  8:50 AM [facility] PC METR PAD   7/25/2022  8:30 AM Conrado Tinoco MD [facility] PC METR PAD

## 2022-01-19 NOTE — PROGRESS NOTES
QUICK REFERENCE INFORMATION:  The ABCs of the Annual Wellness Visit    Subsequent Medicare Wellness Visit     HEALTH RISK ASSESSMENT    : 1945    Recent Hospitalizations:  No hospitalization(s) within the last year..  ccc      Current Medical Providers:  Patient Care Team:  Conrado Tinoco MD as PCP - General  Tamela Doherty APRN as Nurse Practitioner (Hematology and Oncology)        Smoking Status:  Social History     Tobacco Use   Smoking Status Former Smoker   • Packs/day: 2.00   • Years: 37.00   • Pack years: 74.00   • Types: Cigarettes   • Start date: 1963   • Quit date: 2000   • Years since quittin.3   Smokeless Tobacco Never Used       Alcohol Consumption:  Social History     Substance and Sexual Activity   Alcohol Use No       Depression Screen:   PHQ-2/PHQ-9 Depression Screening 2022   Little interest or pleasure in doing things 0   Feeling down, depressed, or hopeless 0   Trouble falling or staying asleep, or sleeping too much 0   Feeling tired or having little energy 0   Poor appetite or overeating 0   Feeling bad about yourself - or that you are a failure or have let yourself or your family down 0   Trouble concentrating on things, such as reading the newspaper or watching television 0   Moving or speaking so slowly that other people could have noticed. Or the opposite - being so fidgety or restless that you have been moving around a lot more than usual 0   Thoughts that you would be better off dead, or of hurting yourself in some way 0   Total Score 0   If you checked off any problems, how difficult have these problems made it for you to do your work, take care of things at home, or get along with other people? Not difficult at all       Health Habits and Functional and Cognitive Screening:  Functional & Cognitive Status 2022   Do you have difficulty preparing food and eating? No   Do you have difficulty bathing yourself, getting dressed or grooming  yourself? No   Do you have difficulty using the toilet? No   Do you have difficulty moving around from place to place? No   Do you have trouble with steps or getting out of a bed or a chair? No   Current Diet Unhealthy Diet   Dental Exam Not up to date   Eye Exam Not up to date   Exercise (times per week) 0 times per week   Current Exercises Include No Regular Exercise   Current Exercise Activities Include -   Do you need help using the phone?  No   Are you deaf or do you have serious difficulty hearing?  No   Do you need help with transportation? No   Do you need help shopping? No   Do you need help preparing meals?  No   Do you need help with housework?  No   Do you need help with laundry? No   Do you need help taking your medications? No   Do you need help managing money? No   Do you ever drive or ride in a car without wearing a seat belt? No   Have you felt unusual stress, anger or loneliness in the last month? No   Who do you live with? Alone   If you need help, do you have trouble finding someone available to you? No   Have you been bothered in the last four weeks by sexual problems? No   Do you have difficulty concentrating, remembering or making decisions? No           Does the patient have evidence of cognitive impairment? No    Asiprin use counseling: Does not need ASA (and currently is not on it)      Recent Lab Results:       Lab Results   Component Value Date    HGBA1C 6.50 (H) 12/08/2021     Lab Results   Component Value Date    CHOL 165 12/08/2021    TRIG 86 12/08/2021    HDL 49 12/08/2021    VLDL 16 12/08/2021    LDLHDL 2.02 12/08/2021           Age-appropriate Screening Schedule:  Refer to the list below for future screening recommendations based on patient's age, sex and/or medical conditions. Orders for these recommended tests are listed in the plan section. The patient has been provided with a written plan.    Health Maintenance   Topic Date Due   • URINE MICROALBUMIN  Never done   • TDAP/TD  VACCINES (1 - Tdap) Never done   • ZOSTER VACCINE (1 of 2) Never done   • MAMMOGRAM  06/05/2020   • DIABETIC EYE EXAM  08/13/2020   • DXA SCAN  12/18/2020   • INFLUENZA VACCINE  08/01/2021   • HEMOGLOBIN A1C  06/08/2022        Subjective   History of Present Illness    Ira Sanchez is a 76 y.o. female who presents for an Annual Wellness Visit.    The following portions of the patient's history were reviewed and updated as appropriate: allergies, current medications, past family history, past medical history, past social history, past surgical history and problem list.    Outpatient Medications Prior to Visit   Medication Sig Dispense Refill   • amLODIPine (NORVASC) 5 MG tablet Take 1 tablet by mouth Daily. 90 tablet 3   • Calcium Carb-Cholecalciferol (CALCIUM-VITAMIN D) 600-400 MG-UNIT tablet Take 1 tablet by mouth 2 (Two) Times a Day.     • celecoxib (CeleBREX) 200 MG capsule TAKE 1 CAPSULE BY MOUTH DAILY 30 capsule 5   • cyclobenzaprine (FLEXERIL) 5 MG tablet Take 1 tablet by mouth Every Night. 30 tablet 1   • cycloSPORINE (RESTASIS) 0.05 % ophthalmic emulsion 1 drop Every 12 (Twelve) Hours.     • diphenhydrAMINE (BENADRYL) 25 mg capsule Take 25 mg by mouth Daily As Needed.     • ibuprofen (ADVIL,MOTRIN) 200 MG tablet Take 200 mg by mouth As Needed.     • oxybutynin XL (DITROPAN-XL) 10 MG 24 hr tablet Take 10 mg by mouth Daily.     • thiamine (VITAMINE B-1) 100 MG tablet Take 100 mg by mouth Daily.     • lisinopril (PRINIVIL,ZESTRIL) 20 MG tablet TAKE 1 TABLET BY MOUTH DAILY 90 tablet 3     Facility-Administered Medications Prior to Visit   Medication Dose Route Frequency Provider Last Rate Last Admin   • heparin flush (porcine) 100 UNIT/ML injection 500 Units  500 Units Intravenous PRN Shanda Torres MD   500 Units at 04/16/18 1504   • heparin flush (porcine) 100 UNIT/ML injection 500 Units  500 Units Intravenous PRN Shanda Torres MD   500 Units at 06/12/18 1445   • heparin injection  500 Units  500 Units Intravenous PRN Tamela Doherty, APRN   500 Units at 12/08/21 0948   • sodium chloride 0.9 % flush 10 mL  10 mL Intravenous PRN Shanda Torres MD   10 mL at 04/16/18 1503   • sodium chloride 0.9 % flush 10 mL  10 mL Intravenous PRN Shanda Torres MD   10 mL at 06/12/18 1445   • sodium chloride 0.9 % flush 10 mL  10 mL Intravenous PRN Tamela Doherty, APRN   10 mL at 12/08/21 0948       Patient Active Problem List   Diagnosis   • Obesity   • Breast cancer-L/valente masectomy 2014   • Overactive bladder   • Diabetes type 2, controlled (HCC)   • Hypertension   • Osteopenia 2018-? 2 to 5y   • Neuropathy-chemo/better   • Primary generalized (osteo)arthritis   • Malignant neoplasm of lower-outer quadrant of left female breast (HCC)   • Family history of breast cancer   • Malignant neoplasm of upper-outer quadrant of female breast (HCC)   • History of bilateral mastectomy   • Wellness examination-done   • Shoulder pain   • Chronic back pain   • Laboratory test*   • Bradycardia   • Cough: copd   • Allergic rhinitis   • Menopause   • History of shingles   • COPD (chronic obstructive pulmonary disease) (HCC)   • Abnormal mammogram   • After-cataract with vision obscured   • Amblyopia   • Cellulitis, trunk   • Dermatitis of eyelid due to herpes zoster   • Dermatochalasis   • Hordeolum internum   • Other hereditary corneal dystrophies   • Pseudophakia   • Rash   • Encounter for care related to Port-a-Cath   • Edema: venous-BH/vascular   • Congestive heart failure (HCC)   • Valvular heart disease: AS       Advance Care Planning:  ACP discussion was held with the patient during this visit. Patient has an advance directive (not in EMR), copy requested.    Identification of Risk Factors:  Risk factors include: Colon Cancer Screening  Immunizations Discussed/Encouraged (specific immunizations; Tdap, Influenza, Pneumococcal 23, Shingrix and COVID19 )  Lung Cancer  Risk  Osteoporosis Risk.    Review of Systems  GENERAL:  Active/slower with limits, speed, stamina for age, and occ back pain.  Sleep is ok. No fever now.  SKIN: No other rash/skin lesion of concern:   ENDO:  No syncope, near or diaphoretic sweaty spells.  No download..  HEENT: No recent head injury; stable occ headache.  No vision change.  No chronic hearing loss.  Ears without fullness without pain/drainage.  No sore throat now.  No significant nasal/sinus congestion/drainage. No epistaxis.   CHEST: No chest wall tenderness or mass.  Same occ/dry cough, and no wheeze.  No SOB; no hemoptysis.  CV: No chest pain, palpitations; daily end of day mild pitting/ankle edema.  GI: No heartburn, dysphagia.  No other abdominal pain, diarrhea, constipation.  No rectal bleeding, or melena.    :  Voids with occ on/off dysuria, without incontinence to completion.  ORTHO: No painful/swollen joints but various on /off sore.  Occ same/sore neck or back.  No acute neck or lower back pain without recent injury.  NEURO: No dizziness, weakness of extremities.  Same mild numbness/paresthesias LE.  PSYCH: No memory loss.  Mood good; mild anxious, depressed but/and not suicidal.  Tries to tolerate stress .   Screening:  Mammogram: bilateral masectomy  Bone density: Bone d-deca/[facility]/T hip -0.5 LS +1.4/12.18.18;  no change; maybe 2-5 yr  Low dose CT chest:Tobacco-smoker/age 17/1-2ppd/dc age 60:  CTs with breast cancer:   GI: Colon-avm-div/Shieben/[facility]/12.19.17/5y  Prostate: NA  Usual lab order  6m CBC, CMP, A1c  12m CBC, CMP, A1c, LIPID, TSH, Vit D    Compared to one year ago, the patient feels her physical health is the same.  Compared to one year ago, the patient feels her mental health is the same.    Objective     Physical Exam  GENERAL:  Well nourished/developed in no acute distress  SKIN: Turgor excellent, without wound, rash, lesion or significance  HEENT: Normal cephalic without trauma. Pupils equal round reactive to light.  "Extraocular motions full without nystagmus.  External canals nonobstructive nontender without reddness. Tymphatic membranes dull with irwin structures intact.   Oral cavity without growths, exudates, and moist.  Posterior pharynx without mass, obstruction, redness.  No thyromegaly, mass, tenderness, definite lymphadenopathy and supple.   CV: Regular rhythm.  1/6 systolic murmur without gallop and trace ankle/LE edema. Posterior pulses intact.  No carotid bruits.  CHEST: No chest wall tenderness or mass.   LUNGS: Symmetric motion with clear to auscultation.  No dullness to percussion  ABD: Soft, non-tender without mass.   ORTHO: Symmetric extremities without swelling/point tenderness.  Full gross range of motion. Symmetric LE.  Neg straight leg raising.  Neg Patricks maneuver.  Back is straight/mild lordosis.  Lower back sore; no point tender.    NEURO: CN 2-12 grossly intact except L eyelid mild droop.  Otherwise symmetric facies and UE/LE. 3/5 strength throughout.   Otherwise nonfocal use extremities. Speech clear.    Vitals:    01/18/22 1008   BP: 128/78   Pulse: 54   Resp: 18   Temp: 98 °F (36.7 °C)   TempSrc: Infrared   SpO2: 96%   Weight: 89.4 kg (197 lb)   Height: 157.5 cm (62\")   PainSc: 0-No pain       Patient's Body mass index is 36.03 kg/m². indicating that she is obese (BMI >30). Obesity-related health conditions include the following: hypertension. Obesity is unchanged. BMI is is above average; BMI management plan is completed. We discussed portion control and increasing exercise..      Assessment/Plan   Patient Self-Management and Personalized Health Advice  The patient has been provided with information about: diet, exercise and weight management and preventive services including:   · Annual Wellness Visit (AWV).    Visit Diagnoses:    ICD-10-CM ICD-9-CM   1. Edema, unspecified type  R60.9 782.3   2. Chronic obstructive pulmonary disease, unspecified COPD type (HCC)  J44.9 496   3. Malignant neoplasm " of female breast, unspecified estrogen receptor status, unspecified laterality, unspecified site of breast ([facility])  C50.919 174.9   4. Valvular heart disease: AS  I38 424.90   5. Primary hypertension  I10 401.9   6. Congestive heart failure, unspecified HF chronicity, unspecified heart failure type ([facility])  I50.9 428.0   7. Bradycardia  R00.1 427.89   8. Controlled type 2 diabetes mellitus without complication, without long-term current use of insulin ([facility])  E11.9 250.00   9. Overactive bladder  N32.81 596.51   10. Wellness examination-done  Z00.00 V70.0   11. Primary generalized (osteo)arthritis  M15.0 715.09   12. Cough: copd  R05.9 786.2   13. Menopausal state  N95.1 627.2   14. Vaccine counseling  Z71.85 V65.49       Orders Placed This Encounter   Procedures   • DEXA Bone Density Axial     Standing Status:   Future     Standing Expiration Date:   1/18/2023     Order Specific Question:   Reason for Exam:     Answer:   menopause; risk/osteopenia-osteoporosis   • Ambulatory Referral to Oncology     Referral Priority:   Routine     Requested Specialty:   Oncology     Number of Visits Requested:   1       Outpatient Encounter Medications as of 1/18/2022   Medication Sig Dispense Refill   • amLODIPine (NORVASC) 5 MG tablet Take 1 tablet by mouth Daily. 90 tablet 3   • Calcium Carb-Cholecalciferol (CALCIUM-VITAMIN D) 600-400 MG-UNIT tablet Take 1 tablet by mouth 2 (Two) Times a Day.     • celecoxib (CeleBREX) 200 MG capsule TAKE 1 CAPSULE BY MOUTH DAILY 30 capsule 5   • cyclobenzaprine (FLEXERIL) 5 MG tablet Take 1 tablet by mouth Every Night. 30 tablet 1   • cycloSPORINE (RESTASIS) 0.05 % ophthalmic emulsion 1 drop Every 12 (Twelve) Hours.     • diphenhydrAMINE (BENADRYL) 25 mg capsule Take 25 mg by mouth Daily As Needed.     • ibuprofen (ADVIL,MOTRIN) 200 MG tablet Take 200 mg by mouth As Needed.     • oxybutynin XL (DITROPAN-XL) 10 MG 24 hr tablet Take 10 mg by mouth Daily.     • thiamine (VITAMINE B-1) 100 MG tablet  Take 100 mg by mouth Daily.     • [DISCONTINUED] lisinopril (PRINIVIL,ZESTRIL) 20 MG tablet TAKE 1 TABLET BY MOUTH DAILY 90 tablet 3   • budesonide (Pulmicort Flexhaler) 90 MCG/ACT inhaler Inhale 1 puff 2 (Two) Times a Day. 1 each 5   • losartan (Cozaar) 100 MG tablet Take 1 tablet by mouth Daily. 90 tablet 2     Facility-Administered Encounter Medications as of 1/18/2022   Medication Dose Route Frequency Provider Last Rate Last Admin   • heparin flush (porcine) 100 UNIT/ML injection 500 Units  500 Units Intravenous PRN Shanda Torres MD   500 Units at 04/16/18 1504   • heparin flush (porcine) 100 UNIT/ML injection 500 Units  500 Units Intravenous PRN Shanda Torres MD   500 Units at 06/12/18 1445   • heparin injection 500 Units  500 Units Intravenous PRN Tamela Doherty, APRN   500 Units at 12/08/21 0948   • sodium chloride 0.9 % flush 10 mL  10 mL Intravenous PRN Shanda Torres MD   10 mL at 04/16/18 1503   • sodium chloride 0.9 % flush 10 mL  10 mL Intravenous PRN Shanda Torres MD   10 mL at 06/12/18 1445   • sodium chloride 0.9 % flush 10 mL  10 mL Intravenous PRN Tamela Doherty, APRN   10 mL at 12/08/21 0948       Reviewed use of high risk medication in the elderly: not applicable  Reviewed for potential of harmful drug interactions in the elderly: not applicable    Follow Up:  Return for see check out note: then lab;, Dr Tinoco-, 6 m;.     An After Visit Summary and PPPS with all of these plans were given to the patient.

## 2022-01-24 ENCOUNTER — HOSPITAL ENCOUNTER (OUTPATIENT)
Dept: BONE DENSITY | Facility: HOSPITAL | Age: 77
Discharge: HOME | End: 2022-01-24
Admitting: FAMILY MEDICINE

## 2022-01-24 DIAGNOSIS — N95.1 MENOPAUSAL STATE: ICD-10-CM

## 2022-01-24 PROCEDURE — 77080 DXA BONE DENSITY AXIAL: CPT

## 2022-01-31 ENCOUNTER — TELEPHONE (OUTPATIENT)
Dept: FAMILY MEDICINE CLINIC | Facility: CLINIC | Age: 77
End: 2022-01-31

## 2022-01-31 DIAGNOSIS — J44.9 CHRONIC OBSTRUCTIVE PULMONARY DISEASE, UNSPECIFIED COPD TYPE: Primary | ICD-10-CM

## 2022-01-31 DIAGNOSIS — C50.412 MALIGNANT NEOPLASM OF UPPER-OUTER QUADRANT OF LEFT FEMALE BREAST, UNSPECIFIED ESTROGEN RECEPTOR STATUS (HCC): Primary | ICD-10-CM

## 2022-02-03 ENCOUNTER — CLINICAL DOCUMENTATION (OUTPATIENT)
Dept: HEMATOLOGY | Age: 77
End: 2022-02-03

## 2022-02-03 NOTE — PROGRESS NOTES
Due to winter weather, signer attempted to call both numbers listed for patient. Signer was unable to get either number to work. Signer also attempted to send a My Chart message, but was unable to send message to patient as she doesn't appear to have access to My Chart.

## 2022-02-14 NOTE — TELEPHONE ENCOUNTER
I don't know anything about this one.  I checked covermymeds but there is no listing for this patient.

## 2022-02-23 NOTE — TELEPHONE ENCOUNTER
Still noted from 2-14-22  Scarlett HOFFMAN    10:17 AM  Note  This PA has not been started as of yet by me, not sure about GHAZAL?        Will attempt to do PA when time is allowed, hopefully today

## 2022-02-28 RX ORDER — BUDESONIDE AND FORMOTEROL FUMARATE DIHYDRATE 80; 4.5 UG/1; UG/1
2 AEROSOL RESPIRATORY (INHALATION)
Qty: 10.2 G | Refills: 5 | Status: SHIPPED | OUTPATIENT
Start: 2022-02-28 | End: 2022-03-01

## 2022-02-28 NOTE — TELEPHONE ENCOUNTER
Spoke to patient and advised that she took Symbicort in the past and is covered medication and new E-ex done

## 2022-03-01 ENCOUNTER — TELEPHONE (OUTPATIENT)
Dept: FAMILY MEDICINE CLINIC | Facility: CLINIC | Age: 77
End: 2022-03-01

## 2022-03-01 DIAGNOSIS — J44.9 CHRONIC OBSTRUCTIVE PULMONARY DISEASE, UNSPECIFIED COPD TYPE: Primary | ICD-10-CM

## 2022-03-01 NOTE — TELEPHONE ENCOUNTER
Caller: Ira Sanchez    Relationship: Self    Best call back number: [phone]    What is the best time to reach you: ANYTIME     Who are you requesting to speak with (clinical staff, provider,  specific staff member): CLINICAL       What was the call regarding: PATIENT REQUESTING A CALL BACK FROM THE NURSE   PATIENT STATES THE MEDICATION SENT TO THE PHARMACY YESTERDAY IS OVER $300 AND SHE CAN NOT AFFORD THAT     Do you require a callback: YES

## 2022-03-01 NOTE — TELEPHONE ENCOUNTER
Called pt back and advised sending in wexila and advised pharmacy to advise of cost? If not where pt can afford, will call her rx insurance and find out what is formulary? Pt is agreeable

## 2022-03-02 NOTE — TELEPHONE ENCOUNTER
"Pt called and advised \"the last inhaler you sent is $156.00 and I cant afford that, you will have to call my insurance\"    Pt has appt tomorrow  "

## 2022-03-03 ENCOUNTER — OFFICE VISIT (OUTPATIENT)
Dept: FAMILY MEDICINE CLINIC | Facility: CLINIC | Age: 77
End: 2022-03-03

## 2022-03-03 VITALS
OXYGEN SATURATION: 97 % | HEART RATE: 52 BPM | DIASTOLIC BLOOD PRESSURE: 68 MMHG | BODY MASS INDEX: 36.7 KG/M2 | WEIGHT: 199.4 LBS | SYSTOLIC BLOOD PRESSURE: 132 MMHG | TEMPERATURE: 98 F | RESPIRATION RATE: 20 BRPM | HEIGHT: 62 IN

## 2022-03-03 DIAGNOSIS — E11.9 CONTROLLED TYPE 2 DIABETES MELLITUS WITHOUT COMPLICATION, WITHOUT LONG-TERM CURRENT USE OF INSULIN: Chronic | ICD-10-CM

## 2022-03-03 DIAGNOSIS — M54.9 CHRONIC BACK PAIN, UNSPECIFIED BACK LOCATION, UNSPECIFIED BACK PAIN LATERALITY: ICD-10-CM

## 2022-03-03 DIAGNOSIS — Z45.2 ENCOUNTER FOR CARE RELATED TO VASCULAR ACCESS PORT: ICD-10-CM

## 2022-03-03 DIAGNOSIS — M15.0 PRIMARY GENERALIZED (OSTEO)ARTHRITIS: ICD-10-CM

## 2022-03-03 DIAGNOSIS — R10.9 ABDOMINAL PAIN, UNSPECIFIED ABDOMINAL LOCATION: ICD-10-CM

## 2022-03-03 DIAGNOSIS — M25.512 LEFT SHOULDER PAIN, UNSPECIFIED CHRONICITY: ICD-10-CM

## 2022-03-03 DIAGNOSIS — G89.29 CHRONIC BACK PAIN, UNSPECIFIED BACK LOCATION, UNSPECIFIED BACK PAIN LATERALITY: ICD-10-CM

## 2022-03-03 DIAGNOSIS — G89.29 CHRONIC NECK PAIN: ICD-10-CM

## 2022-03-03 DIAGNOSIS — M54.2 CHRONIC NECK PAIN: ICD-10-CM

## 2022-03-03 DIAGNOSIS — R10.9 CHRONIC ABDOMINAL PAIN: ICD-10-CM

## 2022-03-03 DIAGNOSIS — Z45.2 ENCOUNTER FOR CARE RELATED TO PORT-A-CATH: ICD-10-CM

## 2022-03-03 DIAGNOSIS — M85.80 OSTEOPENIA, UNSPECIFIED LOCATION: ICD-10-CM

## 2022-03-03 DIAGNOSIS — I10 PRIMARY HYPERTENSION: Chronic | ICD-10-CM

## 2022-03-03 DIAGNOSIS — E65 ABDOMINAL PANNUS: ICD-10-CM

## 2022-03-03 DIAGNOSIS — G89.29 CHRONIC ABDOMINAL PAIN: ICD-10-CM

## 2022-03-03 PROCEDURE — 99214 OFFICE O/P EST MOD 30 MIN: CPT | Performed by: FAMILY MEDICINE

## 2022-03-03 RX ORDER — MULTIPLE VITAMINS W/ MINERALS TAB 9MG-400MCG
1 TAB ORAL DAILY
COMMUNITY
Start: 2022-02-24 | End: 2022-03-31 | Stop reason: HOSPADM

## 2022-03-03 RX ORDER — CHLORAL HYDRATE 500 MG
CAPSULE ORAL DAILY
COMMUNITY

## 2022-03-03 RX ORDER — IBANDRONATE SODIUM 150 MG/1
150 TABLET, FILM COATED ORAL
Qty: 5 TABLET | Refills: 1 | Status: SHIPPED | OUTPATIENT
Start: 2022-03-03 | End: 2022-06-23

## 2022-03-03 RX ORDER — MULTIPLE VITAMINS W/ MINERALS TAB 9MG-400MCG
1 TAB ORAL DAILY
COMMUNITY
End: 2022-03-31 | Stop reason: HOSPADM

## 2022-03-03 NOTE — PROGRESS NOTES
"Subjective   Ira Sanchez is a 77 y.o. female presenting with chief complaint of:   Chief Complaint   Patient presents with   • Abdominal Pain     \"It comes and goes, ULQ of stomach and sometimes is my chest\"   • Neck Pain   • Back Pain     History of Present Illness :  Alone.  Here for primarily review of above abdominal pain and others.       Has multiple chronic problems to consider that might have a bearing on today's issues; still somewhat an interval appointment.       Chronic/acute problems reviewed today:   1. Encounter for care related to vascular access port see below   2. Primary generalized (osteo)arthritis Chronic/stable.  Various on/off joint pains/soreness/stiffness.  Particular joint problems with spinal.  No joint swelling.  Treats mainly with reduced activity, Rx listed, Tylenol.  No frequent NSAIDs, and no recent injections.      3. Primary hypertension Chronic/stable. Stable here past/no recent home blood pressures.  No significant chest pain, SOB, LE edema, orthopnea, near syncope, dizziness/light headness.   Recent Vitals       1/3/2022 1/18/2022 3/3/2022       BP: 124/70 128/78 132/68     Pulse: 51 54 52     Temp: -- 98 °F (36.7 °C) 98 °F (36.7 °C)     Weight: 89.8 kg (198 lb) 89.4 kg (197 lb) 90.4 kg (199 lb 6.4 oz)     BMI (Calculated): 36.2 36 36.5            4. Controlled type 2 diabetes mellitus without complication, without long-term current use of insulin (HCC) Chronic/stable.  No problem/pattern hypoglycemia/hyperglycemia manifest by poly- dypsia, phagia, uria, or sweats, diaphoretic episodes, syncope/near.     5. Encounter for care related to Port-a-Cath chronically has a catheter from chemo and Dr. Paredes is advised to remove it.  She does not want it removed and he did not force the issue.  It was enough of an issue last visit that she was interested in changing to another oncologist but now she is not so sure   6. Chronic neck pain : chronic/variable 0-3/10 neck/UE pain with " infrequent/same radiation discomforts UE/LE  No numbness.  No bladder/bowel control. No desire for surgery/pain management as part of to change approach of care.      7. Abdominal pannus chronic positional abdominal pannus; see abdominal pain   8. Left shoulder pain, unspecified chronicity chronic more recently worse discomfort in and around left shoulder; worse with movement of the neck and not so much shoulder.  Cannot perceive any left versus right weakness or significant dysfunction.   9. Chronic abdominal pain chronic more recently bothersome (see #11) abdominal discomfort particularly around her lower abdomen and the pannus area.   10. Osteopenia, unspecified location Chronic/stable.  Last bone density/if below.   Has not had much/any? Rx.  Ok further bone density screening when due.      11. Abdominal pain, unspecified abdominal location more acute on and off nonspecified for location abdominal bloating discomfort; unassociated change in stools nausea vomiting melena hematochezia   12. Chronic back pain, unspecified back location, unspecified back pain laterality : : chronic/variable 0-3/10 lower back pain with infrequent/above radiation to UE/LE.         Has an/another acute issue today: none.    The following portions of the patient's history were reviewed and updated as appropriate: allergies, current medications, past family history, past medical history, past social history, past surgical history and problem list.      Current Outpatient Medications:   •  amLODIPine (NORVASC) 5 MG tablet, Take 1 tablet by mouth Daily., Disp: 90 tablet, Rfl: 3  •  APPLE CIDER VINEGAR PO, Take 1 each by mouth Daily. chewable, Disp: , Rfl:   •  Calcium Carb-Cholecalciferol (CALCIUM-VITAMIN D) 600-400 MG-UNIT tablet, Take 1 tablet by mouth 2 (Two) Times a Day., Disp: , Rfl:   •  celecoxib (CeleBREX) 200 MG capsule, TAKE 1 CAPSULE BY MOUTH DAILY, Disp: 30 capsule, Rfl: 5  •  cyclobenzaprine (FLEXERIL) 5 MG tablet, Take 1  tablet by mouth Every Night., Disp: 30 tablet, Rfl: 1  •  cycloSPORINE (RESTASIS) 0.05 % ophthalmic emulsion, 1 drop Every 12 (Twelve) Hours., Disp: , Rfl:   •  diphenhydrAMINE (BENADRYL) 25 mg capsule, Take 25 mg by mouth At Night As Needed for Allergies., Disp: , Rfl:   •  ibuprofen (ADVIL,MOTRIN) 200 MG tablet, Take 400 mg by mouth As Needed., Disp: , Rfl:   •  losartan (Cozaar) 100 MG tablet, Take 1 tablet by mouth Daily., Disp: 90 tablet, Rfl: 2  •  multivitamin with minerals (Centrum Silver 50+Women) tablet tablet, Take 1 tablet by mouth Daily., Disp: , Rfl:   •  multivitamin with minerals (HAIR SKIN AND NAILS FORMULA PO), Take 1 tablet by mouth Daily., Disp: , Rfl:   •  Omega-3 1000 MG capsule, Take  by mouth Daily., Disp: , Rfl:   •  oxybutynin XL (DITROPAN-XL) 10 MG 24 hr tablet, Take 10 mg by mouth Daily., Disp: , Rfl:   •  thiamine (VITAMINE B-1) 100 MG tablet, Take 100 mg by mouth Daily., Disp: , Rfl:   No current facility-administered medications for this visit.    Facility-Administered Medications Ordered in Other Visits:   •  heparin flush (porcine) 100 UNIT/ML injection 500 Units, 500 Units, Intravenous, PRN, Shanda Torres MD, 500 Units at 04/16/18 1504  •  heparin flush (porcine) 100 UNIT/ML injection 500 Units, 500 Units, Intravenous, PRN, Shanda Torres MD, 500 Units at 06/12/18 1445  •  heparin injection 500 Units, 500 Units, Intravenous, PRN, Tamela Doherty APRN, 500 Units at 12/08/21 0948  •  sodium chloride 0.9 % flush 10 mL, 10 mL, Intravenous, PRN, Shanda Torres MD, 10 mL at 04/16/18 1503  •  sodium chloride 0.9 % flush 10 mL, 10 mL, Intravenous, PRN, Shanda Torres MD, 10 mL at 06/12/18 1445  •  sodium chloride 0.9 % flush 10 mL, 10 mL, Intravenous, PRN, Tamela Doherty, REBEL, 10 mL at 12/08/21 0946    No problems with medications.    Allergies   Allergen Reactions   • Benzonatate Hives and Itching       Review of  Systems  GENERAL:  Active/slower with limits, speed, stamina for age, and occ back pain.  Sleep is ok. No fever now.  SKIN: No other rash/skin lesion of concern:   ENDO:  No syncope, near or diaphoretic sweaty spells.  No download..  HEENT: No recent head injury; stable occ headache.  No vision change.  No chronic hearing loss.  Ears without fullness without pain/drainage.  No sore throat now.  No significant nasal/sinus congestion/drainage. No epistaxis.   CHEST: No chest wall tenderness or mass.  Same occ/dry cough, and no wheeze.  No SOB; no hemoptysis.  CV: No chest pain, palpitations; daily end of day mild pitting/ankle edema.  GI: No heartburn, dysphagia.  No other abdominal pain, diarrhea, constipation.  No rectal bleeding, or melena.    :  Voids with occ on/off dysuria, without incontinence to completion.  ORTHO: No painful/swollen joints but various on /off sore.  Occ same/sore neck or back.  No acute neck or lower back pain without recent injury.  NEURO: No dizziness, weakness of extremities.  Same mild numbness/paresthesias LE.  PSYCH: No memory loss.  Mood good; mild anxious, depressed but/and not suicidal.  Tries to tolerate stress .   Screening:  Mammogram: bilateral masectomy  Bone density:   Bone d-deca/[facility]/T hip -0.5 LS +1.4/12.18.18;  no change; maybe 2-5 yr  Bone d-deca/BH/hip -1.6, LS +1.3/1.24.22-Ca/Vit D-boniva/2y  Low dose CT chest:Tobacco-smoker/age 17/1-2ppd/dc age 60:  CTs with breast cancer:   GI: Colon-avm-div/Shieben/[facility]/12.19.17/5y  Prostate: NA  Usual lab order  6m CBC, CMP, A1c  12m CBC, CMP, A1c, LIPID, TSH, Vit D    Copy/paste function used for ROS/exam AND each area of these were reviewed, updated, confirmed and supplemented as needed.   Data reviewed:   Recent admit/ER/MD visits:     Last cardiac testing:   Echo: Results for orders placed during the hospital encounter of 04/23/21    Adult Transthoracic Echo Complete W/ Cont if Necessary Per Protocol    Interpretation Summary  ·  Technically difficult study.  · Left ventricular ejection fraction appears to be 66 - 70%. Left ventricular systolic function is normal.  · Left ventricular diastolic function is consistent with (grade I) impaired relaxation.  · Normal right ventricular cavity size and systolic function noted.  · Mild aortic valve stenosis is present.  · Estimated right ventricular systolic pressure from tricuspid regurgitation is normal (<35 mmHg).    Radiology considered:   IMPRESSION: 8.10.21 CT abdomen  1. No acute intra-abdominal finding.  2. Colonic diverticula. No evidence of acute diverticulitis.  3. Mildly nodular liver contour suggests chronic liver disease.  4. Small tree-in-bud opacities at the lingula. See separately dictated  CT chest of the same day.  5. Calcified atherosclerosis.    IMPRESSION: 8.10.21 CT chest  1. Small tree-in-bud opacities at the lingula and lateral aspect right  upper lobe, favored to be infectious/inflammatory.  2. Right lower lobe 4 mm pulmonary nodule. If no outside priors are  available to determine chronicity, recommend follow-up CT chest in 3  months or per oncology.  3. Changes of bilateral mastectomy with implant placement.  4. See separately dictated CT abdomen and pelvis of the same day.    Lab Results:  Results for orders placed or performed in visit on 12/08/21   Hemoglobin A1c    Specimen: Blood   Result Value Ref Range    Hemoglobin A1C 6.50 (H) 4.80 - 5.60 %   Lipid Panel    Specimen: Blood   Result Value Ref Range    Total Cholesterol 165 0 - 200 mg/dL    Triglycerides 86 0 - 150 mg/dL    HDL Cholesterol 49 40 - 60 mg/dL    LDL Cholesterol  100 0 - 100 mg/dL    VLDL Cholesterol 16 5 - 40 mg/dL    LDL/HDL Ratio 2.02    Vitamin D 25 Hydroxy    Specimen: Blood   Result Value Ref Range    25 Hydroxy, Vitamin D 40.1 ng/ml   TSH    Specimen: Blood   Result Value Ref Range    TSH 2.100 0.270 - 4.200 uIU/mL   CEA    Specimen: Blood   Result Value Ref Range    CEA 4.69 ng/mL   Cancer  Antigen 27.29    Specimen: Blood   Result Value Ref Range    CA 27.29 30.3 0.0 - 38.6 U/mL   CBC Auto Differential    Specimen: Blood   Result Value Ref Range    WBC 8.37 3.40 - 10.80 10*3/mm3    RBC 4.53 3.77 - 5.28 10*6/mm3    Hemoglobin 13.7 12.0 - 15.9 g/dL    Hematocrit 41.6 34.0 - 46.6 %    MCV 91.8 79.0 - 97.0 fL    MCH 30.2 26.6 - 33.0 pg    MCHC 32.9 31.5 - 35.7 g/dL    RDW 13.8 12.3 - 15.4 %    RDW-SD 47.0 37.0 - 54.0 fl    MPV 10.7 6.0 - 12.0 fL    Platelets 169 140 - 450 10*3/mm3    Neutrophil % 66.2 42.7 - 76.0 %    Lymphocyte % 26.2 19.6 - 45.3 %    Monocyte % 5.0 5.0 - 12.0 %    Eosinophil % 1.6 0.3 - 6.2 %    Basophil % 0.5 0.0 - 1.5 %    Immature Grans % 0.5 0.0 - 0.5 %    Neutrophils, Absolute 5.55 1.70 - 7.00 10*3/mm3    Lymphocytes, Absolute 2.19 0.70 - 3.10 10*3/mm3    Monocytes, Absolute 0.42 0.10 - 0.90 10*3/mm3    Eosinophils, Absolute 0.13 0.00 - 0.40 10*3/mm3    Basophils, Absolute 0.04 0.00 - 0.20 10*3/mm3    Immature Grans, Absolute 0.04 0.00 - 0.05 10*3/mm3    nRBC 0.0 0.0 - 0.2 /100 WBC       A1C:  Lab Results - Last 18 Months   Lab Units 12/08/21  1006 06/07/21  0832   HEMOGLOBIN A1C % 6.50* 6.10*     GLUCOSE:  Lab Results - Last 18 Months   Lab Units 06/07/21  0832 03/08/21  1215 03/01/21  0822 12/14/20  0937   GLUCOSE mg/dL 116* 127* 106* 108*     LIPID:  Lab Results - Last 18 Months   Lab Units 12/08/21  1006   LDL CHOL mg/dL 100   HDL CHOL mg/dL 49   TRIGLYCERIDES mg/dL 86     PSA:No results for input(s): PSA in the last 09819 hours.  CBC:  Lab Results - Last 18 Months   Lab Units 12/08/21  1006 06/07/21  0832 03/01/21  0822 12/14/20  0937   WBC 10*3/mm3 8.37 7.45 8.1 7.52   HEMOGLOBIN g/dL 13.7 12.4 13.5 12.8   HEMATOCRIT % 41.6 36.9 41.6 39.6   PLATELETS 10*3/mm3 169 161 202 169      BMP/CMP:  Lab Results - Last 18 Months   Lab Units 08/10/21  0834 06/07/21  0832 03/08/21  1215 03/01/21  0822 12/14/20  0937   SODIUM mmol/L  --  139 136 140 140   POTASSIUM mmol/L  --  5.0 4.6 4.6  "4.5   CHLORIDE mmol/L  --  102 98 101 105   TOTAL CO2 mmol/L  --   --  25.1 23  --    CO2 mmol/L  --  27.0  --   --  26.0   GLUCOSE mg/dL  --  116* 127* 106* 108*   BUN mg/dL  --  20 20 14 19   CREATININE mg/dL 0.70 0.74 0.75 0.74 0.55*   EGFR IF NONAFRICN AM mL/min/1.73  --  76 75 78 107   EGFR IF AFRICN AM mL/min/1.73  --   --  91 90  --    CALCIUM mg/dL  --  10.5 9.9 9.6 9.2     HEPATIC:  Lab Results - Last 18 Months   Lab Units 06/07/21  0832 03/01/21  0822 12/14/20  0937   ALT (SGPT) U/L 11 9 8   AST (SGOT) U/L 26 18 20   ALK PHOS U/L 108 121* 114     THYROID:  Lab Results - Last 18 Months   Lab Units 12/08/21  1006 03/01/21  0822   TSH uIU/mL 2.100 2.180       Objective   /68 (BP Location: Right arm, Patient Position: Sitting, Cuff Size: Large Adult)   Pulse 52   Temp 98 °F (36.7 °C) (Infrared)   Resp 20   Ht 157.5 cm (62\")   Wt 90.4 kg (199 lb 6.4 oz)   LMP  (LMP Unknown)   SpO2 97%   Breastfeeding No   BMI 36.47 kg/m²   Body mass index is 36.47 kg/m².      Recent Vitals       1/3/2022 1/18/2022 3/3/2022       BP: 124/70 128/78 132/68     Pulse: 51 54 52     Temp: -- 98 °F (36.7 °C) 98 °F (36.7 °C)     Weight: 89.8 kg (198 lb) 89.4 kg (197 lb) 90.4 kg (199 lb 6.4 oz)     BMI (Calculated): 36.2 36 36.5           Physical Exam  GENERAL:  Well nourished/developed in no acute distress  SKIN: Turgor excellent, without wound, rash, lesion or significance  HEENT: Normal cephalic without trauma. Pupils equal round reactive to light. Extraocular motions full without nystagmus.  External canals nonobstructive nontender without reddness. Tymphatic membranes dull with irwin structures intact.   Oral cavity without growths, exudates, and moist.  Posterior pharynx without mass, obstruction, redness.  No thyromegaly, mass, tenderness, definite lymphadenopathy and supple.   CV: Regular rhythm.  1/6 systolic murmur without gallop and trace ankle/LE edema. Posterior pulses intact.  No carotid bruits.  CHEST: No " chest wall tenderness or mass.   LUNGS: Symmetric motion with clear to auscultation.  No dullness to percussion  ABD: Soft, non-tender without mass.   ORTHO: Symmetric extremities without swelling/point tenderness.  Full gross range of motion. Symmetric LE.  Neg straight leg raising.  Neg Patricks maneuver.  Back is straight/mild lordosis.  Lower back sore; no point tender.    NEURO: CN 2-12 grossly intact except L eyelid mild droop.  Otherwise symmetric facies and UE/LE. 3/5 strength throughout.   Otherwise nonfocal use extremities. Speech clear.      Assessment/Plan     1. Encounter for care related to vascular access port    2. Primary generalized (osteo)arthritis    3. Primary hypertension    4. Controlled type 2 diabetes mellitus without complication, without long-term current use of insulin (HCC)    5. Encounter for care related to Port-a-Cath    6. Chronic neck pain    7. Abdominal pannus    8. Left shoulder pain, unspecified chronicity    9. Chronic abdominal pain    10. Osteopenia, unspecified location    11. Abdominal pain, unspecified abdominal location    12. Chronic back pain, unspecified back location, unspecified back pain laterality        Discussion:  BP ok  Other vitals ok  DM/BS ok  Thyroid ok  Liver ok  CBC ok  Renal ok  Lipid ok  PSA NA    Pro/con staying with BH/oncology vs changing  Keep the port as long as no infection/and she wants.   Shoulder pain pain probably neck  Have some neck problems; try exercise, massage, chiroprater as long as works  Abdominal pain sounds muscular; scar tissue, small hernia (not seen in 8.2021 CT) or pannus  Weight loss suggested  Bone density alittle worse; work Bon[employer]  Nathaniel tx pro/con and how to use discussed.  Check with dentist/stay on Ca/Vit D    Immunization History   Administered Date(s) Administered   • COVID-19 (MODERNA) 1st, 2nd, 3rd Dose Only 02/19/2021, 03/19/2021   • Fluad Quad 65+ 10/09/2020   • Fluzone High Dose =>65 Years ([employer] ONLY)  11/19/2017, 11/14/2019   • Pneumococcal Conjugate 13-Valent (PCV13) 11/19/2017     Vaccine reviewed: today none; later   Screening reviewed/updated    Medical decision issues:   Data review above:   Rx: reviewed and decisions:   Same Rx for now with boniva    Visit today involved chronic significant medical problems or differentials and/or intensive drug monitoring: ie potential to cause serious morbidity or death:   New Medications Ordered This Visit   Medications   • ibandronate (Boniva) 150 MG tablet     Sig: Take 1 tablet by mouth Every 30 (Thirty) Days.     Dispense:  5 tablet     Refill:  1     Orders placed:   LAB/Testing/Referrals: reviewed/orders:   Today:   Orders Placed This Encounter   Procedures   • CT Abdomen Pelvis With & Without Contrast     Chronic/recurrent labs above or change to:   same     Health maintenance:   Body mass index is 36.47 kg/m².  Patient's Body mass index is 36.47 kg/m². indicating that she is obese (BMI >30). Obesity-related health conditions include the following: hypertension. Obesity is worsening. BMI is is above average; BMI management plan is completed. We discussed portion control and increasing exercise..      Tobacco use reviewed:   Ira Sanchez  reports that she quit smoking about 21 years ago. Her smoking use included cigarettes. She started smoking about 59 years ago. She has a 74.00 pack-year smoking history. She has never used smokeless tobacco..     There are no Patient Instructions on file for this visit.    Follow up: Return for lab;, Dr Tinoco-, as planned;.  Future Appointments   Date Time Provider Department Center   3/9/2022  8:30 AM [facility] NURSE [facility]   4/13/2022  9:00 AM Henrietta Olvera APRN [facility] PAD   6/1/2022  8:00 AM  [facility]  [facility]   6/1/2022  8:30 AM [facility] NURSE [facility]   6/8/2022 10:00 AM Adalberto Wallace MD [facility]   7/18/2022  8:50 AM [facility]    7/25/2022  8:30 AM Conrado Tinoco MD [facility]

## 2022-03-09 ENCOUNTER — INFUSION (OUTPATIENT)
Dept: ONCOLOGY | Facility: CLINIC | Age: 77
End: 2022-03-09

## 2022-03-09 DIAGNOSIS — Z45.2 ENCOUNTER FOR CARE RELATED TO VASCULAR ACCESS PORT: Primary | ICD-10-CM

## 2022-03-09 DIAGNOSIS — Z45.2 ENCOUNTER FOR CARE RELATED TO PORT-A-CATH: ICD-10-CM

## 2022-03-09 PROCEDURE — 96523 IRRIG DRUG DELIVERY DEVICE: CPT | Performed by: INTERNAL MEDICINE

## 2022-03-09 RX ORDER — HEPARIN SODIUM (PORCINE) LOCK FLUSH IV SOLN 100 UNIT/ML 100 UNIT/ML
500 SOLUTION INTRAVENOUS AS NEEDED
Status: DISCONTINUED | OUTPATIENT
Start: 2022-03-09 | End: 2022-03-09 | Stop reason: HOSPADM

## 2022-03-09 RX ORDER — HEPARIN SODIUM (PORCINE) LOCK FLUSH IV SOLN 100 UNIT/ML 100 UNIT/ML
500 SOLUTION INTRAVENOUS AS NEEDED
Status: CANCELLED | OUTPATIENT
Start: 2022-03-09

## 2022-03-09 RX ORDER — SODIUM CHLORIDE 0.9 % (FLUSH) 0.9 %
10 SYRINGE (ML) INJECTION AS NEEDED
Status: CANCELLED | OUTPATIENT
Start: 2022-03-09

## 2022-03-09 RX ORDER — SODIUM CHLORIDE 0.9 % (FLUSH) 0.9 %
10 SYRINGE (ML) INJECTION AS NEEDED
Status: DISCONTINUED | OUTPATIENT
Start: 2022-03-09 | End: 2022-03-09 | Stop reason: HOSPADM

## 2022-03-09 RX ADMIN — Medication 10 ML: at 08:15

## 2022-03-09 RX ADMIN — HEPARIN SODIUM (PORCINE) LOCK FLUSH IV SOLN 100 UNIT/ML 500 UNITS: 100 SOLUTION at 08:15

## 2022-03-18 ENCOUNTER — TELEPHONE (OUTPATIENT)
Dept: FAMILY MEDICINE CLINIC | Facility: CLINIC | Age: 77
End: 2022-03-18

## 2022-03-18 NOTE — TELEPHONE ENCOUNTER
"Pt called upset \"I have been out there three times for this and no one is doing anything, my stomach is hurting, my nose is running.\" advised pt and the is an order for a CT of abd on 6-6-22    Spoke with patient about this being quite away out and would call scheduling and try to get it moved up, pt is agreeable    callled scheduling and spoke to Angela and advised that there is an available  appt on 3-21-22@4p, would have to be NPO after 10a, advised will call pt    Called pt and wants to take that appt advised of being NPO past 10a and pt stated \"its ok ,I havnt been able to eat anyway\"    Called Angela back and wanted the offered appt date and time, advised will need to arrive at the hospital at 3:30 p and procedure at 4P    Called pt back and stated understanding  "

## 2022-03-20 ENCOUNTER — OUTSIDE FACILITY SERVICE (OUTPATIENT)
Dept: FAMILY MEDICINE CLINIC | Facility: CLINIC | Age: 77
End: 2022-03-20

## 2022-03-20 ENCOUNTER — DOCUMENTATION (OUTPATIENT)
Dept: FAMILY MEDICINE CLINIC | Facility: CLINIC | Age: 77
End: 2022-03-20

## 2022-03-20 PROCEDURE — OUTSIDEPOS PR OUTSIDE POS PLACEHOLDER: Performed by: FAMILY MEDICINE

## 2022-03-21 ENCOUNTER — DOCUMENTATION (OUTPATIENT)
Dept: FAMILY MEDICINE CLINIC | Facility: CLINIC | Age: 77
End: 2022-03-21

## 2022-03-21 ENCOUNTER — OUTSIDE FACILITY SERVICE (OUTPATIENT)
Dept: FAMILY MEDICINE CLINIC | Facility: CLINIC | Age: 77
End: 2022-03-21

## 2022-03-21 PROCEDURE — OUTSIDEPOS PR OUTSIDE POS PLACEHOLDER: Performed by: FAMILY MEDICINE

## 2022-03-21 NOTE — PROGRESS NOTES
"Epic Patient ID: Ira Sanchez  MRN: [id number]                                  [facility]  Progress note     Patient ID: Ira Sanchez  MRN: [id number]                                    Acct:  [id number]  Admit Date: 3.19.22  Date of service: 3.21.22    CHIEF COMPLAINT: \"cough\"     HISTORY OF PRESENT ILLNESS: Came to [facility] ER with cough; was found on CXR/CT chest to have R > L peribronchial infiltrates and tree-bud RUL changes.  WBC was 16K and procalciton was high at 0.14.  D Dimer was elevated at 1630/CT angio showed no PE.  Her initial O2 sat was 88% RA; she improved with 2L oxygen.  She was started on Rocephin, and had one dose steroids in the ED; by the next day/time I saw her she was feeling better.  Interesting; she has recently c/o more LLQ pain and in fact had CT abdomen scheduled for 3.21.22.  Says her cough has been productive of greenish sputum.  Respiratory PCR was negative for anything; covid negative.     Interval History: slept ok.  Less cough,  Eating some; no diarrhea.  Up in room; lower back sore lying in bed.  Still LLQ pain.    Current hospital meds:   Lovonex 40 mg qd  Rocephin 1 gm qd  Azithromycin 250 qd  Ipratropium 2.5 cc neb q 6  Zestril 10 qd  Prilosec 20 qd    Review of Systems:   Review of Systems   Constitutional: Positive for activity change and appetite change. Negative for chills and fever.   HENT: Negative.    Respiratory: Positive for cough (less; still some green productive). Negative for shortness of breath and wheezing.    Cardiovascular: Negative for chest pain, palpitations and leg swelling.   Gastrointestinal: Positive for abdominal pain (mild LLQ). Negative for constipation, nausea, vomiting and indigestion.   Genitourinary: Negative for difficulty urinating and dysuria.   Musculoskeletal: Positive for back pain (diffuse).   Skin: Negative for rash.   Psychiatric/Behavioral: Negative for sleep disturbance (slept better last night).       Objective     Vital " Signs  T: 97.4; afebrile F P: 53/min  RR: 20/min BP: 127/53 Wt: 193 lbs stable     Lab Results:  WBC up 18.3 Hb 11.6 plt 204.  Na 134.9, K 3.97, Cl 102, CO2 27, BUN 19, Cr 0.62   Culture Results: No new; respiratory PCR was neg  Radiology: No new.    Additional Studies Reviewed: None    Physical Exam  Vitals reviewed.   Constitutional:       General: She is not in acute distress.     Appearance: She is obese.   HENT:      Mouth/Throat:      Mouth: Mucous membranes are moist.      Pharynx: Oropharynx is clear.   Eyes:      Extraocular Movements: Extraocular movements intact.      Conjunctiva/sclera: Conjunctivae normal.      Pupils: Pupils are equal, round, and reactive to light.   Cardiovascular:      Rate and Rhythm: Normal rate and regular rhythm.      Heart sounds: Normal heart sounds.   Pulmonary:      Effort: Pulmonary effort is normal.      Breath sounds: Normal breath sounds.      Comments: Decreased BS bilateral  Abdominal:      Palpations: Abdomen is soft.      Tenderness: There is no abdominal tenderness.   Musculoskeletal:         General: No swelling, tenderness or deformity. Normal range of motion.      Cervical back: Neck supple. No tenderness.      Right lower leg: No edema.      Left lower leg: No edema.   Skin:     General: Skin is warm and dry.      Findings: No rash.   Neurological:      Mental Status: She is alert and oriented to person, place, and time. Mental status is at baseline.       Results Review:  above    ASSESSMENT/PLAN:  Reason for admit/problems addressing acutely:  Pneumonia-CXR/CT, leukocytosis, cough-somewhat improved  Cough-improved  Leukocytosis-steroid affected  Acute respiratory blqcmpi-iuetlab-84%-resolved  Elevated procalcitonin 0.14H-treating  Elevated d dimer-1633; neg CT angio  Abdominal pain-chronic LLQ-persisting without explaination; not acute  Difficult history; one minute abdominal pain/next cough  Steroid tx; tolerated  Hyperglycemia-steroid  affected    Chronic problems to review/consider during care:  76 y/o white  female; advanced age   Allergy/intolerance: see above  Procedural history: see above  Family history: see above  Obesity  Previous smoker  History breast cancer L  Status bilateral masectomy  Menopausal  Osteopenia bone density  Hypertension  Diastolic congestive heart failure-chronic  Valvular heart disease-AS  LE edema-venous insufficiency  Venous insufficiency  Cardiac arrthymia-candy  Diabetes 2  Abnormal CT chest-nodules followed  Degenerative joint disease  Allergic rhinitis  Peripheral neuropathy-chemo related    Rx-reviewed, considered with changes as needed: same  Labs reviewed, considered and changes made as needed: same daily CBC, BMP  Imaging reviewed, and need for considered: CT abdomen/pelvis AM with contrast  Consults  Current: none  Add: none  Diet reviewed, considered and changes made as needed: same/encouraged  Fluids reviewed, considered and changes made as needed: same/encouraged oral; slow IV 30 cc/hr  Increase activity: up in room  Code status: Full code  Discussed possible/future discharge plans: patient/others expects home   Case discussed with patient, nursing.  Available to talk if needed with  POA/caregiver and members care team.    Conrado Tinoco MD  03/21/22  12:54 CDT

## 2022-03-21 NOTE — PROGRESS NOTES
"Epic Patient ID: Ira Sanchez  MRN: [id number]       [facility]  History and Physicial    Patient ID: Ira Sanchez  MRN: [id number]     Acct:  [id number]  Admit Date: 3.19.22  Date of service: 3.20.22    SOURCE: The source of this information is prior knowledge of the patient, review of her office records, her current chart, as well as discussion with she and ED personal; all are considered reliable.      PATIENT PROFILE: The patient is a 78 y/o white  female resident of [place]; she was cooperative.      CHIEF COMPLAINT: \"cough\"     HISTORY OF PRESENT ILLNESS: Came to [facility] ER with cough; was found on CXR/CT chest to have R > L peribronchial infiltrates and tree-bud RUL changes.  WBC was 16K and procalciton was high at 0.14.  D Dimer was elevated at 1630/CT angio showed no PE.  Her initial O2 sat was 88% RA; she improved with 2L oxygen.  She was started on Rocephin, and had one dose steroids in the ED; by the next day/time I saw her she was feeling better.  Interesting; she has recently c/o more LLQ pain and in fact had CT abdomen scheduled for 3.21.22.  Says her cough has been productive of greenish sputum.  Respiratory PCR was negative for anything; covid negative.     Lab Results:  Above: today WBC 14.03, Hb 12.5, Plt 190 and Na 133, K 3.71, Cl 101, CO2 25.4, BUN 17.5, Cr 0.68 and .5 (after ER steroids)    Allergies   Allergen Reactions   • Benzonatate Hives and Itching       HOME MEDICATIONS:  Prior to Admission medications    Medication Sig Start Date End Date Taking? Authorizing Provider   amLODIPine (NORVASC) 5 MG tablet Take 1 tablet by mouth Daily. 12/2/21   Conrado Tinoco MD   APPLE CIDER VINEGAR PO Take 1 each by mouth Daily. chewable    ProviderKaris MD   Calcium Carb-Cholecalciferol (CALCIUM-VITAMIN D) 600-400 MG-UNIT tablet Take 1 tablet by mouth 2 (Two) Times a Day.    ProviderKaris MD   celecoxib (CeleBREX) 200 MG capsule TAKE 1 CAPSULE BY MOUTH " DAILY 5/26/20   Conrado Tinoco MD   cyclobenzaprine (FLEXERIL) 5 MG tablet Take 1 tablet by mouth Every Night. 8/31/21   Conrado Tinoco MD   cycloSPORINE (RESTASIS) 0.05 % ophthalmic emulsion 1 drop Every 12 (Twelve) Hours.    Karis Billingsley MD   diphenhydrAMINE (BENADRYL) 25 mg capsule Take 25 mg by mouth At Night As Needed for Allergies.    Karis Billingsley MD   ibandronate (Boniva) 150 MG tablet Take 1 tablet by mouth Every 30 (Thirty) Days. 3/3/22   Conrado Tinoco MD   ibuprofen (ADVIL,MOTRIN) 200 MG tablet Take 400 mg by mouth As Needed.    Karis Billingsley MD   losartan (Cozaar) 100 MG tablet Take 1 tablet by mouth Daily. 1/18/22   Conrado Tinoco MD   multivitamin with minerals (Centrum Silver 50+Women) tablet tablet Take 1 tablet by mouth Daily.    Karis Billingsley MD   multivitamin with minerals (HAIR SKIN AND NAILS FORMULA PO) Take 1 tablet by mouth Daily. 2/24/22   Karis Billingsley MD   Omega-3 1000 MG capsule Take  by mouth Daily.    Karis Billingsley MD   oxybutynin XL (DITROPAN-XL) 10 MG 24 hr tablet Take 10 mg by mouth Daily. 12/12/21   Karis Billingsley MD   thiamine (VITAMINE B-1) 100 MG tablet Take 100 mg by mouth Daily.    Karis Billingsley MD     Current hospital meds:   Lovonex 40 mg qd  Rocephin 1 gm qd  Azithromycin 250 qd  Ipratropium 2.5 cc neb q 6  Zestril 10 qd  Prilosec 20 qd    PAST HISTORY:  CHILDHOOD: unremarkable.     PROCEDURES:  Gnone  Colonoscopy/[place]/6.18.12/2yr  Colonoscopy+div/[facility]/9.30.14/3y  Colon-avm-div/[facility]/MM/12.19.17/5y    SURGERIES:  Appendix/[facility]/Hard/age 14  R knee arthroscope/[place]/1980  L breast bx+/[facility]/STIVEN/10.15.14  Ulices mastectomies//[facility]/11.12.14  L subclavian/[facility]/12.15.14   Ulices breast reconstruction/[place]/12.23.15  Revisions bilateral breast/[place]/W/6.20.16    FAMILY HISTORY:  HTN/  Heart/m, a-m  DM/m, si  CA-colon/f,   CA-prostate/none  CA-breast/a-m,  a-m  CA-other/none    HABITS:  Tobacco-smoker/age 17/1-2ppd/dc age 60  Tobacco-2nd handed/  Alcohol/none  Drugs/none    SOCIAL HISTORY:  /1973  /  /3.21.18  Employment/[employer]/  Retired/11-  Children/none    HOSPITAL ADMITS:   [facility]:   No recent    [facility]:   No recent    [facility]:   No recent    Review of Systems  GENERAL:  Active/slower with limits, speed, stamina for age, and occ back pain.  Sleep was interrupted by cough.  No fever now.  SKIN: No other rash/skin lesion of concern:   ENDO:  No syncope, near or diaphoretic sweaty spells.  No download..  HEENT: No recent head injury; stable occ headache.  No vision change.  No chronic hearing loss.  Ears without fullness without pain/drainage.  No sore throat now.  No significant nasal/sinus congestion/drainage. No epistaxis.   CHEST: No chest wall tenderness or mass.  Increased productive cough (usual occ/dry) and no wheeze.  No SOB; no hemoptysis.  CV: No chest pain, palpitations; daily end of day mild pitting/ankle edema.  GI: No heartburn, dysphagia.  LLQ discomfort abdominal pain without diarrhea, constipation.  No rectal bleeding, or melena.    :  Voids with occ on/off dysuria, without incontinence to completion.  ORTHO: No painful/swollen joints but various on /off sore.  Occ same/sore neck or back.  No acute neck or lower back pain without recent injury.  NEURO: No dizziness, weakness of extremities.  Same mild numbness/paresthesias LE.  PSYCH: No memory loss.  Mood good; mild anxious, depressed but/and not suicidal.  Tries to tolerate stress .   Screening:  Mammogram: bilateral masectomy  Bone density:   Bone d-deca/[facility]/T hip -0.5 LS +1.4/12.18.18;  no change; maybe 2-5 yr  Bone d-deca/[facility]/hip -1.6, LS +1.3/1.24.22-Ca/Vit D-boniva/2y  Low dose CT chest:Tobacco-smoker/age 17/1-2ppd/dc age 60:  CTs with breast cancer:   GI: Colon-avm-div/[facility]/12.19.17/5y  Prostate: NA  Usual lab order  6m CBC, CMP, A1c  12m CBC,  CMP, A1c, LIPID, TSH, Vit D      Last cardiac testing:   Echo: Results for orders placed during the hospital encounter of 04/23/21     Adult Transthoracic Echo Complete W/ Cont if Necessary Per Protocol     Interpretation Summary  · Technically difficult study.  · Left ventricular ejection fraction appears to be 66 - 70%. Left ventricular systolic function is normal.  · Left ventricular diastolic function is consistent with (grade I) impaired relaxation.  · Normal right ventricular cavity size and systolic function noted.  · Mild aortic valve stenosis is present.  · Estimated right ventricular systolic pressure from tricuspid regurgitation is normal (<35 mmHg).     Radiology considered:   IMPRESSION: 8.10.21 CT abdomen  1. No acute intra-abdominal finding.  2. Colonic diverticula. No evidence of acute diverticulitis.  3. Mildly nodular liver contour suggests chronic liver disease.  4. Small tree-in-bud opacities at the lingula. See separately dictated  CT chest of the same day.  5. Calcified atherosclerosis.     IMPRESSION: 8.10.21 CT chest  1. Small tree-in-bud opacities at the lingula and lateral aspect right  upper lobe, favored to be infectious/inflammatory.  2. Right lower lobe 4 mm pulmonary nodule. If no outside priors are  available to determine chronicity, recommend follow-up CT chest in 3  months or per oncology.  3. Changes of bilateral mastectomy with implant placement.  4. See separately dictated CT abdomen and pelvis of the same day.    8.10.21  Interpretation :  1.  Spirometry is within normal limits.  2.  There is actually improvement in small airways function postbronchodilator which is supranormal postbronchodilator.  Otherwise there is no significant change in spirometry postbronchodilator.  3.  Lung volumes reveal a decrease in expiratory reserve volume, and otherwise are within normal limits.  4.  There is a mild diffusion impairment which when corrected for alveolar volume is a low normal  diffusion capacity.  4.  Arterial blood gases reveal mild hypoxemia on room air.    PHYSICAL EXAMINATION:  T: 96.8 F P: 73/min  RR: 16/min BP: 156/79 Wt: 193 lbs Ht: 63 in    Physical Exam  GENERAL:  Well nourished/developed in no acute distress  SKIN: Turgor excellent, without wound, rash, lesion or significance  HEENT: Normal cephalic without trauma. Pupils equal round reactive to light. Extraocular motions full without nystagmus.  External canals nonobstructive nontender without reddness. Tymphatic membranes dull with irwin structures intact.   Oral cavity without growths, exudates, and moist.  Posterior pharynx without mass, obstruction, redness.  No thyromegaly, mass, tenderness, definite lymphadenopathy and supple.   CV: Regular rhythm.  1/6 systolic murmur without gallop and trace ankle/LE edema. Posterior pulses intact.  No carotid bruits.  CHEST: No chest wall tenderness or mass.   LUNGS: Symmetric motion with clear to auscultation.  No dullness to percussion  ABD: Soft, non-tender without mass.   ORTHO: Symmetric extremities without swelling/point tenderness.  Full gross range of motion. Symmetric LE.  Neg straight leg raising.  Neg Patricks maneuver.  Back is straight/mild lordosis.  Lower back sore; no point tender.    NEURO: CN 2-12 grossly intact except L eyelid mild droop.  Otherwise symmetric facies and UE/LE. 3/5 strength throughout.   Otherwise nonfocal use extremities. Speech clear.     ASSESSMENT/PROBLEM LIST:   78 y/o white  female; advanced age   Allergy/intolerance: see above  Procedural history: see above  Family history: see above  Obesity  Previous smoker  History breast cancer L  Status bilateral masectomy  Menopausal  Osteopenia bone density  Hypertension  Diastolic congestive heart failure-chronic  Valvular heart disease-AS  LE edema-venous insufficiency  Venous insufficiency  Cardiac arrthymia-candy  Diabetes 2  Abnormal CT chest-nodules followed  Degenerative joint  disease  Allergic rhinitis  Peripheral neuropathy-chemo related    REASON FOR ADMISSION:    Pneumonia-CXR/CT, leukocytosis, cough  Cough  Leukocytosis  Acute respiratory [id number]%  Elevated procalcitonin 0.14H  Elevated d dimer-1633; neg CT angio  Abdominal pain-chronic LLQ  Difficult history; one minute abdominal pain/next cough    PLANS:   Rx-reviewed; ordered as needed and will review daily/as needed.   Includes anticoagulation for DVT prevention or antiembolic stockings as appropriate.  Others: rocephin/z pack, duoneb, medrol and sliding scale  LAB-reviewed; ordered as needed and will review daily.  Particular:  daily chemistry and CBC and chemistry occ.    Imaging/cardiology testing-reviewed; ordered as needed and will review daily.  Particular:  Consider CT abdomen this week; after watch renal function  Consults none  Diet: DM  Fluids: slow IV  Special issues: none  Discharge planning: she expects home  Code status: full code

## 2022-03-22 ENCOUNTER — TELEPHONE (OUTPATIENT)
Dept: FAMILY MEDICINE CLINIC | Facility: CLINIC | Age: 77
End: 2022-03-22

## 2022-03-22 ENCOUNTER — DOCUMENTATION (OUTPATIENT)
Dept: FAMILY MEDICINE CLINIC | Facility: CLINIC | Age: 77
End: 2022-03-22

## 2022-03-22 DIAGNOSIS — R10.9 ABDOMINAL PAIN, UNSPECIFIED ABDOMINAL LOCATION: ICD-10-CM

## 2022-03-22 DIAGNOSIS — E65 ABDOMINAL PANNUS: ICD-10-CM

## 2022-03-22 RX ORDER — AZITHROMYCIN 250 MG/1
TABLET, FILM COATED ORAL
Qty: 1 TABLET | Refills: 0 | Status: SHIPPED | OUTPATIENT
Start: 2022-03-22 | End: 2022-03-31

## 2022-03-22 RX ORDER — OMEPRAZOLE 20 MG/1
20 CAPSULE, DELAYED RELEASE ORAL DAILY
Qty: 15 CAPSULE | Refills: 0 | Status: SHIPPED | OUTPATIENT
Start: 2022-03-22 | End: 2022-12-07

## 2022-03-22 NOTE — PROGRESS NOTES
"Epic Patient ID: Ira Sanchez  MRN: [id number]                                  [facility]  Discharge summary     Patient ID: Ira Sanchez  MRN: [id number]                                    Acct:  [id number]  Admit Date: 3.19.22  Date of Discharge: 3.22.22  Date of service: 3.22.22    Consults:    None    The patient was seen and examined on the day of discharge and this discharge summary is in conjunction with any daily progress note from day of discharge.     PATIENT PROFILE: The patient is a 76 y/o white  female resident of [place]; she was cooperative.      CHIEF COMPLAINT: \"cough\"     HISTORY OF PRESENT ILLNESS: Came to [facility] ER with cough; was found on CXR/CT chest to have R > L peribronchial infiltrates and tree-bud RUL changes.  WBC was 16K and procalciton was high at 0.14.  D Dimer was elevated at 1630/CT angio showed no PE.  Her initial O2 sat was 88% RA; she improved with 2L oxygen.  She was started on Rocephin, and had one dose steroids in the ED; by the next day/time I saw her she was feeling better.  Interesting; she has recently c/o more LLQ pain and in fact had CT abdomen scheduled for 3.21.22.  Says her cough has been productive of greenish sputum.  Respiratory PCR was negative for anything; covid negative.      Lab Results:  Above: today WBC 14.03, Hb 12.5, Plt 190 and Na 133, K 3.71, Cl 101, CO2 25.4, BUN 17.5, Cr 0.68 and .5 (after ER steroids)          Allergies   Allergen Reactions   • Benzonatate Hives and Itching         HOME MEDICATIONS:          Prior to Admission medications    Medication Sig Start Date End Date Taking? Authorizing Provider   amLODIPine (NORVASC) 5 MG tablet Take 1 tablet by mouth Daily. 12/2/21     Conrado Tinoco MD   APPLE CIDER VINEGAR PO Take 1 each by mouth Daily. chewable       ProviderKaris MD   Calcium Carb-Cholecalciferol (CALCIUM-VITAMIN D) 600-400 MG-UNIT tablet Take 1 tablet by mouth 2 (Two) Times a Day.       " Karis Billingsley MD   celecoxib (CeleBREX) 200 MG capsule TAKE 1 CAPSULE BY MOUTH DAILY 5/26/20     Conrado Tinoco MD   cyclobenzaprine (FLEXERIL) 5 MG tablet Take 1 tablet by mouth Every Night. 8/31/21     Conrado Tinoco MD   cycloSPORINE (RESTASIS) 0.05 % ophthalmic emulsion 1 drop Every 12 (Twelve) Hours.       Karis Billingsley MD   diphenhydrAMINE (BENADRYL) 25 mg capsule Take 25 mg by mouth At Night As Needed for Allergies.       Karis Billingsley MD   ibandronate (Boniva) 150 MG tablet Take 1 tablet by mouth Every 30 (Thirty) Days. 3/3/22     Conrado Tinoco MD   ibuprofen (ADVIL,MOTRIN) 200 MG tablet Take 400 mg by mouth As Needed.       Karis Billingsley MD   losartan (Cozaar) 100 MG tablet Take 1 tablet by mouth Daily. 1/18/22     Conrado Tinoco MD   multivitamin with minerals (Centrum Silver 50+Women) tablet tablet Take 1 tablet by mouth Daily.       Karis Billingsley MD   multivitamin with minerals (HAIR SKIN AND NAILS FORMULA PO) Take 1 tablet by mouth Daily. 2/24/22     Karis Billingsley MD   Omega-3 1000 MG capsule Take  by mouth Daily.       Karis Billingsley MD   oxybutynin XL (DITROPAN-XL) 10 MG 24 hr tablet Take 10 mg by mouth Daily. 12/12/21     Karis Billingsley MD   thiamine (VITAMINE B-1) 100 MG tablet Take 100 mg by mouth Daily.       Karis Billingsley MD      Current hospital meds:   Lovonex 40 mg qd  Rocephin 1 gm qd  Azithromycin 250 qd  Ipratropium 2.5 cc neb q 6  Zestril 10 qd  Prilosec 20 qd     PAST HISTORY:  CHILDHOOD: unremarkable.      PROCEDURES:  Gnone  Colonoscopy/[place]/6.18.12/2yr  Colonoscopy+div/[facility]/[place]/9.30.14/3y  Colon-avm-div/[place]/[facility]/12.19.17/5y     SURGERIES:  Appendix/[facility]/[place]/age 14  R knee arthroscope/[place]/1980  L breast bx+/[facility]//10.15.14  Ulices mastectomies//[facility]/11.12.14  L subclavian/[facility]//12.15.14   Ulices breast reconstruction/[place]/12.23.15  Revisions bilateral  breast/[place]/W/6.20.16     FAMILY HISTORY:  HTN/  Heart/m, a-m  DM/m, si  CA-colon/f,   CA-prostate/none  CA-breast/a-m, a-m  CA-other/none     HABITS:  Tobacco-smoker/age 17/1-2ppd/dc age 60  Tobacco-2nd handed/  Alcohol/none  Drugs/none     SOCIAL HISTORY:  /1973  /  /3.21.18  Employment/[employer]/  Retired/11-  Children/none     HOSPITAL ADMITS:   [facility]:   No recent     [facility]:   No recent     [place]:   No recent     Review of Systems  GENERAL:  Active/slower with limits, speed, stamina for age, and occ back pain.  Sleep was interrupted by cough.  No fever now.  SKIN: No other rash/skin lesion of concern:   ENDO:  No syncope, near or diaphoretic sweaty spells.  No download..  HEENT: No recent head injury; stable occ headache.  No vision change.  No chronic hearing loss.  Ears without fullness without pain/drainage.  No sore throat now.  No significant nasal/sinus congestion/drainage. No epistaxis.   CHEST: No chest wall tenderness or mass.  Increased productive cough (usual occ/dry) and no wheeze.  No SOB; no hemoptysis.  CV: No chest pain, palpitations; daily end of day mild pitting/ankle edema.  GI: No heartburn, dysphagia.  LLQ discomfort abdominal pain without diarrhea, constipation.  No rectal bleeding, or melena.    :  Voids with occ on/off dysuria, without incontinence to completion.  ORTHO: No painful/swollen joints but various on /off sore.  Occ same/sore neck or back.  No acute neck or lower back pain without recent injury.  NEURO: No dizziness, weakness of extremities.  Same mild numbness/paresthesias LE.  PSYCH: No memory loss.  Mood good; mild anxious, depressed but/and not suicidal.  Tries to tolerate stress .   Screening:  Mammogram: bilateral masectomy  Bone density:   Bone d-deca/[facility]/T hip -0.5 LS +1.4/12.18.18;  no change; maybe 2-5 yr  Bone d-deca/BH/hip -1.6, LS +1.3/1.24.22-Ca/Vit D-boniva/2y  Low dose CT chest:Tobacco-smoker/age 17/1-2ppd/dc age  60:  CTs with breast cancer:   GI: Colon-avm-div/[place]/[facility]/12.19.17/5y  Prostate: NA  Usual lab order  6m CBC, CMP, A1c  12m CBC, CMP, A1c, LIPID, TSH, Vit D      Last cardiac testing:   Echo: Results for orders placed during the hospital encounter of 04/23/21     Adult Transthoracic Echo Complete W/ Cont if Necessary Per Protocol     Interpretation Summary  · Technically difficult study.  · Left ventricular ejection fraction appears to be 66 - 70%. Left ventricular systolic function is normal.  · Left ventricular diastolic function is consistent with (grade I) impaired relaxation.  · Normal right ventricular cavity size and systolic function noted.  · Mild aortic valve stenosis is present.  · Estimated right ventricular systolic pressure from tricuspid regurgitation is normal (<35 mmHg).     Radiology considered:   IMPRESSION: 8.10.21 CT abdomen  1. No acute intra-abdominal finding.  2. Colonic diverticula. No evidence of acute diverticulitis.  3. Mildly nodular liver contour suggests chronic liver disease.  4. Small tree-in-bud opacities at the lingula. See separately dictated  CT chest of the same day.  5. Calcified atherosclerosis.     IMPRESSION: 8.10.21 CT chest  1. Small tree-in-bud opacities at the lingula and lateral aspect right  upper lobe, favored to be infectious/inflammatory.  2. Right lower lobe 4 mm pulmonary nodule. If no outside priors are  available to determine chronicity, recommend follow-up CT chest in 3  months or per oncology.  3. Changes of bilateral mastectomy with implant placement.  4. See separately dictated CT abdomen and pelvis of the same day.     8.10.21  Interpretation :  1.  Spirometry is within normal limits.  2.  There is actually improvement in small airways function postbronchodilator which is supranormal postbronchodilator.  Otherwise there is no significant change in spirometry postbronchodilator.  3.  Lung volumes reveal a decrease in expiratory reserve volume, and  otherwise are within normal limits.  4.  There is a mild diffusion impairment which when corrected for alveolar volume is a low normal diffusion capacity.  4.  Arterial blood gases reveal mild hypoxemia on room air.     PHYSICAL EXAMINATION:  T: 96.8 F P: 73/min  RR: 16/min BP: 156/79 Wt: 193 lbs Ht: 63 in     Physical Exam  GENERAL:  Well nourished/developed in no acute distress  SKIN: Turgor excellent, without wound, rash, lesion or significance  HEENT: Normal cephalic without trauma. Pupils equal round reactive to light. Extraocular motions full without nystagmus.  External canals nonobstructive nontender without reddness. Tymphatic membranes dull with irwin structures intact.   Oral cavity without growths, exudates, and moist.  Posterior pharynx without mass, obstruction, redness.  No thyromegaly, mass, tenderness, definite lymphadenopathy and supple.   CV: Regular rhythm.  1/6 systolic murmur without gallop and trace ankle/LE edema. Posterior pulses intact.  No carotid bruits.  CHEST: No chest wall tenderness or mass.   LUNGS: Symmetric motion with clear to auscultation.  No dullness to percussion  ABD: Soft, non-tender without mass.   ORTHO: Symmetric extremities without swelling/point tenderness.  Full gross range of motion. Symmetric LE.  Neg straight leg raising.  Neg Patricks maneuver.  Back is straight/mild lordosis.  Lower back sore; no point tender.    NEURO: CN 2-12 grossly intact except L eyelid mild droop.  Otherwise symmetric facies and UE/LE. 3/5 strength throughout.   Otherwise nonfocal use extremities. Speech clear.     ASSESSMENT/PROBLEM LIST:   78 y/o white  female; advanced age   Allergy/intolerance: see above  Procedural history: see above  Family history: see above  Obesity  Previous smoker  History breast cancer L  Status bilateral masectomy  Menopausal  Osteopenia bone density  Hypertension  Diastolic congestive heart failure-chronic  Valvular heart disease-AS  LE edema-venous  insufficiency  Venous insufficiency  Cardiac arrthymia-candy  Diabetes 2  Abnormal CT chest-nodules followed  Degenerative joint disease  Allergic rhinitis  Peripheral neuropathy-chemo related     REASON FOR ADMISSION:    Pneumonia-CXR/CT, leukocytosis, cough  Cough  Leukocytosis  Acute respiratory pamwohx-irixhej-19%  Elevated procalcitonin 0.14H  Elevated d dimer-1633; neg CT angio  Abdominal pain-chronic LLQ  Difficult history; one minute abdominal pain/next cough    HOSPITAL COURSE: She received treatments of IV Rocephin and azithromycin along with bronchodilator Duoneb.  Her cough improved.  She never had any fever.  Her white count was followed and trended downward.  She was tested for oxygen needs prior to discharge and had no hypoxemia including with activity.  A CT of her abdomen was performed due to her recent continued left lower quadrant discomfort; it only showed diverticular changes and no diverticulitis.  She was weaned to therapy that could be managed at home and was quite eager to go home.    Labs included:   White count started 16.46 trended to 12.86; hemoglobin 12.7 with IV fluids stabilize 11.7.  There was a left shift.  D-dimer again was 1633 procalcitonin was elevated 0.14 lactic acid was normal 1.24.  1 blood sugar was 238; very limited sliding scale was used as she had steroids.  UA was negative.     DISCHARGE ASSESSMENT:  Reasons for admit/problems address while here:   Pneumonia-CXR/CT, leukocytosis, cough-somewhat improved  Cough-improved  Leukocytosis-steroid affected  Acute respiratory crumajd-iyxmszw-91%-resolved  Elevated procalcitonin 0.14H-treating  Elevated d dimer-1633; neg CT angio  Abdominal pain-chronic LLQ-persisting without explaination; not acute  Difficult history; one minute abdominal pain/next cough  Steroid tx; tolerated  Hyperglycemia-steroid affected    Chronic problems affecting stay:  See above      PLAN:   See AVS    Discharge Disposition:   Home    Discharge  Medications:     Discharge Medications          Accurate as of March 22, 2022 11:59 PM. If you have any questions, ask your nurse or doctor.            New Medications      Instructions Start Date   Anoro Ellipta 62.5-25 MCG/INH aerosol powder  inhaler  Generic drug: umeclidinium-vilanterol  Started by: Conrado Tinoco MD   1 puff, Inhalation, Daily - RT-picked up sample at office      azithromycin 250 MG tablet  Commonly known as: ZITHROMAX  Started by: Conrado Tinoco MD   #1 to [facility]      omeprazole 20 MG capsule  Commonly known as: priLOSEC  Started by: Conrado Tinoco MD   20 mg, Oral, Daily #15 to [facility]      Medrol dose pack-sent home remainder of hospital supply      Continue These Medications      Instructions Start Date   amLODIPine 5 MG tablet  Commonly known as: NORVASC   5 mg, Oral, Daily      APPLE CIDER VINEGAR PO   1 each, Oral, Daily, chewable       Calcium-Vitamin D 600-400 MG-UNIT tablet   1 tablet, Oral, 2 Times Daily      celecoxib 200 MG capsule  Commonly known as: CeleBREX   200 mg, Oral, Daily      cyclobenzaprine 5 MG tablet  Commonly known as: FLEXERIL   5 mg, Oral, Nightly      cycloSPORINE 0.05 % ophthalmic emulsion  Commonly known as: RESTASIS   1 drop, Every 12 Hours      diphenhydrAMINE 25 mg capsule  Commonly known as: BENADRYL   25 mg, Oral, Nightly PRN      ibandronate 150 MG tablet  Commonly known as: Boniva   150 mg, Oral, Every 30 Days      ibuprofen 200 MG tablet  Commonly known as: ADVIL,MOTRIN   400 mg, Oral, As Needed      losartan 100 MG tablet  Commonly known as: Cozaar   100 mg, Oral, Daily      Centrum Silver 50+Women tablet tablet   1 tablet, Oral, Daily      multivitamin with minerals tablet tablet   1 tablet, Oral, Daily      Omega-3 1000 MG capsule   Oral, Daily      oxybutynin XL 10 MG 24 hr tablet  Commonly known as: DITROPAN-XL   10 mg, Oral, Daily      thiamine 100 MG tablet  Commonly known as: VITAMIN B-1   100 mg, Oral, Daily              Discharge Care Plan/Instructions:   ACTIVITY:  Gradually increase activity   Elevate legs as much as can/avoid legs handing down   Suggest no smoking/exposure to smoking    DIET:  AHA  ADA  Additional type: no added salt  Consistency:    Solids: general   Fluids thin  Suggest no alcohol   Avoid caffeine   Calories 1500/24 hr; don’t skip meals  Fluids at least 60 oz/24 hr-stay hydrated    MEDICATIONS:   Per AVS    Follow up: call/schedule f/u Dr Tinoco 7-10 days    CALL:   If problems/questions    Other appointments:   Future Appointments   Date Time Provider Department Center   3/31/2022  9:15 AM Conrado Tinoco MD [facility]   4/20/2022  9:00 AM Henrietta Olvera APRN [facility]   6/1/2022  8:00 AM  [facility]  [facility]   6/1/2022  8:30 AM [facility] NURSE [facility]   6/8/2022 10:00 AM Adalberto Wallace MD [facility]   7/18/2022  8:50 AM [facility] [place] [facility]   7/25/2022  8:30 AM Conrado Tinoco MD [facility]       CONDITION: stable/improved    PROGNOSIS: good    TIME: 32 minutes spend reviewing diagnosis lists, Rx lists, labs, radiology reports; talking/messaging nursing, talking with patient/family, and ordering Rx, labs, diet and making other decisions for care plan.

## 2022-03-22 NOTE — TELEPHONE ENCOUNTER
"[facility] called \"Dr Tinoco said she could go home today but wanted me to do a 3-step. She didn't qualify, she was 95% at rest and room air, with exercise she was 90%\"  "

## 2022-03-31 ENCOUNTER — OFFICE VISIT (OUTPATIENT)
Dept: FAMILY MEDICINE CLINIC | Facility: CLINIC | Age: 77
End: 2022-03-31

## 2022-03-31 VITALS
RESPIRATION RATE: 18 BRPM | HEART RATE: 62 BPM | BODY MASS INDEX: 34.63 KG/M2 | HEIGHT: 62 IN | DIASTOLIC BLOOD PRESSURE: 68 MMHG | WEIGHT: 188.2 LBS | SYSTOLIC BLOOD PRESSURE: 138 MMHG | OXYGEN SATURATION: 92 % | TEMPERATURE: 98 F

## 2022-03-31 DIAGNOSIS — D72.829 LEUKOCYTOSIS, UNSPECIFIED TYPE: ICD-10-CM

## 2022-03-31 DIAGNOSIS — R05.9 COUGH: ICD-10-CM

## 2022-03-31 DIAGNOSIS — R10.9 ABDOMINAL PAIN, UNSPECIFIED ABDOMINAL LOCATION: ICD-10-CM

## 2022-03-31 DIAGNOSIS — J18.9 PNEUMONIA DUE TO INFECTIOUS ORGANISM, UNSPECIFIED LATERALITY, UNSPECIFIED PART OF LUNG: Primary | ICD-10-CM

## 2022-03-31 PROCEDURE — 99214 OFFICE O/P EST MOD 30 MIN: CPT | Performed by: FAMILY MEDICINE

## 2022-03-31 RX ORDER — LISINOPRIL 20 MG/1
20 TABLET ORAL DAILY
COMMUNITY
Start: 2022-03-12 | End: 2022-04-20

## 2022-03-31 NOTE — PROGRESS NOTES
Transitional Care Follow Up Visit  Subjective     Ira Sanchez is a 77 y.o. female who presents for a transitional care management visit.  Alone.    Within 48 business hours after discharge our office contacted her via telephone to coordinate her care and needs.      I reviewed and discussed the details of that call along with the discharge summary, hospital problems, inpatient lab results, inpatient diagnostic studies, and consultation reports with Ira.     Current outpatient and discharge medications have been reconciled for the patient.    Admit Date: 3.19.22  Date of Discharge: 3.22.22  Pneumonia-CXR/CT, leukocytosis, cough-somewhat improved  Cough-improved  Leukocytosis-steroid affected  Acute respiratory sfbkzgh-drhlqzl-18%-resolved  Elevated procalcitonin 0.14H-treating  Elevated d dimer-1633; neg CT angio  Abdominal pain-chronic LLQ-persisting without explaination; not acute  Difficult history; one minute abdominal pain/next cough  Steroid tx; tolerated  Hyperglycemia-steroid affected    Tobacco-smoker/age 17/1-2ppd/dc age 60    Risk for Readmission (LACE)     History of Present Illness  Came to [facility] ER with cough; was found on CXR/CT chest to have R > L peribronchial infiltrates and tree-bud RUL changes.  WBC was 16K and procalciton was high at 0.14.  D Dimer was elevated at 1630/CT angio showed no PE.  Her initial O2 sat was 88% RA; she improved with 2L oxygen.  She was started on Rocephin, and had one dose steroids in the ED; by the next day/time I saw her she was feeling better.  Interesting; she has recently c/o more LLQ pain and in fact had CT abdomen scheduled for 3.21.22.  Says her cough has been productive of greenish sputum.  Respiratory PCR was negative for anything; covid negative.     Course During Hospital Stay:  She received treatments of IV Rocephin and azithromycin along with bronchodilator Duoneb.  Her cough improved.  She never had any fever.  Her white count was followed and trended  downward.  She was tested for oxygen needs prior to discharge and had no hypoxemia including with activity.  A CT of her abdomen was performed due to her recent continued left lower quadrant discomfort; it only showed diverticular changes and no diverticulitis.  She was weaned to therapy that could be managed at home and was quite eager to go home.     The following portions of the patient's history were reviewed and updated as appropriate: allergies, current medications, past family history, past medical history, past social history, past surgical history and problem list.       Current Outpatient Medications:   •  amLODIPine (NORVASC) 5 MG tablet, Take 1 tablet by mouth Daily., Disp: 90 tablet, Rfl: 3  •  Calcium Carb-Cholecalciferol (CALCIUM-VITAMIN D) 600-400 MG-UNIT tablet, Take 1 tablet by mouth Daily., Disp: , Rfl:   •  cycloSPORINE (RESTASIS) 0.05 % ophthalmic emulsion, 1 drop Every 12 (Twelve) Hours., Disp: , Rfl:   •  diphenhydrAMINE (BENADRYL) 25 mg capsule, Take 25 mg by mouth At Night As Needed for Allergies., Disp: , Rfl:   •  ibuprofen (ADVIL,MOTRIN) 200 MG tablet, Take 400 mg by mouth As Needed., Disp: , Rfl:   •  losartan (Cozaar) 100 MG tablet, Take 1 tablet by mouth Daily., Disp: 90 tablet, Rfl: 2  •  Omega-3 1000 MG capsule, Take  by mouth Daily., Disp: , Rfl:   •  oxybutynin XL (DITROPAN-XL) 10 MG 24 hr tablet, Take 10 mg by mouth Daily., Disp: , Rfl:   •  thiamine (VITAMINE B-1) 100 MG tablet, Take 100 mg by mouth Daily., Disp: , Rfl:   •  celecoxib (CeleBREX) 200 MG capsule, TAKE 1 CAPSULE BY MOUTH DAILY, Disp: 30 capsule, Rfl: 5  •  cyclobenzaprine (FLEXERIL) 5 MG tablet, Take 1 tablet by mouth Every Night., Disp: 30 tablet, Rfl: 1  •  ibandronate (Boniva) 150 MG tablet, Take 1 tablet by mouth Every 30 (Thirty) Days., Disp: 5 tablet, Rfl: 1  •  lisinopril (PRINIVIL,ZESTRIL) 20 MG tablet, Take 20 mg by mouth Daily., Disp: , Rfl:   •  omeprazole (priLOSEC) 20 MG capsule, Take 1 capsule by mouth  Daily., Disp: 15 capsule, Rfl: 0  •  umeclidinium-vilanterol (Anoro Ellipta) 62.5-25 MCG/INH aerosol powder  inhaler, Inhale 1 puff Daily., Disp: 1 each, Rfl: 0  No current facility-administered medications for this visit.    Review of Systems  GENERAL:  Active/slower with limits, speed, stamina for age, and occ back pain.  Sleep is ok. No fever now.  SKIN: No other rash/skin lesion of concern:   ENDO:  No syncope, near or diaphoretic sweaty spells.  No download..  HEENT: No recent head injury; stable occ headache.  No vision change.  No chronic hearing loss.  Ears without fullness without pain/drainage.  No sore throat now.  No significant nasal/sinus congestion/drainage. No epistaxis.   CHEST: No chest wall tenderness or mass.  Same occ/dry cough, and no wheeze.  No SOB; no hemoptysis.  CV: No chest pain, palpitations; daily end of day mild pitting/ankle edema.  GI: No heartburn, dysphagia.  No abdominal pain, diarrhea, constipation.  No rectal bleeding, or melena.    :  Voids with occ on/off dysuria, without incontinence to completion.  ORTHO: No painful/swollen joints but various on /off sore.  Occ same/sore neck or back.  No acute neck or lower back pain without recent injury.  NEURO: No dizziness, weakness of extremities.  Same mild numbness/paresthesias LE.  PSYCH: No memory loss.  Mood good; mild anxious, depressed but/and not suicidal.  Tries to tolerate stress .   Screening:  Mammogram: bilateral masectomy  Bone density: Bone d-deca/[facility]/T hip -0.5 LS +1.4/12.18.18;  no change; maybe 2-5 yr  Low dose CT chest:Tobacco-smoker/age 17/1-2ppd/dc age 60:  CTs with breast cancer:   GI: Colon-avm-div/Shieben/[facility]/12.19.17/5y  Prostate: NA  Usual lab order  6m CBC, CMP, A1c  12m CBC, CMP, A1c, LIPID, TSH, Vit D    •  lisinopril (PRINIVIL,ZESTRIL) 20 MG tablet, Take 20 mg by mouth Daily., Disp: , Rfl:   •  losartan (Cozaar) 100 MG tablet, Take 1 tablet by mouth Daily., Disp: 90 tablet, Rfl: 2    Results for orders  placed or performed in visit on 12/08/21   Hemoglobin A1c    Specimen: Blood   Result Value Ref Range    Hemoglobin A1C 6.50 (H) 4.80 - 5.60 %   Lipid Panel    Specimen: Blood   Result Value Ref Range    Total Cholesterol 165 0 - 200 mg/dL    Triglycerides 86 0 - 150 mg/dL    HDL Cholesterol 49 40 - 60 mg/dL    LDL Cholesterol  100 0 - 100 mg/dL    VLDL Cholesterol 16 5 - 40 mg/dL    LDL/HDL Ratio 2.02    Vitamin D 25 Hydroxy    Specimen: Blood   Result Value Ref Range    25 Hydroxy, Vitamin D 40.1 ng/ml   TSH    Specimen: Blood   Result Value Ref Range    TSH 2.100 0.270 - 4.200 uIU/mL   CEA    Specimen: Blood   Result Value Ref Range    CEA 4.69 ng/mL   Cancer Antigen 27.29    Specimen: Blood   Result Value Ref Range    CA 27.29 30.3 0.0 - 38.6 U/mL   CBC Auto Differential    Specimen: Blood   Result Value Ref Range    WBC 8.37 3.40 - 10.80 10*3/mm3    RBC 4.53 3.77 - 5.28 10*6/mm3    Hemoglobin 13.7 12.0 - 15.9 g/dL    Hematocrit 41.6 34.0 - 46.6 %    MCV 91.8 79.0 - 97.0 fL    MCH 30.2 26.6 - 33.0 pg    MCHC 32.9 31.5 - 35.7 g/dL    RDW 13.8 12.3 - 15.4 %    RDW-SD 47.0 37.0 - 54.0 fl    MPV 10.7 6.0 - 12.0 fL    Platelets 169 140 - 450 10*3/mm3    Neutrophil % 66.2 42.7 - 76.0 %    Lymphocyte % 26.2 19.6 - 45.3 %    Monocyte % 5.0 5.0 - 12.0 %    Eosinophil % 1.6 0.3 - 6.2 %    Basophil % 0.5 0.0 - 1.5 %    Immature Grans % 0.5 0.0 - 0.5 %    Neutrophils, Absolute 5.55 1.70 - 7.00 10*3/mm3    Lymphocytes, Absolute 2.19 0.70 - 3.10 10*3/mm3    Monocytes, Absolute 0.42 0.10 - 0.90 10*3/mm3    Eosinophils, Absolute 0.13 0.00 - 0.40 10*3/mm3    Basophils, Absolute 0.04 0.00 - 0.20 10*3/mm3    Immature Grans, Absolute 0.04 0.00 - 0.05 10*3/mm3    nRBC 0.0 0.0 - 0.2 /100 WBC       No results found for: PSA     Lab Results:  CBC:  Lab Results - Last 18 Months   Lab Units 12/08/21  1006 06/07/21  0832 03/01/21  0822 12/14/20  0937   WBC 10*3/mm3 8.37 7.45 8.1 7.52   HEMOGLOBIN g/dL 13.7 12.4 13.5 12.8   HEMATOCRIT %  "41.6 36.9 41.6 39.6   PLATELETS 10*3/mm3 169 161 202 169      BMP/CMP:  Lab Results - Last 18 Months   Lab Units 08/10/21  0834 06/07/21  0832 03/08/21  1215 03/01/21  0822 12/14/20  0937   SODIUM mmol/L  --  139 136 140 140   POTASSIUM mmol/L  --  5.0 4.6 4.6 4.5   CHLORIDE mmol/L  --  102 98 101 105   TOTAL CO2 mmol/L  --   --  25.1 23  --    CO2 mmol/L  --  27.0  --   --  26.0   BUN mg/dL  --  20 20 14 19   CREATININE mg/dL 0.70 0.74 0.75 0.74 0.55*   EGFR IF NONAFRICN AM mL/min/1.73  --  76 75 78 107   EGFR IF AFRICN AM mL/min/1.73  --   --  91 90  --    CALCIUM mg/dL  --  10.5 9.9 9.6 9.2     HEPATIC:  Lab Results - Last 18 Months   Lab Units 06/07/21  0832 03/01/21  0822 12/14/20  0937   ALT (SGPT) U/L 11 9 8   AST (SGOT) U/L 26 18 20   ALK PHOS U/L 108 121* 114     THYROID:  Lab Results - Last 18 Months   Lab Units 12/08/21  1006 03/01/21  0822   TSH uIU/mL 2.100 2.180     A1C:  Lab Results - Last 18 Months   Lab Units 12/08/21  1006 06/07/21  0832   HEMOGLOBIN A1C % 6.50* 6.10*       Objective   /68 (BP Location: Left arm, Patient Position: Sitting, Cuff Size: Large Adult)   Pulse 62   Temp 98 °F (36.7 °C) (Infrared)   Resp 18   Ht 157.5 cm (62\")   Wt 85.4 kg (188 lb 3.2 oz)   LMP  (LMP Unknown)   SpO2 92%   Breastfeeding No   BMI 34.42 kg/m²   Body mass index is 34.42 kg/m².    Physical Exam  GENERAL:  Well nourished/developed in no acute distress  SKIN: Turgor excellent, without wound, rash, lesion or significance  HEENT: Normal cephalic without trauma. Pupils equal round reactive to light. Extraocular motions full without nystagmus.  External canals nonobstructive nontender without reddness. Tymphatic membranes dull with irwin structures intact.   Oral cavity without growths, exudates, and moist.  Posterior pharynx without mass, obstruction, redness.  No thyromegaly, mass, tenderness, definite lymphadenopathy and supple.   CV: Regular rhythm.  1/6 systolic murmur without gallop and trace " ankle/LE edema. Posterior pulses intact.  No carotid bruits.  CHEST: No chest wall tenderness or mass.   LUNGS: Symmetric motion with clear to auscultation.  No dullness to percussion  ABD: Soft, non-tender without mass.   ORTHO: Symmetric extremities without swelling/point tenderness.  Full gross range of motion. Symmetric LE.   NEURO: CN 2-12 grossly intact except L eyelid mild droop.  Otherwise symmetric facies and UE/LE. 3/5 strength throughout.   Otherwise nonfocal use extremities. Speech clear.    Assessment/Plan     1. Pneumonia due to infectious organism, unspecified laterality, unspecified part of lung    2. Cough    3. Leukocytosis, unspecified type    4. Abdominal pain, unspecified abdominal location      Rx: reviewed/changes:  New Medications Ordered This Visit   Medications   • fluticasone-salmeterol (Advair Diskus) 250-50 MCG/DOSE DISKUS     Sig: Inhale 1 puff 2 (Two) Times a Day.     Dispense:  60 each     Refill:  5     LAB/Testing/Referrals: reviewed/orders:   Today: none  No orders of the defined types were placed in this encounter.    Usual:   same    Discussions:   Probably chronic lung disease; beginning to show up more last few months  Since cannot afford Anoro; try to generic advair above.  Needs chronic/daily same inhaler  Refer to pulmonary; their testing/opinions  Stay of ACE/use Cozaar    Body mass index is 34.42 kg/m².   Patient's Body mass index is 34.42 kg/m². indicating that she is obese (BMI >30). Obesity-related health conditions include the following: hypertension. Obesity is unchanged. BMI is is above average; BMI management plan is completed. We discussed portion control and increasing exercise..  Non-smoker  Ira Sanchez  reports that she quit smoking about 21 years ago. Her smoking use included cigarettes. She started smoking about 59 years ago. She has a 74.00 pack-year smoking history. She has never used smokeless tobacco..     There are no Patient Instructions on file for  this visit.    Follow up: No follow-ups on file.  Future Appointments   Date Time Provider Department Center   4/20/2022  9:00 AM Henrietta Olvera APRN [facility]   6/1/2022  8:00 AM  [facility]  [facility]   6/1/2022  8:30 AM [facility] NURSE [facility]   6/8/2022 10:00 AM Adalberto Wallace MD [facility]   7/18/2022  8:50 AM [facility]   7/25/2022  8:30 AM Conrado Tinoco MD [facility]            Current outpatient and discharge medications have been reconciled for the patient.    Transitional Care Management Certification  I certify that the following are true:  1. Communication was made within 2 business days of discharge.  2. Complexity of Medical Decision Making is moderate.  3. Face to face visit occurred within 9 days.    *Note: 33342 is for high complexity patients with a face to face visit within 7 days of discharge.  31128 is for high complexity patients with a face to face on days 8-14 post discharge or medium complexity with face to face visit within 14 days post discharge.

## 2022-04-19 ENCOUNTER — TELEPHONE (OUTPATIENT)
Dept: VASCULAR SURGERY | Facility: CLINIC | Age: 77
End: 2022-04-19

## 2022-04-19 NOTE — TELEPHONE ENCOUNTER
Spoke with Ms Sanchez reminding her of her appointment for Wednesday, April 20th, 2022 at 9 am with Henrietta LUQUE. Ms Laura confirmed she would be here.

## 2022-04-20 ENCOUNTER — OFFICE VISIT (OUTPATIENT)
Dept: VASCULAR SURGERY | Facility: CLINIC | Age: 77
End: 2022-04-20

## 2022-04-20 VITALS
OXYGEN SATURATION: 96 % | SYSTOLIC BLOOD PRESSURE: 136 MMHG | BODY MASS INDEX: 34.96 KG/M2 | HEIGHT: 62 IN | HEART RATE: 56 BPM | WEIGHT: 190 LBS | DIASTOLIC BLOOD PRESSURE: 82 MMHG | RESPIRATION RATE: 18 BRPM

## 2022-04-20 DIAGNOSIS — I10 PRIMARY HYPERTENSION: Chronic | ICD-10-CM

## 2022-04-20 DIAGNOSIS — I87.323 CHRONIC VENOUS HYPERTENSION WITH INFLAMMATION INVOLVING BOTH SIDES: Primary | ICD-10-CM

## 2022-04-20 PROCEDURE — 99213 OFFICE O/P EST LOW 20 MIN: CPT | Performed by: NURSE PRACTITIONER

## 2022-04-20 NOTE — PROGRESS NOTES
"4/20/2022       Conrado Tinoco MD  [address] [place] [id number]    Ira Sanchez  1945    Chief Complaint   Patient presents with   • Chronic Venous Hypertension     3 month follow-up Chronic Venous Hypertension - bilateral.  Pt states she is wearing compression daily and legs are feeling better. Pt denies any stroke-like symptoms.       Dear Conrado Tinoco MD       HPI  I had the pleasure of seeing your patient Ira Sanchez in the office today.   As you recall, Ira Sanchez is a 77 y.o.  female who you are following for routine health maintenance.  She has had significant leg swelling since March 2021.  She does take Norvasc as well which she feels like her legs have swollen since she began this medication a couple years ago.  She is also taking diuretics without significant relief.  She was having complaints of swelling, heaviness, tiredness, and severe leg cramping at night.  She had testing showing significant venous insufficiency to her lower extremities.  She has been wearing compression stockings on a daily basis and her legs are feeling much better.      Review of Systems   Constitutional: Negative.    HENT: Negative.    Eyes: Negative.    Respiratory: Negative.    Cardiovascular: Positive for leg swelling.   Gastrointestinal: Negative.    Endocrine: Negative.    Genitourinary: Negative.    Musculoskeletal: Negative.    Skin: Negative.    Allergic/Immunologic: Negative.    Neurological: Negative.    Hematological: Negative.    Psychiatric/Behavioral: Negative.    All other systems reviewed and are negative.       /82 (BP Location: Left arm, Patient Position: Sitting, Cuff Size: Adult)   Pulse 56   Resp 18   Ht 157.5 cm (62\")   Wt 86.2 kg (190 lb)   LMP  (LMP Unknown)   SpO2 96%   BMI 34.75 kg/m²   Physical Exam  Vitals and nursing note reviewed.   Constitutional:       General: She is not in acute distress.     Appearance: Normal appearance. She is well-developed. " She is obese. She is not diaphoretic.   HENT:      Head: Normocephalic and atraumatic.   Eyes:      General: No scleral icterus.     Pupils: Pupils are equal, round, and reactive to light.   Neck:      Thyroid: No thyromegaly.      Vascular: No carotid bruit or JVD.   Cardiovascular:      Rate and Rhythm: Normal rate and regular rhythm.      Pulses: Normal pulses.      Heart sounds: Normal heart sounds and S2 normal. No murmur heard.    No friction rub. No gallop.      Comments: Varicose veins to BLE with swelling  Pulmonary:      Effort: Pulmonary effort is normal.      Breath sounds: Normal breath sounds.   Abdominal:      General: Bowel sounds are normal.      Palpations: Abdomen is soft.   Musculoskeletal:         General: Swelling present. Normal range of motion.      Cervical back: Normal range of motion and neck supple.      Right lower leg: Edema present.      Left lower leg: Edema present.   Skin:     General: Skin is warm and dry.   Neurological:      General: No focal deficit present.      Mental Status: She is alert and oriented to person, place, and time.      Cranial Nerves: No cranial nerve deficit.   Psychiatric:         Mood and Affect: Mood normal.         Behavior: Behavior normal. Behavior is cooperative.         Thought Content: Thought content normal.         Judgment: Judgment normal.             Patient Active Problem List   Diagnosis   • Obesity   • Breast cancer-L/valente masectomy 2014   • Overactive bladder   • Diabetes type 2, controlled (HCC)   • Hypertension   • Osteopenia 2022- 2 to 5y   • Neuropathy-chemo/better   • Primary generalized (osteo)arthritis   • Malignant neoplasm of lower-outer quadrant of left female breast (HCC)   • Family history of breast cancer   • Malignant neoplasm of upper-outer quadrant of female breast (HCC)   • History of bilateral mastectomy   • Wellness examination-done   • Shoulder pain   • Chronic back pain   • Laboratory test*   • Bradycardia   • Cough: copd    • Allergic rhinitis   • Menopause   • History of shingles   • COPD (chronic obstructive pulmonary disease) ([facility])   • Abnormal mammogram   • After-cataract with vision obscured   • Amblyopia   • Cellulitis, trunk   • Dermatitis of eyelid due to herpes zoster   • Dermatochalasis   • Hordeolum internum   • Other hereditary corneal dystrophies   • Pseudophakia   • Rash   • Encounter for care related to Port-a-Cath   • Edema: venous-BH/vascular   • Congestive heart failure (HCC)   • Valvular heart disease: AS   • Chronic neck pain   • Abdominal pannus   • Encounter for care related to vascular access port         ICD-10-CM ICD-9-CM   1. Chronic venous hypertension with inflammation involving both sides  I87.323 459.32   2. Primary hypertension  I10 401.9           Plan: After thoroughly evaluating Ira Sanchez, I believe the best course of action is to remain conservative from vascular surgery standpoint.  Currently she is doing well wearing compression stockings.  She reports wearing these consistently has helped with her swelling and cramping.  Previously she did have noted venous insufficiency to her lower extremities however she appears to be doing well with conservative treatment.  We will see her back in 1 year for continued surveillance.   I would like her to continue wearing compression stockings on a daily basis, keep her legs elevated when she is not on them, and keep them well moisturized.  I did discuss vascular risk factors as they pertain to the progression of vascular disease including controlling her hypertension.  Her blood pressure is currently stable.  The patient can continue taking their current medication regimen as previously planned.  This was all discussed in full with complete understanding.    Thank you for allowing me to participate in the care of your patient.  Please do not hesitate with any questions or concerns.  I will keep you aware of any further encounters with Ira BULL  Laura.        Sincerely yours,         REBEL Crawford

## 2022-05-04 ENCOUNTER — TELEPHONE (OUTPATIENT)
Dept: PULMONOLOGY | Facility: CLINIC | Age: 77
End: 2022-05-04

## 2022-05-04 NOTE — TELEPHONE ENCOUNTER
Rosaura, this is a new patient who will be coming in to see me tomorrow.  She had some recent chest CTs from [facility].  She needs to make sure she brings those with her on disc.

## 2022-05-05 ENCOUNTER — OFFICE VISIT (OUTPATIENT)
Dept: PULMONOLOGY | Facility: CLINIC | Age: 77
End: 2022-05-05

## 2022-05-05 VITALS
HEART RATE: 45 BPM | BODY MASS INDEX: 35.33 KG/M2 | OXYGEN SATURATION: 96 % | HEIGHT: 62 IN | DIASTOLIC BLOOD PRESSURE: 80 MMHG | SYSTOLIC BLOOD PRESSURE: 142 MMHG | WEIGHT: 192 LBS

## 2022-05-05 DIAGNOSIS — R05.3 CHRONIC COUGH: ICD-10-CM

## 2022-05-05 DIAGNOSIS — E66.01 SEVERE OBESITY (BMI 35.0-35.9 WITH COMORBIDITY): ICD-10-CM

## 2022-05-05 DIAGNOSIS — Z87.891 PERSONAL HISTORY OF NICOTINE DEPENDENCE: ICD-10-CM

## 2022-05-05 DIAGNOSIS — J18.9 PNEUMONIA OF BOTH LOWER LOBES DUE TO INFECTIOUS ORGANISM: Primary | ICD-10-CM

## 2022-05-05 PROCEDURE — 99204 OFFICE O/P NEW MOD 45 MIN: CPT | Performed by: INTERNAL MEDICINE

## 2022-05-05 NOTE — ASSESSMENT & PLAN NOTE
Again her recent CT did show bilateral infiltrates particularly in the lower lobes and particularly on the right.

## 2022-05-05 NOTE — PROGRESS NOTES
Chief Complaint  Pneumonia due to infectious organism    Subjective    History of Present Illness     Ira Sanchez presents to [facility] for a recent episode of pneumonia with abnormal imaging studies.    History of Present Illness   The patient is referred by Dr. Tinoco for evaluation of some abnormal imaging studies and a recent episode of pneumonia.  She been hospitalized at [facility] and mid to late March.  She did bring a CT with her on disc that was obtained during that hospitalization.  She has some chronic changes particularly some mild tree-in-bud changes which have been noted on a CT performed at [facility] on August 10, 2021 to assess for any possible metastatic disease related to history of breast cancer.  The scan did show some mild tree-in-bud changes and a small right lower lobe nodule.  The nodule is 4 mm in size.  I did review this on the [facility] system.  I also reviewed her more recent chest CT that accompanies her on disc from [facility] and similar tree-in-bud changes were noted but she also had some basilar infiltrates more marked on the right consistent with pneumonia.  I did review the scans with the patient as well.  I did tell her I would recommend we get a follow-up CT in several weeks just to make sure these areas of infiltrate have improved.  She has had problems with cough on a chronic basis which has been worse recently.  She did smoke in the past but quit smoking in 2000.  PFTs were performed last August at [facility] and her baseline spirometry was normal with some improvement in midflows postbronchodilator.  She did have a decrease in her expiratory reserve volume on lung volumes but otherwise had normal lung volumes.  She did have a mild diffusion impairment and when corrected for alveolar volume she had a low normal diffusion capacity.  In summary at least based on her prior PFTs she does not have  COPD but I do think she has again some mild chronic changes related to bronchiolitis with associated tree-in-bud changes but again developed a superimposed pneumonia in March predominantly involving the right lower lobe.  When she was hospitalized with her pneumonia her COVID studies were negative.  She has had the COVID-19 vaccine in the form of the Moderna vaccine and will be a candidate for a booster.  She has not had the flu shot.  She has had the Prevnar 13 previously.  She has not had the Pneumovax.  She can get the Pneumovax to her primary care physician and again would also be a candidate for a booster for her COVID-19 vaccine.  Prior to Admission medications    Medication Sig Start Date End Date Taking? Authorizing Provider   amLODIPine (NORVASC) 5 MG tablet Take 1 tablet by mouth Daily. 12/2/21  Yes Conrado Tinoco MD   Calcium Carb-Cholecalciferol (CALCIUM-VITAMIN D) 600-400 MG-UNIT tablet Take 1 tablet by mouth Daily.   Yes Karis Billingsley MD   celecoxib (CeleBREX) 200 MG capsule TAKE 1 CAPSULE BY MOUTH DAILY 5/26/20  Yes Conrado Tinoco MD   cyclobenzaprine (FLEXERIL) 5 MG tablet Take 1 tablet by mouth Every Night. 8/31/21  Yes Conrado Tinoco MD   cycloSPORINE (RESTASIS) 0.05 % ophthalmic emulsion 1 drop Every 12 (Twelve) Hours.   Yes Karis Billingsley MD   diphenhydrAMINE (BENADRYL) 25 mg capsule Take 25 mg by mouth At Night As Needed for Allergies.   Yes Karis Billingsley MD   fluticasone-salmeterol (Advair Diskus) 250-50 MCG/DOSE DISKUS Inhale 1 puff 2 (Two) Times a Day. 3/31/22  Yes Conrado Tinoco MD   ibandronate (Boniva) 150 MG tablet Take 1 tablet by mouth Every 30 (Thirty) Days. 3/3/22  Yes Conrado Tinoco MD   ibuprofen (ADVIL,MOTRIN) 200 MG tablet Take 400 mg by mouth As Needed.   Yes Karis Billingsley MD   losartan (Cozaar) 100 MG tablet Take 1 tablet by mouth Daily. 1/18/22  Yes Conrado Tinoco MD   Omega-3 1000 MG capsule Take  by  "mouth Daily.   Yes Karis Billingsley MD   omeprazole (priLOSEC) 20 MG capsule Take 1 capsule by mouth Daily. 3/22/22  Yes Conrado Tinoco MD   oxybutynin XL (DITROPAN-XL) 10 MG 24 hr tablet Take 10 mg by mouth Daily. 21  Yes Karis Billingsley MD   thiamine (VITAMINE B-1) 100 MG tablet Take 100 mg by mouth Daily.   Yes Karis Billingsley MD       Social History     Socioeconomic History   • Marital status:      Spouse name: Horace   • Number of children: 0   • Years of education: 12.5   Tobacco Use   • Smoking status: Former Smoker     Packs/day: 2.00     Years: 37.00     Pack years: 74.00     Types: Cigarettes     Start date: 1963     Quit date: 2000     Years since quittin.6   • Smokeless tobacco: Never Used   Vaping Use   • Vaping Use: Never used   Substance and Sexual Activity   • Alcohol use: No   • Drug use: No   • Sexual activity: Defer       Objective   Vital Signs:   /80   Pulse (!) 45   Ht 157.5 cm (62\")   Wt 87.1 kg (192 lb)   SpO2 96% Comment: RA  BMI 35.12 kg/m²     Physical Exam  Vitals and nursing note reviewed.   Constitutional:       Appearance: She is obese.   HENT:      Head: Normocephalic.      Comments: She is wearing a mask.  Eyes:      Extraocular Movements: Extraocular movements intact.      Pupils: Pupils are equal, round, and reactive to light.   Cardiovascular:      Rate and Rhythm: Regular rhythm. Bradycardia present.      Comments: Her pulse was initially 45 and on repeat palpation and auscultation her pulse was in the mid 50s.  Pulmonary:      Effort: Pulmonary effort is normal.      Comments: She has reasonable air movement bilaterally.  Musculoskeletal:         General: Normal range of motion.   Skin:     General: Skin is warm and dry.   Neurological:      General: No focal deficit present.      Mental Status: She is alert and oriented to person, place, and time.   Psychiatric:         Mood and Affect: Mood normal.         " Behavior: Behavior normal.        Result Review :          Results for orders placed during the hospital encounter of 08/10/21    Pulmonary Function Test    [facility] - Pulmonary Function Test    [facility]  [place]  [place]  [address]  [phone]    Patient : Ira Sanchez  MRN : [id number]  CSN : [id number]  Pulmonologist : Dante Mendoza MD  Date : 8/10/2021    ______________________________________________________________________    Interpretation :  1.  Spirometry is within normal limits.  2.  There is actually improvement in small airways function postbronchodilator which is supranormal postbronchodilator.  Otherwise there is no significant change in spirometry postbronchodilator.  3.  Lung volumes reveal a decrease in expiratory reserve volume, and otherwise are within normal limits.  4.  There is a mild diffusion impairment which when corrected for alveolar volume is a low normal diffusion capacity.  4.  Arterial blood gases reveal mild hypoxemia on room air.      Dante Mendoza MD                 My interpretation of imaging:    CT Chest With & Without Contrast Diagnostic (08/10/2021 08:45)  I also reviewed her recent chest CT from [facility].        Assessment and Plan     Diagnoses and all orders for this visit:    1. Pneumonia of both lower lobes due to infectious organism (Primary)  Assessment & Plan:  Again her recent CT did show bilateral infiltrates particularly in the lower lobes and particularly on the right.    Orders:  -     CT Chest Without Contrast Diagnostic; Future    2. Chronic cough  Assessment & Plan:  She does have a remote tobacco use history but does not have COPD by prior pulmonary functions.  Can her cough has been worse and she has had a recent episode of pneumonia but she does have some component of chronic cough.  She does appear to have some mild chronic changes on her chest CT and could have some very mild bronchiectasis manifested  by tree-in-bud changes.  That can be further assessed in the future when she recovers further from her pneumonia.  She may continue her Advair Diskus if this seems to help her symptoms as she could conceivably have some underlying asthma but at least by her last PFTs did not have COPD.      3. Personal history of nicotine dependence  Assessment & Plan:  She quit smoking in 2000.      4. Severe obesity (BMI 35.0-35.9 with comorbidity) ([facility])  Assessment & Plan:  Diet and exercise are encouraged and she will follow-up with her primary care physician regarding her elevated BMI otherwise.        Class 2 Severe Obesity (BMI >=35 and <=39.9). Obesity-related health conditions include the following: GERD. Obesity is unchanged.  Diet and exercise are encouraged and she will follow-up with her primary care physician regarding her elevated BMI otherwise.      Dante Mendoza MD  5/5/2022  11:31 CDT    Follow Up   Return in about 6 weeks (around 6/16/2022) for To see me specifically.    Patient was given instructions and counseling regarding her condition or for health maintenance advice. Please see specific information pulled into the AVS if appropriate.

## 2022-05-05 NOTE — ASSESSMENT & PLAN NOTE
She does have a remote tobacco use history but does not have COPD by prior pulmonary functions.  Can her cough has been worse and she has had a recent episode of pneumonia but she does have some component of chronic cough.  She does appear to have some mild chronic changes on her chest CT and could have some very mild bronchiectasis manifested by tree-in-bud changes.  That can be further assessed in the future when she recovers further from her pneumonia.  She may continue her Advair Diskus if this seems to help her symptoms as she could conceivably have some underlying asthma but at least by her last PFTs did not have COPD.

## 2022-05-05 NOTE — PATIENT INSTRUCTIONS
The patient had been hospitalized with pneumonia at [facility] in late March.  She is improved but still has some issues with cough.  Her CT did show some mild chronic changes but some new basilar infiltrates.  We will plan on a repeat CT in about 6 weeks and we will see her back the day of the scan to make sure that there has been improvement.  She had prior pulmonary functions in August of last year which did not show any significant abnormalities on spirometry in particular.

## 2022-05-12 ENCOUNTER — PATIENT ROUNDING (BHMG ONLY) (OUTPATIENT)
Dept: PULMONOLOGY | Facility: CLINIC | Age: 77
End: 2022-05-12

## 2022-05-12 NOTE — PROGRESS NOTES
May 12, 2022    Hello, may I speak with Ira Sanchez?    My name is Karla Javier     I am  with [facility]  [address]  [facility] [address] [phone].    Before we get started may I verify your date of birth? 1945    I am calling to officially welcome you to our practice and ask about your recent visit. Is this a good time to talk? yes    Tell me about your visit with us. What things went well?  Visit was great.  Very pleased.  Everyone was nice.       We're always looking for ways to make our patients' experiences even better. Do you have recommendations on ways we may improve?  no    Overall were you satisfied with your first visit to our practice? yes       I appreciate you taking the time to speak with me today. Is there anything else I can do for you? no      Thank you, and have a great day.

## 2022-06-01 ENCOUNTER — LAB (OUTPATIENT)
Dept: LAB | Facility: HOSPITAL | Age: 77
End: 2022-06-01

## 2022-06-01 ENCOUNTER — INFUSION (OUTPATIENT)
Dept: ONCOLOGY | Facility: CLINIC | Age: 77
End: 2022-06-01

## 2022-06-01 ENCOUNTER — TELEPHONE (OUTPATIENT)
Dept: ONCOLOGY | Facility: CLINIC | Age: 77
End: 2022-06-01

## 2022-06-01 DIAGNOSIS — R94.5 ABNORMAL RESULTS OF LIVER FUNCTION STUDIES: ICD-10-CM

## 2022-06-01 DIAGNOSIS — Z45.2 ENCOUNTER FOR CARE RELATED TO VASCULAR ACCESS PORT: Primary | ICD-10-CM

## 2022-06-01 DIAGNOSIS — R74.8 ELEVATED LIVER ENZYMES: Primary | ICD-10-CM

## 2022-06-01 DIAGNOSIS — R74.8 ELEVATED LIVER ENZYMES: ICD-10-CM

## 2022-06-01 DIAGNOSIS — Z45.2 ENCOUNTER FOR CARE RELATED TO PORT-A-CATH: ICD-10-CM

## 2022-06-01 DIAGNOSIS — C50.919 MALIGNANT NEOPLASM OF FEMALE BREAST, UNSPECIFIED ESTROGEN RECEPTOR STATUS, UNSPECIFIED LATERALITY, UNSPECIFIED SITE OF BREAST: ICD-10-CM

## 2022-06-01 LAB
ALBUMIN SERPL-MCNC: 4.5 G/DL (ref 3.5–5.2)
ALBUMIN/GLOB SERPL: 1.7 G/DL
ALP SERPL-CCNC: 131 U/L (ref 39–117)
ALT SERPL W P-5'-P-CCNC: 19 U/L (ref 1–33)
ANION GAP SERPL CALCULATED.3IONS-SCNC: 8 MMOL/L (ref 5–15)
AST SERPL-CCNC: 35 U/L (ref 1–32)
BASOPHILS # BLD AUTO: 0.04 10*3/MM3 (ref 0–0.2)
BASOPHILS NFR BLD AUTO: 0.5 % (ref 0–1.5)
BILIRUB SERPL-MCNC: 0.3 MG/DL (ref 0–1.2)
BUN SERPL-MCNC: 17 MG/DL (ref 8–23)
BUN/CREAT SERPL: 27.9 (ref 7–25)
CALCIUM SPEC-SCNC: 9.7 MG/DL (ref 8.6–10.5)
CEA SERPL-MCNC: 5.3 NG/ML
CHLORIDE SERPL-SCNC: 103 MMOL/L (ref 98–107)
CO2 SERPL-SCNC: 28 MMOL/L (ref 22–29)
CREAT SERPL-MCNC: 0.61 MG/DL (ref 0.57–1)
DEPRECATED RDW RBC AUTO: 47.6 FL (ref 37–54)
EGFRCR SERPLBLD CKD-EPI 2021: 91.1 ML/MIN/1.73
EOSINOPHIL # BLD AUTO: 0.12 10*3/MM3 (ref 0–0.4)
EOSINOPHIL NFR BLD AUTO: 1.6 % (ref 0.3–6.2)
ERYTHROCYTE [DISTWIDTH] IN BLOOD BY AUTOMATED COUNT: 13.5 % (ref 12.3–15.4)
GLOBULIN UR ELPH-MCNC: 2.7 GM/DL
GLUCOSE SERPL-MCNC: 120 MG/DL (ref 65–99)
HAV IGM SERPL QL IA: NORMAL
HBV CORE IGM SERPL QL IA: NORMAL
HBV SURFACE AG SERPL QL IA: NORMAL
HCT VFR BLD AUTO: 40.9 % (ref 34–46.6)
HCV AB SER DONR QL: NORMAL
HGB BLD-MCNC: 13.1 G/DL (ref 12–15.9)
IMM GRANULOCYTES # BLD AUTO: 0.03 10*3/MM3 (ref 0–0.05)
IMM GRANULOCYTES NFR BLD AUTO: 0.4 % (ref 0–0.5)
LYMPHOCYTES # BLD AUTO: 2.38 10*3/MM3 (ref 0.7–3.1)
LYMPHOCYTES NFR BLD AUTO: 31.1 % (ref 19.6–45.3)
MCH RBC QN AUTO: 30.5 PG (ref 26.6–33)
MCHC RBC AUTO-ENTMCNC: 32 G/DL (ref 31.5–35.7)
MCV RBC AUTO: 95.3 FL (ref 79–97)
MONOCYTES # BLD AUTO: 0.5 10*3/MM3 (ref 0.1–0.9)
MONOCYTES NFR BLD AUTO: 6.5 % (ref 5–12)
NEUTROPHILS NFR BLD AUTO: 4.58 10*3/MM3 (ref 1.7–7)
NEUTROPHILS NFR BLD AUTO: 59.9 % (ref 42.7–76)
NRBC BLD AUTO-RTO: 0 /100 WBC (ref 0–0.2)
PLATELET # BLD AUTO: 175 10*3/MM3 (ref 140–450)
PMV BLD AUTO: 10.6 FL (ref 6–12)
POTASSIUM SERPL-SCNC: 4.1 MMOL/L (ref 3.5–5.2)
PROT SERPL-MCNC: 7.2 G/DL (ref 6–8.5)
RBC # BLD AUTO: 4.29 10*6/MM3 (ref 3.77–5.28)
SODIUM SERPL-SCNC: 139 MMOL/L (ref 136–145)
WBC NRBC COR # BLD: 7.65 10*3/MM3 (ref 3.4–10.8)

## 2022-06-01 PROCEDURE — 86300 IMMUNOASSAY TUMOR CA 15-3: CPT

## 2022-06-01 PROCEDURE — 82378 CARCINOEMBRYONIC ANTIGEN: CPT

## 2022-06-01 PROCEDURE — 85025 COMPLETE CBC W/AUTO DIFF WBC: CPT

## 2022-06-01 PROCEDURE — 80074 ACUTE HEPATITIS PANEL: CPT

## 2022-06-01 PROCEDURE — 80053 COMPREHEN METABOLIC PANEL: CPT

## 2022-06-01 PROCEDURE — 36415 COLL VENOUS BLD VENIPUNCTURE: CPT

## 2022-06-01 RX ORDER — SODIUM CHLORIDE 0.9 % (FLUSH) 0.9 %
10 SYRINGE (ML) INJECTION AS NEEDED
Status: CANCELLED | OUTPATIENT
Start: 2022-06-01

## 2022-06-01 RX ORDER — HEPARIN SODIUM (PORCINE) LOCK FLUSH IV SOLN 100 UNIT/ML 100 UNIT/ML
500 SOLUTION INTRAVENOUS AS NEEDED
Status: CANCELLED | OUTPATIENT
Start: 2022-06-01

## 2022-06-01 RX ORDER — SODIUM CHLORIDE 0.9 % (FLUSH) 0.9 %
10 SYRINGE (ML) INJECTION AS NEEDED
Status: DISCONTINUED | OUTPATIENT
Start: 2022-06-01 | End: 2022-06-01 | Stop reason: HOSPADM

## 2022-06-01 RX ORDER — HEPARIN SODIUM (PORCINE) LOCK FLUSH IV SOLN 100 UNIT/ML 100 UNIT/ML
500 SOLUTION INTRAVENOUS AS NEEDED
Status: DISCONTINUED | OUTPATIENT
Start: 2022-06-01 | End: 2022-06-01 | Stop reason: HOSPADM

## 2022-06-01 RX ADMIN — HEPARIN SODIUM (PORCINE) LOCK FLUSH IV SOLN 100 UNIT/ML 500 UNITS: 100 SOLUTION at 08:48

## 2022-06-01 RX ADMIN — Medication 10 ML: at 08:47

## 2022-06-01 NOTE — TELEPHONE ENCOUNTER
----- Message from Adalberto Wallace MD sent at 6/1/2022  9:06 AM CDT -----  Schedule liver ultrasound and add to labs: Hepatitis profile    Called patient with the above information. She verbalized understanding.    rp

## 2022-06-02 LAB — CANCER AG27-29 SERPL-ACNC: 24.3 U/ML (ref 0–38.6)

## 2022-06-04 NOTE — PROGRESS NOTES
[facility]  HEMATOLOGY & ONCOLOGY    [facility] HEMATOLOGY AND ONCOLOGY  [address]  [facility] [address] [phone]    Patient Name: Ira Sanchez  Encounter Date: 6/8/2022  YOB: 1945  Patient Number: [id number]      REASON FOR VISIT: Mrs Sanchez is a 78 yo female patient here today in follow up for left breast cancer that was diagnosed in 2014.  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  It was ER+, NC+ and Her 2 emerita +.  She subsequently underwent bilateral mastectomies and her AJCC TNM staging was [id number], Stage IIA.     She then underwent 5 cycles of Carboplatin, Taxotere and Herceptin followed by a year of Herceptin therapy.  She then was placed on hormonal manipulation with Arimidex in June 2015 and completed 5 years in 2020.     I have reviewed the HPI and verified with the patient the accuracy of it. No changes to interval history since the information was documented. Adalberto Wallace MD 06/08/22     Oncology/Hematology History Overview Note   Ms. Sanchez is a pleasant 71 year old  female with the diagnosis of stage IIA left breast carcinoma, ER/NC and HER2  positive 3+.  She is status post bilateral mastectomies and has completed 5 cycles of adjuvant systemic chemotherapy with taxotere, carboplatin and  Herceptin. She is presently receiving Herceptin 6 mg/kg every 3 weeks for a total dosing of 1 year with last dose planned for 12/09/2015.  Ms. Sanchez is also presently taking hormonal manipulation therapy with Arimidex 1 mg p.o. daily and this will be taken for 10 years, this  was initiated on 06/03/2015.  She had completed Herceptin. She had completed breast reconstruction process. We will give her a refill on her Arimidex. She is due for  her bone density in September 2016.     Breast cancer-L/valente masectomy 2014 9/29/2016 Initial Diagnosis    Breast  cancer           PAST MEDICAL HISTORY:  ALLERGIES:  Allergies   Allergen Reactions   • Benzonatate Hives and Itching       CURRENT MEDICATIONS:  Outpatient Encounter Medications as of 6/8/2022   Medication Sig Dispense Refill   • amLODIPine (NORVASC) 5 MG tablet Take 1 tablet by mouth Daily. 90 tablet 3   • Calcium Carb-Cholecalciferol (CALCIUM-VITAMIN D) 600-400 MG-UNIT tablet Take 1 tablet by mouth Daily.     • celecoxib (CeleBREX) 200 MG capsule TAKE 1 CAPSULE BY MOUTH DAILY 30 capsule 5   • cyclobenzaprine (FLEXERIL) 5 MG tablet Take 1 tablet by mouth Every Night. 30 tablet 1   • cycloSPORINE (RESTASIS) 0.05 % ophthalmic emulsion 1 drop Every 12 (Twelve) Hours.     • diphenhydrAMINE (BENADRYL) 25 mg capsule Take 25 mg by mouth At Night As Needed for Allergies.     • fluticasone-salmeterol (Advair Diskus) 250-50 MCG/DOSE DISKUS Inhale 1 puff 2 (Two) Times a Day. 60 each 5   • ibandronate (Boniva) 150 MG tablet Take 1 tablet by mouth Every 30 (Thirty) Days. 5 tablet 1   • ibuprofen (ADVIL,MOTRIN) 200 MG tablet Take 400 mg by mouth As Needed.     • losartan (Cozaar) 100 MG tablet Take 1 tablet by mouth Daily. 90 tablet 2   • Omega-3 1000 MG capsule Take  by mouth Daily.     • omeprazole (priLOSEC) 20 MG capsule Take 1 capsule by mouth Daily. 15 capsule 0   • oxybutynin XL (DITROPAN-XL) 10 MG 24 hr tablet Take 10 mg by mouth Daily.     • thiamine (VITAMINE B-1) 100 MG tablet Take 100 mg by mouth Daily.       Facility-Administered Encounter Medications as of 6/8/2022   Medication Dose Route Frequency Provider Last Rate Last Admin   • heparin flush (porcine) 100 UNIT/ML injection 500 Units  500 Units Intravenous PRN Shanda Torres MD   500 Units at 04/16/18 1504   • heparin flush (porcine) 100 UNIT/ML injection 500 Units  500 Units Intravenous PRN Shanda Torres MD   500 Units at 06/12/18 1445   • heparin injection 500 Units  500 Units Intravenous PRN Tamela Doherty APRN   500 Units  at 12/08/21 0948   • sodium chloride 0.9 % flush 10 mL  10 mL Intravenous PRN Shanda Torres MD   10 mL at 04/16/18 1503   • sodium chloride 0.9 % flush 10 mL  10 mL Intravenous PRN Shanda Torres MD   10 mL at 06/12/18 1445   • sodium chloride 0.9 % flush 10 mL  10 mL Intravenous PRN Tamela Doherty APRN   10 mL at 12/08/21 0948       ADULT ILLNESSES:  Patient Active Problem List   Diagnosis Code   • Obesity E66.9   • Breast cancer-L/valente masectomy 2014 C50.919   • Overactive bladder N32.81   • Diabetes type 2, controlled ([facility]) E11.9   • Hypertension I10   • Osteopenia 2022- 2 to 5y M85.80   • Neuropathy-chemo/better G62.9   • Primary generalized (osteo)arthritis M15.0   • Malignant neoplasm of lower-outer quadrant of left female breast ([facility]) C50.512   • Family history of breast cancer Z80.3   • Malignant neoplasm of upper-outer quadrant of female breast ([facility]) C50.419   • History of bilateral mastectomy Z90.13   • Wellness examination-done Z00.00   • Shoulder pain M25.519   • Chronic back pain M54.9, G89.29   • Laboratory test* Z01.89   • Bradycardia R00.1   • Cough: copd R05.9   • Allergic rhinitis J30.9   • Menopause Z78.0   • History of shingles Z86.19   • COPD (chronic obstructive pulmonary disease) ([facility]) J44.9   • Abnormal mammogram R92.8   • After-cataract with vision obscured H26.499   • Amblyopia H53.009   • Cellulitis, trunk L03.319   • Dermatitis of eyelid due to herpes zoster B02.39   • Dermatochalasis H02.839   • Hordeolum internum H00.029   • 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   • Pseudophakia Z96.1   • Rash R21   • Encounter for care related to Port-a-Cath Z45.2   • Edema: venous-BH/vascular R60.9   • Congestive heart failure ([facility]) I50.9   • Valvular heart disease: AS I38   • Chronic neck pain M54.2, G89.29   • Abdominal pannus E65   • Encounter for care related to vascular access port Z45.2   • Pneumonia of both lower lobes due to infectious organism J18.9  "  • Chronic cough R05.3   • Personal history of nicotine dependence Z87.891   • Severe obesity (BMI 35.0-35.9 with comorbidity) ([facility]) E66.01, Z68.35       SURGERIES:  Past Surgical History:   Procedure Laterality Date   • APPENDECTOMY     • BREAST SURGERY     • CATARACT EXTRACTION Bilateral    • DILATATION AND CURETTAGE     • KNEE SURGERY     • MASTECTOMY Bilateral    • NIPPLE TATTOO  2016   • PORTACATH PLACEMENT     • THROAT SURGERY         HEALTH MAINTENANCE ITEMS:    Last Completed Colonoscopy       Status Date      COLONOSCOPY Done 2017 SCANNED - COLONOSCOPY     Negative; 5 year recall        FAMILY HISTORY:  Family History   Problem Relation Age of Onset   • Hypertension Mother    • Heart failure Mother    • Cancer Father         colon   • Colon cancer Father    • Cancer Maternal Aunt         breast   • No Known Problems Maternal Grandmother    • No Known Problems Maternal Grandfather    • No Known Problems Paternal Grandmother    • No Known Problems Paternal Grandfather    • Cancer Maternal Aunt         breast   • Cancer Maternal Aunt         breast   • Cancer Other        SOCIAL HISTORY:  Social History     Socioeconomic History   • Marital status:      Spouse name: Horace   • Number of children: 0   • Years of education: 12.5   Tobacco Use   • Smoking status: Former Smoker     Packs/day: 2.00     Years: 37.00     Pack years: 74.00     Types: Cigarettes     Start date: 1963     Quit date: 2000     Years since quittin.7   • Smokeless tobacco: Never Used   Vaping Use   • Vaping Use: Never used   Substance and Sexual Activity   • Alcohol use: No   • Drug use: No   • Sexual activity: Defer       REVIEW OF SYSTEMS:  Review of Systems   Constitutional: Negative for activity change, appetite change, chills, diaphoresis, fatigue, fever and unexpected weight loss.        Manages her personal ADLs, and to include chores, errands and driving.  Lives alone.  Is up and about, \"oh yeah, " "all the time.\"   HENT: Negative for ear pain, nosebleeds, sinus pressure, sore throat and voice change.    Eyes: Negative for blurred vision, double vision, pain and visual disturbance.   Respiratory: Negative for cough and shortness of breath.    Cardiovascular: Positive for leg swelling (bilateral). Negative for chest pain and palpitations.   Gastrointestinal: Negative for abdominal pain, anal bleeding, blood in stool, constipation, diarrhea, nausea and vomiting.   Endocrine: Negative for heat intolerance, polydipsia and polyuria.   Genitourinary: Negative for dysuria, frequency, hematuria, urgency and urinary incontinence.   Musculoskeletal: Positive for arthralgias (Lower back) and back pain (chronic). Negative for myalgias.   Skin: Negative for rash and skin lesions.   Neurological: Negative for dizziness, tremors, seizures, syncope, speech difficulty, weakness and headache.   Hematological: Negative for adenopathy. Does not bruise/bleed easily.   Psychiatric/Behavioral: Negative for dysphoric mood, sleep disturbance, suicidal ideas and depressed mood.         /70   Pulse 54   Temp 96.7 °F (35.9 °C)   Resp 16   Ht 157.5 cm (62\")   Wt 86.5 kg (190 lb 9.6 oz)   LMP  (LMP Unknown)   SpO2 97%   Breastfeeding No   BMI 34.86 kg/m²  Body surface area is 1.87 meters squared.  Pain Score    06/08/22 0947   PainSc: 0-No pain       Physical Exam:  Physical Exam  Constitutional:       Appearance: Normal appearance. She is well-developed.      Comments: Pleasant, cooperative, obese, modestly kept elderly female.  Ambulatory.    She has lost 12 pounds (had gained 8 pounds at her prior visit) since her last visit    HENT:      Head: Atraumatic.   Eyes:      General: No scleral icterus.     Pupils: Pupils are equal, round, and reactive to light.   Neck:      Trachea: Trachea normal.   Cardiovascular:      Rate and Rhythm: Normal rate and regular rhythm.      Heart sounds: No murmur heard.  Pulmonary:      " "Effort: Pulmonary effort is normal.      Breath sounds: Normal breath sounds. No wheezing, rhonchi or rales.   Chest:   Breasts:      Right: Absent. No axillary adenopathy or supraclavicular adenopathy.      Left: Absent. No axillary adenopathy or supraclavicular adenopathy.        Comments: Right breast is notable for reconstruction implant    Left breast notable for reconstruction implant    Port in the left upper chest is again noted to be well seated.  We again discussed removal of the port since it has been in for over 5 years.  The patient states she has thought about it but for now would like to keep it in since it has been working well and it has not been giving her problems and it has been useful for blood draws.  I recommended that we remove it but she does not want to do it just yet.  \"I might need it again.\"  Abdominal:      Palpations: Abdomen is soft.      Tenderness: There is no abdominal tenderness. There is no guarding or rebound.   Musculoskeletal:         General: Swelling present.      Cervical back: Neck supple.      Right lower leg: Edema (Trace ankle) present.      Left lower leg: Edema (Trace ankle) present.   Lymphadenopathy:      Cervical: No cervical adenopathy.      Right cervical: No superficial or deep cervical adenopathy.     Left cervical: No superficial or deep cervical adenopathy.      Upper Body:      Right upper body: No supraclavicular or axillary adenopathy.      Left upper body: No supraclavicular or axillary adenopathy.   Skin:     General: Skin is warm and dry.   Neurological:      Mental Status: She is alert and oriented to person, place, and time.      Sensory: No sensory deficit.      Gait: Gait normal.   Psychiatric:         Judgment: Judgment normal.       LABS    Lab Results - Last 18 Months   Lab Units 06/01/22  0756 12/08/21  1006 06/07/21  0832 03/01/21  0822 12/14/20  0937   HEMOGLOBIN g/dL 13.1 13.7 12.4 13.5 12.8   HEMATOCRIT % 40.9 41.6 36.9 41.6 39.6   MCV fL " 95.3 91.8 90.4 92 93.4   WBC 10*3/mm3 7.65 8.37 7.45 8.1 7.52   RDW % 13.5 13.8 13.0 12.4 12.6   MPV fL 10.6 10.7 10.9  --  10.9   PLATELETS 10*3/mm3 175 169 161 202 169   IMM GRAN % % 0.4 0.5  --   --  0.5   NEUTROS ABS 10*3/mm3 4.58 5.55 4.69 5.0 4.55   LYMPHS ABS 10*3/mm3 2.38 2.19 2.06 2.5 2.41   MONOS ABS 10*3/mm3 0.50 0.42 0.47 0.5 0.37   EOS ABS 10*3/mm3 0.12 0.13 0.15 0.1 0.13   BASOS ABS 10*3/mm3 0.04 0.04 0.04 0.0 0.02   IMMATURE GRANS (ABS) 10*3/mm3 0.03 0.04  --   --  0.04   NRBC /100 WBC 0.0 0.0  --   --  0.0       Lab Results - Last 18 Months   Lab Units 06/01/22  0756 08/10/21  0834 06/07/21  0832 03/08/21  1215 03/01/21  0822 12/14/20  0937   GLUCOSE mg/dL 120*  --  116* 127* 106* 108*   SODIUM mmol/L 139  --  139 136 140 140   POTASSIUM mmol/L 4.1  --  5.0 4.6 4.6 4.5   TOTAL CO2 mmol/L  --   --   --  25.1 23  --    CO2 mmol/L 28.0  --  27.0  --   --  26.0   CHLORIDE mmol/L 103  --  102 98 101 105   ANION GAP mmol/L 8.0  --  10.0  --   --  9.0   CREATININE mg/dL 0.61 0.70 0.74 0.75 0.74 0.55*   BUN mg/dL 17  --  20 20 14 19   BUN / CREAT RATIO  27.9*  --  27.0* 26.7* 19 34.5*   CALCIUM mg/dL 9.7  --  10.5 9.9 9.6 9.2   EGFR IF NONAFRICN AM mL/min/1.73  --   --  76 75 78 107   ALK PHOS U/L 131*  --  108  --  121* 114   TOTAL PROTEIN g/dL 7.2  --  7.1  --   --  6.8   ALT (SGPT) U/L 19  --  11  --  9 8   AST (SGOT) U/L 35*  --  26  --  18 20   BILIRUBIN mg/dL 0.3  --  0.3  --  0.3 0.4   ALBUMIN g/dL 4.50  --  4.00  --  4.5 4.40   GLOBULIN gm/dL 2.7  --  3.1  --   --  2.4     ASSESSMENT  1.  Left breast carcinoma diagnosed in October 2014  · Stage: AJCC TNM IIA [id number], ER/SD/HER-2 positive  · Treatment: Bilateral mastectomies, Adjuvant chemotherapy with 5 cycles of Carbplatin Taxotere Herceptin followed by a total of one year of herceptin. Hormonal manipulation starting in June 2015 -2020.  · Disease status:   · Clinically YMKE.    2.  Chronic mildly elevated CEA.  Stable  -- 06/01/2022-5.3  "(3.6-5.38)  3.  Osteoarthritis/Chronic back pain - stable followed by pcp  4.  Obesity  5.  Slightly elevated AST and alk phos.  Chronic liver disease/fatty liver?  -- 06/01/2022- hepatitis profile-nonreactive  -- 08/10/2021- CT abdomen/pelvis.  Mildly nodular liver contour suggests chronic liver disease.  --03/22/2022-CT abdomen/pelvis ([place]).  No acute abnormality.  Liver unremarkable.  6.  Abnormal chest CT.  Followed by Dr. Tinoco and Dr. Quinones  --03/19/2022-[facility]-no PE.  Right greater than left lung base nodular infiltrates and bronchial wall thickening.  Also focal left upper lobe groundglass infiltrate.  Findings favor pneumonia.      PLAN  1.  Apprised of labs, 06/01/2022 with AST 35, alk phos 131 otherwise normal CMP, CEA 5.3 (3.6-5.38), normal CA 27-29, normal CBC, negative hepatitis profile  2.  Schedule liver ultrasound if not already done   3.  Apprised of CT PE protocol, 03/19/2022 (above).  Follow-up imaging on 06/09/2022 per patient recollection.  Followed by Dr. Tinoco and Dr. Quinones.  4.  Apprised of CT abdomen/pelvis, 03/22/2022.  No acute abnormality.    5.  Port flush every 8 weeks-due today  6.  Refer back to general surgery when she agrees to have her port removed.  Today states again that, \"I want to keep it in for now.\"  7.   Continue to follow with pcp and other specialists  8.  Return in 6 mo for follow up and with preoffice CBC, CMP, CEA, CA 27-29    I spent 32 minutes caring for Ira on this date of service. This time includes time spent by me in the following activities: preparing for the visit, reviewing tests, performing a medically appropriate examination and/or evaluation, counseling and educating the patient/family/caregiver, ordering medications, tests, or procedures and documenting information in the medical record.               "

## 2022-06-06 ENCOUNTER — APPOINTMENT (OUTPATIENT)
Dept: CT IMAGING | Facility: HOSPITAL | Age: 77
End: 2022-06-06

## 2022-06-08 ENCOUNTER — OFFICE VISIT (OUTPATIENT)
Dept: ONCOLOGY | Facility: CLINIC | Age: 77
End: 2022-06-08

## 2022-06-08 VITALS
HEART RATE: 54 BPM | RESPIRATION RATE: 16 BRPM | WEIGHT: 190.6 LBS | BODY MASS INDEX: 35.07 KG/M2 | SYSTOLIC BLOOD PRESSURE: 120 MMHG | DIASTOLIC BLOOD PRESSURE: 70 MMHG | OXYGEN SATURATION: 97 % | TEMPERATURE: 96.7 F | HEIGHT: 62 IN

## 2022-06-08 DIAGNOSIS — C50.919 MALIGNANT NEOPLASM OF FEMALE BREAST, UNSPECIFIED ESTROGEN RECEPTOR STATUS, UNSPECIFIED LATERALITY, UNSPECIFIED SITE OF BREAST: Primary | Chronic | ICD-10-CM

## 2022-06-08 PROCEDURE — 99214 OFFICE O/P EST MOD 30 MIN: CPT | Performed by: INTERNAL MEDICINE

## 2022-06-09 ENCOUNTER — APPOINTMENT (OUTPATIENT)
Dept: ULTRASOUND IMAGING | Facility: HOSPITAL | Age: 77
End: 2022-06-09

## 2022-06-16 ENCOUNTER — HOSPITAL ENCOUNTER (OUTPATIENT)
Dept: CT IMAGING | Facility: HOSPITAL | Age: 77
Discharge: HOME | End: 2022-06-16

## 2022-06-16 ENCOUNTER — HOSPITAL ENCOUNTER (OUTPATIENT)
Dept: ULTRASOUND IMAGING | Facility: HOSPITAL | Age: 77
Discharge: HOME | End: 2022-06-16

## 2022-06-16 DIAGNOSIS — R74.8 ELEVATED LIVER ENZYMES: ICD-10-CM

## 2022-06-16 DIAGNOSIS — J18.9 PNEUMONIA OF BOTH LOWER LOBES DUE TO INFECTIOUS ORGANISM: ICD-10-CM

## 2022-06-16 PROCEDURE — 71250 CT THORAX DX C-: CPT

## 2022-06-16 PROCEDURE — 76705 ECHO EXAM OF ABDOMEN: CPT

## 2022-06-17 ENCOUNTER — OFFICE VISIT (OUTPATIENT)
Dept: PULMONOLOGY | Facility: CLINIC | Age: 77
End: 2022-06-17

## 2022-06-17 ENCOUNTER — TELEPHONE (OUTPATIENT)
Dept: ONCOLOGY | Facility: CLINIC | Age: 77
End: 2022-06-17

## 2022-06-17 VITALS
WEIGHT: 191 LBS | SYSTOLIC BLOOD PRESSURE: 132 MMHG | HEART RATE: 46 BPM | OXYGEN SATURATION: 97 % | BODY MASS INDEX: 35.15 KG/M2 | HEIGHT: 62 IN | DIASTOLIC BLOOD PRESSURE: 70 MMHG

## 2022-06-17 DIAGNOSIS — J18.9 PNEUMONIA OF BOTH LOWER LOBES DUE TO INFECTIOUS ORGANISM: Primary | ICD-10-CM

## 2022-06-17 DIAGNOSIS — Z87.891 PERSONAL HISTORY OF NICOTINE DEPENDENCE: ICD-10-CM

## 2022-06-17 DIAGNOSIS — R05.3 CHRONIC COUGH: ICD-10-CM

## 2022-06-17 DIAGNOSIS — E66.9 OBESITY (BMI 30-39.9): ICD-10-CM

## 2022-06-17 PROCEDURE — 99214 OFFICE O/P EST MOD 30 MIN: CPT | Performed by: INTERNAL MEDICINE

## 2022-06-17 NOTE — PATIENT INSTRUCTIONS
The patient's chest CT did not show any new or suspicious lesions with no evidence of any acute process and I did review the scan with her.  She is stable from a pulmonary standpoint and I will see her back in 1 year.

## 2022-06-17 NOTE — ASSESSMENT & PLAN NOTE
Patient's (Body mass index is 34.93 kg/m².) indicates that they are obese (BMI >30) with health conditions that include hypertension . Weight is unchanged.  Diet and exercise are encouraged and she will follow-up with her primary care physician regarding her elevated BMI otherwise.

## 2022-06-17 NOTE — TELEPHONE ENCOUNTER
----- Message from Adalberto Wallace MD sent at 6/16/2022  3:20 PM CDT -----  Cirrhosis? Pls refer to GI if not already being followed

## 2022-06-17 NOTE — ASSESSMENT & PLAN NOTE
Her chest CT performed yesterday at [facility] showed significant improvement with no acute process noted.

## 2022-06-17 NOTE — PROGRESS NOTES
Chief Complaint  Recent pneumonia.    Subjective    History of Present Illness     Ira Sanchez presents to [facility] for a recent pneumonia and abnormal imaging studies.    History of Present Illness   The patient states that she is continuing to improve clinically in terms of her recent pneumonia and feels she is about totally recovered.  She did have a chest CT done yesterday and I reviewed it with her.  It does not show any acute process.  The previously noted infiltrates that were present on scans from [place] do appear to have resolved.  She did smoke in the past but quit smoking in 2000.  Based on this she would not be a candidate for any routine follow-up CT scans for screening purposes.  She does have a history of breast cancer and if Dr. Paredes on does perform any follow-up imaging studies that might include chest imaging I advised her to have them sent to our office.  Otherwise I will just plan on a follow-up visit in 1 year.  She may continue her Advair Diskus if this helps her problems with cough.  She clearly does not however have COPD based on the pulmonary functions performed at [facility] last August.  She has had her COVID-19 vaccine and would be a candidate for a booster.  She has also had the Prevnar.  She declines a flu shot.  She would be a candidate for the Pneumovax and could get that through her primary care physician or one of the local pharmacies.  An additional note is that she also had a liver ultrasound yesterday and I reviewed these results with her.  There were some abnormalities noted that could relate to some component of cirrhosis or fibrosis and I advised her to follow-up with her primary care physician Dr. Tinoco and also Dr. Paredes who had ordered the study.  Prior to Admission medications    Medication Sig Start Date End Date Taking? Authorizing Provider   amLODIPine (NORVASC) 5 MG tablet Take 1 tablet by mouth Daily.  21  Yes Conrado Tinoco MD   Calcium Carb-Cholecalciferol (CALCIUM-VITAMIN D) 600-400 MG-UNIT tablet Take 1 tablet by mouth Daily.   Yes Karis Billingsley MD   celecoxib (CeleBREX) 200 MG capsule TAKE 1 CAPSULE BY MOUTH DAILY 20  Yes Conrado Tinoco MD   cyclobenzaprine (FLEXERIL) 5 MG tablet Take 1 tablet by mouth Every Night. 21  Yes Conrado Tinoco MD   cycloSPORINE (RESTASIS) 0.05 % ophthalmic emulsion 1 drop Every 12 (Twelve) Hours.   Yes Karis Billingsley MD   diphenhydrAMINE (BENADRYL) 25 mg capsule Take 25 mg by mouth At Night As Needed for Allergies.   Yes Karis Billingsley MD   fluticasone-salmeterol (Advair Diskus) 250-50 MCG/DOSE DISKUS Inhale 1 puff 2 (Two) Times a Day. 3/31/22  Yes Conrado Tinoco MD   ibandronate (Boniva) 150 MG tablet Take 1 tablet by mouth Every 30 (Thirty) Days. 3/3/22  Yes Conrado Tinoco MD   ibuprofen (ADVIL,MOTRIN) 200 MG tablet Take 400 mg by mouth As Needed.   Yes Karis Billingsley MD   losartan (Cozaar) 100 MG tablet Take 1 tablet by mouth Daily. 22  Yes Conrado Tinoco MD   Omega-3 1000 MG capsule Take  by mouth Daily.   Yes Karis Billingsley MD   omeprazole (priLOSEC) 20 MG capsule Take 1 capsule by mouth Daily. 3/22/22  Yes Conrado Tinoco MD   oxybutynin XL (DITROPAN-XL) 10 MG 24 hr tablet Take 10 mg by mouth Daily. 21  Yes Karis Billingsley MD   thiamine (VITAMINE B-1) 100 MG tablet Take 100 mg by mouth Daily.   Yes Karis Billingsley MD       Social History     Socioeconomic History   • Marital status:      Spouse name: Horace   • Number of children: 0   • Years of education: 12.5   Tobacco Use   • Smoking status: Former Smoker     Packs/day: 2.00     Years: 37.00     Pack years: 74.00     Types: Cigarettes     Start date: 1963     Quit date: 2000     Years since quittin.7   • Smokeless tobacco: Never Used   Vaping Use   • Vaping Use: Never used  "  Substance and Sexual Activity   • Alcohol use: No   • Drug use: No   • Sexual activity: Defer       Objective   Vital Signs:   /70   Pulse (!) 46   Ht 157.5 cm (62\")   Wt 86.6 kg (191 lb)   SpO2 97% Comment: RA  BMI 34.93 kg/m²     Physical Exam  Vitals and nursing note reviewed.   Constitutional:       Appearance: She is obese.      Comments: She is somewhat bradycardic with a pulse of approximately 50.  Per review of her previous vital signs her pulse generally runs from the mid 40s to the mid 50s.   HENT:      Head: Normocephalic.      Comments: She is wearing a mask.  Eyes:      Extraocular Movements: Extraocular movements intact.      Pupils: Pupils are equal, round, and reactive to light.   Cardiovascular:      Rate and Rhythm: Regular rhythm. Bradycardia present.      Comments: Again her pulse is running in the range of 46-50 and she generally runs a pulse rate in the mid 40s to the mid 50s.  She is not having any symptoms associated with her bradycardia.  Pulmonary:      Effort: Pulmonary effort is normal.      Breath sounds: Normal breath sounds.   Musculoskeletal:         General: Normal range of motion.   Skin:     General: Skin is warm and dry.   Neurological:      General: No focal deficit present.      Mental Status: She is alert and oriented to person, place, and time.   Psychiatric:         Mood and Affect: Mood normal.         Behavior: Behavior normal.        Result Review :          Results for orders placed during the hospital encounter of 08/10/21    Pulmonary Function Test    [facility] - Pulmonary Function Test    [address] [place] [address]  [id number]  [phone]    Patient : Ira Sanchez  MRN : [id number]  CSN : [id number]  Pulmonologist : Dante Mendoza MD  Date : 8/10/2021    ______________________________________________________________________    Interpretation :  1.  Spirometry is within normal limits.  2.  There is actually improvement in small " airways function postbronchodilator which is supranormal postbronchodilator.  Otherwise there is no significant change in spirometry postbronchodilator.  3.  Lung volumes reveal a decrease in expiratory reserve volume, and otherwise are within normal limits.  4.  There is a mild diffusion impairment which when corrected for alveolar volume is a low normal diffusion capacity.  4.  Arterial blood gases reveal mild hypoxemia on room air.      Dante Mendoza MD                 My interpretation of imaging:    CT Chest Without Contrast Diagnostic (06/16/2022 08:33)  US Liver (06/16/2022 09:06)          Assessment and Plan     Diagnoses and all orders for this visit:    1. Pneumonia of both lower lobes due to infectious organism (Primary)  Assessment & Plan:  Her chest CT performed yesterday at [facility] showed significant improvement with no acute process noted.      2. Chronic cough  Assessment & Plan:  She may continue her Advair Diskus.      3. Personal history of nicotine dependence  Assessment & Plan:  She has not smoked since 2000.      4. Obesity (BMI 30-39.9)  Assessment & Plan:  Patient's (Body mass index is 34.93 kg/m².) indicates that they are obese (BMI >30) with health conditions that include hypertension . Weight is unchanged.  Diet and exercise are encouraged and she will follow-up with her primary care physician regarding her elevated BMI otherwise.          Dante Mendoza MD  6/17/2022  12:53 CDT    Follow Up   Return in about 1 year (around 6/17/2023) for To see me specifically.    Patient was given instructions and counseling regarding her condition or for health maintenance advice. Please see specific information pulled into the AVS if appropriate.

## 2022-06-20 ENCOUNTER — TELEPHONE (OUTPATIENT)
Dept: FAMILY MEDICINE CLINIC | Facility: CLINIC | Age: 77
End: 2022-06-20

## 2022-06-20 DIAGNOSIS — R74.8 ELEVATED LIVER ENZYMES: Primary | ICD-10-CM

## 2022-06-20 DIAGNOSIS — N32.81 OVERACTIVE BLADDER: Primary | ICD-10-CM

## 2022-06-20 DIAGNOSIS — R94.5 ABNORMAL RESULTS OF LIVER FUNCTION STUDIES: ICD-10-CM

## 2022-06-20 NOTE — TELEPHONE ENCOUNTER
Caller: Ira Sanchez    Relationship to patient: Self    Best call back number:  [phone] (H)     Patient is needing: Pt would like to confirm that  received a copy of her scans that were done last week 6/16/22. She would like to discuss her results. She said that they told her that the results show cirrhosis but she is unsure.

## 2022-06-20 NOTE — TELEPHONE ENCOUNTER
Patient called back and I gave her the results. She is agreeable to see GI. She reports that she had seen Dr. Genao in the past and that is who she would like to see again.

## 2022-06-21 RX ORDER — OXYBUTYNIN CHLORIDE 10 MG/1
TABLET, EXTENDED RELEASE ORAL
Qty: 90 TABLET | Refills: 3 | Status: SHIPPED | OUTPATIENT
Start: 2022-06-21

## 2022-06-21 NOTE — TELEPHONE ENCOUNTER
Rx Refill Note  Requested Prescriptions     Pending Prescriptions Disp Refills   • oxybutynin XL (DITROPAN-XL) 10 MG 24 hr tablet [Pharmacy Med Name: OXYBUTYNIN ER 10MG TABLETS] 90 tablet      Sig: TAKE 1 TABLET BY MOUTH DAILY      Last office visit with prescribing clinician: 3/31/2022      Next office visit with prescribing clinician: 6/23/2022            Sheri Mascorro LPN  06/21/22, 17:10 CDT

## 2022-06-21 NOTE — TELEPHONE ENCOUNTER
Would prefer she come in    US Liver    Result Date: 6/16/2022   1.  Coarsened hepatic echotexture and mildly lobular contour with prominence of the caudate lobe. Underlying fibrosis/cirrhotic morphology to be considered.   This report was finalized on 06/16/2022 14:48 by Dr. Maco Leyva MD.    (3.22.22 CT abdomen/[facility] normal liver)    6.16.22  Normal heart size.  Heart valve calcification.  Prominent coronary artery calcification.  There are a few stable nonenlarged middle mediastinal lymph nodes.  No axillary mass.  Mildly hyperexpanded lungs.  No pneumonia, pneumothorax, or pleural effusion.  No bronchiectasis or cystic lung disease.  Minimal scarring or atelectasis is seen within both lungs.  Moderate degenerative endplate spurring within the mid and lower  thoracic spine.  Summary:  1. Mild chronic lung change and arterial calcification noted.  2. No acute lung disease.

## 2022-06-21 NOTE — TELEPHONE ENCOUNTER
Results in timeline, pt next office visit 7-18-22    Do you want to move her appt up? Or stable until seen, pt is wanting Dr Tinoco thoughts on her most recent testing CT chest - Dr Mendoza,  liver Dr Wallace

## 2022-06-23 ENCOUNTER — OFFICE VISIT (OUTPATIENT)
Dept: FAMILY MEDICINE CLINIC | Facility: CLINIC | Age: 77
End: 2022-06-23

## 2022-06-23 VITALS
RESPIRATION RATE: 16 BRPM | OXYGEN SATURATION: 97 % | HEIGHT: 62 IN | TEMPERATURE: 96.9 F | DIASTOLIC BLOOD PRESSURE: 82 MMHG | BODY MASS INDEX: 34.96 KG/M2 | WEIGHT: 190 LBS | HEART RATE: 62 BPM | SYSTOLIC BLOOD PRESSURE: 138 MMHG

## 2022-06-23 DIAGNOSIS — E11.9 CONTROLLED TYPE 2 DIABETES MELLITUS WITHOUT COMPLICATION, WITHOUT LONG-TERM CURRENT USE OF INSULIN: Chronic | ICD-10-CM

## 2022-06-23 DIAGNOSIS — R93.5 ABNORMAL US (ULTRASOUND) OF ABDOMEN: ICD-10-CM

## 2022-06-23 DIAGNOSIS — E66.9 OBESITY (BMI 30-39.9): ICD-10-CM

## 2022-06-23 PROCEDURE — 99214 OFFICE O/P EST MOD 30 MIN: CPT | Performed by: FAMILY MEDICINE

## 2022-06-23 NOTE — PROGRESS NOTES
Subjective   Ira Sanchez is a 77 y.o. female presenting with chief complaint of:   Chief Complaint   Patient presents with   • Follow-up     testing     History of Present Illness :  Alone.  Here for primarily a concern with recent xray/labs.  She had an ultrasound of her liver that shows possible cirrhosis.  She indicates she has never drank.  We have previous hepatitis C studies that were negative.  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  She has been overweight but previous CTs have not showed fatty liver.  In fact confusingly a March CT of the abdomen and [place] said liver normal.  Liver enzymes have been minimally elevated    Has multiple chronic problems to consider that might have a bearing on today's issues; still somewhat an interval appointment.       Chronic/acute problems reviewed today:   1. Obesity (BMI 30-39.9) Chronic/stable.  Aware, advised weight loss before.  On/off some success with weight loss but often with weight gain back.      2. Controlled type 2 diabetes mellitus without complication, without long-term current use of insulin (HCC) Chronic/stable for years.  No problem/pattern hypoglycemia/hyperglycemia manifest by poly- dypsia, phagia, uria, or sweats, diaphoretic episodes, syncope/near.     3. Abnormal US (ultrasound) of abdomen see above     Has an/another acute issue today: none.    The following portions of the patient's history were reviewed and updated as appropriate: allergies, current medications, past family history, past medical history, past social history, past surgical history and problem list.      Current Outpatient Medications:   •  amLODIPine (NORVASC) 5 MG tablet, Take 1 tablet by mouth Daily., Disp: 90 tablet, Rfl: 3  •  Calcium Carb-Cholecalciferol (CALCIUM-VITAMIN D) 600-400 MG-UNIT tablet, Take 1 tablet by mouth Daily., Disp: , Rfl:   •  celecoxib (CeleBREX) 200 MG capsule, TAKE 1 CAPSULE BY MOUTH DAILY, Disp: 30 capsule, Rfl: 5-not  sure  •  cyclobenzaprine (FLEXERIL) 5 MG tablet, Take 1 tablet by mouth Every Night., Disp: 30 tablet, Rfl: 1  •  cycloSPORINE (RESTASIS) 0.05 % ophthalmic emulsion, 1 drop Every 12 (Twelve) Hours., Disp: , Rfl:   •  diphenhydrAMINE (BENADRYL) 25 mg capsule, Take 25 mg by mouth At Night As Needed for Allergies., Disp: , Rfl:   •  fluticasone-salmeterol (Advair Diskus) 250-50 MCG/DOSE DISKUS, Inhale 1 puff 2 (Two) Times a Day., Disp: 60 each, Rfl: 5  •  ibandronate (Boniva) 150 MG tablet, Take 1 tablet by mouth Every 30 (Thirty) Days., Disp: 5 tablet, Rfl: 1  •  losartan (Cozaar) 100 MG tablet, Take 1 tablet by mouth Daily., Disp: 90 tablet, Rfl: 2  •  Omega-3 1000 MG capsule, Take  by mouth Daily., Disp: , Rfl:   •  omeprazole (priLOSEC) 20 MG capsule, Take 1 capsule by mouth Daily., Disp: 15 capsule, Rfl: 0  •  oxybutynin XL (DITROPAN-XL) 10 MG 24 hr tablet, TAKE 1 TABLET BY MOUTH DAILY, Disp: 90 tablet, Rfl: 3  •  thiamine (VITAMINE B-1) 100 MG tablet, Take 100 mg by mouth Daily., Disp: , Rfl:   •  ibuprofen (ADVIL,MOTRIN) 200 MG tablet, Take 400 mg by mouth As Needed., Disp: , Rfl:   No current facility-administered medications for this visit.-using    Facility-Administered Medications Ordered in Other Visits:   •  heparin flush (porcine) 100 UNIT/ML injection 500 Units, 500 Units, Intravenous, PRN, Shanda Torres MD, 500 Units at 04/16/18 1504  •  heparin flush (porcine) 100 UNIT/ML injection 500 Units, 500 Units, Intravenous, PRN, Shanda Torres MD, 500 Units at 06/12/18 1445  •  heparin injection 500 Units, 500 Units, Intravenous, PRN, Tamela Doherty APRN, 500 Units at 12/08/21 0948  •  sodium chloride 0.9 % flush 10 mL, 10 mL, Intravenous, PRN, Shanda Torres MD, 10 mL at 04/16/18 1503  •  sodium chloride 0.9 % flush 10 mL, 10 mL, Intravenous, PRN, Shanda Torres MD, 10 mL at 06/12/18 1445  •  sodium chloride 0.9 % flush 10 mL, 10 mL, Intravenous,  Bessy ALLEN Alison Carol, APRN, 10 mL at 12/08/21 0948    No problems with medications.    Allergies   Allergen Reactions   • Benzonatate Hives and Itching       Review of Systems  GENERAL:  Active/slower with limits, speed, stamina for age, and occ back pain.  Sleep is ok. No fever now.  SKIN: No other rash/skin lesion of concern:   ENDO:  No syncope, near or diaphoretic sweaty spells.  No download..  HEENT: No recent head injury; stable occ headache.  No vision change.  No chronic hearing loss.  Ears without fullness without pain/drainage.  No sore throat now.  No significant nasal/sinus congestion/drainage. No epistaxis.   CHEST: No chest wall tenderness or mass.  Same occ/dry cough, and no wheeze.  No SOB; no hemoptysis.  CV: No chest pain, palpitations; daily end of day mild pitting/ankle edema.  GI: No heartburn, dysphagia.  No abdominal pain, diarrhea, constipation.  No rectal bleeding, or melena.    :  Voids with occ on/off dysuria, without incontinence to completion.  ORTHO: No painful/swollen joints but various on /off sore.  Occ same/sore neck or back.  No acute neck or lower back pain without recent injury.  NEURO: No dizziness, weakness of extremities.  Same mild numbness/paresthesias LE.  PSYCH: No memory loss.  Mood good; mild anxious, depressed but/and not suicidal.  Tries to tolerate stress .   Screening:  Mammogram: bilateral masectomy  Bone density: Bone d-deca/[facility]/T hip -0.5 LS +1.4/12.18.18;  no change; maybe 2-5 yr  Low dose CT chest:Tobacco-smoker/age 17/1-2ppd/dc age 60:  CTs with breast cancer:   GI: Colon-avm-div/[facility]/12.19.17/5y  Prostate: NA  Usual lab order  6m CBC, CMP, A1c  12m CBC, CMP, A1c, LIPID, TSH, Vit D    Copy/paste function used for ROS/exam AND each area of these were reviewed, updated, confirmed and supplemented as needed.   Data reviewed:   Recent admit/ER/MD visits: last visit  Last cardiac testing:   Echo:   Results for orders placed during the hospital  encounter of 04/23/21    Adult Transthoracic Echo Complete W/ Cont if Necessary Per Protocol    Interpretation Summary  · Technically difficult study.  · Left ventricular ejection fraction appears to be 66 - 70%. Left ventricular systolic function is normal.  · Left ventricular diastolic function is consistent with (grade I) impaired relaxation.  · Normal right ventricular cavity size and systolic function noted.  · Mild aortic valve stenosis is present.  · Estimated right ventricular systolic pressure from tricuspid regurgitation is normal (<35 mmHg).    Radiology considered:   US Liver    Result Date: 6/16/2022   1.  Coarsened hepatic echotexture and mildly lobular contour with prominence of the caudate lobe. Underlying fibrosis/cirrhotic morphology to be considered.   This report was finalized on 06/16/2022 14:48 by Dr. Maco Leyva MD.    CT Abdomen Pelvis With Contrast (08/10/2021 08:51)  IMPRESSION:  1. No acute intra-abdominal finding.  2. Colonic diverticula. No evidence of acute diverticulitis.  3. Mildly nodular liver contour suggests chronic liver disease.  4. Small tree-in-bud opacities at the lingula. See separately dictated  CT chest of the same day.  5. Calcified atherosclerosis.    3.22.22 CT abdomen [facility]  Liver is unremarkable    3.19.22 CT chest [facility]  No evidence of pulmonary artery thrombus  R > L lung base nodular infiltrates and bronchial wall thickening; also a focal left upper lobe ground glass infitrate; favor pneumonitis    Lab Results:  Results for orders placed or performed in visit on 06/01/22   Comprehensive Metabolic Panel    Specimen: Blood   Result Value Ref Range    Glucose 120 (H) 65 - 99 mg/dL    BUN 17 8 - 23 mg/dL    Creatinine 0.61 0.57 - 1.00 mg/dL    Sodium 139 136 - 145 mmol/L    Potassium 4.1 3.5 - 5.2 mmol/L    Chloride 103 98 - 107 mmol/L    CO2 28.0 22.0 - 29.0 mmol/L    Calcium 9.7 8.6 - 10.5 mg/dL    Total Protein 7.2 6.0 - 8.5 g/dL    Albumin 4.50 3.50 - 5.20 g/dL     ALT (SGPT) 19 1 - 33 U/L    AST (SGOT) 35 (H) 1 - 32 U/L    Alkaline Phosphatase 131 (H) 39 - 117 U/L    Total Bilirubin 0.3 0.0 - 1.2 mg/dL    Globulin 2.7 gm/dL    A/G Ratio 1.7 g/dL    BUN/Creatinine Ratio 27.9 (H) 7.0 - 25.0    Anion Gap 8.0 5.0 - 15.0 mmol/L    eGFR 91.1 >60.0 mL/min/1.73   CEA    Specimen: Blood   Result Value Ref Range    CEA 5.30 ng/mL   Cancer Antigen 27.29    Specimen: Blood   Result Value Ref Range    CA 27.29 24.3 0.0 - 38.6 U/mL   CBC Auto Differential    Specimen: Blood   Result Value Ref Range    WBC 7.65 3.40 - 10.80 10*3/mm3    RBC 4.29 3.77 - 5.28 10*6/mm3    Hemoglobin 13.1 12.0 - 15.9 g/dL    Hematocrit 40.9 34.0 - 46.6 %    MCV 95.3 79.0 - 97.0 fL    MCH 30.5 26.6 - 33.0 pg    MCHC 32.0 31.5 - 35.7 g/dL    RDW 13.5 12.3 - 15.4 %    RDW-SD 47.6 37.0 - 54.0 fl    MPV 10.6 6.0 - 12.0 fL    Platelets 175 140 - 450 10*3/mm3    Neutrophil % 59.9 42.7 - 76.0 %    Lymphocyte % 31.1 19.6 - 45.3 %    Monocyte % 6.5 5.0 - 12.0 %    Eosinophil % 1.6 0.3 - 6.2 %    Basophil % 0.5 0.0 - 1.5 %    Immature Grans % 0.4 0.0 - 0.5 %    Neutrophils, Absolute 4.58 1.70 - 7.00 10*3/mm3    Lymphocytes, Absolute 2.38 0.70 - 3.10 10*3/mm3    Monocytes, Absolute 0.50 0.10 - 0.90 10*3/mm3    Eosinophils, Absolute 0.12 0.00 - 0.40 10*3/mm3    Basophils, Absolute 0.04 0.00 - 0.20 10*3/mm3    Immature Grans, Absolute 0.03 0.00 - 0.05 10*3/mm3    nRBC 0.0 0.0 - 0.2 /100 WBC   Hepatitis Panel, Acute    Specimen: Blood   Result Value Ref Range    Hepatitis B Surface Ag Non-Reactive Non-Reactive    Hep A IgM Non-Reactive Non-Reactive    Hep B C IgM Non-Reactive Non-Reactive    Hepatitis C Ab Non-Reactive Non-Reactive       A1C:  Lab Results - Last 18 Months   Lab Units 12/08/21  1006 06/07/21  0832   HEMOGLOBIN A1C % 6.50* 6.10*     GLUCOSE:  Lab Results - Last 18 Months   Lab Units 06/01/22  0756 06/07/21  0832 03/08/21  1215 03/01/21  0822   GLUCOSE mg/dL 120* 116* 127* 106*     LIPID:  Lab Results - Last 18  Months   Lab Units 12/08/21  1006   LDL CHOL mg/dL 100   HDL CHOL mg/dL 49   TRIGLYCERIDES mg/dL 86     PSA:No results for input(s): PSA in the last 10447 hours.    CBC:  Lab Results - Last 18 Months   Lab Units 06/01/22  0756 12/08/21  1006 06/07/21  0832 03/01/21  0822   WBC 10*3/mm3 7.65 8.37 7.45 8.1   HEMOGLOBIN g/dL 13.1 13.7 12.4 13.5   HEMATOCRIT % 40.9 41.6 36.9 41.6   PLATELETS 10*3/mm3 175 169 161 202      BMP/CMP:  Lab Results - Last 18 Months   Lab Units 06/01/22  0756 08/10/21  0834 06/07/21  0832 03/08/21  1215 03/01/21  0822   SODIUM mmol/L 139  --  139 136 140   POTASSIUM mmol/L 4.1  --  5.0 4.6 4.6   CHLORIDE mmol/L 103  --  102 98 101   TOTAL CO2 mmol/L  --   --   --  25.1 23   CO2 mmol/L 28.0  --  27.0  --   --    GLUCOSE mg/dL 120*  --  116* 127* 106*   BUN mg/dL 17  --  20 20 14   CREATININE mg/dL 0.61 0.70 0.74 0.75 0.74   EGFR IF NONAFRICN AM mL/min/1.73  --   --  76 75 78   EGFR IF AFRICN AM mL/min/1.73  --   --   --  91 90   CALCIUM mg/dL 9.7  --  10.5 9.9 9.6     HEPATIC:  Lab Results - Last 18 Months   Lab Units 06/01/22  0756 06/07/21  0832 03/01/21  0822   ALT (SGPT) U/L 19 11 9   AST (SGOT) U/L 35* 26 18   ALK PHOS U/L 131* 108 121*     3.21.22 [facility] CMP Alk 98N, AST 40.7 (14-36)  3.20.22 [facility] CMP AST 37 (14-36), Alk P 115N    6.1.22  Hepatitis B Surface Ag   Non-Reactive Non-Reactive    Hep A IgM   Non-Reactive Non-Reactive    Hep B C IgM   Non-Reactive Non-Reactive    Hepatitis C Ab   Non-Reactive Non-Reactive    Resulting Agency  [facility]     6.8.20  Hep C Virus Ab   0.0 - 0.9 s/co ratio 0.3    Comment:                                   Negative:     < 0.8      Vit D:  Lab Results - Last 18 Months   Lab Units 12/08/21  1006   VIT D 25 HYDROXY ng/ml 40.1     THYROID:  Lab Results - Last 18 Months   Lab Units 12/08/21  1006 03/01/21  0822   TSH uIU/mL 2.100 2.180       Objective   /82 (BP Location: Right arm, Patient Position: Sitting, Cuff Size: Large Adult)   Pulse 62   Temp  "96.9 °F (36.1 °C) (Infrared)   Resp 16   Ht 157.5 cm (62\")   Wt 86.2 kg (190 lb)   LMP  (LMP Unknown)   SpO2 97%   Breastfeeding No   BMI 34.75 kg/m²   Body mass index is 34.75 kg/m².    Recent Vitals       5/5/2022 6/8/2022 6/17/2022       BP: 142/80 120/70 132/70     Pulse: 45 54 46     Temp: -- 96.7 °F (35.9 °C) --     Weight: 87.1 kg (192 lb) 86.5 kg (190 lb 9.6 oz) 86.6 kg (191 lb)     BMI (Calculated): 35.1 34.9 34.9           Physical Exam  GENERAL:  Well nourished/developed in no acute distress  SKIN: Turgor excellent, without wound, rash, lesion or significance  HEENT: Normal cephalic without trauma. Pupils equal round reactive to light. Extraocular motions full without nystagmus.   No thyromegaly, mass, tenderness, definite lymphadenopathy and supple.   CV: Regular rhythm.  1/6 systolic murmur without gallop and trace ankle/LE edema. Posterior pulses intact.  No carotid bruits.  CHEST: No chest wall tenderness or mass.   LUNGS: Symmetric motion with clear to auscultation.   ABD: Soft, non-tender without mass.   ORTHO: Symmetric extremities without swelling/point tenderness.  Full gross range of motion.   NEURO: CN 2-12 grossly intact except L eyelid mild droop.  Otherwise symmetric facies and UE/LE. 3/5 strength throughout.   Otherwise nonfocal use extremities. Speech clear.    Assessment & Plan     1. Obesity (BMI 30-39.9)    2. Controlled type 2 diabetes mellitus without complication, without long-term current use of insulin (HCC)    3. Abnormal US (ultrasound) of abdomen        Discussions/medical decisions/reviews:  BP ok  Other vitals slow; watching  DM/BS ok  Lipid ok  PSA NA  CBC ok  Renal ok  Liver minimal past elevations alk phos/AST  Vit D ok  Thyroid ok    Needs MRI liver if port will allow  Good colonoscopy coming up; agree with cancer center making apt with gi  Confusing; labs never that bad AND CT [facility] 3.2022 CT abdomen says \"liver normal\"-  Worse case; liver bx    Medical decision " issues:   Data review above:   Rx: reviewed and decisions:   Visit today involved chronic significant medical problems or differentials and/or intensive drug monitoring: ie potential to cause serious morbidity or death:   Rx changes: none  No orders of the defined types were placed in this encounter.    Orders placed:   LAB/Testing/Referrals: reviewed/orders:   Today:   Orders Placed This Encounter   Procedures   • MRI abdomen w contrast     Chronic/recurrent labs above or change to:   same    Screening reviewed/updated     Immunization History   Administered Date(s) Administered   • COVID-19 (MODERNA) 1st, 2nd, 3rd Dose Only 02/19/2021, 03/19/2021   • Fluad Quad 65+ 10/09/2020   • Fluzone High Dose =>65 Years ([employer] ONLY) 11/19/2017, 11/14/2019   • Pneumococcal Conjugate 13-Valent (PCV13) 11/19/2017     Vaccine reviewed: today none; later we advised/reaffirmed our support/suggestion for staying complete with covid- covid boosters, seasonal flu/yearly and any missing vaccine from list we supplied; we suggest contact with local health department office to review missing/needed vaccines and then bring nursing documentation for these vaccines to this office.     Health maintenance:   Body mass index is 34.75 kg/m².     Tobacco use reviewed:   Ira Sanchez  reports that she quit smoking about 21 years ago. Her smoking use included cigarettes. She started smoking about 59 years ago. She has a 74.00 pack-year smoking history. She has never used smokeless tobacco..    There are no Patient Instructions on file for this visit.    Follow up: No follow-ups on file.  Future Appointments   Date Time Provider Department Center   7/25/2022  8:30 AM Conrado Tinoco MD [facility]   7/26/2022  8:45 AM Nicholas Krause APRN [facility]   8/31/2022  8:30 AM [facility] NURSE [facility]   11/30/2022  8:30 AM [facility]   11/30/2022  9:00 AM [facility] NURSE [facility]    12/7/2022  9:30 AM Adalberto Wallace MD [facility]   4/5/2023  9:00 AM Henrietta Olvera APRN [facility]   6/19/2023  9:15 AM Dante Mendoza MD [facility]

## 2022-07-07 ENCOUNTER — TELEPHONE (OUTPATIENT)
Dept: FAMILY MEDICINE CLINIC | Facility: CLINIC | Age: 77
End: 2022-07-07

## 2022-07-07 NOTE — TELEPHONE ENCOUNTER
"Suzanna  MRI \"she has an MRI coming next week and we need the order to be MRI w and wo contrast of her abd\"       imaging called to advise \"I just need him to cosign the order we put in\"  "

## 2022-07-14 ENCOUNTER — HOSPITAL ENCOUNTER (OUTPATIENT)
Dept: MRI IMAGING | Facility: HOSPITAL | Age: 77
Discharge: HOME | End: 2022-07-14
Admitting: FAMILY MEDICINE

## 2022-07-14 DIAGNOSIS — R93.5 ABNORMAL US (ULTRASOUND) OF ABDOMEN: ICD-10-CM

## 2022-07-14 PROBLEM — R93.2: Status: ACTIVE | Noted: 2022-07-14

## 2022-07-14 PROCEDURE — A9577 INJ MULTIHANCE: HCPCS | Performed by: FAMILY MEDICINE

## 2022-07-14 PROCEDURE — 82565 ASSAY OF CREATININE: CPT

## 2022-07-14 PROCEDURE — 74183 MRI ABD W/O CNTR FLWD CNTR: CPT

## 2022-07-14 PROCEDURE — 0 GADOBENATE DIMEGLUMINE 529 MG/ML SOLUTION: Performed by: FAMILY MEDICINE

## 2022-07-14 RX ADMIN — GADOBENATE DIMEGLUMINE 17 ML: 529 INJECTION, SOLUTION INTRAVENOUS at 15:18

## 2022-07-19 ENCOUNTER — TELEPHONE (OUTPATIENT)
Dept: FAMILY MEDICINE CLINIC | Facility: CLINIC | Age: 77
End: 2022-07-19

## 2022-07-19 NOTE — TELEPHONE ENCOUNTER
Caller: Ira Sanchez    Relationship: Self    Best call back number: [phone]    What is the best time to reach you: ANYTIME    Who are you requesting to speak with (clinical staff, provider,  specific staff member): CLINICAL      What was the call regarding: PATIENT IS SORT OF CONFUSED AND UPSET REGARDING HER RECENT REFERRAL TO GASTROENTEROLOGY AND WOULD LIKE TO SPEAK WITH DR KAHN    Do you require a callback: YES

## 2022-07-20 LAB — CREAT BLDA-MCNC: 0.6 MG/DL (ref 0.6–1.3)

## 2022-07-20 NOTE — TELEPHONE ENCOUNTER
Called pt and advised that she wanted to get in sooner than 9-1-22, advised pt they will put her on a call list incase someone cancels, pt stated understanding, pt was not upset or mad, just concern of her health and wants to be sooner than later

## 2022-08-31 ENCOUNTER — INFUSION (OUTPATIENT)
Dept: ONCOLOGY | Facility: CLINIC | Age: 77
End: 2022-08-31

## 2022-08-31 DIAGNOSIS — Z45.2 ENCOUNTER FOR CARE RELATED TO VASCULAR ACCESS PORT: Primary | ICD-10-CM

## 2022-08-31 DIAGNOSIS — Z45.2 ENCOUNTER FOR CARE RELATED TO PORT-A-CATH: ICD-10-CM

## 2022-08-31 RX ORDER — HEPARIN SODIUM (PORCINE) LOCK FLUSH IV SOLN 100 UNIT/ML 100 UNIT/ML
500 SOLUTION INTRAVENOUS AS NEEDED
Status: DISCONTINUED | OUTPATIENT
Start: 2022-08-31 | End: 2022-08-31 | Stop reason: HOSPADM

## 2022-08-31 RX ORDER — SODIUM CHLORIDE 0.9 % (FLUSH) 0.9 %
10 SYRINGE (ML) INJECTION AS NEEDED
Status: DISCONTINUED | OUTPATIENT
Start: 2022-08-31 | End: 2022-08-31 | Stop reason: HOSPADM

## 2022-08-31 RX ORDER — SODIUM CHLORIDE 0.9 % (FLUSH) 0.9 %
10 SYRINGE (ML) INJECTION AS NEEDED
Status: CANCELLED | OUTPATIENT
Start: 2022-08-31

## 2022-08-31 RX ORDER — HEPARIN SODIUM (PORCINE) LOCK FLUSH IV SOLN 100 UNIT/ML 100 UNIT/ML
500 SOLUTION INTRAVENOUS AS NEEDED
Status: CANCELLED | OUTPATIENT
Start: 2022-08-31

## 2022-08-31 RX ADMIN — HEPARIN SODIUM (PORCINE) LOCK FLUSH IV SOLN 100 UNIT/ML 500 UNITS: 100 SOLUTION at 08:41

## 2022-08-31 RX ADMIN — Medication 10 ML: at 08:41

## 2022-09-01 ENCOUNTER — LAB (OUTPATIENT)
Dept: LAB | Facility: HOSPITAL | Age: 77
End: 2022-09-01

## 2022-09-01 ENCOUNTER — OFFICE VISIT (OUTPATIENT)
Dept: GASTROENTEROLOGY | Facility: CLINIC | Age: 77
End: 2022-09-01

## 2022-09-01 VITALS
BODY MASS INDEX: 34.38 KG/M2 | WEIGHT: 194 LBS | HEIGHT: 63 IN | SYSTOLIC BLOOD PRESSURE: 190 MMHG | HEART RATE: 45 BPM | DIASTOLIC BLOOD PRESSURE: 72 MMHG | TEMPERATURE: 96.6 F | OXYGEN SATURATION: 100 %

## 2022-09-01 DIAGNOSIS — R79.89 ABNORMAL LFTS: Primary | ICD-10-CM

## 2022-09-01 DIAGNOSIS — Z83.71 FAMILY HX COLONIC POLYPS: ICD-10-CM

## 2022-09-01 DIAGNOSIS — Z80.0 FAMILY HX OF COLON CANCER: ICD-10-CM

## 2022-09-01 DIAGNOSIS — Z86.010 HISTORY OF ADENOMATOUS POLYP OF COLON: ICD-10-CM

## 2022-09-01 DIAGNOSIS — R93.89 ABNORMAL FINDING ON IMAGING: ICD-10-CM

## 2022-09-01 DIAGNOSIS — R79.89 ABNORMAL LFTS: ICD-10-CM

## 2022-09-01 LAB
ALBUMIN SERPL-MCNC: 4.4 G/DL (ref 3.5–5.2)
ALP SERPL-CCNC: 123 U/L (ref 39–117)
ALPHA1 GLOB MFR UR ELPH: 140 MG/DL (ref 90–200)
ALT SERPL W P-5'-P-CCNC: 17 U/L (ref 1–33)
AST SERPL-CCNC: 33 U/L (ref 1–32)
BASOPHILS # BLD AUTO: 0.03 10*3/MM3 (ref 0–0.2)
BASOPHILS NFR BLD AUTO: 0.4 % (ref 0–1.5)
BILIRUB CONJ SERPL-MCNC: <0.2 MG/DL (ref 0–0.3)
BILIRUB INDIRECT SERPL-MCNC: ABNORMAL MG/DL
BILIRUB SERPL-MCNC: 0.4 MG/DL (ref 0–1.2)
CERULOPLASMIN SERPL-MCNC: 27 MG/DL (ref 19–39)
DEPRECATED RDW RBC AUTO: 43.4 FL (ref 37–54)
EOSINOPHIL # BLD AUTO: 0.11 10*3/MM3 (ref 0–0.4)
EOSINOPHIL NFR BLD AUTO: 1.6 % (ref 0.3–6.2)
ERYTHROCYTE [DISTWIDTH] IN BLOOD BY AUTOMATED COUNT: 12.7 % (ref 12.3–15.4)
FERRITIN SERPL-MCNC: 40.5 NG/ML (ref 13–150)
HCT VFR BLD AUTO: 44.2 % (ref 34–46.6)
HGB BLD-MCNC: 14.2 G/DL (ref 12–15.9)
IMM GRANULOCYTES # BLD AUTO: 0.03 10*3/MM3 (ref 0–0.05)
IMM GRANULOCYTES NFR BLD AUTO: 0.4 % (ref 0–0.5)
INR PPP: 1.03 (ref 0.91–1.09)
IRON 24H UR-MRATE: 85 MCG/DL (ref 37–145)
IRON SATN MFR SERPL: 16 % (ref 20–50)
LYMPHOCYTES # BLD AUTO: 2.07 10*3/MM3 (ref 0.7–3.1)
LYMPHOCYTES NFR BLD AUTO: 30 % (ref 19.6–45.3)
MCH RBC QN AUTO: 30.3 PG (ref 26.6–33)
MCHC RBC AUTO-ENTMCNC: 32.1 G/DL (ref 31.5–35.7)
MCV RBC AUTO: 94.2 FL (ref 79–97)
MONOCYTES # BLD AUTO: 0.52 10*3/MM3 (ref 0.1–0.9)
MONOCYTES NFR BLD AUTO: 7.5 % (ref 5–12)
NEUTROPHILS NFR BLD AUTO: 4.13 10*3/MM3 (ref 1.7–7)
NEUTROPHILS NFR BLD AUTO: 60.1 % (ref 42.7–76)
NRBC BLD AUTO-RTO: 0 /100 WBC (ref 0–0.2)
PLATELET # BLD AUTO: 176 10*3/MM3 (ref 140–450)
PMV BLD AUTO: 11.1 FL (ref 6–12)
PROT SERPL-MCNC: 7.1 G/DL (ref 6–8.5)
PROTHROMBIN TIME: 13.1 SECONDS (ref 11.9–14.6)
RBC # BLD AUTO: 4.69 10*6/MM3 (ref 3.77–5.28)
TIBC SERPL-MCNC: 520 MCG/DL (ref 298–536)
TRANSFERRIN SERPL-MCNC: 349 MG/DL (ref 200–360)
WBC NRBC COR # BLD: 6.89 10*3/MM3 (ref 3.4–10.8)

## 2022-09-01 PROCEDURE — 36415 COLL VENOUS BLD VENIPUNCTURE: CPT

## 2022-09-01 PROCEDURE — 82728 ASSAY OF FERRITIN: CPT

## 2022-09-01 PROCEDURE — 86038 ANTINUCLEAR ANTIBODIES: CPT

## 2022-09-01 PROCEDURE — 82390 ASSAY OF CERULOPLASMIN: CPT

## 2022-09-01 PROCEDURE — 86015 ACTIN ANTIBODY EACH: CPT

## 2022-09-01 PROCEDURE — 82103 ALPHA-1-ANTITRYPSIN TOTAL: CPT

## 2022-09-01 PROCEDURE — 99214 OFFICE O/P EST MOD 30 MIN: CPT | Performed by: NURSE PRACTITIONER

## 2022-09-01 PROCEDURE — 84466 ASSAY OF TRANSFERRIN: CPT

## 2022-09-01 PROCEDURE — 85025 COMPLETE CBC W/AUTO DIFF WBC: CPT | Performed by: NURSE PRACTITIONER

## 2022-09-01 PROCEDURE — 86381 MITOCHONDRIAL ANTIBODY EACH: CPT

## 2022-09-01 PROCEDURE — 83540 ASSAY OF IRON: CPT

## 2022-09-01 PROCEDURE — 85610 PROTHROMBIN TIME: CPT

## 2022-09-01 PROCEDURE — 80076 HEPATIC FUNCTION PANEL: CPT

## 2022-09-01 RX ORDER — CELECOXIB 200 MG/1
200 CAPSULE ORAL DAILY
COMMUNITY

## 2022-09-01 RX ORDER — OMEPRAZOLE 20 MG/1
20 CAPSULE, DELAYED RELEASE ORAL DAILY
COMMUNITY
End: 2022-12-07

## 2022-09-01 NOTE — PROGRESS NOTES
[facility] GASTROENTEROLOGY - OFFICE NOTE    9/1/2022    Ira Sanchez   1945    Primary Physician: Conrado Tinoco MD       Referring Physician: Dr. Wallace    Chief Complaint   Patient presents with   • Abnormal Lab         HISTORY OF PRESENT ILLNESS:     Ira Sanchez is a 77 y.o. female presents with abnormal lft's. I have reviewed labs in epic Lft's have been elevated since at least 2015.  She denies history of liver disease. No family history of liver disease.   No history chf. No juandice. No abdominal pain, fever,  or weight loss. No abdominal or ankle swelling. No history of heavy alcohol use.         Recent hepatitis panel negative.       MRI Abdomen With & Without Contrast (07/14/2022 15:18)  IMPRESSION:  1. There is a slightly lobulated contour to the left hepatic lobe with  mild caudate lobe hypertrophy. Findings can be seen with mild fibrosis.  There is no suspicious focal liver mass. No evidence of portal  hypertension.    US Liver (06/16/2022 09:06)  IMPRESSION:   1. Coarsened hepatic echotexture and mildly lobular contour with  prominence of the caudate lobe. Underlying fibrosis/cirrhotic morphology  to be considered.          No change in bowel habits or rectal bleeding.       SCANNED - COLONOSCOPY (12/19/2017 00:00)  She has history adenomatous colon polyp 2014.   Father had colon cancer at age 64.   Brother had colon polyps.       Past Medical History:   Diagnosis Date   • Bradycardia    • Breast cancer (HCC)    • Colon polyp    • Colon polyps    • COVID-19    • Diabetes mellitus (HCC)    • Encounter for care related to Port-a-Cath 04/24/2020   • Hx of migraine headaches    • Hypertension    • Peripheral neuropathy    • Urinary incontinence        Past Surgical History:   Procedure Laterality Date   • APPENDECTOMY     • BREAST SURGERY     • CATARACT EXTRACTION Bilateral    • DILATATION AND CURETTAGE     • KNEE SURGERY     • MASTECTOMY Bilateral    • NIPPLE TATTOO  04/12/2016    • PORTACATH PLACEMENT     • THROAT SURGERY         Outpatient Medications Marked as Taking for the 22 encounter (Office Visit) with Adela Barboza APRN   Medication Sig Dispense Refill   • amLODIPine (NORVASC) 5 MG tablet Take 1 tablet by mouth Daily. 90 tablet 3   • Calcium Carb-Cholecalciferol (CALCIUM-VITAMIN D) 600-400 MG-UNIT tablet Take 1 tablet by mouth Daily.     • celecoxib (CeleBREX) 200 MG capsule Take 200 mg by mouth Daily.     • cyclobenzaprine (FLEXERIL) 5 MG tablet Take 1 tablet by mouth Every Night. 30 tablet 1   • cycloSPORINE (RESTASIS) 0.05 % ophthalmic emulsion 1 drop As Needed.     • diphenhydrAMINE (BENADRYL) 25 mg capsule Take 25 mg by mouth At Night As Needed for Allergies.     • fluticasone-salmeterol (Advair Diskus) 250-50 MCG/DOSE DISKUS Inhale 1 puff 2 (Two) Times a Day. (Patient taking differently: Inhale 1 puff As Needed.) 60 each 5   • losartan (Cozaar) 100 MG tablet Take 1 tablet by mouth Daily. 90 tablet 2   • Omega-3 1000 MG capsule Take  by mouth Daily.     • omeprazole (priLOSEC) 20 MG capsule Take 20 mg by mouth Daily.     • oxybutynin XL (DITROPAN-XL) 10 MG 24 hr tablet TAKE 1 TABLET BY MOUTH DAILY 90 tablet 3   • thiamine (VITAMINE B-1) 100 MG tablet Take 100 mg by mouth Daily.         Allergies   Allergen Reactions   • Benzonatate Hives and Itching       Social History     Socioeconomic History   • Marital status:      Spouse name: Horace   • Number of children: 0   • Years of education: 12.5   Tobacco Use   • Smoking status: Former Smoker     Packs/day: 2.00     Years: 37.00     Pack years: 74.00     Types: Cigarettes     Start date: 1963     Quit date: 2000     Years since quittin.9   • Smokeless tobacco: Never Used   Vaping Use   • Vaping Use: Never used   Substance and Sexual Activity   • Alcohol use: No   • Drug use: No   • Sexual activity: Defer       Family History   Problem Relation Age of Onset   • Hypertension Mother    • Heart failure  "Mother    • Cancer Father         colon   • Colon cancer Father    • Cancer Maternal Aunt         breast   • No Known Problems Maternal Grandmother    • No Known Problems Maternal Grandfather    • No Known Problems Paternal Grandmother    • No Known Problems Paternal Grandfather    • Cancer Maternal Aunt         breast   • Cancer Maternal Aunt         breast   • Cancer Other        Review of Systems   Constitutional: Negative for chills, fever and unexpected weight change.   Respiratory: Negative for cough, shortness of breath and wheezing.    Cardiovascular: Negative for chest pain and palpitations.   Gastrointestinal: Negative for abdominal distention, abdominal pain, anal bleeding, blood in stool, constipation, diarrhea, nausea and vomiting.        Vitals:    09/01/22 0755   BP: (!) 190/72   Pulse: (!) 45   Temp: 96.6 °F (35.9 °C)   SpO2: 100%   Weight: 88 kg (194 lb)   Height: 160 cm (63\")      Body mass index is 34.37 kg/m².    Physical Exam  Vitals reviewed.   Constitutional:       General: She is not in acute distress.  Cardiovascular:      Rate and Rhythm: Normal rate and regular rhythm.      Heart sounds: Normal heart sounds.   Pulmonary:      Effort: Pulmonary effort is normal.      Breath sounds: Normal breath sounds.   Abdominal:      General: Bowel sounds are normal. There is no distension.      Palpations: Abdomen is soft.      Tenderness: There is no abdominal tenderness.   Skin:     General: Skin is warm and dry.   Neurological:      Mental Status: She is alert.         Results for orders placed or performed during the hospital encounter of 07/14/22   POC Creatinine    Specimen: Blood   Result Value Ref Range    Creatinine 0.60 0.60 - 1.30 mg/dL           ASSESSMENT AND PLAN    Assessment & Plan     Diagnoses and all orders for this visit:    1. Abnormal LFTs (Primary)  -     Alpha - 1 - Antitrypsin; Future  -     Anti-Smooth Muscle Antibody Titer; Future  -     Ferritin; Future  -     Ceruloplasmin; " Future  -     Hepatic Function Panel; Future  -     Iron Profile; Future  -     Mitochondrial Antibodies, M2; Future  -     Nuclear Antigen Antibody, IFA; Future  -     Protime-INR; Future  -     CBC & Differential  -     US Elastography Parenchyma; Future  -     US Liver; Future    2. Abnormal finding on imaging  Comments:  lobular contour with prominence of caudate lobe of liver.     3. History of adenomatous polyp of colon    4. Family hx of colon cancer    5. Family hx colonic polyps    Differential diagnoses discussed. In review of labs, her platelet count and albumin are normal. I do have an inr for review. Her labs are not consistent with cirrhosis. I would recommend ultrasound elastography to eval for fibrosis. She understands this is not a perfect test.  I did explain the recent imagine did not note any liver masses. Check liver serologies/labs and US elastography. F/u ov 6 weeks.         She will be due for colonoscopy 12/2022.  She is asymptomatic.          Return in about 6 weeks (around 10/13/2022).          RBEEL Gomez

## 2022-09-02 LAB
MITOCHONDRIA M2 IGG SER-ACNC: <20 UNITS (ref 0–20)
SMA IGG SER-ACNC: 5 UNITS (ref 0–19)

## 2022-09-05 LAB
ANA HOMOGEN TITR SER: NORMAL {TITER}
ANA SER QL IF: POSITIVE
Lab: NORMAL

## 2022-09-06 ENCOUNTER — OFFICE VISIT (OUTPATIENT)
Dept: FAMILY MEDICINE CLINIC | Facility: CLINIC | Age: 77
End: 2022-09-06

## 2022-09-06 VITALS
HEART RATE: 41 BPM | DIASTOLIC BLOOD PRESSURE: 78 MMHG | SYSTOLIC BLOOD PRESSURE: 132 MMHG | WEIGHT: 192 LBS | OXYGEN SATURATION: 98 % | HEIGHT: 63 IN | TEMPERATURE: 97.1 F | BODY MASS INDEX: 34.02 KG/M2 | RESPIRATION RATE: 18 BRPM

## 2022-09-06 DIAGNOSIS — E11.9 CONTROLLED TYPE 2 DIABETES MELLITUS WITHOUT COMPLICATION, WITHOUT LONG-TERM CURRENT USE OF INSULIN: Chronic | ICD-10-CM

## 2022-09-06 DIAGNOSIS — M15.0 PRIMARY GENERALIZED (OSTEO)ARTHRITIS: ICD-10-CM

## 2022-09-06 DIAGNOSIS — M85.80 OSTEOPENIA, UNSPECIFIED LOCATION: ICD-10-CM

## 2022-09-06 DIAGNOSIS — I10 PRIMARY HYPERTENSION: Chronic | ICD-10-CM

## 2022-09-06 DIAGNOSIS — I50.9 CONGESTIVE HEART FAILURE, UNSPECIFIED HF CHRONICITY, UNSPECIFIED HEART FAILURE TYPE: ICD-10-CM

## 2022-09-06 DIAGNOSIS — Z79.1 NSAID LONG-TERM USE: ICD-10-CM

## 2022-09-06 DIAGNOSIS — G62.9 NEUROPATHY: ICD-10-CM

## 2022-09-06 DIAGNOSIS — R60.9 EDEMA, UNSPECIFIED TYPE: ICD-10-CM

## 2022-09-06 DIAGNOSIS — R00.1 BRADYCARDIA: ICD-10-CM

## 2022-09-06 DIAGNOSIS — I38 VALVULAR HEART DISEASE: ICD-10-CM

## 2022-09-06 DIAGNOSIS — R93.2: ICD-10-CM

## 2022-09-06 DIAGNOSIS — R76.8 POSITIVE ANA (ANTINUCLEAR ANTIBODY): ICD-10-CM

## 2022-09-06 DIAGNOSIS — J44.9 CHRONIC OBSTRUCTIVE PULMONARY DISEASE, UNSPECIFIED COPD TYPE: ICD-10-CM

## 2022-09-06 PROCEDURE — 99214 OFFICE O/P EST MOD 30 MIN: CPT | Performed by: FAMILY MEDICINE

## 2022-09-06 NOTE — PROGRESS NOTES
Subjective   Ira Sanchez is a 77 y.o. female presenting with chief complaint of:   Chief Complaint   Patient presents with   • Hypertension   • Follow-up       History of Present Illness :  Alone.   Here for review of chronic problems that includes HTN and others.      Has multiple chronic problems to consider that might have a bearing on today's issues;  an interval appointment.       Chronic/acute problems reviewed today:   1. Primary hypertension: Chronic/stable. Stable here past/no recent home blood pressures.  No significant chest pain, SOB, LE edema, orthopnea, near syncope, dizziness/light headness.   Recent Vitals       6/23/2022 9/1/2022 9/6/2022       BP: 138/82 190/72 132/78     Pulse: 62 45 41     Temp: 96.9 °F (36.1 °C) 96.6 °F (35.9 °C) 97.1 °F (36.2 °C)     Weight: 86.2 kg (190 lb) 88 kg (194 lb) 87.1 kg (192 lb)     BMI (Calculated): 34.7 34.4 34            2. Valvular heart disease: AS: chronic/stable symptoms of no chest pain, SOB, edema with past echos showing valvular changes of: AS.     3. Congestive heart failure, unspecified HF chronicity, unspecified heart failure type (HCC): Chronic/stable.  Denies significant sob, orthopnea, leg edema, weight gain.  Aware of influence diet/salt and watching weight at home.       4. Controlled type 2 diabetes mellitus without complication, without long-term current use of insulin (HCC):  Chronic/stable.  No problem/pattern hypoglycemia/hyperglycemia manifest by poly- dypsia, phagia, uria, or sweats, diaphoretic episodes, syncope/near.     5. Abnormal liver x-ray: recently found; thus far without pain, nausea, explaination.    6. Osteopenia, unspecified location: Chronic/stable.  Last bone density/if below.   Has had/or offered Rx.  Ok further bone density screening when due.      7. Primary generalized (osteo)arthritis: Chronic/stable.  Various on/off joint pains/soreness/stiffness.  Particular joint problems with R knee.  No joint swelling.  Treats  mainly with reduced activity, Rx listed, Tylenol. No NSAIDs, and no injections.      8. Neuropathy-chemo/better    9. Chronic obstructive pulmonary disease, unspecified COPD type (HCC) : Chronic/stable mild occ cough, sob, wheeze.  Rx helps.   No smoking.       10. Edema, unspecified type: chronic/variable.  Recently better.  Causes include: likely venous insufficiency.      11. Positive JESUSITA (antinuclear antibody) : just noticed with gi eval for imaging suggesting fibrosis/cirrhosis.    12. NSAID long-term use: Chronic use of NSAID.  Use is occ.   Per ROS/denies issues with use.   Has been warned of potential for GI toxicity (ulcers, bleeding), renal toxicity (even renal failure), liver injury, interference with control blood pressure and increased risk CV disease in general.  Advised as little use as able.     13. Bradycardia: acute/noticed today.       US liver  Result Date: 6/16/2022   1.  Coarsened hepatic echotexture and mildly lobular contour with prominence of the caudate lobe. Underlying fibrosis/cirrhotic morphology to be considered.   This report was finalized on 06/16/2022 14:48 by Dr. Maco Leyva MD.     CT Abdomen Pelvis With Contrast (08/10/2021 08:51)  IMPRESSION:  1. No acute intra-abdominal finding.  2. Colonic diverticula. No evidence of acute diverticulitis.  3. Mildly nodular liver contour suggests chronic liver disease.  4. Small tree-in-bud opacities at the lingula. See separately dictated  CT chest of the same day.  5. Calcified atherosclerosis.     3.22.22 CT abdomen [facility]  Liver is unremarkable     3.19.22 CT chest [facility]  No evidence of pulmonary artery thrombus  R > L lung base nodular infiltrates and bronchial wall thickening; also a focal left upper lobe ground glass infitrate; favor pneumonitis    7.14.22 BH/MRI liver  IMPRESSION:  1. There is a slightly lobulated contour to the left hepatic lobe with  mild caudate lobe hypertrophy. Findings can be seen with mild fibrosis.  There is no  suspicious focal liver mass. No evidence of portal  hypertension.  2. Right renal cysts.    Has an/another acute issue today: none.    The following portions of the patient's history were reviewed and updated as appropriate: allergies, current medications, past family history, past medical history, past social history, past surgical history and problem list.      Current Outpatient Medications:   •  amLODIPine (NORVASC) 5 MG tablet, Take 1 tablet by mouth Daily., Disp: 90 tablet, Rfl: 3  •  Calcium Carb-Cholecalciferol (CALCIUM-VITAMIN D) 600-400 MG-UNIT tablet, Take 1 tablet by mouth Daily., Disp: , Rfl:   •  celecoxib (CeleBREX) 200 MG capsule, Take 200 mg by mouth Daily., Disp: , Rfl:   •  cyclobenzaprine (FLEXERIL) 5 MG tablet, Take 1 tablet by mouth Every Night., Disp: 30 tablet, Rfl: 1  •  cycloSPORINE (RESTASIS) 0.05 % ophthalmic emulsion, 1 drop As Needed., Disp: , Rfl:   •  diphenhydrAMINE (BENADRYL) 25 mg capsule, Take 25 mg by mouth At Night As Needed for Allergies., Disp: , Rfl:   •  fluticasone-salmeterol (Advair Diskus) 250-50 MCG/DOSE DISKUS, Inhale 1 puff 2 (Two) Times a Day. (Patient taking differently: Inhale 1 puff As Needed.), Disp: 60 each, Rfl: 5  •  losartan (Cozaar) 100 MG tablet, Take 1 tablet by mouth Daily., Disp: 90 tablet, Rfl: 2  •  Omega-3 1000 MG capsule, Take  by mouth Daily., Disp: , Rfl:   •  omeprazole (priLOSEC) 20 MG capsule, Take 1 capsule by mouth Daily., Disp: 15 capsule, Rfl: 0  •  oxybutynin XL (DITROPAN-XL) 10 MG 24 hr tablet, TAKE 1 TABLET BY MOUTH DAILY, Disp: 90 tablet, Rfl: 3  •  thiamine (VITAMINE B-1) 100 MG tablet, Take 100 mg by mouth Daily., Disp: , Rfl:   •  omeprazole (priLOSEC) 20 MG capsule, Take 20 mg by mouth Daily., Disp: , Rfl:   No current facility-administered medications for this visit.    Facility-Administered Medications Ordered in Other Visits:   •  heparin flush (porcine) 100 UNIT/ML injection 500 Units, 500 Units, Intravenous, PRN, Brian,  Shanda Valente MD, 500 Units at 04/16/18 1504  •  heparin flush (porcine) 100 UNIT/ML injection 500 Units, 500 Units, Intravenous, PRN, Shanda Torres MD, 500 Units at 06/12/18 1445  •  heparin injection 500 Units, 500 Units, Intravenous, PRN, Tamela Doherty, APRN, 500 Units at 12/08/21 0948  •  sodium chloride 0.9 % flush 10 mL, 10 mL, Intravenous, PRN, Shanda Torres MD, 10 mL at 04/16/18 1503  •  sodium chloride 0.9 % flush 10 mL, 10 mL, Intravenous, PRN, Shanda Torres MD, 10 mL at 06/12/18 1445  •  sodium chloride 0.9 % flush 10 mL, 10 mL, Intravenous, PRN, Tamela Doherty, APRN, 10 mL at 12/08/21 0948    No problems with medications.    Allergies   Allergen Reactions   • Benzonatate Hives and Itching       Review of Systems  GENERAL:  Active/slower with limits, speed, stamina for age, and occ back pain.  Sleep is ok. No fever now.  SKIN: No other rash/skin lesion of concern:   ENDO:  No syncope, near or diaphoretic sweaty spells.  No download..  HEENT: No recent head injury; stable occ headache.  No vision change.  No chronic hearing loss.  Ears without fullness without pain/drainage.  No sore throat now.  No significant nasal/sinus congestion/drainage. No epistaxis.   CHEST: No chest wall tenderness or mass.  Same occ/dry cough, and no wheeze.  No SOB; no hemoptysis.  CV: No chest pain, palpitations; daily end of day mild pitting/ankle edema.  GI: No heartburn, dysphagia.  No abdominal pain, diarrhea, constipation.  No rectal bleeding, or melena.    :  Voids with occ on/off dysuria, without incontinence to completion.  ORTHO: No painful/swollen joints but various on /off sore.  Occ same/sore neck or back.  No acute neck or lower back pain without recent injury.  NEURO: No dizziness, weakness of extremities.  Same mild numbness/paresthesias LE.  PSYCH: No memory loss.  Mood good; mild anxious, depressed but/and not suicidal.  Tries to tolerate stress .    Screening:  Mammogram: bilateral masectomy  Bone density:   Bone d-deca/BH/hip -1.6, LS +1.3/1.24.22-watching  Bone d-deca/[facility]/T hip -0.5 LS +1.4/12.18.18;  no change; maybe 2-5 yr  Low dose CT chest:Tobacco-smoker/age 17/1-2ppd/dc age 60:  CTs with breast cancer:   GI: Colon-avm-div/[facility]ie[facility]/12.19.17/5y  Prostate: NA  Usual lab order  6m CBC, CMP, A1c  12m CBC, CMP, A1c, LIPID, TSH, Vit D    Copy/paste function used for ROS/exam AND each area of these were reviewed, updated, confirmed and supplemented as needed.   Data reviewed:   Recent admit/ER/MD visits: last visit and last GI  Last cardiac testing:   Echo:   Results for orders placed during the hospital encounter of 04/23/21    Adult Transthoracic Echo Complete W/ Cont if Necessary Per Protocol    Interpretation Summary  · Technically difficult study.  · Left ventricular ejection fraction appears to be 66 - 70%. Left ventricular systolic function is normal.  · Left ventricular diastolic function is consistent with (grade I) impaired relaxation.  · Normal right ventricular cavity size and systolic function noted.  · Mild aortic valve stenosis is present.  · Estimated right ventricular systolic pressure from tricuspid regurgitation is normal (<35 mmHg).    Radiology considered:   MRI Abdomen With & Without Contrast    Result Date: 7/14/2022  1. There is a slightly lobulated contour to the left hepatic lobe with mild caudate lobe hypertrophy. Findings can be seen with mild fibrosis. There is no suspicious focal liver mass. No evidence of portal hypertension. 2. Right renal cysts. This report was finalized on 07/14/2022 15:38 by Dr. Naima Romeo MD.    US Liver    Result Date: 6/16/2022   1.  Coarsened hepatic echotexture and mildly lobular contour with prominence of the caudate lobe. Underlying fibrosis/cirrhotic morphology to be considered.   This report was finalized on 06/16/2022 14:48 by Dr. Maco Leyva MD.    Lab Results:  Results for orders  placed or performed in visit on 09/01/22   Nuclear Antigen Antibody, IFA    Specimen: Blood   Result Value Ref Range    JESUSITA Positive (A)    Alpha - 1 - Antitrypsin    Specimen: Blood   Result Value Ref Range    ALPHA -1 ANTITRYPSIN 140 90 - 200 mg/dL   Anti-Smooth Muscle Antibody Titer    Specimen: Blood   Result Value Ref Range    Smooth Muscle Ab 5 0 - 19 Units   Ferritin    Specimen: Blood   Result Value Ref Range    Ferritin 40.50 13.00 - 150.00 ng/mL   Ceruloplasmin    Specimen: Blood   Result Value Ref Range    Ceruloplasmin 27 19 - 39 mg/dL   Hepatic Function Panel    Specimen: Blood   Result Value Ref Range    Total Protein 7.1 6.0 - 8.5 g/dL    Albumin 4.40 3.50 - 5.20 g/dL    ALT (SGPT) 17 1 - 33 U/L    AST (SGOT) 33 (H) 1 - 32 U/L    Alkaline Phosphatase 123 (H) 39 - 117 U/L    Total Bilirubin 0.4 0.0 - 1.2 mg/dL    Bilirubin, Direct <0.2 0.0 - 0.3 mg/dL    Bilirubin, Indirect     Iron Profile    Specimen: Blood   Result Value Ref Range    Iron 85 37 - 145 mcg/dL    Iron Saturation 16 (L) 20 - 50 %    Transferrin 349 200 - 360 mg/dL    TIBC 520 298 - 536 mcg/dL   Mitochondrial Antibodies, M2    Specimen: Blood   Result Value Ref Range    Mitochondrial Ab <20.0 0.0 - 20.0 Units   Protime-INR    Specimen: Blood   Result Value Ref Range    Protime 13.1 11.9 - 14.6 Seconds    INR 1.03 0.91 - 1.09   JULIO Staining Patterns    Specimen: Blood   Result Value Ref Range    Homogeneous Pattern 1:80     Note: (Reference) Comment        A1C:  Lab Results - Last 18 Months   Lab Units 12/08/21  1006 06/07/21  0832   HEMOGLOBIN A1C % 6.50* 6.10*     GLUCOSE:  Lab Results - Last 18 Months   Lab Units 06/01/22  0756 06/07/21  0832 03/08/21  1215   GLUCOSE mg/dL 120* 116* 127*     LIPID:  Lab Results - Last 18 Months   Lab Units 12/08/21  1006   LDL CHOL mg/dL 100   HDL CHOL mg/dL 49   TRIGLYCERIDES mg/dL 86     PSA:No results for input(s): PSA in the last 05088 hours.    CBC:  Lab Results - Last 18 Months   Lab Units  "09/01/22  0848 06/01/22  0756 12/08/21  1006 06/07/21  0832   WBC 10*3/mm3 6.89 7.65 8.37 7.45   HEMOGLOBIN g/dL 14.2 13.1 13.7 12.4   HEMATOCRIT % 44.2 40.9 41.6 36.9   PLATELETS 10*3/mm3 176 175 169 161   IRON mcg/dL 85  --   --   --       BMP/CMP:  Lab Results - Last 18 Months   Lab Units 07/14/22  1433 06/01/22  0756 08/10/21  0834 06/07/21  0832 03/08/21  1215   SODIUM mmol/L  --  139  --  139 136   POTASSIUM mmol/L  --  4.1  --  5.0 4.6   CHLORIDE mmol/L  --  103  --  102 98   TOTAL CO2 mmol/L  --   --   --   --  25.1   CO2 mmol/L  --  28.0  --  27.0  --    GLUCOSE mg/dL  --  120*  --  116* 127*   BUN mg/dL  --  17  --  20 20   CREATININE mg/dL 0.60 0.61 0.70 0.74 0.75   EGFR IF NONAFRICN AM mL/min/1.73  --   --   --  76 75   EGFR IF AFRICN AM mL/min/1.73  --   --   --   --  91   CALCIUM mg/dL  --  9.7  --  10.5 9.9     HEPATIC:  Lab Results - Last 18 Months   Lab Units 09/01/22  0848 06/01/22  0756 06/07/21  0832   ALT (SGPT) U/L 17 19 11   AST (SGOT) U/L 33* 35* 26   ALK PHOS U/L 123* 131* 108     Vit D:  Lab Results - Last 18 Months   Lab Units 12/08/21  1006   VIT D 25 HYDROXY ng/ml 40.1     THYROID:  Lab Results - Last 18 Months   Lab Units 12/08/21  1006   TSH uIU/mL 2.100       Objective   /78 (BP Location: Left arm, Patient Position: Sitting, Cuff Size: Adult)   Pulse (!) 41   Temp 97.1 °F (36.2 °C) (Infrared)   Resp 18   Ht 160 cm (63\")   Wt 87.1 kg (192 lb)   LMP  (LMP Unknown)   SpO2 98%   Breastfeeding No   BMI 34.01 kg/m²   Body mass index is 34.01 kg/m².    Recent Vitals       6/17/2022 6/23/2022 9/1/2022       BP: 132/70 138/82 190/72     Pulse: 46 62 45     Temp: -- 96.9 °F (36.1 °C) 96.6 °F (35.9 °C)     Weight: 86.6 kg (191 lb) 86.2 kg (190 lb) 88 kg (194 lb)     BMI (Calculated): 34.9 34.7 34.4         Wt Readings from Last 15 Encounters:   09/06/22 1130 87.1 kg (192 lb)   09/01/22 0755 88 kg (194 lb)   06/23/22 0910 86.2 kg (190 lb)   06/17/22 1024 86.6 kg (191 lb)   06/08/22 " 0947 86.5 kg (190 lb 9.6 oz)   05/05/22 0901 87.1 kg (192 lb)   04/20/22 0909 86.2 kg (190 lb)   03/31/22 0932 85.4 kg (188 lb 3.2 oz)   03/03/22 1314 90.4 kg (199 lb 6.4 oz)   01/18/22 1008 89.4 kg (197 lb)   01/03/22 0859 89.8 kg (198 lb)   12/08/21 0907 91.7 kg (202 lb 1.6 oz)   10/11/21 1546 89.8 kg (198 lb)   09/14/21 0809 90.3 kg (199 lb)   08/31/21 1401 90.3 kg (199 lb)       Physical Exam  GENERAL:  Well nourished/developed in no acute distress  SKIN: Turgor excellent, without wound, rash, lesion or significance  HEENT: Normal cephalic without trauma. Pupils equal round reactive to light. Extraocular motions full without nystagmus.  External canals nonobstructive nontender without reddness. Tymphatic membranes dull with irwin structures intact.   Oral cavity without growths, exudates, and moist.  Posterior pharynx without mass, obstruction, redness.  No thyromegaly, mass, tenderness, definite lymphadenopathy and supple.   CV: Regular rhythm.  1/6 systolic murmur without gallop and trace ankle/LE edema. Posterior pulses intact.  No carotid bruits.  CHEST: No chest wall tenderness or mass.   LUNGS: Symmetric motion with clear to auscultation.  No dullness to percussion  ABD: Soft, non-tender without mass.   ORTHO: Symmetric extremities without swelling/point tenderness.  Full gross range of motion. Symmetric LE.  Neg straight leg raising.  Neg Patricks maneuver.  Back is straight/mild lordosis.  Lower back sore; no point tender.    NEURO: CN 2-12 grossly intact except L eyelid mild droop.  Otherwise symmetric facies and UE/LE. 3/5 strength throughout.   Otherwise nonfocal use extremities. Speech clear.      Assessment & Plan     1. Primary hypertension    2. Valvular heart disease: AS    3. Congestive heart failure, unspecified HF chronicity, unspecified heart failure type (HCC)    4. Controlled type 2 diabetes mellitus without complication, without long-term current use of insulin (formerly [facility])    5. Abnormal liver  x-ray    6. Osteopenia, unspecified location    7. Primary generalized (osteo)arthritis    8. Neuropathy-chemo/better    9. Chronic obstructive pulmonary disease, unspecified COPD type (HCC)    10. Edema, unspecified type    11. Positive JESUSITA (antinuclear antibody)    12. NSAID long-term use    13. Bradycardia      Discussions/medical decisions/reviews:  BP ok  Other vitals ok; will watch 41 pulse  DM/BS 6.5 12.8.21  Lipid  12.8.21  PSA ok 9.1.22  CBC ok 7.14.22  Renal ok 7.14.22  Liver stable AST 33, Alp P 123 9.1.22  Vit D 40 12.8.21  Thyroid TSH ok 12.8.21        Medical decision issues:   Data review above:   Rx: reviewed and decisions:   Visit today involved chronic significant medical problems or differentials and/or intensive drug monitoring: ie potential to cause serious morbidity or death:   Rx changes: none  No orders of the defined types were placed in this encounter.    Orders placed:   LAB/Testing/Referrals: reviewed/orders:   Today:   Orders Placed This Encounter   Procedures   • Ambulatory Referral to Rheumatology     Chronic/recurrent labs above or change to:   same     Screening reviewed/updated     Immunization History   Administered Date(s) Administered   • COVID-19 (MODERNA) 1st, 2nd, 3rd Dose Only 02/19/2021, 03/19/2021   • Fluad Quad 65+ 10/09/2020   • Fluzone High Dose =>65 Years ([employer] ONLY) 11/19/2017, 11/14/2019   • Pneumococcal Conjugate 13-Valent (PCV13) 11/19/2017     Vaccine reviewed: today none; later we advised/reaffirmed our support/suggestion for staying complete with covid- covid boosters, seasonal flu/yearly and any missing vaccine from list we supplied; we suggest contact with local health department office to review missing/needed vaccines and then bring nursing documentation for these vaccines to this office.     Health maintenance:   Body mass index is 34.01 kg/m².  BMI is >= 30 and <35. (Class 1 Obesity). The following options were offered after discussion;: exercise  counseling/recommendations and nutrition counseling/recommendations      Tobacco use reviewed:   Ira Sanchez  reports that she quit smoking about 21 years ago. Her smoking use included cigarettes. She started smoking about 59 years ago. She has a 74.00 pack-year smoking history. She has never used smokeless tobacco..     There are no Patient Instructions on file for this visit.    Follow up: Return for lab/Dr Tinoco before end of year.  Future Appointments   Date Time Provider Department Center   9/8/2022  7:30 AM [facility] BIC US 2  PAD US BI PAD   9/8/2022  8:00 AM [facility] BIC US 2  PAD US BI PAD   10/17/2022  2:45 PM Modesto Genao MD [facility] GE PAD PAD   11/30/2022  8:30 AM  [facility] CANCER CTR LAB  PAD CCLAB PAD   11/30/2022  9:00 AM [facility] ONC PAD NURSE [facility] ONC PAD PAD   12/7/2022  9:30 AM Adalberto Wallace MD [facility] ONC PAD PAD   12/8/2022  1:30 PM Conrado Tinoco MD [facility] PC METR PAD   4/5/2023  9:00 AM Henrietta Olvera APRN [facility] VS PAD PAD   6/19/2023  9:15 AM Dante Mendoza MD [facility] RD PAD PAD               An After Visit Summary and PPPS with all of these plans were given to the patient.

## 2022-09-08 ENCOUNTER — HOSPITAL ENCOUNTER (OUTPATIENT)
Dept: ULTRASOUND IMAGING | Facility: HOSPITAL | Age: 77
Discharge: HOME | End: 2022-09-08

## 2022-09-08 ENCOUNTER — TELEPHONE (OUTPATIENT)
Dept: GASTROENTEROLOGY | Facility: CLINIC | Age: 77
End: 2022-09-08

## 2022-09-08 DIAGNOSIS — R79.89 ABNORMAL LFTS: ICD-10-CM

## 2022-09-08 PROCEDURE — 76705 ECHO EXAM OF ABDOMEN: CPT

## 2022-09-08 PROCEDURE — 76981 USE PARENCHYMA: CPT

## 2022-09-08 NOTE — TELEPHONE ENCOUNTER
Let her know that ultrasound elastography notes meatvir score of less than F2 which means could have some scarring but no liver masses were noted.i recommend she keep f/u appointment 10/17 to discuss further imaging and labs further.   Thank you

## 2022-09-16 DIAGNOSIS — I10 PRIMARY HYPERTENSION: Chronic | ICD-10-CM

## 2022-09-16 RX ORDER — LOSARTAN POTASSIUM 100 MG/1
100 TABLET ORAL DAILY
Qty: 90 TABLET | Refills: 3 | Status: SHIPPED | OUTPATIENT
Start: 2022-09-16

## 2022-09-16 NOTE — TELEPHONE ENCOUNTER
Rx Refill Note  Requested Prescriptions     Pending Prescriptions Disp Refills   • losartan (COZAAR) 100 MG tablet [Pharmacy Med Name: LOSARTAN 100MG TABLETS] 90 tablet 3     Sig: TAKE 1 TABLET BY MOUTH DAILY      Last office visit with prescribing clinician: 9/6/2022      Next office visit with prescribing clinician: 12/8/2022            Sheri Mascorro LPN  09/16/22, 14:03 CDT

## 2022-09-21 DIAGNOSIS — R42 DIZZY: ICD-10-CM

## 2022-09-21 DIAGNOSIS — R03.0 ELEVATED BLOOD PRESSURE READING: ICD-10-CM

## 2022-09-21 RX ORDER — AMLODIPINE BESYLATE 5 MG/1
5 TABLET ORAL DAILY
Qty: 90 TABLET | Refills: 3 | Status: SHIPPED | OUTPATIENT
Start: 2022-09-21

## 2022-09-21 NOTE — TELEPHONE ENCOUNTER
Rx Refill Note  Requested Prescriptions     Pending Prescriptions Disp Refills   • amLODIPine (NORVASC) 5 MG tablet [Pharmacy Med Name: AMLODIPINE BESYLATE 5MG TABLETS] 90 tablet 3     Sig: TAKE 1 TABLET BY MOUTH DAILY      Last office visit with prescribing clinician: 9/6/2022      Next office visit with prescribing clinician: 12/8/2022            Sheri Mascorro LPN  09/21/22, 08:27 CDT

## 2022-10-05 ENCOUNTER — TELEPHONE (OUTPATIENT)
Dept: PODIATRY | Facility: CLINIC | Age: 77
End: 2022-10-05

## 2022-10-05 NOTE — TELEPHONE ENCOUNTER
LEFT VM INFORMING PATIENT OF APPOINTMENT CHANGE DUE TO SPRING BREAK. NEW APPOINTMENT IS 03/29/23 @ 9AM.

## 2022-10-11 ENCOUNTER — TELEPHONE (OUTPATIENT)
Dept: VASCULAR SURGERY | Facility: CLINIC | Age: 77
End: 2022-10-11

## 2022-11-30 ENCOUNTER — INFUSION (OUTPATIENT)
Dept: ONCOLOGY | Facility: CLINIC | Age: 77
End: 2022-11-30

## 2022-11-30 ENCOUNTER — LAB (OUTPATIENT)
Dept: LAB | Facility: HOSPITAL | Age: 77
End: 2022-11-30

## 2022-11-30 DIAGNOSIS — Z45.2 ENCOUNTER FOR CARE RELATED TO PORT-A-CATH: ICD-10-CM

## 2022-11-30 DIAGNOSIS — Z45.2 ENCOUNTER FOR CARE RELATED TO VASCULAR ACCESS PORT: Primary | ICD-10-CM

## 2022-11-30 DIAGNOSIS — C50.919 MALIGNANT NEOPLASM OF FEMALE BREAST, UNSPECIFIED ESTROGEN RECEPTOR STATUS, UNSPECIFIED LATERALITY, UNSPECIFIED SITE OF BREAST: ICD-10-CM

## 2022-11-30 LAB
ALBUMIN SERPL-MCNC: 4.3 G/DL (ref 3.5–5.2)
ALBUMIN/GLOB SERPL: 1.5 G/DL
ALP SERPL-CCNC: 127 U/L (ref 39–117)
ALT SERPL W P-5'-P-CCNC: 16 U/L (ref 1–33)
ANION GAP SERPL CALCULATED.3IONS-SCNC: 9 MMOL/L (ref 5–15)
AST SERPL-CCNC: 32 U/L (ref 1–32)
BASOPHILS # BLD AUTO: 0.04 10*3/MM3 (ref 0–0.2)
BASOPHILS NFR BLD AUTO: 0.5 % (ref 0–1.5)
BILIRUB SERPL-MCNC: 0.5 MG/DL (ref 0–1.2)
BUN SERPL-MCNC: 12 MG/DL (ref 8–23)
BUN/CREAT SERPL: 19.4 (ref 7–25)
CALCIUM SPEC-SCNC: 9.7 MG/DL (ref 8.6–10.5)
CEA SERPL-MCNC: 4.54 NG/ML
CHLORIDE SERPL-SCNC: 102 MMOL/L (ref 98–107)
CO2 SERPL-SCNC: 27 MMOL/L (ref 22–29)
CREAT SERPL-MCNC: 0.62 MG/DL (ref 0.57–1)
DEPRECATED RDW RBC AUTO: 43.6 FL (ref 37–54)
EGFRCR SERPLBLD CKD-EPI 2021: 90.7 ML/MIN/1.73
EOSINOPHIL # BLD AUTO: 0.11 10*3/MM3 (ref 0–0.4)
EOSINOPHIL NFR BLD AUTO: 1.3 % (ref 0.3–6.2)
ERYTHROCYTE [DISTWIDTH] IN BLOOD BY AUTOMATED COUNT: 12.5 % (ref 12.3–15.4)
GLOBULIN UR ELPH-MCNC: 2.8 GM/DL
GLUCOSE SERPL-MCNC: 129 MG/DL (ref 65–99)
HCT VFR BLD AUTO: 43.3 % (ref 34–46.6)
HGB BLD-MCNC: 13.9 G/DL (ref 12–15.9)
IMM GRANULOCYTES # BLD AUTO: 0.02 10*3/MM3 (ref 0–0.05)
IMM GRANULOCYTES NFR BLD AUTO: 0.2 % (ref 0–0.5)
LYMPHOCYTES # BLD AUTO: 2.55 10*3/MM3 (ref 0.7–3.1)
LYMPHOCYTES NFR BLD AUTO: 30 % (ref 19.6–45.3)
MCH RBC QN AUTO: 30.2 PG (ref 26.6–33)
MCHC RBC AUTO-ENTMCNC: 32.1 G/DL (ref 31.5–35.7)
MCV RBC AUTO: 94.1 FL (ref 79–97)
MONOCYTES # BLD AUTO: 0.62 10*3/MM3 (ref 0.1–0.9)
MONOCYTES NFR BLD AUTO: 7.3 % (ref 5–12)
NEUTROPHILS NFR BLD AUTO: 5.17 10*3/MM3 (ref 1.7–7)
NEUTROPHILS NFR BLD AUTO: 60.7 % (ref 42.7–76)
NRBC BLD AUTO-RTO: 0 /100 WBC (ref 0–0.2)
PLATELET # BLD AUTO: 176 10*3/MM3 (ref 140–450)
PMV BLD AUTO: 10.4 FL (ref 6–12)
POTASSIUM SERPL-SCNC: 4.3 MMOL/L (ref 3.5–5.2)
PROT SERPL-MCNC: 7.1 G/DL (ref 6–8.5)
RBC # BLD AUTO: 4.6 10*6/MM3 (ref 3.77–5.28)
SODIUM SERPL-SCNC: 138 MMOL/L (ref 136–145)
WBC NRBC COR # BLD: 8.51 10*3/MM3 (ref 3.4–10.8)

## 2022-11-30 PROCEDURE — 80053 COMPREHEN METABOLIC PANEL: CPT

## 2022-11-30 PROCEDURE — 82378 CARCINOEMBRYONIC ANTIGEN: CPT

## 2022-11-30 PROCEDURE — 85025 COMPLETE CBC W/AUTO DIFF WBC: CPT

## 2022-11-30 PROCEDURE — 36415 COLL VENOUS BLD VENIPUNCTURE: CPT

## 2022-11-30 PROCEDURE — 86300 IMMUNOASSAY TUMOR CA 15-3: CPT

## 2022-11-30 RX ORDER — HEPARIN SODIUM (PORCINE) LOCK FLUSH IV SOLN 100 UNIT/ML 100 UNIT/ML
500 SOLUTION INTRAVENOUS AS NEEDED
Status: CANCELLED | OUTPATIENT
Start: 2022-11-30

## 2022-11-30 RX ORDER — HEPARIN SODIUM (PORCINE) LOCK FLUSH IV SOLN 100 UNIT/ML 100 UNIT/ML
500 SOLUTION INTRAVENOUS AS NEEDED
Status: DISCONTINUED | OUTPATIENT
Start: 2022-11-30 | End: 2022-11-30 | Stop reason: HOSPADM

## 2022-11-30 RX ORDER — SODIUM CHLORIDE 0.9 % (FLUSH) 0.9 %
10 SYRINGE (ML) INJECTION AS NEEDED
Status: DISCONTINUED | OUTPATIENT
Start: 2022-11-30 | End: 2022-11-30 | Stop reason: HOSPADM

## 2022-11-30 RX ORDER — SODIUM CHLORIDE 0.9 % (FLUSH) 0.9 %
10 SYRINGE (ML) INJECTION AS NEEDED
Status: CANCELLED | OUTPATIENT
Start: 2022-11-30

## 2022-11-30 RX ADMIN — Medication 10 ML: at 08:40

## 2022-11-30 RX ADMIN — HEPARIN SODIUM (PORCINE) LOCK FLUSH IV SOLN 100 UNIT/ML 500 UNITS: 100 SOLUTION at 08:39

## 2022-12-01 ENCOUNTER — OFFICE VISIT (OUTPATIENT)
Dept: GASTROENTEROLOGY | Facility: CLINIC | Age: 77
End: 2022-12-01

## 2022-12-01 VITALS
SYSTOLIC BLOOD PRESSURE: 152 MMHG | HEIGHT: 63 IN | WEIGHT: 193 LBS | OXYGEN SATURATION: 96 % | DIASTOLIC BLOOD PRESSURE: 64 MMHG | TEMPERATURE: 96.6 F | HEART RATE: 58 BPM | BODY MASS INDEX: 34.2 KG/M2

## 2022-12-01 DIAGNOSIS — R93.2: ICD-10-CM

## 2022-12-01 DIAGNOSIS — D12.6 ADENOMATOUS POLYP OF COLON, UNSPECIFIED PART OF COLON: ICD-10-CM

## 2022-12-01 DIAGNOSIS — R79.89 ABNORMAL LFTS: Primary | ICD-10-CM

## 2022-12-01 PROBLEM — K76.0 FATTY LIVER: Status: ACTIVE | Noted: 2022-12-01

## 2022-12-01 LAB — CANCER AG27-29 SERPL-ACNC: 33.2 U/ML (ref 0–38.6)

## 2022-12-01 PROCEDURE — 99214 OFFICE O/P EST MOD 30 MIN: CPT | Performed by: INTERNAL MEDICINE

## 2022-12-01 NOTE — PROGRESS NOTES
[facility]enterology    Chief Complaint   Patient presents with   • Elevated Hepatic Enzymes   • Abnormal Imaging       Subjective     HPI    Ira Sanchez is a 77 y.o. female who presents with a chief complaint of elevated LFTs    She was here in September and saw Adela.  See copy of HPI below.  Adela ordered liver serologies which came back all unremarkable other than a positive JESUSITA.  Liver ultrasound with elastography revealed less than F2 fibrosis.  She had undergone prior CTs, MRI and ultrasounds of liver.  Some of the imaging previously had suggested cirrhosis which is the original reason she was referred.  She has no history of cirrhotic liver disease.  She has been overweight for most of her life.  No other risk factors.  She feels well at this time.  Denies any edema.    She last had a colonoscopy 5 years ago.  She has family history colon cancer involving her father.  He was 64    ============ copy of September 1, 2022 HPI=====================  HISTORY OF PRESENT ILLNESS:      Ira Sanchez is a 77 y.o. female presents with abnormal lft's. I have reviewed labs in epic Lft's have been elevated since at least 2015.  She denies history of liver disease. No family history of liver disease.   No history chf. No juandice. No abdominal pain, fever,  or weight loss. No abdominal or ankle swelling. No history of heavy alcohol use.            Recent hepatitis panel negative.         MRI Abdomen With & Without Contrast (07/14/2022 15:18)  IMPRESSION:  1. There is a slightly lobulated contour to the left hepatic lobe with  mild caudate lobe hypertrophy. Findings can be seen with mild fibrosis.  There is no suspicious focal liver mass. No evidence of portal  hypertension.     US Liver (06/16/2022 09:06)  IMPRESSION:   1. Coarsened hepatic echotexture and mildly lobular contour with  prominence of the caudate lobe. Underlying fibrosis/cirrhotic morphology  to be considered.              No change in bowel  habits or rectal bleeding.         SCANNED - COLONOSCOPY (12/19/2017 00:00)  She has history adenomatous colon polyp 2014.   Father had colon cancer at age 64.   Brother had colon polyps.        Past Medical History:   Diagnosis Date   • Bradycardia    • Breast cancer (HCC)    • Colon polyp    • Colon polyps    • COVID-19    • Diabetes mellitus (HCC)    • Encounter for care related to Port-a-Cath 04/24/2020   • Hx of migraine headaches    • Hypertension    • Peripheral neuropathy    • Urinary incontinence        Past Surgical History:   Procedure Laterality Date   • APPENDECTOMY     • BREAST SURGERY     • CATARACT EXTRACTION Bilateral    • DILATATION AND CURETTAGE     • KNEE SURGERY     • MASTECTOMY Bilateral    • NIPPLE TATTOO  04/12/2016   • PORTACATH PLACEMENT     • THROAT SURGERY           Current Outpatient Medications:   •  amLODIPine (NORVASC) 5 MG tablet, TAKE 1 TABLET BY MOUTH DAILY, Disp: 90 tablet, Rfl: 3  •  Calcium Carb-Cholecalciferol (CALCIUM-VITAMIN D) 600-400 MG-UNIT tablet, Take 1 tablet by mouth Daily., Disp: , Rfl:   •  cycloSPORINE (RESTASIS) 0.05 % ophthalmic emulsion, 1 drop As Needed., Disp: , Rfl:   •  diphenhydrAMINE (BENADRYL) 25 mg capsule, Take 25 mg by mouth At Night As Needed for Allergies., Disp: , Rfl:   •  fluticasone-salmeterol (Advair Diskus) 250-50 MCG/DOSE DISKUS, Inhale 1 puff 2 (Two) Times a Day. (Patient taking differently: Inhale 1 puff As Needed.), Disp: 60 each, Rfl: 5  •  losartan (COZAAR) 100 MG tablet, TAKE 1 TABLET BY MOUTH DAILY, Disp: 90 tablet, Rfl: 3  •  Omega-3 1000 MG capsule, Take  by mouth Daily., Disp: , Rfl:   •  oxybutynin XL (DITROPAN-XL) 10 MG 24 hr tablet, TAKE 1 TABLET BY MOUTH DAILY, Disp: 90 tablet, Rfl: 3  •  thiamine (VITAMINE B-1) 100 MG tablet, Take 100 mg by mouth Daily., Disp: , Rfl:   •  celecoxib (CeleBREX) 200 MG capsule, Take 200 mg by mouth Daily., Disp: , Rfl:   •  cyclobenzaprine (FLEXERIL) 5 MG tablet, Take 1 tablet by mouth Every Night.,  Disp: 30 tablet, Rfl: 1  •  omeprazole (priLOSEC) 20 MG capsule, Take 1 capsule by mouth Daily., Disp: 15 capsule, Rfl: 0  •  omeprazole (priLOSEC) 20 MG capsule, Take 20 mg by mouth Daily., Disp: , Rfl:   No current facility-administered medications for this visit.    Facility-Administered Medications Ordered in Other Visits:   •  heparin flush (porcine) 100 UNIT/ML injection 500 Units, 500 Units, Intravenous, PRN, Shanda Torres MD, 500 Units at 18 1504  •  heparin flush (porcine) 100 UNIT/ML injection 500 Units, 500 Units, Intravenous, PRN, Shanda Torres MD, 500 Units at 18 1445  •  heparin injection 500 Units, 500 Units, Intravenous, PRN, SasTamela andrade, APRN, 500 Units at 21 0948  •  sodium chloride 0.9 % flush 10 mL, 10 mL, Intravenous, PRN, Shanda Torres MD, 10 mL at 18 1503  •  sodium chloride 0.9 % flush 10 mL, 10 mL, Intravenous, PRN, Shanda Torres MD, 10 mL at 18 1445  •  sodium chloride 0.9 % flush 10 mL, 10 mL, Intravenous, PRN, Tamela Doherty, APRN, 10 mL at 21 0948    Allergies   Allergen Reactions   • Benzonatate Hives and Itching       Social History     Socioeconomic History   • Marital status:      Spouse name: Horace   • Number of children: 0   • Years of education: 12.5   Tobacco Use   • Smoking status: Former     Packs/day: 2.00     Years: 37.00     Pack years: 74.00     Types: Cigarettes     Start date: 1963     Quit date: 2000     Years since quittin.1   • Smokeless tobacco: Never   Vaping Use   • Vaping Use: Never used   Substance and Sexual Activity   • Alcohol use: No   • Drug use: No   • Sexual activity: Defer       Family History   Problem Relation Age of Onset   • Hypertension Mother    • Heart failure Mother    • Cancer Father         colon   • Colon cancer Father    • Cancer Maternal Aunt         breast   • No Known Problems Maternal Grandmother    • No Known  Problems Maternal Grandfather    • No Known Problems Paternal Grandmother    • No Known Problems Paternal Grandfather    • Cancer Maternal Aunt         breast   • Cancer Maternal Aunt         breast   • Cancer Other        Review of Systems  No chest pains, no shortness of breath    Objective     Vitals:    12/01/22 1416   BP: 152/64   Pulse: 58   Temp: 96.6 °F (35.9 °C)   SpO2: 96%       Physical Exam  No acute distress. Vital signs as documented.  Sclera anicteric.  Neck without noticeable JVD. Lungs clear to auscultation. Heart exam notable for regular rhythm, normal sounds. Abdomen is soft, nontender, non distended, normal bowel sounds and without evidence of organomegaly, masses.  Neuro alert, moves extremities.        Assessment & Plan   Problem List Items Addressed This Visit        Gastrointestinal Abdominal     Abnormal liver x-ray    Overview      US liver  Result Date: 6/16/2022   1.  Coarsened hepatic echotexture and mildly lobular contour with prominence of the caudate lobe. Underlying fibrosis/cirrhotic morphology to be considered.   This report was finalized on 06/16/2022 14:48 by Dr. Maco Leyva MD.     CT Abdomen Pelvis With Contrast (08/10/2021 08:51)  IMPRESSION:  1. No acute intra-abdominal finding.  2. Colonic diverticula. No evidence of acute diverticulitis.  3. Mildly nodular liver contour suggests chronic liver disease.  4. Small tree-in-bud opacities at the lingula. See separately dictated  CT chest of the same day.  5. Calcified atherosclerosis.     3.22.22 CT abdomen [facility]  Liver is unremarkable     3.19.22 CT chest [facility]  No evidence of pulmonary artery thrombus  R > L lung base nodular infiltrates and bronchial wall thickening; also a focal left upper lobe ground glass infitrate; favor pneumonitis    7.14.22 BH/MRI liver  IMPRESSION:  1. There is a slightly lobulated contour to the left hepatic lobe with  mild caudate lobe hypertrophy. Findings can be seen with mild fibrosis.  There is  no suspicious focal liver mass. No evidence of portal  hypertension.  2. Right renal cysts.    Ultrasound liver with elastography September 2022  IMPRESSION:     1. Mild coarsening of the liver echotexture and mildly nodular contour.  Median velocity 1 m/s.  2. Metavir score less than F2.  3. No gallstones or ductal dilatation.         Abnormal LFTs - Primary    Adenomatous polyp of colon         Long discussion about her liver.  There were 7 imaging studies that suggest she may have chronic liver disease or recent ultrasound elastography suggested that she has no significant fibrosis.  Regardless, at this time she has preserved hepatic function.  Liver transaminases are unremarkable.  She does have a mild elevated alkaline phosphatase.  I suspect she has underlying fatty liver disease.  She may have some fibrosis related to this but not substantial at this time.  As discussed, her liver can deteriorate over time in the future.  The #1 think she could help do to prevent further deterioration of her liver is to try to achieve ideal body weight.  She should exercise and eat healthy.  Not only will this help with the liver it would be beneficial to her overall health.  She expressed understanding.    She does have labs monitored routinely with hematology.  We will plan to follow-up with her in the office in 1 year.    Next, she does have a history of adenomatous colon polyps as well as a family history colon cancer involving her father.  We talk about the pros and cons of colonoscopy exam.  She expressed understanding.  She would like to pursue.  We will plan MiraLAX prep.  Plan for this at [facility] in a little less than 2 weeks.  I did talk to her about the recommended split dose prep.  She prefers taking a prep the night before and I informed her that split dose increases the visualization which increases the overall accuracy and benefit of colonoscopy exam and this is what I recommend.  We discussed how a  prep just the day before can lead to less than ideal visualization.    Continue ongoing management by primary care provider and other specialists.     Body mass index is 34.74 kg/m².  Elevated BMI, I set a goal for her to try to get her weight down into the 160 range by this time next year      EMR Dragon/transcription disclaimer:  Much of this encounter note is electronic transcription/translation of spoken language to printed text.  The electronic translation of spoken language may be erroneous, or at times, nonsensical words or phrases may be inadvertently transcribed.  Although I have reviewed the note for such errors, some may still exist.    Modesto Genao MD  16:13 CST  12/01/22

## 2022-12-05 ENCOUNTER — TELEPHONE (OUTPATIENT)
Dept: ONCOLOGY | Facility: CLINIC | Age: 77
End: 2022-12-05

## 2022-12-05 NOTE — TELEPHONE ENCOUNTER
Caller: Ira Sanchez    Relationship to patient: Self    Best call back number: [phone]    Chief complaint: PT WANTED TO SCHEDULE HER NEXT APPOINTMENT WITH VIDYA SANFORD    Type of visit: FOLLOW UP        If rescheduling, when is the original appointment: 12-7-22     Additional notes:PLEASE CALL PATIENT TO SCHEDULE

## 2022-12-06 NOTE — PROGRESS NOTES
[facility] HEMATOLOGY & ONCOLOGY [place]  [address]  [place] [address] [phone]    Patient Name: Ira Sanchez  Encounter Date: 12/07/2022   YOB: 1945  Patient Number: [id number]    Hematology / Oncology Progress Note      HPI / REASON FOR VISIT: Ira Sanchez is a 77 y.o. female who is followed by this office for history of  left breast cancer that was diagnosed in 2014.  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  It was ER+, MN+ and Her 2 emerita +.  She subsequently underwent bilateral mastectomies and her AJCC TNM staging was [id number], Stage IIA.      She then underwent 5 cycles of Carboplatin, Taxotere and Herceptin followed by a year of Herceptin therapy.  She then was placed on hormonal manipulation with Arimidex in June 2015 and completed 5 years in 2020.     INTERVAL HISTORY   Ms. Sanchez presents to clinic today for continued follow up.   She has seen Dr. Genao for elevated LFT / fatty liver.  MRI showed no liver lesions or concerns for malignancy.  Plans to follow up in one year.      She had labs drawn. November 30, 2022.  Chemistry unremarkable other than non fasting glucose 129 and Alk Phos 127.  Hgb 13.9, Hct 43.3.  Ca 27.29 33.2 and CEA 4.54      LABS    Lab Results - Last 18 Months   Lab Units 11/30/22  0818 09/01/22  0848 06/01/22  0756 12/08/21  1006   HEMOGLOBIN g/dL 13.9 14.2 13.1 13.7   HEMATOCRIT % 43.3 44.2 40.9 41.6   MCV fL 94.1 94.2 95.3 91.8   WBC 10*3/mm3 8.51 6.89 7.65 8.37   RDW % 12.5 12.7 13.5 13.8   MPV fL 10.4 11.1 10.6 10.7   PLATELETS 10*3/mm3 176 176 175 169   IMM GRAN % % 0.2 0.4 0.4 0.5   NEUTROS ABS 10*3/mm3 5.17 4.13 4.58 5.55   LYMPHS ABS 10*3/mm3 2.55 2.07 2.38 2.19   MONOS ABS 10*3/mm3 0.62 0.52 0.50 0.42   EOS ABS 10*3/mm3 0.11 0.11 0.12 0.13   BASOS ABS 10*3/mm3 0.04 0.03 0.04 0.04   IMMATURE GRANS (ABS) 10*3/mm3 0.02 0.03 0.03 0.04   NRBC  /100 WBC 0.0 0.0 0.0 0.0       Lab Results - Last 18 Months   Lab Units 11/30/22  0818 09/01/22  0848 07/14/22  1433 06/01/22  0756 08/10/21  0834   GLUCOSE mg/dL 129*  --   --  120*  --    SODIUM mmol/L 138  --   --  139  --    POTASSIUM mmol/L 4.3  --   --  4.1  --    CO2 mmol/L 27.0  --   --  28.0  --    CHLORIDE mmol/L 102  --   --  103  --    ANION GAP mmol/L 9.0  --   --  8.0  --    CREATININE mg/dL 0.62  --  0.60 0.61 0.70   BUN mg/dL 12  --   --  17  --    BUN / CREAT RATIO  19.4  --   --  27.9*  --    CALCIUM mg/dL 9.7  --   --  9.7  --    ALK PHOS U/L 127* 123*  --  131*  --    TOTAL PROTEIN g/dL 7.1 7.1  --  7.2  --    ALT (SGPT) U/L 16 17  --  19  --    AST (SGOT) U/L 32 33*  --  35*  --    BILIRUBIN mg/dL 0.5 0.4  --  0.3  --    ALBUMIN g/dL 4.30 4.40  --  4.50  --    GLOBULIN gm/dL 2.8  --   --  2.7  --        Lab Results - Last 18 Months   Lab Units 11/30/22  0818 06/01/22  0756 12/08/21  1006 07/19/21  0747   CEA ng/mL 4.54 5.30 4.69 5.38       Lab Results - Last 18 Months   Lab Units 09/01/22  0848 12/08/21  1006   IRON mcg/dL 85  --    TIBC mcg/dL 520  --    IRON SATURATION % 16*  --    FERRITIN ng/mL 40.50  --    TSH uIU/mL  --  2.100         PAST MEDICAL HISTORY:  ALLERGIES:  Allergies   Allergen Reactions   • Benzonatate Hives and Itching     CURRENT MEDICATIONS:  Outpatient Encounter Medications as of 12/7/2022   Medication Sig Dispense Refill   • amLODIPine (NORVASC) 5 MG tablet TAKE 1 TABLET BY MOUTH DAILY 90 tablet 3   • Calcium Carb-Cholecalciferol (CALCIUM-VITAMIN D) 600-400 MG-UNIT tablet Take 1 tablet by mouth Daily.     • celecoxib (CeleBREX) 200 MG capsule Take 200 mg by mouth Daily.     • cycloSPORINE (RESTASIS) 0.05 % ophthalmic emulsion 1 drop As Needed.     • diphenhydrAMINE (BENADRYL) 25 mg capsule Take 25 mg by mouth At Night As Needed for Allergies.     • losartan (COZAAR) 100 MG tablet TAKE 1 TABLET BY MOUTH DAILY 90 tablet 3   • Omega-3 1000 MG capsule Take  by mouth Daily.      • oxybutynin XL (DITROPAN-XL) 10 MG 24 hr tablet TAKE 1 TABLET BY MOUTH DAILY 90 tablet 3   • thiamine (VITAMINE B-1) 100 MG tablet Take 100 mg by mouth Daily.     • cyclobenzaprine (FLEXERIL) 5 MG tablet Take 1 tablet by mouth Every Night. 30 tablet 1   • [DISCONTINUED] fluticasone-salmeterol (Advair Diskus) 250-50 MCG/DOSE DISKUS Inhale 1 puff 2 (Two) Times a Day. (Patient taking differently: Inhale 1 puff As Needed.) 60 each 5   • [DISCONTINUED] omeprazole (priLOSEC) 20 MG capsule Take 1 capsule by mouth Daily. 15 capsule 0   • [DISCONTINUED] omeprazole (priLOSEC) 20 MG capsule Take 20 mg by mouth Daily.       Facility-Administered Encounter Medications as of 12/7/2022   Medication Dose Route Frequency Provider Last Rate Last Admin   • heparin flush (porcine) 100 UNIT/ML injection 500 Units  500 Units Intravenous PRN Shanda Torres MD   500 Units at 04/16/18 1504   • heparin flush (porcine) 100 UNIT/ML injection 500 Units  500 Units Intravenous PRN Shanda Torres MD   500 Units at 06/12/18 1445   • heparin injection 500 Units  500 Units Intravenous PRN Tamela Doherty APRN   500 Units at 12/08/21 0948   • sodium chloride 0.9 % flush 10 mL  10 mL Intravenous PRN Shanda Torres MD   10 mL at 04/16/18 1503   • sodium chloride 0.9 % flush 10 mL  10 mL Intravenous PRN Shanda Torres MD   10 mL at 06/12/18 1445   • sodium chloride 0.9 % flush 10 mL  10 mL Intravenous PRN Tamela Doherty APRN   10 mL at 12/08/21 0948     ADULT ILLNESSES:  Patient Active Problem List   Diagnosis Code   • Obesity (BMI 30-39.9) E66.9   • Breast cancer-L/valente masectomy 2014 C50.919   • Overactive bladder N32.81   • Diabetes type 2, controlled (HCC) E11.9   • Hypertension I10   • Osteopenia 2022- 2 to 5y M85.80   • Neuropathy-chemo/better G62.9   • Primary generalized (osteo)arthritis M15.0   • Malignant neoplasm of lower-outer quadrant of left female breast (HCC) C50.512   •  Family history of breast cancer Z80.3   • Malignant neoplasm of upper-outer quadrant of female breast ([facility]) C50.419   • History of bilateral mastectomy Z90.13   • Wellness examination-done Z00.00   • Shoulder pain M25.519   • Chronic back pain M54.9, G89.29   • Laboratory test* Z01.89   • Bradycardia R00.1   • Cough: copd R05.9   • Allergic rhinitis J30.9   • Menopause Z78.0   • History of shingles Z86.19   • COPD (chronic obstructive pulmonary disease) ([facility]) J44.9   • Abnormal mammogram R92.8   • After-cataract with vision obscured H26.499   • Amblyopia H53.009   • Cellulitis, trunk L03.319   • Dermatitis of eyelid due to herpes zoster B02.39   • Dermatochalasis H02.839   • Hordeolum internum H00.029   • 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   • Pseudophakia Z96.1   • Rash R21   • Encounter for care related to Port-a-Cath Z45.2   • Edema: venous-BH/vascular R60.9   • Congestive heart failure ([facility]) I50.9   • Valvular heart disease: AS I38   • Chronic neck pain M54.2, G89.29   • Abdominal pannus E65   • Encounter for care related to vascular access port Z45.2   • Pneumonia of both lower lobes due to infectious organism J18.9   • Chronic cough R05.3   • Personal history of nicotine dependence Z87.891   • Severe obesity (BMI 35.0-35.9 with comorbidity) ([facility]) E66.01, Z68.35   • Abnormal liver x-ray R93.2   • Positive JESUSITA (antinuclear antibody) R76.8   • NSAID long-term use Z79.1   • Abnormal LFTs R79.89   • Adenomatous polyp of colon D12.6   • Fatty liver K76.0     SURGERIES:  Past Surgical History:   Procedure Laterality Date   • APPENDECTOMY     • BREAST SURGERY     • CATARACT EXTRACTION Bilateral    • DILATATION AND CURETTAGE     • KNEE SURGERY     • MASTECTOMY Bilateral    • NIPPLE TATTOO  04/12/2016   • PORTACATH PLACEMENT     • THROAT SURGERY       HEALTH MAINTENANCE ITEMS:  Health Maintenance Due   Topic Date Due   • URINE MICROALBUMIN  Never done   • TDAP/TD VACCINES (1 - Tdap) Never done   • ZOSTER  VACCINE (1 of 2) Never done   • MAMMOGRAM  2020   • DIABETIC EYE EXAM  2020   • COVID-19 Vaccine (3 - Booster for Moderna series) 2021   • HEMOGLOBIN A1C  2022   • INFLUENZA VACCINE  2022       <no information>  Last Completed Colonoscopy          COLORECTAL CANCER SCREENING (COLONOSCOPY - Every 5 Years) Next due on 2022  SCANNED - COLONOSCOPY    2014  SCANNED - COLONOSCOPY              Immunization History   Administered Date(s) Administered   • COVID-19 (MODERNA) 1st, 2nd, 3rd Dose Only 2021, 2021   • Fluad Quad 65+ 10/09/2020   • Fluzone High Dose =>65 Years ([employer] ONLY) 2017, 2019   • Pneumococcal Conjugate 13-Valent (PCV13) 2017     Last Completed Mammogram     This patient has no relevant Health Maintenance data.            FAMILY HISTORY:  Family History   Problem Relation Age of Onset   • Hypertension Mother    • Heart failure Mother    • Cancer Father         colon   • Colon cancer Father    • Cancer Maternal Aunt         breast   • No Known Problems Maternal Grandmother    • No Known Problems Maternal Grandfather    • No Known Problems Paternal Grandmother    • No Known Problems Paternal Grandfather    • Cancer Maternal Aunt         breast   • Cancer Maternal Aunt         breast   • Cancer Other      SOCIAL HISTORY:  Social History     Socioeconomic History   • Marital status:      Spouse name: Horace   • Number of children: 0   • Years of education: 12.5   Tobacco Use   • Smoking status: Former     Packs/day: 2.00     Years: 37.00     Pack years: 74.00     Types: Cigarettes     Start date: 1963     Quit date: 2000     Years since quittin.2   • Smokeless tobacco: Never   Vaping Use   • Vaping Use: Never used   Substance and Sexual Activity   • Alcohol use: No   • Drug use: No   • Sexual activity: Defer       REVIEW OF SYSTEMS:  Review of Systems   Constitutional: Positive for fatigue. Negative for  "fever.   HENT: Negative for trouble swallowing.    Respiratory: Negative for cough and shortness of breath.    Cardiovascular: Negative for chest pain, palpitations and leg swelling.   Gastrointestinal: Negative for nausea and vomiting.   Genitourinary: Negative for hematuria.   Musculoskeletal: Negative for arthralgias and myalgias.   Skin: Negative for rash, skin lesions and wound.   Neurological: Negative for dizziness, syncope, memory problem and confusion.   Psychiatric/Behavioral: Negative for suicidal ideas and depressed mood. The patient is not nervous/anxious.        /88   Pulse 72   Temp 97.6 °F (36.4 °C) (Temporal)   Resp 16   Ht 160 cm (63\")   Wt 87.6 kg (193 lb 3.2 oz)   LMP  (LMP Unknown)   SpO2 95%   BMI 34.22 kg/m²  Body surface area is 1.91 meters squared.    Pain Score    12/07/22 0805   PainSc: 0-No pain         Physical Exam  Constitutional:       Appearance: Normal appearance. She is obese.   HENT:      Head: Normocephalic and atraumatic.   Cardiovascular:      Rate and Rhythm: Normal rate and regular rhythm.   Pulmonary:      Effort: Pulmonary effort is normal.      Breath sounds: Normal breath sounds.   Chest:   Breasts:     Right: Absent.      Left: Absent.   Abdominal:      General: Bowel sounds are normal.      Palpations: Abdomen is soft.   Musculoskeletal:      Right lower leg: No edema.      Left lower leg: No edema.   Skin:     General: Skin is warm and dry.   Neurological:      Mental Status: She is alert and oriented to person, place, and time.   Psychiatric:         Attention and Perception: Attention normal.         Mood and Affect: Mood normal.         Judgment: Judgment normal.         Ira Sanchez reports a pain score of 0.  Given her pain assessment as noted, treatment options were discussed and the following options were decided upon as a follow-up plan to address the patient's pain: No intervention indicated.           ASSESSMENT / PLAN    1. History of breast " cancer    2. Essential hypertension    3. Elevated liver enzymes    4. Osteopenia, unspecified location    5. Obesity (BMI 30-39.9)         ASSESSMENT:    1.  History of Left breast carcinoma diagnosed in October 2014  ? Stage: AJCC TNM IIA [id number], ER/NM/HER-2 positive  ? Treatment: Bilateral mastectomies, Adjuvant chemotherapy with 5 cycles of Carbplatin Taxotere Herceptin followed by a total of one year of herceptin. Hormonal manipulation starting in June 2015 -2020.  ? Tumor markers stable:  CEA 4.54 and Ca 27.29 33.2  ? Heme status stable Hgb 13.9, Hct 43.3.      2.  Hypertension  -Taking Amlodipine and losartan   -Managed by PCP     3.  Elevated LFT  -Alk Phos 127  -07/14/22 MRI Abdomen There is a slightly lobulated contour to the left hepatic lobe with mild caudate lobe hypertrophy. Findings can be seen with mild fibrosis.  There is no suspicious focal liver mass. No evidence of portal hypertension.  -Followed by Dr. Genao    4.  Obesity   Patient's (Body mass index is 34.22 kg/m².) indicates that they are obese (BMI >30) with health related conditions that include hypertension .   -Defer Management to PCP     5.  Osteopenia   -DEXA Scan 01/24/22 with Femoral neck T Score -1.6 and Lumbar Spine 1.3  -Pt is taking Calcium and Vit D   -Managed by PCP     PLAN:  Stable for observation.    Return to office 6 months   Advised port flush every 6-8 weeks.  Pt states she gets them q 3 months  Patient will have preoffice labs. CBC, CMP, Iron Profile, Ferritin, Ca 27.29 CEA   Continue current medications, treatment plans and follow up with PCP and any other providers.  Care discussed with patient.  Understanding expressed.  Patient agreeable with plan.    Denisha Reyes, APRN  12/07/2022

## 2022-12-07 ENCOUNTER — TELEPHONE (OUTPATIENT)
Dept: GASTROENTEROLOGY | Facility: CLINIC | Age: 77
End: 2022-12-07

## 2022-12-07 ENCOUNTER — OFFICE VISIT (OUTPATIENT)
Dept: ONCOLOGY | Facility: CLINIC | Age: 77
End: 2022-12-07

## 2022-12-07 VITALS
HEIGHT: 63 IN | TEMPERATURE: 97.6 F | OXYGEN SATURATION: 95 % | BODY MASS INDEX: 34.23 KG/M2 | RESPIRATION RATE: 16 BRPM | SYSTOLIC BLOOD PRESSURE: 138 MMHG | DIASTOLIC BLOOD PRESSURE: 88 MMHG | HEART RATE: 72 BPM | WEIGHT: 193.2 LBS

## 2022-12-07 DIAGNOSIS — Z85.3 HISTORY OF BREAST CANCER: Primary | ICD-10-CM

## 2022-12-07 DIAGNOSIS — I10 ESSENTIAL HYPERTENSION: ICD-10-CM

## 2022-12-07 DIAGNOSIS — R74.8 ELEVATED LIVER ENZYMES: ICD-10-CM

## 2022-12-07 DIAGNOSIS — M85.80 OSTEOPENIA, UNSPECIFIED LOCATION: ICD-10-CM

## 2022-12-07 DIAGNOSIS — E66.9 OBESITY (BMI 30-39.9): ICD-10-CM

## 2022-12-07 DIAGNOSIS — D64.9 ANEMIA, UNSPECIFIED TYPE: ICD-10-CM

## 2022-12-07 PROCEDURE — 99214 OFFICE O/P EST MOD 30 MIN: CPT | Performed by: NURSE PRACTITIONER

## 2022-12-07 NOTE — TELEPHONE ENCOUNTER
PT called and rescheduled her procedure.  She was scheduled at [facility]  On 12-14-22.  She rescheduled to February 22@ [facility].

## 2022-12-08 ENCOUNTER — OFFICE VISIT (OUTPATIENT)
Dept: FAMILY MEDICINE CLINIC | Facility: CLINIC | Age: 77
End: 2022-12-08

## 2022-12-08 VITALS
HEIGHT: 63 IN | HEART RATE: 48 BPM | RESPIRATION RATE: 18 BRPM | OXYGEN SATURATION: 96 % | BODY MASS INDEX: 34.2 KG/M2 | TEMPERATURE: 97.1 F | WEIGHT: 193 LBS | SYSTOLIC BLOOD PRESSURE: 118 MMHG | DIASTOLIC BLOOD PRESSURE: 88 MMHG

## 2022-12-08 DIAGNOSIS — E66.9 CLASS 1 OBESITY WITH SERIOUS COMORBIDITY AND BODY MASS INDEX (BMI) OF 34.0 TO 34.9 IN ADULT, UNSPECIFIED OBESITY TYPE: ICD-10-CM

## 2022-12-08 DIAGNOSIS — Z00.00 MEDICARE ANNUAL WELLNESS VISIT, SUBSEQUENT: Primary | ICD-10-CM

## 2022-12-08 DIAGNOSIS — Z23 NEED FOR INFLUENZA VACCINATION: ICD-10-CM

## 2022-12-08 PROCEDURE — 1170F FXNL STATUS ASSESSED: CPT | Performed by: NURSE PRACTITIONER

## 2022-12-08 PROCEDURE — G0008 ADMIN INFLUENZA VIRUS VAC: HCPCS | Performed by: NURSE PRACTITIONER

## 2022-12-08 PROCEDURE — 1160F RVW MEDS BY RX/DR IN RCRD: CPT | Performed by: NURSE PRACTITIONER

## 2022-12-08 PROCEDURE — G0439 PPPS, SUBSEQ VISIT: HCPCS | Performed by: NURSE PRACTITIONER

## 2022-12-08 PROCEDURE — 90662 IIV NO PRSV INCREASED AG IM: CPT | Performed by: NURSE PRACTITIONER

## 2022-12-08 NOTE — PROGRESS NOTES
The ABCs of the Annual Wellness Visit  Subsequent Medicare Wellness Visit    Subjective      Ira Sanchez is a 77 y.o. female who presents for a Subsequent Medicare Wellness Visit.    The following portions of the patient's history were reviewed and   updated as appropriate: allergies, current medications, past family history, past medical history, past social history, past surgical history and problem list.    Compared to one year ago, the patient feels her physical   health is the same.    Compared to one year ago, the patient feels her mental   health is better.    Recent Hospitalizations:  She was admitted within the past 365 days at [facility]. 3/2022 with pneumonia.        Current Medical Providers:  Patient Care Team:  Conrado Tinoco MD as PCP - General  MendozaDante yung MD as Consulting Physician (Pulmonary Disease)  Adalberto Wallace MD as Consulting Physician (Hematology and Oncology)  Modesto Genao MD as Consulting Physician (Gastroenterology)    Outpatient Medications Prior to Visit   Medication Sig Dispense Refill   • amLODIPine (NORVASC) 5 MG tablet TAKE 1 TABLET BY MOUTH DAILY 90 tablet 3   • Calcium Carb-Cholecalciferol (CALCIUM-VITAMIN D) 600-400 MG-UNIT tablet Take 1 tablet by mouth Daily.     • celecoxib (CeleBREX) 200 MG capsule Take 200 mg by mouth Daily.     • cyclobenzaprine (FLEXERIL) 5 MG tablet Take 1 tablet by mouth Every Night. 30 tablet 1   • cycloSPORINE (RESTASIS) 0.05 % ophthalmic emulsion 1 drop As Needed.     • diphenhydrAMINE (BENADRYL) 25 mg capsule Take 25 mg by mouth At Night As Needed for Allergies.     • losartan (COZAAR) 100 MG tablet TAKE 1 TABLET BY MOUTH DAILY 90 tablet 3   • Omega-3 1000 MG capsule Take  by mouth Daily.     • oxybutynin XL (DITROPAN-XL) 10 MG 24 hr tablet TAKE 1 TABLET BY MOUTH DAILY 90 tablet 3   • thiamine (VITAMINE B-1) 100 MG tablet Take 100 mg by mouth Daily.       Facility-Administered Medications Prior to Visit    Medication Dose Route Frequency Provider Last Rate Last Admin   • heparin flush (porcine) 100 UNIT/ML injection 500 Units  500 Units Intravenous PRN Shanda Torres MD   500 Units at 04/16/18 1504   • heparin flush (porcine) 100 UNIT/ML injection 500 Units  500 Units Intravenous PRN Shanda Torres MD   500 Units at 06/12/18 1445   • heparin injection 500 Units  500 Units Intravenous PRN Tamela Doherty, APRN   500 Units at 12/08/21 0948   • sodium chloride 0.9 % flush 10 mL  10 mL Intravenous PRN Shanda Torres MD   10 mL at 04/16/18 1503   • sodium chloride 0.9 % flush 10 mL  10 mL Intravenous PRN Shanda Torres MD   10 mL at 06/12/18 1445   • sodium chloride 0.9 % flush 10 mL  10 mL Intravenous PRN Tamela Doherty, APRN   10 mL at 12/08/21 0948       No opioid medication identified on active medication list. I have reviewed chart for other potential  high risk medication/s and harmful drug interactions in the elderly.          Aspirin is not on active medication list.  Aspirin use is not indicated based on review of current medical condition/s. Risk of harm outweighs potential benefits.  .    Patient Active Problem List   Diagnosis   • Obesity (BMI 30-39.9)   • Breast cancer-L/valente masectomy 2014   • Overactive bladder   • Diabetes type 2, controlled (HCC)   • Hypertension   • Osteopenia 2022- 2 to 5y   • Neuropathy-chemo/better   • Primary generalized (osteo)arthritis   • Malignant neoplasm of lower-outer quadrant of left female breast (HCC)   • Family history of breast cancer   • Malignant neoplasm of upper-outer quadrant of female breast (HCC)   • History of bilateral mastectomy   • Wellness examination-done   • Shoulder pain   • Chronic back pain   • Laboratory test*   • Bradycardia   • Cough: copd   • Allergic rhinitis   • Menopause   • History of shingles   • COPD (chronic obstructive pulmonary disease) (HCC)   • Abnormal mammogram   •  "After-cataract with vision obscured   • Amblyopia   • Cellulitis, trunk   • Dermatitis of eyelid due to herpes zoster   • Dermatochalasis   • Hordeolum internum   • Other hereditary corneal dystrophies   • Pseudophakia   • Rash   • Encounter for care related to Port-a-Cath   • Edema: venous-BH/vascular   • Congestive heart failure (HCC)   • Valvular heart disease: AS   • Chronic neck pain   • Abdominal pannus   • Encounter for care related to vascular access port   • Pneumonia of both lower lobes due to infectious organism   • Chronic cough   • Personal history of nicotine dependence   • Severe obesity (BMI 35.0-35.9 with comorbidity) (HCC)   • Abnormal liver x-ray   • Positive JESUSITA (antinuclear antibody)   • NSAID long-term use   • Abnormal LFTs   • Adenomatous polyp of colon   • Fatty liver     Advance Care Planning  Advance Directive is on file.  ACP discussion was declined by the patient. Patient has an advance directive in EMR which is still valid.      Objective    Vitals:    12/08/22 1326   BP: 118/88   BP Location: Left arm   Patient Position: Sitting   Cuff Size: Adult   Pulse: (!) 48   Resp: 18   Temp: 97.1 °F (36.2 °C)   TempSrc: Infrared   SpO2: 96%   Weight: 87.5 kg (193 lb)   Height: 160 cm (63\")   PainSc: 0-No pain    Recheck pulse 68 bpm    Estimated body mass index is 34.19 kg/m² as calculated from the following:    Height as of this encounter: 160 cm (63\").    Weight as of this encounter: 87.5 kg (193 lb).    BMI is >= 30 and <35. (Class 1 Obesity). The following options were offered after discussion;: exercise counseling/recommendations and nutrition counseling/recommendations      Does the patient have evidence of cognitive impairment?   No            HEALTH RISK ASSESSMENT    Smoking Status:  Social History     Tobacco Use   Smoking Status Former   • Packs/day: 2.00   • Years: 37.00   • Pack years: 74.00   • Types: Cigarettes   • Start date: 2/20/1963   • Quit date: 9/29/2000   • Years since " quittin.2   Smokeless Tobacco Never     Alcohol Consumption:  Social History     Substance and Sexual Activity   Alcohol Use No     Fall Risk Screen:    CHRISTINEADI Fall Risk Assessment was completed, and patient is at LOW risk for falls.Assessment completed on:2022    Depression Screening:  PHQ-2/PHQ-9 Depression Screening 2022   Retired PHQ-9 Total Score -   Retired Total Score -   Little Interest or Pleasure in Doing Things 0-->not at all   Feeling Down, Depressed or Hopeless 0-->not at all   PHQ-9: Brief Depression Severity Measure Score 0       Health Habits and Functional and Cognitive Screening:  Functional & Cognitive Status 2022   Do you have difficulty preparing food and eating? No   Do you have difficulty bathing yourself, getting dressed or grooming yourself? No   Do you have difficulty using the toilet? No   Do you have difficulty moving around from place to place? No   Do you have trouble with steps or getting out of a bed or a chair? No   Current Diet Well Balanced Diet   Dental Exam Not up to date   Eye Exam Up to date   Exercise (times per week) 3 times per week   Current Exercises Include House Cleaning   Current Exercise Activities Include -   Do you need help using the phone?  No   Are you deaf or do you have serious difficulty hearing?  Yes   Do you need help with transportation? No   Do you need help shopping? No   Do you need help preparing meals?  No   Do you need help with housework?  No   Do you need help with laundry? No   Do you need help taking your medications? No   Do you need help managing money? No   Do you ever drive or ride in a car without wearing a seat belt? No   Have you felt unusual stress, anger or loneliness in the last month? No   Who do you live with? Alone   If you need help, do you have trouble finding someone available to you? No   Have you been bothered in the last four weeks by sexual problems? No   Do you have difficulty concentrating, remembering or  making decisions? No       Age-appropriate Screening Schedule:  Refer to the list below for future screening recommendations based on patient's age, sex and/or medical conditions. Orders for these recommended tests are listed in the plan section. The patient has been provided with a written plan.    Health Maintenance   Topic Date Due   • URINE MICROALBUMIN  Never done   • TDAP/TD VACCINES (1 - Tdap) Never done   • ZOSTER VACCINE (1 of 2) Never done   • MAMMOGRAM  06/05/2020   • DIABETIC EYE EXAM  08/13/2020   • HEMOGLOBIN A1C  06/08/2022   • INFLUENZA VACCINE  08/01/2022   • DXA SCAN  01/24/2024                CMS Preventative Services Quick Reference  Risk Factors Identified During Encounter:    Fall Risk-High or Moderate: Discussed Fall Prevention in the home  Immunizations Discussed/Encouraged: Influenza    The above risks/problems have been discussed with the patient.  Pertinent information has been shared with the patient in the After Visit Summary.    There are no diagnoses linked to this encounter.    Follow Up:   Next Medicare Wellness visit to be scheduled in 1 year.      An After Visit Summary and PPPS were made available to the patient.

## 2023-01-23 ENCOUNTER — TELEPHONE (OUTPATIENT)
Dept: FAMILY MEDICINE CLINIC | Facility: CLINIC | Age: 78
End: 2023-01-23

## 2023-02-08 NOTE — PROGRESS NOTES
From [facility] bringing her in, pt stated she did not take her insulin tonight before and after having a big meal today with ETOH as well, stated immediately after the meal, she started not feeling well Subjective   Ira Sanchez is a 73 y.o. female presenting with chief complaint of:   Chief Complaint   Patient presents with   • Diabetes   • Hypertension   • Shoulder Pain     right       History of Present Illness :  Alone.   Has multiple chronic problems to consider that might have a bearing on today's issues;  an interval appointment.       Chronic/acute problems to review today:   1. Shoulder pain, unspecified chronicity, unspecified laterality: without injury.  Sore/pain with reaching up/use. Some lesser pain/ache at rest.  On/off.     2. Controlled type 2 diabetes mellitus without complication, without long-term current use of insulin: chronic with usually good BS control. . Diet is loose not special. No hypoglycemia.     3. Hypertension, unspecified type: chronic with home monitoring and does well. No lows.      Has an/another acute issue today: none.    The following portions of the patient's history were reviewed and updated as appropriate: allergies, current medications, past family history, past medical history, past social history, past surgical history and problem list.  Records acquired and reviewed; TCC migrated.      Current Outpatient Prescriptions:   •  amLODIPine (NORVASC) 5 MG tablet, Take 1 tablet by mouth Daily., Disp: 30 tablet, Rfl: 5  •  anastrozole (ARIMIDEX) 1 MG tablet, TAKE 1 TABLET BY MOUTH DAILY, Disp: 90 tablet, Rfl: 0  •  Calcium Carb-Cholecalciferol (CALCIUM-VITAMIN D) 600-400 MG-UNIT tablet, Take 1 tablet by mouth 2 (Two) Times a Day., Disp: , Rfl:   •  cycloSPORINE (RESTASIS) 0.05 % ophthalmic emulsion, 1 drop Every 12 (Twelve) Hours., Disp: , Rfl:   •  diphenhydrAMINE (BENADRYL) 25 mg capsule, Take 25 mg by mouth Daily As Needed., Disp: , Rfl:   •  fluticasone (FLONASE) 50 MCG/ACT nasal spray, 2 sprays into each nostril Daily., Disp: 1 bottle, Rfl: 0  •  ibuprofen (ADVIL,MOTRIN) 200 MG tablet, Take 200 mg by mouth As Needed., Disp: , Rfl:   •  lisinopril (PRINIVIL,ZESTRIL) 20  MG tablet, Take 1 tablet by mouth Daily., Disp: 90 tablet, Rfl: 1  •  oxybutynin (DITROPAN) 5 MG tablet, TAKE 1 TABLET BY MOUTH TWICE DAILY, Disp: 180 tablet, Rfl: 1  •  thiamine (VITAMINE B-1) 100 MG tablet, Take 100 mg by mouth Daily., Disp: , Rfl:   No current facility-administered medications for this visit.     Facility-Administered Medications Ordered in Other Visits:   •  heparin flush (porcine) 100 UNIT/ML injection 500 Units, 500 Units, Intravenous, PRN, Shanda Torres MD, 500 Units at 04/16/18 1504  •  sodium chloride 0.9 % flush 10 mL, 10 mL, Intravenous, PRN, Shanda Torres MD, 10 mL at 04/16/18 1503    No problems with medications.  Refills if needed done    Allergies   Allergen Reactions   • Benzonatate Hives and Itching       Review of Systems  GENERAL:  Active/slower with limits, speed, stamina for age and acute fatigue with this. Sleep is ok. No fever now.  SKIN: No  rash/skin lesion of concern:   ENDO:  No syncope, near or diaphoretic sweaty spells.  No download..  HEENT: No recent head injury; increased sinus area headache,  No vision change.  Muffled acute but no chronic hearing loss.  Ears with fullness without pain/drainage.  No sore throat now.  Increased  significant nasal/sinus congestion/drainage. No epistaxis. ? Knot R neck.   CHEST: No chest wall tenderness or mass.  No significant cough,  without wheeze.  No SOB; no hemoptysis.  CV: No chest pain, palpitations, ankle edema.  GI: No heartburn, dysphagia.  No abdominal pain, diarrhea, constipation.  No rectal bleeding, or melena.    :  Voids without dysuria, or  incontinence to completion.  ORTHO: No painful/swollen joints but various on /off sore: especially R shoulder  Occ sore neck or back.  No acute neck or back pain without recent injury.  NEURO: No dizziness, weakness of extremities.  No numbness/paresthesias.   PSYCH: No memory loss.  Mood good; mild anxious, depressed but/and not suicidal.  Tries to  tolerate stress .     Screening:  Mammogram:none; bilateral masectomy  Bone density: end of 2018  [facility]on-avm-div/[place]/12.19.17    Results for orders placed or performed in visit on 04/16/18   Comprehensive Metabolic Panel   Result Value Ref Range    Glucose 88 70 - 100 mg/dL    BUN 15 5 - 21 mg/dL    Creatinine 0.55 0.50 - 1.40 mg/dL    Sodium 139 135 - 145 mmol/L    Potassium 4.4 3.5 - 5.3 mmol/L    Chloride 100 98 - 110 mmol/L    CO2 29.0 24.0 - 31.0 mmol/L    Calcium 10.1 8.4 - 10.4 mg/dL    Total Protein 7.4 6.3 - 8.7 g/dL    Albumin 4.40 3.50 - 5.00 g/dL    ALT (SGPT) 28 0 - 54 U/L    AST (SGOT) 27 7 - 45 U/L    Alkaline Phosphatase 101 24 - 120 U/L    Total Bilirubin 0.4 0.1 - 1.0 mg/dL    eGFR Non African Amer 108 >60 mL/min/1.73    Globulin 3.0 gm/dL    A/G Ratio 1.5 1.1 - 2.5 g/dL    BUN/Creatinine Ratio 27.3 (H) 7.0 - 25.0    Anion Gap 10.0 4.0 - 13.0 mmol/L   CBC Auto Differential   Result Value Ref Range    WBC 10.69 4.80 - 10.80 10*3/mm3    RBC 4.95 4.20 - 5.40 10*6/mm3    Hemoglobin 15.1 12.0 - 16.0 g/dL    Hematocrit 44.5 37.0 - 47.0 %    MCV 89.9 82.0 - 98.0 fL    MCH 30.5 28.0 - 32.0 pg    MCHC 33.9 33.0 - 36.0 g/dL    RDW 12.1 12.0 - 15.0 %    RDW-SD 39.8 (L) 40.0 - 54.0 fl    MPV 10.7 6.0 - 12.0 fL    Platelets 204 130 - 400 10*3/mm3    Neutrophil % 59.0 39.0 - 78.0 %    Lymphocyte % 32.7 15.0 - 45.0 %    Monocyte % 6.2 4.0 - 12.0 %    Eosinophil % 1.1 0.0 - 4.0 %    Basophil % 0.5 0.0 - 2.0 %    Immature Grans % 0.5 0.0 - 5.0 %    Neutrophils, Absolute 6.31 1.87 - 8.40 10*3/mm3    Lymphocytes, Absolute 3.50 0.72 - 4.86 10*3/mm3    Monocytes, Absolute 0.66 0.19 - 1.30 10*3/mm3    Eosinophils, Absolute 0.12 0.00 - 0.70 10*3/mm3    Basophils, Absolute 0.05 0.00 - 0.20 10*3/mm3    Immature Grans, Absolute 0.05 (H) 0.00 - 0.03 10*3/mm3    nRBC 0.0 0.0 - 0.0 /100 WBC   Gold Top - SST   Result Value Ref Range    Extra Tube Hold for add-ons.    Gold Top - SST   Result Value Ref Range    Extra Tube  "Hold for add-ons.        No results found for: PSA     Lab Results:  CBC:    Lab Results - Last 18 Months  Lab Units 04/16/18  1424 03/26/18  0847 09/14/17  0853 06/21/17  1455 05/09/17  0859 01/17/17  0725   WBC 10*3/mm3 10.69 9.89 7.20 7.40 6.80 8.77   HEMOGLOBIN g/dL 15.1 14.7 15.0 14.1 14.3 14.2   HEMATOCRIT % 44.5 46.6 43.9 44.3 44.8 44.1   PLATELETS 10*3/mm3 204 193 144 154 158 165      BMP/CMP:    Lab Results - Last 18 Months  Lab Units 04/16/18  1424 03/26/18  0847 09/14/17  0853 06/21/17  1455 05/09/17  0859 01/17/17  0725   SODIUM mmol/L 139 143 141 143 140 140   POTASSIUM mmol/L 4.4 3.9 4.1 4.4 4.2 4.2   CHLORIDE mmol/L 100 101 103 106 103 98   CO2 mmol/L 29.0  --  26.0 28.0 28.0  --    TOTAL CO2, ARTERIAL mmol/L  --  25.0  --   --   --  28.0   GLUCOSE mg/dL  --  127*  --   --   --  127*   BUN mg/dL 15 13 17 17 15 12   CREATININE mg/dL 0.55 0.46* 0.52 0.50* 0.50* 0.53   EGFR IF NONAFRICN AM mL/min/1.73 108 132 115 121 121 113   EGFR IF AFRICN AM mL/min/1.73  --  >150  --   --   --  137   CALCIUM mg/dL 10.1 10.1 9.5 9.6 9.3 10.0     HEPATIC:    Lab Results - Last 18 Months  Lab Units 04/16/18  1424 09/14/17  0853 06/21/17  1455 05/09/17  0859 01/17/17  0725   ALT (SGPT) U/L 28 29 29 31 30   AST (SGOT) U/L 27 40 50* 53* 35   ALK PHOS U/L 101 88 116 109 121*     THYROID:No results for input(s): TSH, T3FREE, FREET4, FTI in the last 25198 hours.    Invalid input(s): T3, T4, TEUP  A1C:    Lab Results - Last 18 Months  Lab Units 06/21/17  1456   HEMOGLOBIN A1C % 6.7*     PSA:No results for input(s): PSA in the last 73439 hours.    Objective   /78 (BP Location: Right arm, Patient Position: Sitting, Cuff Size: Large Adult)   Pulse 58   Temp 97.9 °F (36.6 °C) (Oral)   Resp 18   Ht 160 cm (63\")   Wt 81.6 kg (180 lb)   LMP  (LMP Unknown)   SpO2 98%   BMI 31.89 kg/m²   Body mass index is 31.89 kg/m².    Physical Exam  GENERAL:  Well nourished/developed in no acute distress.   SKIN: Turgor excellent, " without wound, rash, lesion  HEENT: Normal cephalic without trauma.  Pupils equal round reactive to light. Extraocular motions full without nystagmus.   External canals nonobstructive nontender without reddness. Tymphatic membranes dull with irwin structures intact.   Oral cavity without growths, exudates, and moist.  Posterior pharynx without mass, obstruction, redness.  No thyromegaly, mass, tenderness, definite lymphadenopathy and supple; ? 1 cm soft, movable posterior cervical node.   CV: Regular rhythm.  No murmur, gallop,  edema. Posterior pulses intact.  No carotid bruits.  CHEST: No chest wall tenderness or mass.   LUNGS: Symmetric motion with clear to auscultation.  No dullness to percussion  ABD: Soft, nontender without mass.   ORTHO: Symmetric extremities without swelling/point tenderness.  Full gross range of motion; R shoulder sore. .  NEURO: CN 2-12 grossly intact.  Symmetric facies and UE/LE. 3/5 strength throughout.   Nonfocal use extremities. Speech clear.     PSYCH: Oriented x 3.  Pleasant calm, well kept.  Purposeful/directed conservation with intact short/long gross memory.     Assessment/Plan     1. Shoulder pain, unspecified chronicity, unspecified laterality    2. Controlled type 2 diabetes mellitus without complication, without long-term current use of insulin    3. Hypertension, unspecified type        Rx: reviewed/changes:  same    LAB/Testing/Referrals: reviewed/orders:   Today:   No orders of the defined types were placed in this encounter.    Usual:   6m CBC, CMP, A1c  12m CBC, CMP, A1c, LIPID, TSH, Vit D    Discussions:   Injection of shoulders if she wants.   Body mass index is 31.89 kg/m².   Patient's Body mass index is 31.89 kg/m². BMI is above normal parameters. Recommendations include: exercise counseling, nutrition counseling and she plans using a gym and getting started. .  Non-smoker      Patient Instructions   Bone density in the fall    Regular cardio exercise something  everyone should consider and try to do; even if health limitations (ie find that exercise UE/LE/cardio that they can tolerate). (as we discussed)  Normal weight a goal for everyone (as we discussed)  Healthy diet helpful for weight management, illness prevention (as we discussed)  If over 50-screening exams include men PSA/rectal exam, women mammograms, and  everyone colonoscopy screening for colon cancer.          Follow up: Return for lab;, Dr Tinoco-, 6 m;.  Future Appointments  Date Time Provider Department Center   6/12/2018 2:00 PM [facility] CANCER CTR LAB [facility]   6/12/2018 2:15 PM Shanda Torres MD [facility] ONC PAD PAD   12/11/2018 9:30 AM Conrado Tinoco MD [facility] PC METR None

## 2023-02-08 NOTE — PATIENT INSTRUCTIONS
Advance Care Planning and Advance Directives     You make decisions on a daily basis - decisions about where you want to live, your career, your home, your life. Perhaps one of the most important decisions you face is your choice for future medical care. Take time to talk with your family and your healthcare team and start planning today.  Advance Care Planning is a process that can help you:  Understand possible future healthcare decisions in light of your own experiences  Reflect on those decision in light of your goals and values  Discuss your decisions with those closest to you and the healthcare professionals that care for you  Make a plan by creating a document that reflects your wishes    Surrogate Decision Maker  In the event of a medical emergency, which has left you unable to communicate or to make your own decisions, you would need someone to make decisions for you.  It is important to discuss your preferences for medical treatment with this person while you are in good health.     Qualities of a surrogate decision maker:  Willing to take on this role and responsibility  Knows what you want for future medical care  Willing to follow your wishes even if they don't agree with them  Able to make difficult medical decisions under stressful circumstances    Advance Directives  These are legal documents you can create that will guide your healthcare team and decision maker(s) when needed. These documents can be stored in the electronic medical record.    Living Will - a legal document to guide your care if you have a terminal condition or a serious illness and are unable to communicate. States vary by statute in document names/types, but most forms may include one or more of the following:        -  Directions regarding life-prolonging treatments        -  Directions regarding artificially provided nutrition/hydration        -  Choosing a healthcare decision maker        -  Direction regarding organ/tissue  donation    Durable Power of  for Healthcare - this document names an -in-fact to make medical decisions for you, but it may also allow this person to make personal and financial decisions for you. Please seek the advice of an  if you need this type of document.    **Advance Directives are not required and no one may discriminate against you if you do not sign one.    Medical Orders  Many states allow specific forms/orders signed by your physician to record your wishes for medical treatment in your current state of health. This form, signed in personal communication with your physician, addresses resuscitation and other medical interventions that you may or may not want.      For more information or to schedule a time with a [facility] Advance Care Planning Facilitator contact: [email or web address] or call [phone] and someone will contact you directly.   Detail Level: Simple Detail Level: Detailed

## 2023-02-22 ENCOUNTER — OUTSIDE FACILITY SERVICE (OUTPATIENT)
Dept: GASTROENTEROLOGY | Facility: CLINIC | Age: 78
End: 2023-02-22
Payer: MEDICARE

## 2023-02-22 ENCOUNTER — LAB REQUISITION (OUTPATIENT)
Dept: LAB | Facility: HOSPITAL | Age: 78
End: 2023-02-22
Payer: MEDICARE

## 2023-02-22 DIAGNOSIS — Z00.00 ENCOUNTER FOR GENERAL ADULT MEDICAL EXAMINATION WITHOUT ABNORMAL FINDINGS: ICD-10-CM

## 2023-02-22 PROCEDURE — 45385 COLONOSCOPY W/LESION REMOVAL: CPT | Performed by: INTERNAL MEDICINE

## 2023-02-22 PROCEDURE — 88305 TISSUE EXAM BY PATHOLOGIST: CPT | Performed by: INTERNAL MEDICINE

## 2023-02-24 LAB
CYTO UR: NORMAL
LAB AP CASE REPORT: NORMAL
LAB AP CLINICAL INFORMATION: NORMAL
Lab: NORMAL
PATH REPORT.FINAL DX SPEC: NORMAL
PATH REPORT.GROSS SPEC: NORMAL

## 2023-03-08 ENCOUNTER — INFUSION (OUTPATIENT)
Dept: ONCOLOGY | Facility: CLINIC | Age: 78
End: 2023-03-08
Payer: MEDICARE

## 2023-03-08 DIAGNOSIS — Z45.2 ENCOUNTER FOR CARE RELATED TO PORT-A-CATH: ICD-10-CM

## 2023-03-08 DIAGNOSIS — Z45.2 ENCOUNTER FOR CARE RELATED TO VASCULAR ACCESS PORT: Primary | ICD-10-CM

## 2023-03-08 RX ORDER — HEPARIN SODIUM (PORCINE) LOCK FLUSH IV SOLN 100 UNIT/ML 100 UNIT/ML
500 SOLUTION INTRAVENOUS AS NEEDED
Status: DISCONTINUED | OUTPATIENT
Start: 2023-03-08 | End: 2023-03-08 | Stop reason: HOSPADM

## 2023-03-08 RX ORDER — HEPARIN SODIUM (PORCINE) LOCK FLUSH IV SOLN 100 UNIT/ML 100 UNIT/ML
500 SOLUTION INTRAVENOUS AS NEEDED
OUTPATIENT
Start: 2023-03-08

## 2023-03-08 RX ORDER — SODIUM CHLORIDE 0.9 % (FLUSH) 0.9 %
10 SYRINGE (ML) INJECTION AS NEEDED
Status: DISCONTINUED | OUTPATIENT
Start: 2023-03-08 | End: 2023-03-08 | Stop reason: HOSPADM

## 2023-03-08 RX ORDER — SODIUM CHLORIDE 0.9 % (FLUSH) 0.9 %
10 SYRINGE (ML) INJECTION AS NEEDED
OUTPATIENT
Start: 2023-03-08

## 2023-03-08 RX ADMIN — Medication 10 ML: at 08:34

## 2023-03-08 RX ADMIN — HEPARIN SODIUM (PORCINE) LOCK FLUSH IV SOLN 100 UNIT/ML 500 UNITS: 100 SOLUTION at 08:35

## 2023-03-08 NOTE — PROGRESS NOTES
I have reviewed the notes, assessments, and/or procedures performed by Orly Kirkpatrick RN, I concur with her/his documentation of Ira Sanchez.    
3

## 2023-04-10 ENCOUNTER — OFFICE VISIT (OUTPATIENT)
Dept: FAMILY MEDICINE CLINIC | Facility: CLINIC | Age: 78
End: 2023-04-10
Payer: MEDICARE

## 2023-04-10 VITALS
OXYGEN SATURATION: 96 % | HEART RATE: 72 BPM | DIASTOLIC BLOOD PRESSURE: 82 MMHG | HEIGHT: 63 IN | WEIGHT: 190.1 LBS | BODY MASS INDEX: 33.68 KG/M2 | RESPIRATION RATE: 20 BRPM | SYSTOLIC BLOOD PRESSURE: 138 MMHG

## 2023-04-10 DIAGNOSIS — I50.9 CONGESTIVE HEART FAILURE, UNSPECIFIED HF CHRONICITY, UNSPECIFIED HEART FAILURE TYPE: ICD-10-CM

## 2023-04-10 DIAGNOSIS — I38 VALVULAR HEART DISEASE: ICD-10-CM

## 2023-04-10 DIAGNOSIS — R22.1 NECK NODULE: ICD-10-CM

## 2023-04-10 DIAGNOSIS — J44.9 CHRONIC OBSTRUCTIVE PULMONARY DISEASE, UNSPECIFIED COPD TYPE: ICD-10-CM

## 2023-04-10 DIAGNOSIS — J40 BRONCHITIS: ICD-10-CM

## 2023-04-10 DIAGNOSIS — E11.9 CONTROLLED TYPE 2 DIABETES MELLITUS WITHOUT COMPLICATION, WITHOUT LONG-TERM CURRENT USE OF INSULIN: Chronic | ICD-10-CM

## 2023-04-10 DIAGNOSIS — R05.9 COUGH, UNSPECIFIED TYPE: ICD-10-CM

## 2023-04-10 LAB
EXPIRATION DATE: NORMAL
INTERNAL CONTROL: NORMAL
Lab: NORMAL
SARS-COV-2 AG UPPER RESP QL IA.RAPID: NOT DETECTED

## 2023-04-10 RX ORDER — PREDNISONE 10 MG/1
TABLET ORAL
Qty: 12 TABLET | Refills: 0 | Status: SHIPPED | OUTPATIENT
Start: 2023-04-10 | End: 2023-04-17

## 2023-04-10 RX ORDER — AZITHROMYCIN 250 MG/1
TABLET, FILM COATED ORAL
Qty: 6 TABLET | Refills: 0 | Status: SHIPPED | OUTPATIENT
Start: 2023-04-10 | End: 2023-04-17

## 2023-04-10 NOTE — PROGRESS NOTES
Subjective   Ira Sanchez is a 78 y.o. female presenting with chief complaint of:   Chief Complaint   Patient presents with   • Cough     Symptoms present for 3 weeks.    • URI       History of Present Illness :  Alone.  Here for primarily an acute issue today; above.    Immunization History   Administered Date(s) Administered   • COVID-19 (MODERNA) 1st, 2nd, 3rd Dose Only 02/19/2021, 03/19/2021   • Fluad Quad 65+ 10/09/2020   • Fluzone High Dose =>65 Years ([employer] ONLY) 11/19/2017, 11/14/2019   • Fluzone High-Dose 65+yrs 12/08/2022   • Pneumococcal Conjugate 13-Valent (PCV13) 11/19/2017       Has multiple chronic problems to consider that might have a bearing on today's issues; not an interval appointment.       Chronic/acute problems reviewed today:   1. Cough, unspecified type acute with above; does not usually have a chronic cough   2. Valvular heart disease: AS echo proven aortic stenosis with no significant palpitations syncope near syncope and occasional mild leg edema and exertional shortness of breath   3. Bronchitis see above 3 weeks.  There is been no fever hemoptysis   4. Neck nodule felt today; has upcoming appointment with oncology.  History of breast cancer   5. Controlled type 2 diabetes mellitus without complication, without long-term current use of insulin Chronic/stable (enough for steroids)  No problem/pattern hypoglycemia/hyperglycemia manifest by poly- dypsia, phagia, uria, or sweats, diaphoretic episodes, syncope/near.     6. Congestive heart failure, unspecified HF chronicity, unspecified heart failure type heart failure findings on echo with prior minimal leg swelling and may be a little contribution to a low level exertional shortness of breath.   7. Chronic obstructive pulmonary disease, unspecified COPD type established prior diagnosis with usually no significant cough and certainly no wheeze or shortness of breath     Has an/another acute issue today: none.    The following portions  of the patient's history were reviewed and updated as appropriate: allergies, current medications, past family history, past medical history, past social history, past surgical history and problem list.      Current Outpatient Medications:   •  amLODIPine (NORVASC) 5 MG tablet, TAKE 1 TABLET BY MOUTH DAILY, Disp: 90 tablet, Rfl: 3  •  Calcium Carb-Cholecalciferol (CALCIUM-VITAMIN D) 600-400 MG-UNIT tablet, Take 1 tablet by mouth Daily., Disp: , Rfl:   •  celecoxib (CeleBREX) 200 MG capsule, Take 1 capsule by mouth Daily., Disp: , Rfl:   •  cyclobenzaprine (FLEXERIL) 5 MG tablet, Take 1 tablet by mouth Every Night., Disp: 30 tablet, Rfl: 1  •  cycloSPORINE (RESTASIS) 0.05 % ophthalmic emulsion, 1 drop As Needed., Disp: , Rfl:   •  diphenhydrAMINE (BENADRYL) 25 mg capsule, Take 1 capsule by mouth At Night As Needed for Allergies., Disp: , Rfl:   •  losartan (COZAAR) 100 MG tablet, TAKE 1 TABLET BY MOUTH DAILY, Disp: 90 tablet, Rfl: 3  •  Omega-3 1000 MG capsule, Take  by mouth Daily., Disp: , Rfl:   •  oxybutynin XL (DITROPAN-XL) 10 MG 24 hr tablet, TAKE 1 TABLET BY MOUTH DAILY, Disp: 90 tablet, Rfl: 3  •  thiamine (VITAMINE B-1) 100 MG tablet, Take 1 tablet by mouth Daily., Disp: , Rfl:   No current facility-administered medications for this visit.    Facility-Administered Medications Ordered in Other Visits:   •  heparin flush (porcine) 100 UNIT/ML injection 500 Units, 500 Units, Intravenous, PRN, Shanda Torres MD, 500 Units at 04/16/18 1504  •  heparin flush (porcine) 100 UNIT/ML injection 500 Units, 500 Units, Intravenous, PRN, Shanda Torres MD, 500 Units at 06/12/18 1445  •  heparin injection 500 Units, 500 Units, Intravenous, PRN, Tamela Doherty APRN, 500 Units at 12/08/21 0948  •  sodium chloride 0.9 % flush 10 mL, 10 mL, Intravenous, PRN, Shanda Torres MD, 10 mL at 04/16/18 1503  •  sodium chloride 0.9 % flush 10 mL, 10 mL, Intravenous, PRN, Shanda Torres  MD Ambrosio, 10 mL at 06/12/18 1445  •  sodium chloride 0.9 % flush 10 mL, 10 mL, Intravenous, PRN, Tamela Doherty APRN, 10 mL at 12/08/21 0948    No problems with medications.  Refills if needed done    Allergies   Allergen Reactions   • Benzonatate Hives and Itching       Review of Systems  GENERAL:  Active/slower with limits, speed, stamina for age, and occ back pain.  Sleep is ok. No fever now/recent.  SKIN: No other rash/skin lesion of concern:   ENDO:  No syncope, near or diaphoretic sweaty spells.  No download..  HEENT: No recent head injury; stable occ headache.  No vision change.  No chronic hearing loss.  Ears without fullness without pain/drainage.  No sore throat now.  No significant nasal/sinus congestion/drainage. No epistaxis.   CHEST: No chest wall tenderness or mass.  Increased/recent cough, and no wheeze.  No SOB; no hemoptysis.  CV: No chest pain, palpitations; daily end of day mild pitting/ankle edema.  GI: No heartburn, dysphagia.  No abdominal pain, diarrhea, constipation.  No rectal bleeding, or melena.    :  Voids with occ on/off dysuria, without incontinence to completion.  ORTHO: No painful/swollen joints but various on /off sore.  Occ same/sore neck or back.  No acute neck or lower back pain without recent injury.  NEURO: No dizziness, weakness of extremities.  Same mild numbness/paresthesias LE.  PSYCH: No memory loss.  Mood good; mild anxious, depressed but/and not suicidal.  Tries to tolerate stress .   Screening:  Mammogram: bilateral masectomy  Bone density:   Bone d-deca/BH/hip -1.6, LS +1.3/1.24.22-watching  Bone d-deca/[facility]/T hip -0.5 LS +1.4/12.18.18;  no change; maybe 2-5 yr  Low dose CT chest:Tobacco-smoker/age 17/1-2ppd/dc age 60:  CTs with breast cancer:   GI:   Colon-avm-div/Shieben/[facility]/12.19.17/5y  Colon-p, avm, div/Shieben/[facility]/2.22.23/prn    Prostate: NA  Usual lab order  6m CBC, CMP, A1c  12m CBC, CMP, A1c, LIPID, TSH, Vit D    Copy/paste function used for  ROS/exam AND each area of these were reviewed, updated, confirmed and supplemented as needed.  Data reviewed:   Recent admit/ER/MD visits: 9.6.22    1. Primary hypertension    2. Valvular heart disease: AS    3. Congestive heart failure, unspecified HF chronicity, unspecified heart failure type (HCC)    4. Controlled type 2 diabetes mellitus without complication, without long-term current use of insulin (HCC)    5. Abnormal liver x-ray    6. Osteopenia, unspecified location    7. Primary generalized (osteo)arthritis    8. Neuropathy-chemo/better    9. Chronic obstructive pulmonary disease, unspecified COPD type (HCC)    10. Edema, unspecified type    11. Positive JESUSITA (antinuclear antibody)    12. NSAID long-term use    13. Bradycardia       Discussions/medical decisions/reviews:  BP ok  Other vitals ok; will watch 41 pulse  DM/BS 6.5 12.8.21  Lipid  12.8.21  PSA ok 9.1.22  CBC ok 7.14.22  Renal ok 7.14.22  Liver stable AST 33, Alp P 123 9.1.22  Vit D 40 12.8.21  Thyroid TSH ok 12.8.21     Medical decision issues:   Data review above:   Rx: reviewed and decisions:   Visit today involved chronic significant medical problems or differentials and/or intensive drug monitoring: ie potential to cause serious morbidity or death:   Rx changes: none  No orders of the defined types were placed in this encounter.     Orders placed:   LAB/Testing/Referrals: reviewed/orders:   Today:       Orders Placed This Encounter   Procedures   • Ambulatory Referral to Rheumatology     Last cardiac testing:   Echo:   Results for orders placed during the hospital encounter of 04/23/21    Adult Transthoracic Echo Complete W/ Cont if Necessary Per Protocol    Interpretation Summary  · Technically difficult study.  · Left ventricular ejection fraction appears to be 66 - 70%. Left ventricular systolic function is normal.  · Left ventricular diastolic function is consistent with (grade I) impaired relaxation.  · Normal right ventricular  "cavity size and systolic function noted.  · Mild aortic valve stenosis is present.  · Estimated right ventricular systolic pressure from tricuspid regurgitation is normal (<35 mmHg).    Radiology considered:   No radiology results for the last 90 days.    Lab Results:  Results for orders placed or performed in visit on 04/10/23   POCT SARS-CoV-2 Antigen SHAHIDA    Specimen: Swab   Result Value Ref Range    SARS Antigen Not Detected Not Detected, Presumptive Negative    Internal Control Passed Passed    Lot Number [id number]     Expiration Date 8,102,023        A1C:  Lab Results - Last 18 Months   Lab Units 12/08/21  1006   HEMOGLOBIN A1C % 6.50*     CBC:  Lab Results - Last 18 Months   Lab Units 11/30/22  0818 09/01/22  0848 06/01/22  0756 12/08/21  1006   WBC 10*3/mm3 8.51 6.89 7.65 8.37   HEMOGLOBIN g/dL 13.9 14.2 13.1 13.7   HEMATOCRIT % 43.3 44.2 40.9 41.6   PLATELETS 10*3/mm3 176 176 175 169   IRON mcg/dL  --  85  --   --       BMP/CMP:  Lab Results - Last 18 Months   Lab Units 11/30/22  0818 07/14/22  1433 06/01/22  0756   SODIUM mmol/L 138  --  139   POTASSIUM mmol/L 4.3  --  4.1   CHLORIDE mmol/L 102  --  103   CO2 mmol/L 27.0  --  28.0   BUN mg/dL 12  --  17   CREATININE mg/dL 0.62 0.60 0.61   CALCIUM mg/dL 9.7  --  9.7     PSA:No results found for: PSA  HEPATIC:  Lab Results - Last 18 Months   Lab Units 11/30/22  0818 09/01/22  0848 06/01/22  0756   ALT (SGPT) U/L 16 17 19   AST (SGOT) U/L 32 33* 35*   ALK PHOS U/L 127* 123* 131*     THYROID:  Lab Results - Last 18 Months   Lab Units 12/08/21  1006   TSH uIU/mL 2.100       Objective   /82 (BP Location: Right arm, Patient Position: Sitting, Cuff Size: Adult)   Pulse 72   Resp 20   Ht 160 cm (63\")   Wt 86.2 kg (190 lb 1.6 oz)   LMP  (LMP Unknown)   SpO2 96%   BMI 33.67 kg/m²   Body mass index is 33.67 kg/m².     Wt Readings from Last 15 Encounters:   04/10/23 1111 86.2 kg (190 lb 1.6 oz)   12/08/22 1326 87.5 kg (193 lb)   12/07/22 0805 87.6 kg (193 " lb 3.2 oz)   12/01/22 1416 87.5 kg (193 lb)   09/06/22 1130 87.1 kg (192 lb)   09/01/22 0755 88 kg (194 lb)   06/23/22 0910 86.2 kg (190 lb)   06/17/22 1024 86.6 kg (191 lb)   06/08/22 0947 86.5 kg (190 lb 9.6 oz)   05/05/22 0901 87.1 kg (192 lb)   04/20/22 0909 86.2 kg (190 lb)   03/31/22 0932 85.4 kg (188 lb 3.2 oz)   03/03/22 1314 90.4 kg (199 lb 6.4 oz)   01/18/22 1008 89.4 kg (197 lb)   01/03/22 0859 89.8 kg (198 lb)       Physical Exam  GENERAL:  Well nourished/developed in no acute distress  SKIN: Turgor excellent, without wound, rash, lesion or significance  HEENT: Normal cephalic without trauma. Pupils equal round reactive to light. Extraocular motions full without nystagmus.  External canals nonobstructive nontender without reddness. Tymphatic membranes dull with irwin structures intact.   Oral cavity without growths, exudates, and moist.  Posterior pharynx without mass, obstruction, redness.  No thyromegaly, , tenderness, definite lymphadenopathy and supple; 2-3 cm raised/flesh toned soft movable nodule R posterior trapezius/mid neck. (suspect lipoma)   CV: Regular rhythm.  1/6 systolic murmur without gallop and trace ankle/LE edema. Posterior pulses intact.  No carotid bruits.  CHEST: No chest wall tenderness or mass.   LUNGS: Symmetric motion with clear to auscultation.  No dullness to percussion  ABD: Soft, non-tender without mass.   ORTHO: Symmetric extremities without swelling/point tenderness.  Full gross range of motion.  NEURO: CN 2-12 grossly intact except L eyelid mild droop.  Otherwise symmetric facies and UE/LE. 3/5 strength throughout.   Otherwise nonfocal use extremities. Speech clear.    Assessment & Plan     1. Cough, unspecified type    2. Valvular heart disease: AS    3. Bronchitis    4. Neck nodule    5. Controlled type 2 diabetes mellitus without complication, without long-term current use of insulin    6. Congestive heart failure, unspecified HF chronicity, unspecified heart failure  type    7. Chronic obstructive pulmonary disease, unspecified COPD type      Decision making:   Rx: reviewed/changes:  New Medications Ordered This Visit   Medications   • azithromycin (ZITHROMAX) 250 MG tablet     Sig: Take 2 tablets the first day, then 1 tablet daily for 4 days.     Dispense:  6 tablet     Refill:  0   • predniSONE (DELTASONE) 10 MG tablet     Sig: Two a day for 4 days and then one a day for 4 days     Dispense:  12 tablet     Refill:  0     Pharmacist tell patient: When using steroids A. Do not use anti-inflammatories such as Motrin/ibuprofen, Alleve/naprosyn, Mobic and like medications     LAB/Testing/Referrals: reviewed/orders:   Today:   Orders Placed This Encounter   Procedures   • POCT SARS-CoV-2 Antigen SHAHIDA     Chronic/recurrent labs above or change to:   same   School/work/other notes: NA  Discussions:   Defer to oncology the R neck ? lipoma  Either copd exc/or acute bronchitis; after 3 weeks worth above  Has always to this point tolerated brief steroids    There are no Patient Instructions on file for this visit.    Follow up: No follow-ups on file.  Future Appointments   Date Time Provider Department Center   4/17/2023  9:00 AM Henrietta Olvera APRN [facility]   6/7/2023  8:00 AM [facility] LAB [facility]   6/7/2023  8:30 AM Denisha Reyes APRN [facility]   6/8/2023  9:15 AM David Burgess APRN [facility]   6/19/2023  9:15 AM Dante Mendoza MD [facility]

## 2023-04-17 ENCOUNTER — OFFICE VISIT (OUTPATIENT)
Dept: VASCULAR SURGERY | Facility: CLINIC | Age: 78
End: 2023-04-17
Payer: MEDICARE

## 2023-04-17 VITALS
HEART RATE: 45 BPM | DIASTOLIC BLOOD PRESSURE: 76 MMHG | BODY MASS INDEX: 32.42 KG/M2 | WEIGHT: 183 LBS | SYSTOLIC BLOOD PRESSURE: 122 MMHG | OXYGEN SATURATION: 95 %

## 2023-04-17 DIAGNOSIS — I65.23 BILATERAL CAROTID ARTERY STENOSIS: ICD-10-CM

## 2023-04-17 DIAGNOSIS — I10 PRIMARY HYPERTENSION: ICD-10-CM

## 2023-04-17 DIAGNOSIS — I10 ESSENTIAL HYPERTENSION: ICD-10-CM

## 2023-04-17 DIAGNOSIS — I87.323 CHRONIC VENOUS HYPERTENSION WITH INFLAMMATION INVOLVING BOTH SIDES: Primary | ICD-10-CM

## 2023-04-17 NOTE — PROGRESS NOTES
4/17/2023       Conrado Tinoco MD  [address] [place] [id number]    Ira Sanchez  1945    Chief Complaint   Patient presents with   • Follow-up     1 yr f/u of chronic venous hypertension with inflammation of BLE.  Last seen in the office on 4/20/22.  The patient was encouraged to wear compression and elevate when possible.        Dear Conrado Tinoco MD       HPI  I had the pleasure of seeing your patient Ira Sanchez in the office today.   As you recall, rIa Sanchez is a 78 y.o.  female who you are following for routine health maintenance.  She has had significant leg swelling since March 2021.  She does take Norvasc as well which she feels like her legs have swollen since she began this medication a couple years ago.  Since her last visit she reports she has been doing well on legs are not swelling.  She has not been wearing compression stockings to her lower extremities.  She did have previous testing showing venous insufficiency to her lower extremities.      Review of Systems   Constitutional: Negative.    HENT: Negative.    Eyes: Negative.    Respiratory: Negative.    Cardiovascular: Negative.  Negative for leg swelling.   Gastrointestinal: Negative.    Endocrine: Negative.    Genitourinary: Negative.    Musculoskeletal: Negative.    Skin: Negative.    Allergic/Immunologic: Negative.    Neurological: Negative.    Hematological: Negative.    Psychiatric/Behavioral: Negative.    All other systems reviewed and are negative.       /76   Pulse (!) 45   Wt 83 kg (183 lb)   LMP  (LMP Unknown)   SpO2 95%   BMI 32.42 kg/m²   Physical Exam  Vitals and nursing note reviewed.   Constitutional:       General: She is not in acute distress.     Appearance: Normal appearance. She is well-developed. She is obese. She is not diaphoretic.   HENT:      Head: Normocephalic and atraumatic.   Eyes:      General: No scleral icterus.     Pupils: Pupils are equal, round, and reactive to light.    Neck:      Thyroid: No thyromegaly.      Vascular: No carotid bruit or JVD.   Cardiovascular:      Rate and Rhythm: Regular rhythm. Bradycardia present.      Pulses: Normal pulses.      Heart sounds: Normal heart sounds and S2 normal. No murmur heard.    No friction rub. No gallop.      Comments: Varicose veins to BLE with swelling  Pulmonary:      Effort: Pulmonary effort is normal.      Breath sounds: Normal breath sounds.   Abdominal:      General: Bowel sounds are normal.      Palpations: Abdomen is soft.   Musculoskeletal:         General: No swelling. Normal range of motion.      Cervical back: Normal range of motion and neck supple.      Right lower leg: No edema.      Left lower leg: No edema.   Skin:     General: Skin is warm and dry.      Comments: Stasis dermatitis   Neurological:      General: No focal deficit present.      Mental Status: She is alert and oriented to person, place, and time.      Cranial Nerves: No cranial nerve deficit.   Psychiatric:         Mood and Affect: Mood normal.         Behavior: Behavior normal. Behavior is cooperative.         Thought Content: Thought content normal.         Judgment: Judgment normal.             Patient Active Problem List   Diagnosis   • Obesity (BMI 30-39.9)   • Breast cancer-L/valente masectomy 2014   • Overactive bladder   • Diabetes type 2, controlled   • Hypertension   • Osteopenia 2022- 2 to 5y   • Neuropathy-chemo/better   • Primary generalized (osteo)arthritis   • Malignant neoplasm of lower-outer quadrant of left female breast   • Family history of breast cancer   • Malignant neoplasm of upper-outer quadrant of female breast   • History of bilateral mastectomy   • Wellness examination-done   • Shoulder pain   • Chronic back pain   • Laboratory test*   • Bradycardia   • Cough: copd   • Allergic rhinitis   • Menopause   • History of shingles   • COPD (chronic obstructive pulmonary disease)   • Abnormal mammogram   • After-cataract with vision obscured    • Amblyopia   • Cellulitis, trunk   • Dermatitis of eyelid due to herpes zoster   • Dermatochalasis   • Hordeolum internum   • Other hereditary corneal dystrophies   • Pseudophakia   • Rash   • Encounter for care related to Port-a-Cath   • Edema: venous-BH/vascular   • Congestive heart failure   • Valvular heart disease: AS   • Chronic neck pain   • Abdominal pannus   • Encounter for care related to vascular access port   • Pneumonia of both lower lobes due to infectious organism   • Chronic cough   • Personal history of nicotine dependence   • Severe obesity (BMI 35.0-35.9 with comorbidity)   • Abnormal liver x-ray   • Positive JESUSITA (antinuclear antibody)   • NSAID long-term use   • Abnormal LFTs   • Adenomatous polyp of colon   • Fatty liver         ICD-10-CM ICD-9-CM   1. Chronic venous hypertension with inflammation involving both sides  I87.323 459.32   2. Primary hypertension  I10 401.9   3. Essential hypertension  I10 401.9   4. Bilateral carotid artery stenosis  I65.23 433.10     433.30           Plan: After thoroughly evaluating Ira Sanchez, I believe the best course of action is to remain conservative from vascular surgery standpoint.  Currently she is doing well really does not have any swelling to her lower extremities.  She is not even wearing compression at this time.  Previously her testing did show significant venous insufficiency and she has opted for conservative treatment.  We will see her back in 1 year for continued surveillance.  At that time I would like to get a screening carotid duplex. I did discuss vascular risk factors as they pertain to the progression of vascular disease including controlling her hypertension.  Her blood pressure stable on her current medication.  The patient can continue taking their current medication regimen as previously planned.  This was all discussed in full with complete understanding.    Thank you for allowing me to participate in the care of your patient.   Please do not hesitate with any questions or concerns.  I will keep you aware of any further encounters with Ira Sanchez.        Sincerely yours,         REBEL Crawford

## 2023-04-19 NOTE — PROGRESS NOTES
Called patient to reschedule appointment - no answer - lm @ 1:45 pm   HUB OK TO RESCHEDULE // READ

## 2023-06-07 ENCOUNTER — LAB (OUTPATIENT)
Dept: LAB | Facility: HOSPITAL | Age: 78
End: 2023-06-07
Payer: MEDICARE

## 2023-06-07 ENCOUNTER — OFFICE VISIT (OUTPATIENT)
Dept: ONCOLOGY | Facility: CLINIC | Age: 78
End: 2023-06-07
Payer: MEDICARE

## 2023-06-07 VITALS
SYSTOLIC BLOOD PRESSURE: 128 MMHG | BODY MASS INDEX: 32.67 KG/M2 | RESPIRATION RATE: 16 BRPM | DIASTOLIC BLOOD PRESSURE: 82 MMHG | OXYGEN SATURATION: 94 % | TEMPERATURE: 97.4 F | WEIGHT: 184.4 LBS | HEIGHT: 63 IN | HEART RATE: 53 BPM

## 2023-06-07 DIAGNOSIS — Z85.3 HISTORY OF BREAST CANCER: ICD-10-CM

## 2023-06-07 DIAGNOSIS — M85.80 OSTEOPENIA, UNSPECIFIED LOCATION: ICD-10-CM

## 2023-06-07 DIAGNOSIS — E66.9 OBESITY (BMI 30-39.9): ICD-10-CM

## 2023-06-07 DIAGNOSIS — D64.9 ANEMIA, UNSPECIFIED TYPE: ICD-10-CM

## 2023-06-07 DIAGNOSIS — I10 ESSENTIAL HYPERTENSION: ICD-10-CM

## 2023-06-07 DIAGNOSIS — R74.8 ELEVATED LIVER ENZYMES: ICD-10-CM

## 2023-06-07 DIAGNOSIS — Z45.2 ENCOUNTER FOR CARE RELATED TO PORT-A-CATH: Primary | ICD-10-CM

## 2023-06-07 DIAGNOSIS — Z45.2 ENCOUNTER FOR CARE RELATED TO VASCULAR ACCESS PORT: ICD-10-CM

## 2023-06-07 LAB
ALBUMIN SERPL-MCNC: 4.2 G/DL (ref 3.5–5.2)
ALBUMIN/GLOB SERPL: 1.4 G/DL
ALP SERPL-CCNC: 110 U/L (ref 39–117)
ALT SERPL W P-5'-P-CCNC: 15 U/L (ref 1–33)
ANION GAP SERPL CALCULATED.3IONS-SCNC: 12 MMOL/L (ref 5–15)
AST SERPL-CCNC: 34 U/L (ref 1–32)
BASOPHILS # BLD AUTO: 0.02 10*3/MM3 (ref 0–0.2)
BASOPHILS NFR BLD AUTO: 0.3 % (ref 0–1.5)
BILIRUB SERPL-MCNC: 0.7 MG/DL (ref 0–1.2)
BUN SERPL-MCNC: 18 MG/DL (ref 8–23)
BUN/CREAT SERPL: 30.5 (ref 7–25)
CALCIUM SPEC-SCNC: 9.5 MG/DL (ref 8.6–10.5)
CEA SERPL-MCNC: 4.7 NG/ML
CHLORIDE SERPL-SCNC: 101 MMOL/L (ref 98–107)
CO2 SERPL-SCNC: 25 MMOL/L (ref 22–29)
CREAT SERPL-MCNC: 0.59 MG/DL (ref 0.57–1)
DEPRECATED RDW RBC AUTO: 44.8 FL (ref 37–54)
EGFRCR SERPLBLD CKD-EPI 2021: 91.2 ML/MIN/1.73
EOSINOPHIL # BLD AUTO: 0.1 10*3/MM3 (ref 0–0.4)
EOSINOPHIL NFR BLD AUTO: 1.3 % (ref 0.3–6.2)
ERYTHROCYTE [DISTWIDTH] IN BLOOD BY AUTOMATED COUNT: 12.5 % (ref 12.3–15.4)
FERRITIN SERPL-MCNC: 106.7 NG/ML (ref 13–150)
GLOBULIN UR ELPH-MCNC: 3 GM/DL
GLUCOSE SERPL-MCNC: 107 MG/DL (ref 65–99)
HCT VFR BLD AUTO: 44.1 % (ref 34–46.6)
HGB BLD-MCNC: 14.2 G/DL (ref 12–15.9)
IMM GRANULOCYTES # BLD AUTO: 0.02 10*3/MM3 (ref 0–0.05)
IMM GRANULOCYTES NFR BLD AUTO: 0.3 % (ref 0–0.5)
IRON 24H UR-MRATE: 85 MCG/DL (ref 37–145)
IRON SATN MFR SERPL: 20 % (ref 20–50)
LYMPHOCYTES # BLD AUTO: 2.69 10*3/MM3 (ref 0.7–3.1)
LYMPHOCYTES NFR BLD AUTO: 35.4 % (ref 19.6–45.3)
MCH RBC QN AUTO: 31.1 PG (ref 26.6–33)
MCHC RBC AUTO-ENTMCNC: 32.2 G/DL (ref 31.5–35.7)
MCV RBC AUTO: 96.7 FL (ref 79–97)
MONOCYTES # BLD AUTO: 0.58 10*3/MM3 (ref 0.1–0.9)
MONOCYTES NFR BLD AUTO: 7.6 % (ref 5–12)
NEUTROPHILS NFR BLD AUTO: 4.19 10*3/MM3 (ref 1.7–7)
NEUTROPHILS NFR BLD AUTO: 55.1 % (ref 42.7–76)
NRBC BLD AUTO-RTO: 0 /100 WBC (ref 0–0.2)
PLATELET # BLD AUTO: 167 10*3/MM3 (ref 140–450)
PMV BLD AUTO: 10.5 FL (ref 6–12)
POTASSIUM SERPL-SCNC: 4.5 MMOL/L (ref 3.5–5.2)
PROT SERPL-MCNC: 7.2 G/DL (ref 6–8.5)
RBC # BLD AUTO: 4.56 10*6/MM3 (ref 3.77–5.28)
SODIUM SERPL-SCNC: 138 MMOL/L (ref 136–145)
TIBC SERPL-MCNC: 417 MCG/DL (ref 298–536)
TRANSFERRIN SERPL-MCNC: 280 MG/DL (ref 200–360)
WBC NRBC COR # BLD: 7.6 10*3/MM3 (ref 3.4–10.8)

## 2023-06-07 PROCEDURE — 82378 CARCINOEMBRYONIC ANTIGEN: CPT

## 2023-06-07 PROCEDURE — 86300 IMMUNOASSAY TUMOR CA 15-3: CPT

## 2023-06-07 PROCEDURE — 80053 COMPREHEN METABOLIC PANEL: CPT

## 2023-06-07 PROCEDURE — 85025 COMPLETE CBC W/AUTO DIFF WBC: CPT

## 2023-06-07 PROCEDURE — 36415 COLL VENOUS BLD VENIPUNCTURE: CPT

## 2023-06-07 PROCEDURE — 83540 ASSAY OF IRON: CPT

## 2023-06-07 PROCEDURE — 84466 ASSAY OF TRANSFERRIN: CPT

## 2023-06-07 PROCEDURE — 82728 ASSAY OF FERRITIN: CPT

## 2023-06-07 RX ORDER — HEPARIN SODIUM (PORCINE) LOCK FLUSH IV SOLN 100 UNIT/ML 100 UNIT/ML
500 SOLUTION INTRAVENOUS AS NEEDED
Status: DISCONTINUED | OUTPATIENT
Start: 2023-06-07 | End: 2023-06-18 | Stop reason: HOSPADM

## 2023-06-07 RX ORDER — SODIUM CHLORIDE 0.9 % (FLUSH) 0.9 %
10 SYRINGE (ML) INJECTION AS NEEDED
Status: DISCONTINUED | OUTPATIENT
Start: 2023-06-07 | End: 2023-06-18 | Stop reason: HOSPADM

## 2023-06-07 RX ORDER — HEPARIN SODIUM (PORCINE) LOCK FLUSH IV SOLN 100 UNIT/ML 100 UNIT/ML
500 SOLUTION INTRAVENOUS AS NEEDED
OUTPATIENT
Start: 2023-06-07

## 2023-06-07 RX ORDER — SODIUM CHLORIDE 0.9 % (FLUSH) 0.9 %
10 SYRINGE (ML) INJECTION AS NEEDED
OUTPATIENT
Start: 2023-06-07

## 2023-06-07 RX ADMIN — HEPARIN SODIUM (PORCINE) LOCK FLUSH IV SOLN 100 UNIT/ML 500 UNITS: 100 SOLUTION at 09:20

## 2023-06-07 RX ADMIN — Medication 10 ML: at 09:20

## 2023-06-07 NOTE — PROGRESS NOTES
[facility] HEMATOLOGY & ONCOLOGY [place]  [address]  [place] [address] [phone]    Patient Name: Ira Sanchez  Encounter Date: 06/07/2023   YOB: 1945  Patient Number: [id number]    Hematology / Oncology Progress Note    HPI / REASON FOR VISIT: Ira Sanchez is a 78 y.o. female who is followed by this office for history of  left breast cancer that was diagnosed in 2014.  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  It was ER+, AL+ and Her 2 emerita +.  She subsequently underwent bilateral mastectomies and her AJCC TNM staging was [id number], Stage IIA.      She then underwent 5 cycles of Carboplatin, Taxotere and Herceptin followed by a year of Herceptin therapy.  She then was placed on hormonal manipulation with Arimidex in June 2015 and completed 5 years in 2020.     INTERVAL HISTORY   Ms. Sanchez presents to clinic today for continued follow up.   She has no acute complaints today.  She mentioned getting her port out but with further discussion, she states she doesn't want it out.  Discussed with her how it has been over five years but she continues to not want to have it removed.      Pt had labs drawn and results were reviewed with her today.       LABS    Lab Results - Last 18 Months   Lab Units 06/07/23  0810 11/30/22  0818 09/01/22  0848 06/01/22  0756   HEMOGLOBIN g/dL 14.2 13.9 14.2 13.1   HEMATOCRIT % 44.1 43.3 44.2 40.9   MCV fL 96.7 94.1 94.2 95.3   WBC 10*3/mm3 7.60 8.51 6.89 7.65   RDW % 12.5 12.5 12.7 13.5   MPV fL 10.5 10.4 11.1 10.6   PLATELETS 10*3/mm3 167 176 176 175   IMM GRAN % % 0.3 0.2 0.4 0.4   NEUTROS ABS 10*3/mm3 4.19 5.17 4.13 4.58   LYMPHS ABS 10*3/mm3 2.69 2.55 2.07 2.38   MONOS ABS 10*3/mm3 0.58 0.62 0.52 0.50   EOS ABS 10*3/mm3 0.10 0.11 0.11 0.12   BASOS ABS 10*3/mm3 0.02 0.04 0.03 0.04   IMMATURE GRANS (ABS) 10*3/mm3 0.02 0.02 0.03 0.03   NRBC /100 WBC 0.0  0.0 0.0 0.0       Lab Results - Last 18 Months   Lab Units 06/07/23  0810 11/30/22  0818 09/01/22  0848 07/14/22  1433 06/01/22  0756   GLUCOSE mg/dL 107* 129*  --   --  120*   SODIUM mmol/L 138 138  --   --  139   POTASSIUM mmol/L 4.5 4.3  --   --  4.1   CO2 mmol/L 25.0 27.0  --   --  28.0   CHLORIDE mmol/L 101 102  --   --  103   ANION GAP mmol/L 12.0 9.0  --   --  8.0   CREATININE mg/dL 0.59 0.62  --  0.60 0.61   BUN mg/dL 18 12  --   --  17   BUN / CREAT RATIO  30.5* 19.4  --   --  27.9*   CALCIUM mg/dL 9.5 9.7  --   --  9.7   ALK PHOS U/L 110 127* 123*  --  131*   TOTAL PROTEIN g/dL 7.2 7.1 7.1  --  7.2   ALT (SGPT) U/L 15 16 17  --  19   AST (SGOT) U/L 34* 32 33*  --  35*   BILIRUBIN mg/dL 0.7 0.5 0.4  --  0.3   ALBUMIN g/dL 4.2 4.30 4.40  --  4.50   GLOBULIN gm/dL 3.0 2.8  --   --  2.7       Lab Results - Last 18 Months   Lab Units 06/07/23  0810 11/30/22  0818 06/01/22  0756   CEA ng/mL 4.70 4.54 5.30       Lab Results - Last 18 Months   Lab Units 06/07/23  0810 09/01/22  0848   IRON mcg/dL 85 85   TIBC mcg/dL 417 520   IRON SATURATION (TSAT) % 20 16*   FERRITIN ng/mL 106.70 40.50         PAST MEDICAL HISTORY:  ALLERGIES:  Allergies   Allergen Reactions    Benzonatate Hives and Itching     CURRENT MEDICATIONS:  Outpatient Encounter Medications as of 6/7/2023   Medication Sig Dispense Refill    amLODIPine (NORVASC) 5 MG tablet TAKE 1 TABLET BY MOUTH DAILY 90 tablet 3    Calcium Carb-Cholecalciferol (CALCIUM-VITAMIN D) 600-400 MG-UNIT tablet Take 1 tablet by mouth Daily.      cycloSPORINE (RESTASIS) 0.05 % ophthalmic emulsion 1 drop As Needed.      diphenhydrAMINE (BENADRYL) 25 mg capsule Take 1 capsule by mouth At Night As Needed for Allergies.      thiamine (VITAMINE B-1) 100 MG tablet Take 1 tablet by mouth Daily.      [DISCONTINUED] losartan (COZAAR) 100 MG tablet TAKE 1 TABLET BY MOUTH DAILY 90 tablet 3    [DISCONTINUED] oxybutynin XL (DITROPAN-XL) 10 MG 24 hr tablet TAKE 1 TABLET BY MOUTH DAILY 90 tablet  3    [DISCONTINUED] Omega-3 1000 MG capsule Take  by mouth Daily. (Patient not taking: Reported on 6/7/2023)       Facility-Administered Encounter Medications as of 6/7/2023   Medication Dose Route Frequency Provider Last Rate Last Admin    heparin flush (porcine) 100 UNIT/ML injection 500 Units  500 Units Intravenous PRN Shanda Torres MD   500 Units at 04/16/18 1504    heparin flush (porcine) 100 UNIT/ML injection 500 Units  500 Units Intravenous PRN Shanda Torres MD   500 Units at 06/12/18 1445    heparin injection 500 Units  500 Units Intravenous PRN Tamela Doherty APRN   500 Units at 12/08/21 0948    heparin injection 500 Units  500 Units Intravenous PRN Denisha Reyes APRN   500 Units at 06/07/23 0920    sodium chloride 0.9 % flush 10 mL  10 mL Intravenous PRN Shanda Torres MD   10 mL at 04/16/18 1503    sodium chloride 0.9 % flush 10 mL  10 mL Intravenous PRN Shanda Torres MD   10 mL at 06/12/18 1445    sodium chloride 0.9 % flush 10 mL  10 mL Intravenous PRN Tamela Doherty, APRN   10 mL at 12/08/21 0948    sodium chloride 0.9 % flush 10 mL  10 mL Intravenous PRN Denisha Reyes APRN   10 mL at 06/07/23 0920     ADULT ILLNESSES:  Patient Active Problem List   Diagnosis Code    Obesity (BMI 30-39.9) E66.9    Breast cancer-L/valente masectomy 2014 C50.919    Overactive bladder N32.81    Diabetes type 2, controlled E11.9    Hypertension I10    Osteopenia 2022- 2 to 5y M85.80    Neuropathy-chemo/better G62.9    Primary generalized (osteo)arthritis M15.0    Malignant neoplasm of lower-outer quadrant of left female breast C50.512    Family history of breast cancer Z80.3    Malignant neoplasm of upper-outer quadrant of female breast C50.419    History of bilateral mastectomy Z90.13    Wellness examination-done Z00.00    Shoulder pain M25.519    Chronic back pain M54.9, G89.29    Laboratory test* Z01.89    Bradycardia R00.1    Cough: copd R05.9     Allergic rhinitis J30.9    Menopause Z78.0    History of shingles Z86.19    COPD (chronic obstructive pulmonary disease) J44.9    Abnormal mammogram R92.8    After-cataract with vision obscured H26.499    Amblyopia H53.009    Cellulitis, trunk L03.319    Dermatitis of eyelid due to herpes zoster B02.39    Dermatochalasis H02.839    Hordeolum internum H00.029    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    Pseudophakia Z96.1    Rash R21    Encounter for care related to Port-a-Cath Z45.2    Edema: venous-BH/vascular R60.9    Congestive heart failure I50.9    Valvular heart disease: AS I38    Chronic neck pain M54.2, G89.29    Abdominal pannus E65    Encounter for care related to vascular access port Z45.2    Pneumonia of both lower lobes due to infectious organism J18.9    Chronic cough R05.3    Personal history of nicotine dependence Z87.891    Severe obesity (BMI 35.0-35.9 with comorbidity) E66.01, Z68.35    Abnormal liver x-ray R93.2    Positive JESUSITA (antinuclear antibody) R76.8    NSAID long-term use Z79.1    Abnormal LFTs R79.89    Adenomatous polyp of colon D12.6    Fatty liver K76.0     SURGERIES:  Past Surgical History:   Procedure Laterality Date    APPENDECTOMY      BREAST SURGERY      CATARACT EXTRACTION Bilateral     DILATATION AND CURETTAGE      KNEE SURGERY      MASTECTOMY Bilateral     NIPPLE TATTOO  04/12/2016    PORTACATH PLACEMENT      THROAT SURGERY       HEALTH MAINTENANCE ITEMS:  Health Maintenance Due   Topic Date Due    URINE MICROALBUMIN  Never done    TDAP/TD VACCINES (1 - Tdap) Never done    ZOSTER VACCINE (1 of 2) Never done    Pneumococcal Vaccine 65+ (2 - PPSV23 if available, else PCV20) 11/19/2018    DIABETIC EYE EXAM  08/13/2020    COVID-19 Vaccine (3 - Moderna series) 05/14/2021    HEMOGLOBIN A1C  06/08/2022       <no information>  Last Completed Colonoscopy            COLORECTAL CANCER SCREENING (COLONOSCOPY - Every 5 Years) Next due on 2/22/2028 02/22/2023  SCANNED -  COLONOSCOPY    2017  SCANNED - COLONOSCOPY    2014  SCANNED - COLONOSCOPY                  Immunization History   Administered Date(s) Administered    COVID-19 (MODERNA) 1st,2nd,3rd Dose Monovalent 2021, 2021    Fluad Quad 65+ 10/09/2020    Fluzone High Dose =>65 Years ([employer] ONLY) 2017, 2019    Fluzone High-Dose 65+yrs 2022    Pneumococcal Conjugate 13-Valent (PCV13) 2017     Last Completed Mammogram            Discontinued - MAMMOGRAM  Discontinued      2018  Declined - Bilateral Mastectomy                       FAMILY HISTORY:  Family History   Problem Relation Age of Onset    Hypertension Mother     Heart failure Mother     Cancer Father         colon    Colon cancer Father     Cancer Maternal Aunt         breast    No Known Problems Maternal Grandmother     No Known Problems Maternal Grandfather     No Known Problems Paternal Grandmother     No Known Problems Paternal Grandfather     Cancer Maternal Aunt         breast    Cancer Maternal Aunt         breast    Cancer Other      SOCIAL HISTORY:  Social History     Socioeconomic History    Marital status:      Spouse name: Horace    Number of children: 0    Years of education: 12.5   Tobacco Use    Smoking status: Former     Packs/day: 2.00     Years: 37.00     Pack years: 74.00     Types: Cigarettes     Start date: 1963     Quit date: 2000     Years since quittin.7    Smokeless tobacco: Never   Vaping Use    Vaping Use: Never used   Substance and Sexual Activity    Alcohol use: No    Drug use: No    Sexual activity: Defer       REVIEW OF SYSTEMS:  Review of Systems   Constitutional:  Positive for fatigue. Negative for fever.   HENT:  Negative for trouble swallowing.    Respiratory:  Negative for cough and shortness of breath.    Cardiovascular:  Negative for chest pain, palpitations and leg swelling.   Gastrointestinal:  Negative for nausea and vomiting.   Genitourinary:   "Negative for hematuria.   Musculoskeletal:  Negative for arthralgias and myalgias.   Skin:  Negative for rash, skin lesions and wound.   Neurological:  Negative for dizziness, syncope, memory problem and confusion.   Psychiatric/Behavioral:  Negative for suicidal ideas and depressed mood. The patient is not nervous/anxious.      /82   Pulse 53   Temp 97.4 °F (36.3 °C)   Resp 16   Ht 160 cm (63\")   Wt 83.6 kg (184 lb 6.4 oz)   LMP  (LMP Unknown)   SpO2 94%   BMI 32.66 kg/m²  Body surface area is 1.87 meters squared.    Pain Score    06/07/23 0827   PainSc:   2   PainLoc: Arm         Physical Exam  Constitutional:       Appearance: Normal appearance. She is obese.   HENT:      Head: Normocephalic and atraumatic.   Cardiovascular:      Rate and Rhythm: Normal rate and regular rhythm.   Pulmonary:      Effort: Pulmonary effort is normal.      Breath sounds: Normal breath sounds.   Chest:   Breasts:     Right: Absent.      Left: Absent.   Abdominal:      General: Bowel sounds are normal.      Palpations: Abdomen is soft.   Musculoskeletal:      Right lower leg: No edema.      Left lower leg: No edema.   Skin:     General: Skin is warm and dry.   Neurological:      Mental Status: She is alert and oriented to person, place, and time.   Psychiatric:         Attention and Perception: Attention normal.         Mood and Affect: Mood normal.         Judgment: Judgment normal.       Ira Sanchez reports a pain score of 2.  Given her pain assessment as noted, treatment options were discussed and the following options were decided upon as a follow-up plan to address the patient's pain:  No intervention indicated .           ASSESSMENT / PLAN    1. Encounter for care related to Port-a-Cath    2. Encounter for care related to vascular access port    3. History of breast cancer    4. Elevated liver enzymes    5. Osteopenia, unspecified location    6. Obesity (BMI 30-39.9)    7. Essential hypertension     "     ASSESSMENT:    1.  History of Left breast carcinoma diagnosed in October 2014  Stage: AJCC TNM IIA [id number], ER/CT/HER-2 positive  Treatment: Bilateral mastectomies, Adjuvant chemotherapy with 5 cycles of Carbplatin Taxotere Herceptin followed by a total of one year of herceptin. Hormonal manipulation starting in June 2015 -2020.  Tumor markers stable:  CEA 4.7 and Ca 27.29 34.7  Heme status stable Hgb 14.2, Hct 44.1.    Anemia profile stable Iron 85, Ferritin 106, Sat 20%, TIBC 417    2.  Hypertension  -Taking Amlodipine and Losartan   -BP today 128/82  -Managed by PCP     3.  Elevated LFT  -AST 34  -07/14/22 MRI Abdomen There is a slightly lobulated contour to the left hepatic lobe with mild caudate lobe hypertrophy. Findings can be seen with mild fibrosis.  There is no suspicious focal liver mass. No evidence of portal hypertension.  -Followed by Dr. Genao    4.  Obesity   Patient's (Body mass index is 32.66 kg/m².) indicates that they are obese (BMI >30) with health related conditions that include hypertension .   -Defer Management to PCP     5.  Osteopenia   -DEXA Scan 01/24/22 with Femoral neck T Score -1.6 and Lumbar Spine 1.3  -Pt is taking Calcium and Vit D   -Managed by PCP     PLAN:  Stable for observation.    Return to office 6 months   Advised port flush every 6-8 weeks.    Pt states she gets them q 3 months  Patient will have preoffice labs. CBC, CMP, Iron Profile, Ferritin, Ca 27.29 CEA   Continue current medications, treatment plans and follow up with PCP and any other providers.  Care discussed with patient.  Understanding expressed.  Patient agreeable with plan.    Denisha Reyes, REBEL  06/07/2023

## 2023-06-07 NOTE — LETTER
June 18, 2023       No Recipients    Patient: Ira Sanchez   YOB: 1945   Date of Visit: 6/7/2023       Dear Dr. Santos Recipients:    Thank you for referring Ira Sanchez to me for evaluation. Below are the relevant portions of my assessment and plan of care.    If you have questions, please do not hesitate to call me. I look forward to following Ira along with you.         Sincerely,        REBEL Chow        CC:   No Recipients    Denisha Reyes APRN  06/18/23 1813  Sign when Signing Visit  [facility] HEMATOLOGY & ONCOLOGY [address]  [facility] [address]  [phone]    Patient Name: Ira Sanchez  Encounter Date: 06/07/2023   YOB: 1945  Patient Number: [id number]    Hematology / Oncology Progress Note    HPI / REASON FOR VISIT: Ira Sanchez is a 78 y.o. female who is followed by this office for history of  left breast cancer that was diagnosed in 2014.  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  It was ER+, [place]+ and Her 2 emerita +.  She subsequently underwent bilateral mastectomies and her AJCC TNM staging was [id number], Stage IIA.      She then underwent 5 cycles of Carboplatin, Taxotere and Herceptin followed by a year of Herceptin therapy.  She then was placed on hormonal manipulation with Arimidex in June 2015 and completed 5 years in 2020.     INTERVAL HISTORY   Ms. Sanchez presents to clinic today for continued follow up.   She has no acute complaints today.  She mentioned getting her port out but with further discussion, she states she doesn't want it out.  Discussed with her how it has been over five years but she continues to not want to have it removed.      Pt had labs drawn and results were reviewed with her today.       LABS    Lab Results - Last 18 Months   Lab Units 06/07/23  0810 11/30/22  0818 09/01/22  0848 06/01/22  0756   HEMOGLOBIN  g/dL 14.2 13.9 14.2 13.1   HEMATOCRIT % 44.1 43.3 44.2 40.9   MCV fL 96.7 94.1 94.2 95.3   WBC 10*3/mm3 7.60 8.51 6.89 7.65   RDW % 12.5 12.5 12.7 13.5   MPV fL 10.5 10.4 11.1 10.6   PLATELETS 10*3/mm3 167 176 176 175   IMM GRAN % % 0.3 0.2 0.4 0.4   NEUTROS ABS 10*3/mm3 4.19 5.17 4.13 4.58   LYMPHS ABS 10*3/mm3 2.69 2.55 2.07 2.38   MONOS ABS 10*3/mm3 0.58 0.62 0.52 0.50   EOS ABS 10*3/mm3 0.10 0.11 0.11 0.12   BASOS ABS 10*3/mm3 0.02 0.04 0.03 0.04   IMMATURE GRANS (ABS) 10*3/mm3 0.02 0.02 0.03 0.03   NRBC /100 WBC 0.0 0.0 0.0 0.0       Lab Results - Last 18 Months   Lab Units 06/07/23  0810 11/30/22  0818 09/01/22  0848 07/14/22  1433 06/01/22  0756   GLUCOSE mg/dL 107* 129*  --   --  120*   SODIUM mmol/L 138 138  --   --  139   POTASSIUM mmol/L 4.5 4.3  --   --  4.1   CO2 mmol/L 25.0 27.0  --   --  28.0   CHLORIDE mmol/L 101 102  --   --  103   ANION GAP mmol/L 12.0 9.0  --   --  8.0   CREATININE mg/dL 0.59 0.62  --  0.60 0.61   BUN mg/dL 18 12  --   --  17   BUN / CREAT RATIO  30.5* 19.4  --   --  27.9*   CALCIUM mg/dL 9.5 9.7  --   --  9.7   ALK PHOS U/L 110 127* 123*  --  131*   TOTAL PROTEIN g/dL 7.2 7.1 7.1  --  7.2   ALT (SGPT) U/L 15 16 17  --  19   AST (SGOT) U/L 34* 32 33*  --  35*   BILIRUBIN mg/dL 0.7 0.5 0.4  --  0.3   ALBUMIN g/dL 4.2 4.30 4.40  --  4.50   GLOBULIN gm/dL 3.0 2.8  --   --  2.7       Lab Results - Last 18 Months   Lab Units 06/07/23  0810 11/30/22  0818 06/01/22  0756   CEA ng/mL 4.70 4.54 5.30       Lab Results - Last 18 Months   Lab Units 06/07/23  0810 09/01/22  0848   IRON mcg/dL 85 85   TIBC mcg/dL 417 520   IRON SATURATION (TSAT) % 20 16*   FERRITIN ng/mL 106.70 40.50         PAST MEDICAL HISTORY:  ALLERGIES:  Allergies   Allergen Reactions   • Benzonatate Hives and Itching     CURRENT MEDICATIONS:  Outpatient Encounter Medications as of 6/7/2023   Medication Sig Dispense Refill   • amLODIPine (NORVASC) 5 MG tablet TAKE 1 TABLET BY MOUTH DAILY 90 tablet 3   • Calcium  Carb-Cholecalciferol (CALCIUM-VITAMIN D) 600-400 MG-UNIT tablet Take 1 tablet by mouth Daily.     • cycloSPORINE (RESTASIS) 0.05 % ophthalmic emulsion 1 drop As Needed.     • diphenhydrAMINE (BENADRYL) 25 mg capsule Take 1 capsule by mouth At Night As Needed for Allergies.     • thiamine (VITAMINE B-1) 100 MG tablet Take 1 tablet by mouth Daily.     • [DISCONTINUED] losartan (COZAAR) 100 MG tablet TAKE 1 TABLET BY MOUTH DAILY 90 tablet 3   • [DISCONTINUED] oxybutynin XL (DITROPAN-XL) 10 MG 24 hr tablet TAKE 1 TABLET BY MOUTH DAILY 90 tablet 3   • [DISCONTINUED] Omega-3 1000 MG capsule Take  by mouth Daily. (Patient not taking: Reported on 6/7/2023)       Facility-Administered Encounter Medications as of 6/7/2023   Medication Dose Route Frequency Provider Last Rate Last Admin   • heparin flush (porcine) 100 UNIT/ML injection 500 Units  500 Units Intravenous PRN Shanda Torres MD   500 Units at 04/16/18 1504   • heparin flush (porcine) 100 UNIT/ML injection 500 Units  500 Units Intravenous PRN Shanda Torres MD   500 Units at 06/12/18 1445   • heparin injection 500 Units  500 Units Intravenous PRN Tamela Doherty APRN   500 Units at 12/08/21 0948   • heparin injection 500 Units  500 Units Intravenous PRN Denisha Reyes, APRN   500 Units at 06/07/23 0920   • sodium chloride 0.9 % flush 10 mL  10 mL Intravenous PRN Shanda Torres MD   10 mL at 04/16/18 1503   • sodium chloride 0.9 % flush 10 mL  10 mL Intravenous PRN Shanda Torres MD   10 mL at 06/12/18 1445   • sodium chloride 0.9 % flush 10 mL  10 mL Intravenous PRN Tamela Doherty APRN   10 mL at 12/08/21 0948   • sodium chloride 0.9 % flush 10 mL  10 mL Intravenous PRN Denisha Reyes, APRN   10 mL at 06/07/23 0920     ADULT ILLNESSES:  Patient Active Problem List   Diagnosis Code   • Obesity (BMI 30-39.9) E66.9   • Breast cancer-L/valente masectomy 2014 C50.919   • Overactive bladder N32.81   •  Diabetes type 2, controlled E11.9   • Hypertension I10   • Osteopenia 2022- 2 to 5y M85.80   • Neuropathy-chemo/better G62.9   • Primary generalized (osteo)arthritis M15.0   • Malignant neoplasm of lower-outer quadrant of left female breast C50.512   • Family history of breast cancer Z80.3   • Malignant neoplasm of upper-outer quadrant of female breast C50.419   • History of bilateral mastectomy Z90.13   • Wellness examination-done Z00.00   • Shoulder pain M25.519   • Chronic back pain M54.9, G89.29   • Laboratory test* Z01.89   • Bradycardia R00.1   • Cough: copd R05.9   • Allergic rhinitis J30.9   • Menopause Z78.0   • History of shingles Z86.19   • COPD (chronic obstructive pulmonary disease) J44.9   • Abnormal mammogram R92.8   • After-cataract with vision obscured H26.499   • Amblyopia H53.009   • Cellulitis, trunk L03.319   • Dermatitis of eyelid due to herpes zoster B02.39   • Dermatochalasis H02.839   • Hordeolum internum H00.029   • 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   • Pseudophakia Z96.1   • Rash R21   • Encounter for care related to Port-a-Cath Z45.2   • Edema: venous-BH/vascular R60.9   • Congestive heart failure I50.9   • Valvular heart disease: AS I38   • Chronic neck pain M54.2, G89.29   • Abdominal pannus E65   • Encounter for care related to vascular access port Z45.2   • Pneumonia of both lower lobes due to infectious organism J18.9   • Chronic cough R05.3   • Personal history of nicotine dependence Z87.891   • Severe obesity (BMI 35.0-35.9 with comorbidity) E66.01, Z68.35   • Abnormal liver x-ray R93.2   • Positive JESUSITA (antinuclear antibody) R76.8   • NSAID long-term use Z79.1   • Abnormal LFTs R79.89   • Adenomatous polyp of colon D12.6   • Fatty liver K76.0     SURGERIES:  Past Surgical History:   Procedure Laterality Date   • APPENDECTOMY     • BREAST SURGERY     • CATARACT EXTRACTION Bilateral    • DILATATION AND CURETTAGE     • KNEE SURGERY     • MASTECTOMY Bilateral    • NIPPLE  TATTOO  04/12/2016   • PORTACATH PLACEMENT     • THROAT SURGERY       HEALTH MAINTENANCE ITEMS:  Health Maintenance Due   Topic Date Due   • URINE MICROALBUMIN  Never done   • TDAP/TD VACCINES (1 - Tdap) Never done   • ZOSTER VACCINE (1 of 2) Never done   • Pneumococcal Vaccine 65+ (2 - PPSV23 if available, else PCV20) 11/19/2018   • DIABETIC EYE EXAM  08/13/2020   • COVID-19 Vaccine (3 - Moderna series) 05/14/2021   • HEMOGLOBIN A1C  06/08/2022       <no information>  Last Completed Colonoscopy            COLORECTAL CANCER SCREENING (COLONOSCOPY - Every 5 Years) Next due on 2/22/2028 02/22/2023  SCANNED - COLONOSCOPY    12/19/2017  SCANNED - COLONOSCOPY    09/30/2014  SCANNED - COLONOSCOPY                  Immunization History   Administered Date(s) Administered   • COVID-19 (MODERNA) 1st,2nd,3rd Dose Monovalent 02/19/2021, 03/19/2021   • Fluad Quad 65+ 10/09/2020   • Fluzone High Dose =>65 Years ([employer] ONLY) 11/19/2017, 11/14/2019   • Fluzone High-Dose 65+yrs 12/08/2022   • Pneumococcal Conjugate 13-Valent (PCV13) 11/19/2017     Last Completed Mammogram            Discontinued - MAMMOGRAM  Discontinued      06/05/2018  Declined - Bilateral Mastectomy                       FAMILY HISTORY:  Family History   Problem Relation Age of Onset   • Hypertension Mother    • Heart failure Mother    • Cancer Father         colon   • Colon cancer Father    • Cancer Maternal Aunt         breast   • No Known Problems Maternal Grandmother    • No Known Problems Maternal Grandfather    • No Known Problems Paternal Grandmother    • No Known Problems Paternal Grandfather    • Cancer Maternal Aunt         breast   • Cancer Maternal Aunt         breast   • Cancer Other      SOCIAL HISTORY:  Social History     Socioeconomic History   • Marital status:      Spouse name: Horace   • Number of children: 0   • Years of education: 12.5   Tobacco Use   • Smoking status: Former     Packs/day: 2.00     Years: 37.00      "Pack years: 74.00     Types: Cigarettes     Start date: 1963     Quit date: 2000     Years since quittin.7   • Smokeless tobacco: Never   Vaping Use   • Vaping Use: Never used   Substance and Sexual Activity   • Alcohol use: No   • Drug use: No   • Sexual activity: Defer       REVIEW OF SYSTEMS:  Review of Systems   Constitutional:  Positive for fatigue. Negative for fever.   HENT:  Negative for trouble swallowing.    Respiratory:  Negative for cough and shortness of breath.    Cardiovascular:  Negative for chest pain, palpitations and leg swelling.   Gastrointestinal:  Negative for nausea and vomiting.   Genitourinary:  Negative for hematuria.   Musculoskeletal:  Negative for arthralgias and myalgias.   Skin:  Negative for rash, skin lesions and wound.   Neurological:  Negative for dizziness, syncope, memory problem and confusion.   Psychiatric/Behavioral:  Negative for suicidal ideas and depressed mood. The patient is not nervous/anxious.      /82   Pulse 53   Temp 97.4 °F (36.3 °C)   Resp 16   Ht 160 cm (63\")   Wt 83.6 kg (184 lb 6.4 oz)   LMP  (LMP Unknown)   SpO2 94%   BMI 32.66 kg/m²  Body surface area is 1.87 meters squared.    Pain Score    23 0827   PainSc:   2   PainLoc: Arm         Physical Exam  Constitutional:       Appearance: Normal appearance. She is obese.   HENT:      Head: Normocephalic and atraumatic.   Cardiovascular:      Rate and Rhythm: Normal rate and regular rhythm.   Pulmonary:      Effort: Pulmonary effort is normal.      Breath sounds: Normal breath sounds.   Chest:   Breasts:     Right: Absent.      Left: Absent.   Abdominal:      General: Bowel sounds are normal.      Palpations: Abdomen is soft.   Musculoskeletal:      Right lower leg: No edema.      Left lower leg: No edema.   Skin:     General: Skin is warm and dry.   Neurological:      Mental Status: She is alert and oriented to person, place, and time.   Psychiatric:         Attention and " Perception: Attention normal.         Mood and Affect: Mood normal.         Judgment: Judgment normal.       Ira Sanchez reports a pain score of 2.  Given her pain assessment as noted, treatment options were discussed and the following options were decided upon as a follow-up plan to address the patient's pain:  No intervention indicated .           ASSESSMENT / PLAN    1. Encounter for care related to Port-a-Cath    2. Encounter for care related to vascular access port    3. History of breast cancer    4. Elevated liver enzymes    5. Osteopenia, unspecified location    6. Obesity (BMI 30-39.9)    7. Essential hypertension         ASSESSMENT:    1.  History of Left breast carcinoma diagnosed in October 2014  Stage: AJCC TNM IIA [id number], ER/VT/HER-2 positive  Treatment: Bilateral mastectomies, Adjuvant chemotherapy with 5 cycles of Carbplatin Taxotere Herceptin followed by a total of one year of herceptin. Hormonal manipulation starting in June 2015 -2020.  Tumor markers stable:  CEA 4.7 and Ca 27.29 34.7  Heme status stable Hgb 14.2, Hct 44.1.    Anemia profile stable Iron 85, Ferritin 106, Sat 20%, TIBC 417    2.  Hypertension  -Taking Amlodipine and Losartan   -BP today 128/82  -Managed by PCP     3.  Elevated LFT  -AST 34  -07/14/22 MRI Abdomen There is a slightly lobulated contour to the left hepatic lobe with mild caudate lobe hypertrophy. Findings can be seen with mild fibrosis.  There is no suspicious focal liver mass. No evidence of portal hypertension.  -Followed by Dr. Genao    4.  Obesity   Patient's (Body mass index is 32.66 kg/m².) indicates that they are obese (BMI >30) with health related conditions that include hypertension .   -Defer Management to PCP     5.  Osteopenia   -DEXA Scan 01/24/22 with Femoral neck T Score -1.6 and Lumbar Spine 1.3  -Pt is taking Calcium and Vit D   -Managed by PCP     PLAN:  Stable for observation.    Return to office 6 months   Advised port flush every 6-8 weeks.     Pt states she gets them q 3 months  Patient will have preoffice labs. CBC, CMP, Iron Profile, Ferritin, Ca 27.29 CEA   Continue current medications, treatment plans and follow up with PCP and any other providers.  Care discussed with patient.  Understanding expressed.  Patient agreeable with plan.    Denisha Reyes, REBEL  06/07/2023

## 2023-06-08 ENCOUNTER — OFFICE VISIT (OUTPATIENT)
Dept: FAMILY MEDICINE CLINIC | Facility: CLINIC | Age: 78
End: 2023-06-08
Payer: MEDICARE

## 2023-06-08 DIAGNOSIS — Z78.9 CHRONIC HEALTH PROBLEM: Primary | ICD-10-CM

## 2023-06-08 LAB — CANCER AG27-29 SERPL-ACNC: 34.7 U/ML (ref 0–38.6)

## 2023-06-09 DIAGNOSIS — N32.81 OVERACTIVE BLADDER: ICD-10-CM

## 2023-06-09 RX ORDER — OXYBUTYNIN CHLORIDE 10 MG/1
TABLET, EXTENDED RELEASE ORAL
Qty: 90 TABLET | Refills: 3 | Status: SHIPPED | OUTPATIENT
Start: 2023-06-09

## 2023-06-13 DIAGNOSIS — I10 PRIMARY HYPERTENSION: Chronic | ICD-10-CM

## 2023-06-13 RX ORDER — LOSARTAN POTASSIUM 100 MG/1
100 TABLET ORAL DAILY
Qty: 90 TABLET | Refills: 3 | Status: SHIPPED | OUTPATIENT
Start: 2023-06-13

## 2023-06-19 ENCOUNTER — OFFICE VISIT (OUTPATIENT)
Dept: PULMONOLOGY | Facility: CLINIC | Age: 78
End: 2023-06-19
Payer: MEDICARE

## 2023-06-19 VITALS
DIASTOLIC BLOOD PRESSURE: 82 MMHG | OXYGEN SATURATION: 97 % | SYSTOLIC BLOOD PRESSURE: 138 MMHG | HEART RATE: 42 BPM | HEIGHT: 63 IN | BODY MASS INDEX: 33.13 KG/M2 | WEIGHT: 187 LBS

## 2023-06-19 DIAGNOSIS — J98.4 SCARRING OF LUNG: Primary | Chronic | ICD-10-CM

## 2023-06-19 DIAGNOSIS — R05.3 CHRONIC COUGH: Chronic | ICD-10-CM

## 2023-06-19 DIAGNOSIS — E66.9 OBESITY (BMI 30-39.9): Chronic | ICD-10-CM

## 2023-06-19 DIAGNOSIS — Z87.891 PERSONAL HISTORY OF NICOTINE DEPENDENCE: Chronic | ICD-10-CM

## 2023-06-19 DIAGNOSIS — R00.1 BRADYCARDIA: Chronic | ICD-10-CM

## 2023-06-19 PROCEDURE — 3075F SYST BP GE 130 - 139MM HG: CPT | Performed by: INTERNAL MEDICINE

## 2023-06-19 PROCEDURE — 99214 OFFICE O/P EST MOD 30 MIN: CPT | Performed by: INTERNAL MEDICINE

## 2023-06-19 PROCEDURE — 3079F DIAST BP 80-89 MM HG: CPT | Performed by: INTERNAL MEDICINE

## 2023-06-19 PROCEDURE — 1159F MED LIST DOCD IN RCRD: CPT | Performed by: INTERNAL MEDICINE

## 2023-06-19 PROCEDURE — 1160F RVW MEDS BY RX/DR IN RCRD: CPT | Performed by: INTERNAL MEDICINE

## 2023-06-19 NOTE — ASSESSMENT & PLAN NOTE
She has been noted to be bradycardic on multiple vital sign determinations in the past.  She is asymptomatic from this.

## 2023-06-19 NOTE — ASSESSMENT & PLAN NOTE
Mild chronic changes were noted on her last chest CT.  Some of this may be residual scarring from her prior pneumonia but again no acute process was noted on her last chest CT.

## 2023-06-19 NOTE — ASSESSMENT & PLAN NOTE
Patient's (Body mass index is 33.13 kg/m².) indicates that they are obese (BMI >30) with health conditions that include hypertension . Weight is unchanged.  Diet and exercise are encouraged and she will follow-up with her primary care physician regarding her elevated BMI otherwise.

## 2023-06-19 NOTE — PATIENT INSTRUCTIONS
The patient is doing quite well from a pulmonary standpoint.  She will return in 1 year unless she has any acute respiratory problems in the interim.

## 2023-06-19 NOTE — PROGRESS NOTES
Chief Complaint  Cough and history of pneumonia.    Subjective    History of Present Illness {CC  Problem List  Visit Diagnosis   Encounters  Notes  Medications  Labs  Result Review Imaging  Media: 23}    Ira Sanchez presents to [facility] for cough and history of pneumonia.    History of Present Illness   The patient's cough is much improved and she is not having to use any inhalers at this time.  His chest CT showed no acute process.  I told her we will just plan on a follow-up visit in 1 year unless she has any acute respiratory problems in the interim.  She is somewhat bradycardic today with a pulse as low as 2 and on follow-up auscultation and palpation her pulse was running approximately 50.  She has had bradycardia noted intermittently on previous vital signs.  She does not have any symptoms associated with bradycardia such as lightheadedness or presyncope and I told her if she had such symptoms to follow-up with her primary care physician.  She also has had some liver abnormalities noted on prior imaging studies but her MRI performed last July.  This showed just possible mild fibrosis.  She is being followed by the physicians for these issues.  She has had the COVID-19 vaccine in the form of the Moderna vaccine.  She declined the flu shot last year.  She has had a Prevnar 13 in the past.  Prior to Admission medications    Medication Sig Start Date End Date Taking? Authorizing Provider   amLODIPine (NORVASC) 5 MG tablet TAKE 1 TABLET BY MOUTH DAILY 9/21/22  Yes Conrado Tinoco MD   Calcium Carb-Cholecalciferol (CALCIUM-VITAMIN D) 600-400 MG-UNIT tablet Take 1 tablet by mouth Daily.   Yes Karis Billingsley MD   cycloSPORINE (RESTASIS) 0.05 % ophthalmic emulsion 1 drop As Needed.   Yes Karis Billingsley MD   diphenhydrAMINE (BENADRYL) 25 mg capsule Take 1 capsule by mouth At Night As Needed for Allergies.   Yes aKris Billingsley  "MD   losartan (COZAAR) 100 MG tablet TAKE 1 TABLET BY MOUTH DAILY 23  Yes Conrado Tinoco MD   oxybutynin XL (DITROPAN-XL) 10 MG 24 hr tablet TAKE 1 TABLET BY MOUTH DAILY 23  Yes Conrado Tinoco MD   thiamine (VITAMINE B-1) 100 MG tablet Take 1 tablet by mouth Daily.   Yes ProviderKaris MD       Social History     Socioeconomic History    Marital status:      Spouse name: Horace    Number of children: 0    Years of education: 12.5   Tobacco Use    Smoking status: Former     Packs/day: 2.00     Years: 37.00     Pack years: 74.00     Types: Cigarettes     Start date: 1963     Quit date: 2000     Years since quittin.7    Smokeless tobacco: Never   Vaping Use    Vaping Use: Never used   Substance and Sexual Activity    Alcohol use: No    Drug use: No    Sexual activity: Defer       Objective   Vital Signs:   /82   Pulse (!) 42   Ht 160 cm (63\")   Wt 84.8 kg (187 lb)   SpO2 97% Comment: RA  BMI 33.13 kg/m²     Physical Exam  Vitals and nursing note reviewed.   Constitutional:       Appearance: She is obese.      Comments: She is bradycardic with a pulse in the 40s to 50 range.   HENT:      Head: Normocephalic.   Eyes:      Extraocular Movements: Extraocular movements intact.      Pupils: Pupils are equal, round, and reactive to light.   Cardiovascular:      Rate and Rhythm: Regular rhythm. Bradycardia present.      Comments: Her pulse was running from low 40s to approximately 50.  She is asymptomatic related to her bradycardia.  Pulmonary:      Effort: Pulmonary effort is normal.      Breath sounds: Normal breath sounds.   Musculoskeletal:         General: Normal range of motion.   Skin:     General: Skin is warm and dry.   Neurological:      General: No focal deficit present.      Mental Status: She is alert and oriented to person, place, and time.   Psychiatric:         Mood and Affect: Mood normal.         Behavior: Behavior normal.      Result Review " :          Results for orders placed during the hospital encounter of 08/10/21    Pulmonary Function Test    [facility] - Pulmonary Function Test    [address]  [place]  [place]  [address]  [phone]    Patient : Ira Sanchez  MRN : [id number]  CSN : [id number]  Pulmonologist : Dante Mendoza MD  Date : 8/10/2021    ______________________________________________________________________    Interpretation :  1.  Spirometry is within normal limits.  2.  There is actually improvement in small airways function postbronchodilator which is supranormal postbronchodilator.  Otherwise there is no significant change in spirometry postbronchodilator.  3.  Lung volumes reveal a decrease in expiratory reserve volume, and otherwise are within normal limits.  4.  There is a mild diffusion impairment which when corrected for alveolar volume is a low normal diffusion capacity.  4.  Arterial blood gases reveal mild hypoxemia on room air.      Dante Mendoza MD                 My interpretation of imaging:    CT Chest Without Contrast Diagnostic (06/16/2022 08:33)         Assessment and Plan {CC Problem List  Visit Diagnosis  ROS  Review (Popup)  [facility] Maintenance  Quality  BestPractice  Medications  SmartSets  SnapShot Encounters  Media : 23}    Diagnoses and all orders for this visit:    1. Scarring of lung (Primary)  Assessment & Plan:  Mild chronic changes were noted on her last chest CT.  Some of this may be residual scarring from her prior pneumonia but again no acute process was noted on her last chest CT.      2. Chronic cough  Assessment & Plan:  This is much improved at this time.      3. Bradycardia  Assessment & Plan:  She has been noted to be bradycardic on multiple vital sign determinations in the past.  She is asymptomatic from this.      4. Personal history of nicotine dependence  Assessment & Plan:  She quit smoking in the year 2000.      5. Obesity (BMI  30-39.9)  Assessment & Plan:  Patient's (Body mass index is 33.13 kg/m².) indicates that they are obese (BMI >30) with health conditions that include hypertension . Weight is unchanged.  Diet and exercise are encouraged and she will follow-up with her primary care physician regarding her elevated BMI otherwise.             Dante Mendoza MD  6/19/2023  12:25 CDT    Follow Up   Return in about 1 year (around 6/19/2024) for To see me specifically.    Patient was given instructions and counseling regarding her condition or for health maintenance advice. Please see specific information pulled into the AVS if appropriate.

## 2023-09-06 ENCOUNTER — INFUSION (OUTPATIENT)
Dept: ONCOLOGY | Facility: CLINIC | Age: 78
End: 2023-09-06
Payer: MEDICARE

## 2023-09-06 DIAGNOSIS — Z45.2 ENCOUNTER FOR CARE RELATED TO VASCULAR ACCESS PORT: ICD-10-CM

## 2023-09-06 DIAGNOSIS — Z45.2 ENCOUNTER FOR CARE RELATED TO PORT-A-CATH: Primary | ICD-10-CM

## 2023-09-06 RX ORDER — HEPARIN SODIUM (PORCINE) LOCK FLUSH IV SOLN 100 UNIT/ML 100 UNIT/ML
500 SOLUTION INTRAVENOUS AS NEEDED
OUTPATIENT
Start: 2023-09-06

## 2023-09-06 RX ORDER — SODIUM CHLORIDE 0.9 % (FLUSH) 0.9 %
10 SYRINGE (ML) INJECTION AS NEEDED
OUTPATIENT
Start: 2023-09-06

## 2023-09-06 RX ORDER — HEPARIN SODIUM (PORCINE) LOCK FLUSH IV SOLN 100 UNIT/ML 100 UNIT/ML
500 SOLUTION INTRAVENOUS AS NEEDED
Status: DISCONTINUED | OUTPATIENT
Start: 2023-09-06 | End: 2023-09-06 | Stop reason: HOSPADM

## 2023-09-06 RX ORDER — SODIUM CHLORIDE 0.9 % (FLUSH) 0.9 %
10 SYRINGE (ML) INJECTION AS NEEDED
Status: DISCONTINUED | OUTPATIENT
Start: 2023-09-06 | End: 2023-09-06 | Stop reason: HOSPADM

## 2023-09-06 RX ADMIN — Medication 10 ML: at 08:24

## 2023-09-06 RX ADMIN — HEPARIN SODIUM (PORCINE) LOCK FLUSH IV SOLN 100 UNIT/ML 500 UNITS: 100 SOLUTION at 08:25

## 2023-09-06 NOTE — PROGRESS NOTES
I have reviewed the notes, assessments, and/or procedures performed by Orly Kirkpatrick RN, I concur with her/his documentation of Ira Sanchez.

## 2023-09-11 DIAGNOSIS — R42 DIZZY: ICD-10-CM

## 2023-09-11 DIAGNOSIS — R03.0 ELEVATED BLOOD PRESSURE READING: ICD-10-CM

## 2023-09-11 RX ORDER — AMLODIPINE BESYLATE 5 MG/1
5 TABLET ORAL DAILY
Qty: 90 TABLET | Refills: 3 | Status: SHIPPED | OUTPATIENT
Start: 2023-09-11

## 2023-09-12 ENCOUNTER — OFFICE VISIT (OUTPATIENT)
Dept: FAMILY MEDICINE CLINIC | Facility: CLINIC | Age: 78
End: 2023-09-12
Payer: MEDICARE

## 2023-09-12 VITALS
WEIGHT: 188 LBS | HEART RATE: 60 BPM | BODY MASS INDEX: 33.31 KG/M2 | SYSTOLIC BLOOD PRESSURE: 126 MMHG | DIASTOLIC BLOOD PRESSURE: 74 MMHG | RESPIRATION RATE: 18 BRPM | HEIGHT: 63 IN | TEMPERATURE: 96.8 F | OXYGEN SATURATION: 96 %

## 2023-09-12 DIAGNOSIS — J44.9 CHRONIC OBSTRUCTIVE PULMONARY DISEASE, UNSPECIFIED COPD TYPE: ICD-10-CM

## 2023-09-12 DIAGNOSIS — M85.80 OSTEOPENIA, UNSPECIFIED LOCATION: ICD-10-CM

## 2023-09-12 DIAGNOSIS — N32.81 OVERACTIVE BLADDER: Chronic | ICD-10-CM

## 2023-09-12 DIAGNOSIS — R69 MULTIPLE CHRONIC DISEASES: ICD-10-CM

## 2023-09-12 DIAGNOSIS — I50.9 CONGESTIVE HEART FAILURE, UNSPECIFIED HF CHRONICITY, UNSPECIFIED HEART FAILURE TYPE: ICD-10-CM

## 2023-09-12 DIAGNOSIS — M15.0 PRIMARY GENERALIZED (OSTEO)ARTHRITIS: ICD-10-CM

## 2023-09-12 DIAGNOSIS — L30.4 INTERTRIGO: ICD-10-CM

## 2023-09-12 DIAGNOSIS — G89.29 CHRONIC BACK PAIN, UNSPECIFIED BACK LOCATION, UNSPECIFIED BACK PAIN LATERALITY: ICD-10-CM

## 2023-09-12 DIAGNOSIS — E66.9 OBESITY (BMI 30-39.9): ICD-10-CM

## 2023-09-12 DIAGNOSIS — R79.89 ABNORMAL LFTS: ICD-10-CM

## 2023-09-12 DIAGNOSIS — R60.9 EDEMA, UNSPECIFIED TYPE: ICD-10-CM

## 2023-09-12 DIAGNOSIS — R22.41 MASS OF RIGHT LOWER EXTREMITY: ICD-10-CM

## 2023-09-12 DIAGNOSIS — G62.9 NEUROPATHY: ICD-10-CM

## 2023-09-12 DIAGNOSIS — I10 PRIMARY HYPERTENSION: Chronic | ICD-10-CM

## 2023-09-12 DIAGNOSIS — K76.0 FATTY LIVER: ICD-10-CM

## 2023-09-12 DIAGNOSIS — E55.9 VITAMIN D DEFICIENCY: Primary | ICD-10-CM

## 2023-09-12 DIAGNOSIS — M54.9 CHRONIC BACK PAIN, UNSPECIFIED BACK LOCATION, UNSPECIFIED BACK PAIN LATERALITY: ICD-10-CM

## 2023-09-12 DIAGNOSIS — R20.0 RUE NUMBNESS: ICD-10-CM

## 2023-09-12 DIAGNOSIS — I38 VALVULAR HEART DISEASE: ICD-10-CM

## 2023-09-12 DIAGNOSIS — E65 ABDOMINAL PANNUS: ICD-10-CM

## 2023-09-12 DIAGNOSIS — E11.9 CONTROLLED TYPE 2 DIABETES MELLITUS WITHOUT COMPLICATION, WITHOUT LONG-TERM CURRENT USE OF INSULIN: Chronic | ICD-10-CM

## 2023-09-12 RX ORDER — NYSTATIN 100000 [USP'U]/G
POWDER TOPICAL 3 TIMES DAILY
Qty: 45 G | Refills: 0 | Status: SHIPPED | OUTPATIENT
Start: 2023-09-12

## 2023-09-12 NOTE — PROGRESS NOTES
ELANA”.   Subjective   Ira Sanchez is a 78 y.o. female presenting with chief complaint of:   Chief Complaint   Patient presents with    Incision Redness    Knot on Right Knee    Right Arm Numbness     AWV 12.8.22  Last Completed Annual Wellness Visit            ANNUAL WELLNESS VISIT (Yearly) Next due on 12/8/2023 12/08/2022  Registry Metric: AWV Encounter Date    01/18/2022  Level of Service: [place] PPPS, SUBSEQ VISIT    12/20/2019  Level of Service: [place] PPPS, SUBSEQ VISIT    12/20/2019  Done                     History of Present Illness :  Alone.  Here for primarily f/u multiple chronic problems.   Here for review of chronic problems that includes HTN and others.     Has multiple chronic problems to consider that might have a bearing on today's issues;  an interval appointment.       Chronic/acute problems reviewed today:   1. Primary hypertension Chronic/stable. Stable here past/and occ home blood pressures.  No significant chest pain, SOB, LE edema, orthopnea, near syncope, dizziness/light headness.   Recent Vitals         6/19/2023 6/20/2023 9/12/2023       BP: 138/82 122/78 126/74     Pulse: 42 44 60     Temp: -- 97.1 °F (36.2 °C) 96.8 °F (36 °C)     Weight: 84.8 kg (187 lb) 85.7 kg (189 lb) 85.3 kg (188 lb)     BMI (Calculated): 33.1 33.5 33.3              2. Congestive heart failure, unspecified HF chronicity, unspecified heart failure type Chronic/stable.  Denies significant sob, orthopnea, leg edema, weight gain.  Aware of influence diet/salt and watching weight at home.       3. Valvular heart disease: AS chronic/stable symptoms of no chest pain, SOB, edema with past echos showing valvular changes of: AS.    Followed clinically between occ echo     4. Controlled type 2 diabetes mellitus without complication, without long-term current use of insulin Chronic/stable.  No problem/pattern hypoglycemia/hyperglycemia manifest by poly- dypsia, phagia, uria, or sweats, diaphoretic episodes,  syncope/near.     5. Abnormal LFTs on occasion elevated liver functions; see #6   6. Fatty liver Chronic/stable.  Aware treatment involves normalizing weight; usually including low fat diet and regular exercise.  Aware condition can go on to cirrhosis and death.  Stable occ liver labs and past imaging.       7. Primary generalized (osteo)arthritis Chronic/stable.  Various on/off joint pains/soreness/stiffness.  Particular joint problems with various.  No joint swelling.  Treats mainly with reduced activity, Rx listed, Tylenol.  No  NSAIDs, and no recent injections.      8. Chronic back pain, unspecified back location, unspecified back pain laterality : chronic/variable spinal (upper/lower) 1-2/10 pain with infrequent/rare radiation to UE/LE.  No change LE numbness.  Has bladder/bowel control. No desire to change approach of care.      9. Neuropathy-chemo/better chronic continued lower extremity numbness; does not come and go like a right upper extremity mention today.  Explained to be secondary to her previous chemo   10. Chronic obstructive pulmonary disease, unspecified COPD type Chronic/stable mild occ cough, sob, wheeze.  Rx not needed.  Not smoking.       11. Edema, unspecified type chronic/variable.  Recently better.  Causes include: venous insuffinciency.      12. RUE numbness on and off her right hand gets numb when she is driving   13. Mass of right lower extremity she has a nodule on the dorsal right lower leg; it seems to be getting little bigger.  Is not sore   14. Intertrigo and area of redness under her pannus   15. Obesity (BMI 30-39.9) Chronic/stable.  Aware, advised weight loss before.  On/off some success with weight loss but often with weight gain back.      16. Abdominal pannus has an abdominal fold; near an old incision she has a recent area of redness   17. Overactive bladder chronic urinary frequency improved with Ditropan; denies significant dry mouth and no glaucoma   18. Osteopenia,  unspecified location Chronic/stable.  Last bone density/if below.   Has had or offered Rx.  Ok further bone density screening when due.      19. Multiple chronic diseases Has multiple chronic problems with increased risks for management (involves polypharmacy, multiple providers, many required labs/imaging/ to manage)       Has an/another acute issue today: none.    The following portions of the patient's history were reviewed and updated as appropriate: allergies, current medications, past family history, past medical history, past social history, past surgical history, and problem list.      Current Outpatient Medications:     amLODIPine (NORVASC) 5 MG tablet, TAKE 1 TABLET BY MOUTH DAILY, Disp: 90 tablet, Rfl: 3    Calcium Carb-Cholecalciferol (CALCIUM-VITAMIN D) 600-400 MG-UNIT tablet, Take 1 tablet by mouth Daily., Disp: , Rfl:     cycloSPORINE (RESTASIS) 0.05 % ophthalmic emulsion, 1 drop As Needed., Disp: , Rfl:     diphenhydrAMINE (BENADRYL) 25 mg capsule, Take 1 capsule by mouth At Night As Needed for Allergies., Disp: , Rfl:     losartan (COZAAR) 100 MG tablet, TAKE 1 TABLET BY MOUTH DAILY, Disp: 90 tablet, Rfl: 3    oxybutynin XL (DITROPAN-XL) 10 MG 24 hr tablet, TAKE 1 TABLET BY MOUTH DAILY, Disp: 90 tablet, Rfl: 3    thiamine (VITAMINE B-1) 100 MG tablet, Take 1 tablet by mouth Daily., Disp: , Rfl:     nystatin (MYCOSTATIN) 433994 UNIT/GM powder, Apply  topically to the appropriate area as directed 3 (Three) Times a Day., Disp: 45 g, Rfl: 0  No current facility-administered medications for this visit.    Facility-Administered Medications Ordered in Other Visits:     heparin flush (porcine) 100 UNIT/ML injection 500 Units, 500 Units, Intravenous, PRN, Shanda Torres MD, 500 Units at 04/16/18 1504    heparin flush (porcine) 100 UNIT/ML injection 500 Units, 500 Units, Intravenous, PRN, Shanda Torres MD, 500 Units at 06/12/18 1445    heparin injection 500 Units, 500 Units,  Intravenous, PRN, Tamela Doherty, APRN, 500 Units at 12/08/21 0948    sodium chloride 0.9 % flush 10 mL, 10 mL, Intravenous, PRN, Shanda Torres MD, 10 mL at 04/16/18 1503    sodium chloride 0.9 % flush 10 mL, 10 mL, Intravenous, PRN, Shanda Torres MD, 10 mL at 06/12/18 1445    sodium chloride 0.9 % flush 10 mL, 10 mL, Intravenous, PRN, Tamela Doherty, APRN, 10 mL at 12/08/21 0948    No problems with medications.    Allergies   Allergen Reactions    Benzonatate Hives and Itching       Review of Systems  GENERAL:  Active/slower with limits, speed, stamina for age, and occ back pain.  Sleep is ok. No fever now/recent.  SKIN: No other rash/skin lesion of concern:   ENDO:  No syncope, near or diaphoretic sweaty spells.  No download..  HEENT: No recent head injury; stable occ headache.  No vision change.  No chronic hearing loss.  Ears without fullness without pain/drainage.  No sore throat now.  No significant nasal/sinus congestion/drainage. No epistaxis.   CHEST: No chest wall tenderness or mass.  No significant cough, and no wheeze.  No SOB; no hemoptysis.  CV: No chest pain, palpitations; daily end of day mild pitting/ankle edema.  GI: No heartburn, dysphagia.  No abdominal pain, diarrhea, constipation.  No rectal bleeding, or melena.    :  Voids with occ on/off dysuria, without incontinence to completion.  ORTHO: No painful/swollen joints but various on /off sore.  Occ same/sore neck or back.  No acute neck or lower back pain without recent injury.  NEURO: No dizziness, weakness of extremities.  Same mild numbness/paresthesias LE.  PSYCH: No memory loss.  Mood good; mild anxious, depressed but/and not suicidal.  Tries to tolerate stress .   Screening:  Mammogram: bilateral masectomy  Bone density:   Bone d-deca/BH/hip -1.6, LS +1.3/1.24.22-watching  Bone d-deca/[facility]/T hip -0.5 LS +1.4/12.18.18;  no change; maybe 2-5 yr  Low dose CT chest:Tobacco-smoker/age 17/1-2ppd/dc age  60:  CTs with breast cancer:   GI:   Colon-avm-div/[facility]ie[facility]/12.19.17/5y  Colon-p, avm, div/[facility]ie[facility]/2.22.23/prn    Copy/paste function used for ROS/exam AND each area of these were reviewed, updated, confirmed and supplemented as needed.  Data reviewed:   Recent admit/ER/MD visits: 6.20.23    1. Bradycardia    2. Primary hypertension    3. Valvular heart disease: AS    4. Controlled type 2 diabetes mellitus without complication, without long-term current use of insulin    5. Abnormal liver x-ray    6. Fatty liver    7. Primary generalized (osteo)arthritis    8. Positive JESUSITA (antinuclear antibody)-neg [place] visit    9. Chronic obstructive pulmonary disease, unspecified COPD type          Data review above:   Discussions/medical decisions/reviews:  BP ok  Other vitals HR slower; EKG with her asymptomatic nature ok to watch  DM/ 6.7.23  Lipid LDL next time  PSA NA  CBC ok 6.7.23  Iron 85 6.7.23  Renal ok 6.7.23  Liver AST 34 6.7.23; overall better past  Vit D next time  Thyroid next time     Screening reviewed/updated   Vaccines discussed shingles free  EKG; ok for now    Last cardiac testing:   Echo:   Results for orders placed during the hospital encounter of 04/23/21    Adult Transthoracic Echo Complete W/ Cont if Necessary Per Protocol    Interpretation Summary  · Technically difficult study.  · Left ventricular ejection fraction appears to be 66 - 70%. Left ventricular systolic function is normal.  · Left ventricular diastolic function is consistent with (grade I) impaired relaxation.  · Normal right ventricular cavity size and systolic function noted.  · Mild aortic valve stenosis is present.  · Estimated right ventricular systolic pressure from tricuspid regurgitation is normal (<35 mmHg).    Radiology considered:   No radiology results for the last 90 days.    Lab Results:  Results for orders placed or performed in visit on 06/07/23   Comprehensive Metabolic Panel    Specimen: Blood   Result  Value Ref Range    Glucose 107 (H) 65 - 99 mg/dL    BUN 18 8 - 23 mg/dL    Creatinine 0.59 0.57 - 1.00 mg/dL    Sodium 138 136 - 145 mmol/L    Potassium 4.5 3.5 - 5.2 mmol/L    Chloride 101 98 - 107 mmol/L    CO2 25.0 22.0 - 29.0 mmol/L    Calcium 9.5 8.6 - 10.5 mg/dL    Total Protein 7.2 6.0 - 8.5 g/dL    Albumin 4.2 3.5 - 5.2 g/dL    ALT (SGPT) 15 1 - 33 U/L    AST (SGOT) 34 (H) 1 - 32 U/L    Alkaline Phosphatase 110 39 - 117 U/L    Total Bilirubin 0.7 0.0 - 1.2 mg/dL    Globulin 3.0 gm/dL    A/G Ratio 1.4 g/dL    BUN/Creatinine Ratio 30.5 (H) 7.0 - 25.0    Anion Gap 12.0 5.0 - 15.0 mmol/L    eGFR 91.2 >60.0 mL/min/1.73   Iron Profile    Specimen: Blood   Result Value Ref Range    Iron 85 37 - 145 mcg/dL    Iron Saturation (TSAT) 20 20 - 50 %    Transferrin 280 200 - 360 mg/dL    TIBC 417 298 - 536 mcg/dL   Ferritin    Specimen: Blood   Result Value Ref Range    Ferritin 106.70 13.00 - 150.00 ng/mL   CEA    Specimen: Blood   Result Value Ref Range    CEA 4.70 ng/mL   Cancer Antigen 27.29    Specimen: Blood   Result Value Ref Range    CA 27.29 34.7 0.0 - 38.6 U/mL   CBC Auto Differential    Specimen: Blood   Result Value Ref Range    WBC 7.60 3.40 - 10.80 10*3/mm3    RBC 4.56 3.77 - 5.28 10*6/mm3    Hemoglobin 14.2 12.0 - 15.9 g/dL    Hematocrit 44.1 34.0 - 46.6 %    MCV 96.7 79.0 - 97.0 fL    MCH 31.1 26.6 - 33.0 pg    MCHC 32.2 31.5 - 35.7 g/dL    RDW 12.5 12.3 - 15.4 %    RDW-SD 44.8 37.0 - 54.0 fl    MPV 10.5 6.0 - 12.0 fL    Platelets 167 140 - 450 10*3/mm3    Neutrophil % 55.1 42.7 - 76.0 %    Lymphocyte % 35.4 19.6 - 45.3 %    Monocyte % 7.6 5.0 - 12.0 %    Eosinophil % 1.3 0.3 - 6.2 %    Basophil % 0.3 0.0 - 1.5 %    Immature Grans % 0.3 0.0 - 0.5 %    Neutrophils, Absolute 4.19 1.70 - 7.00 10*3/mm3    Lymphocytes, Absolute 2.69 0.70 - 3.10 10*3/mm3    Monocytes, Absolute 0.58 0.10 - 0.90 10*3/mm3    Eosinophils, Absolute 0.10 0.00 - 0.40 10*3/mm3    Basophils, Absolute 0.02 0.00 - 0.20 10*3/mm3    Immature  "Grans, Absolute 0.02 0.00 - 0.05 10*3/mm3    nRBC 0.0 0.0 - 0.2 /100 WBC       A1C:No results for input(s): HGBA1C in the last 05314 hours.  GLUCOSE:  Lab Results - Last 18 Months   Lab Units 06/07/23  0810 11/30/22  0818 06/01/22  0756   GLUCOSE mg/dL 107* 129* 120*     LIPID:No results for input(s): CHLPL, LDL, HDL, TRIG in the last 70973 hours.  PSA:No results found for: PSA      CBC:  Lab Results - Last 18 Months   Lab Units 06/07/23  0810 11/30/22  0818 09/01/22  0848 06/01/22  0756   WBC 10*3/mm3 7.60 8.51 6.89 7.65   HEMOGLOBIN g/dL 14.2 13.9 14.2 13.1   HEMATOCRIT % 44.1 43.3 44.2 40.9   PLATELETS 10*3/mm3 167 176 176 175   IRON mcg/dL 85  --  85  --      BMP/CMP:  Lab Results - Last 18 Months   Lab Units 06/07/23  0810 11/30/22  0818 07/14/22  1433 06/01/22  0756   SODIUM mmol/L 138 138  --  139   POTASSIUM mmol/L 4.5 4.3  --  4.1   CHLORIDE mmol/L 101 102  --  103   CO2 mmol/L 25.0 27.0  --  28.0   GLUCOSE mg/dL 107* 129*  --  120*   BUN mg/dL 18 12  --  17   CREATININE mg/dL 0.59 0.62 0.60 0.61   CALCIUM mg/dL 9.5 9.7  --  9.7       HEPATIC:  Lab Results - Last 18 Months   Lab Units 06/07/23  0810 11/30/22  0818 09/01/22  0848 06/01/22  0756   ALT (SGPT) U/L 15 16 17 19   AST (SGOT) U/L 34* 32 33* 35*   ALK PHOS U/L 110 127* 123* 131*     HEPATITIS C ANTIBODY:   Lab Results   Component Value Date/Time    HEPCVIRUSABY Non-Reactive 06/01/2022 07:26 AM    HEPCVIRUSABY 0.3 06/08/2020 07:05 AM     Vit D:No results for input(s): TIAJ50OH in the last 89713 hours.  THYROID:No results for input(s): TSH, FREET4, FTI in the last 91737 hours.    Invalid input(s): FREET3, T3, T4, TEUP,  TOTALT4    Objective   /74   Pulse 60   Temp 96.8 °F (36 °C) (Temporal)   Resp 18   Ht 160 cm (63\")   Wt 85.3 kg (188 lb)   LMP  (LMP Unknown)   SpO2 96%   BMI 33.30 kg/m²   Body mass index is 33.3 kg/m².    Recent Vitals         6/19/2023 6/20/2023 9/12/2023       BP: 138/82 122/78 126/74     Pulse: 42 44 60     Temp: -- " 97.1 °F (36.2 °C) 96.8 °F (36 °C)     Weight: 84.8 kg (187 lb) 85.7 kg (189 lb) 85.3 kg (188 lb)     BMI (Calculated): 33.1 33.5 33.3           Wt Readings from Last 15 Encounters:   09/12/23 0924 85.3 kg (188 lb)   06/20/23 1310 85.7 kg (189 lb)   06/19/23 0906 84.8 kg (187 lb)   06/07/23 0827 83.6 kg (184 lb 6.4 oz)   04/17/23 0902 83 kg (183 lb)   04/10/23 1111 86.2 kg (190 lb 1.6 oz)   12/08/22 1326 87.5 kg (193 lb)   12/07/22 0805 87.6 kg (193 lb 3.2 oz)   12/01/22 1416 87.5 kg (193 lb)   09/06/22 1130 87.1 kg (192 lb)   09/01/22 0755 88 kg (194 lb)   06/23/22 0910 86.2 kg (190 lb)   06/17/22 1024 86.6 kg (191 lb)   06/08/22 0947 86.5 kg (190 lb 9.6 oz)   05/05/22 0901 87.1 kg (192 lb)       Physical Exam  GENERAL:  Well nourished/developed in no acute distress  SKIN: Turgor excellent, without wound, rash, lesion or significance; does have 3 x 3 pink clean based area under abdominal panus.  Also soft 3 x 3 cm flesh toned subdermal area distal R thigh; without tenderness  HEENT: Normal cephalic without trauma. Pupils equal round reactive to light. Extraocular motions full without nystagmus.  External canals nonobstructive nontender without reddness. Tymphatic membranes dull with irwin structures intact.   Oral cavity without growths, exudates, and moist.  Posterior pharynx without mass, obstruction, redness.  No thyromegaly, , tenderness, definite lymphadenopathy and supple  CV: Regular rhythm.  1/6 systolic murmur without gallop and trace ankle/LE edema. Posterior pulses intact.  No carotid bruits.  CHEST: No chest wall tenderness or mass.   LUNGS: Symmetric motion with clear to auscultation.  No dullness to percussion  ABD: Soft, non-tender without mass.   ORTHO: Symmetric extremities without swelling/point tenderness.  Full gross range of motion.  Neg tinel's R.   NEURO: CN 2-12 grossly intact except L eyelid mild droop.  Otherwise symmetric facies and UE/LE. 3/5 strength throughout.   Otherwise nonfocal use  extremities. Speech clear.     Assessment & Plan     1. Primary hypertension    2. Congestive heart failure, unspecified HF chronicity, unspecified heart failure type    3. Valvular heart disease: AS    4. Controlled type 2 diabetes mellitus without complication, without long-term current use of insulin    5. Abnormal LFTs    6. Fatty liver    7. Primary generalized (osteo)arthritis    8. Chronic back pain, unspecified back location, unspecified back pain laterality    9. Neuropathy-chemo/better    10. Chronic obstructive pulmonary disease, unspecified COPD type    11. Edema, unspecified type    12. RUE numbness    13. Mass of right lower extremity    14. Intertrigo    15. Obesity (BMI 30-39.9)    16. Abdominal pannus    17. Overactive bladder    18. Osteopenia, unspecified location    19. Multiple chronic diseases        Data review above:   Discussions/medical decisions/reviews:  BP ok  Other vitals ok  Recent Vitals         6/19/2023 6/20/2023 9/12/2023       BP: 138/82 122/78 126/74     Pulse: 42 44 60     Temp: -- 97.1 °F (36.2 °C) 96.8 °F (36 °C)     Weight: 84.8 kg (187 lb) 85.7 kg (189 lb) 85.3 kg (188 lb)     BMI (Calculated): 33.1 33.5 33.3           DM/ 6.7.23  Lipid LDL due  PSA NA  CBC ok 6.7.23  Iron 85 6.7.23; no iron  Renal ok 6.7.23  Liver AST 34 6.7.23  Vit D due  Thyroid due    Screening reviewed/updated   Vaccines discussed winter covid, flu, RSV  Splint R wrist when driving since likely has carpal tunnel and does not want test/surgery  L shoulder > R; may try to exercise through; ? Injection  Intertrigo near old surgical incision; use nystatin power  Our labs for 12.2023 labs at  lab   CT R distal leg; mass.       Follow up: Return for Dr Tinoco 6m.  Future Appointments   Date Time Provider Department Center   9/25/2023  1:15 PM Adela Barboza APRN [facility]   12/6/2023  8:30 AM [facility] NURSE [facility]   12/18/2023 11:00 AM Conrado Tinoco MD [facility] METR [facility]  "  12/20/2023  9:45 AM Conrado Tinoco MD [facility]   3/6/2024  8:30 AM [facility] NURSE [facility]   3/18/2024  9:00 AM Conrado Tinoco MD [facility]   3/25/2024  8:00 AM PAD US NIVAS CART 1  PAD [facility] PAD   3/25/2024  9:15 AM Henrietta Olvera APRN [facility] PAD   6/5/2024  8:00 AM  [facility]  PAD CCLAB PAD   6/5/2024  8:30 AM Denisha Reyes APRN [facility]   6/18/2024  9:00 AM Dante Mendoza MD [facility] PAD       Data review above:   Rx: reviewed and decisions:   Rx new/changes: new  New Medications Ordered This Visit   Medications    nystatin (MYCOSTATIN) [id number] UNIT/GM powder     Sig: Apply  topically to the appropriate area as directed 3 (Three) Times a Day.     Dispense:  45 g     Refill:  0       Orders placed:   LAB/Testing/Referrals: reviewed/orders:   Today:   Orders Placed This Encounter   Procedures    CT lower extremity right wo contrast    Adult Transthoracic Echo Complete W/ Cont if Necessary Per Protocol     Chronic/recurrent labs above or change to:   Same     Immunization History   Administered Date(s) Administered    COVID-19 (MODERNA) 1st,2nd,3rd Dose Monovalent 02/19/2021, 03/19/2021    Fluad Quad 65+ 10/09/2020    Fluzone High Dose =>65 Years ([employer] ONLY) 11/19/2017, 11/14/2019    Fluzone High-Dose 65+yrs 12/08/2022    Pneumococcal Conjugate 13-Valent (PCV13) 11/19/2017     We advised/reaffirmed our support/suggestion for staying complete with covid- covid boosters, seasonal flu/yearly and any missing vaccine from list we supplied; we suggest contact with local health department office to review missing/needed vaccines and then bring nursing documentation for these vaccines to this office or call this information in. Shingles became \"free\" 1.1.23 for medicare insurance.    Health maintenance:   Body mass index is 33.3 kg/m².         Tobacco use reviewed:   Ira Sanchez  reports that she quit smoking about 22 years ago. Her " "smoking use included cigarettes. She started smoking about 60 years ago. She has a 74.00 pack-year smoking history. She has never used smokeless tobacco..   Patient Instructions   There are several vaccines used for individuals near/over 65 years old.      1. Tetanus.   Like anyone this needs to given every 10 years; sooner for/with lacerations/wounds.   Likely when getting this booster it needs to be a tetanus called Tdap (tetanus mixed with diptheria and pertussis).   Years ago you had this vaccine.  We now know we can lose our immunity to pertussis (a part of this vaccine) and run a risk of catching this.  Now only would this make us ill; but more importantly we can spread this to very young children (and for them it can be a much more dangerous illness).   We call this the grandparent vaccine for this reason.     2. Pneumonia.   This comes now as a one time vaccine for most persons.  Even if you have had these before; we need to review when and your current health situation/s as you may need a booster     3. Shingles.  You do not want to catch shingles.  Though you will recover from this; the pain associated with shingles can be severe.  Even if you have had the now older zostavax, or have had shingles; it is recommended you still get the Shingrix (the new just available early 2018 shingles vaccine).   Medicare began 1.1.23 waving any co-pays for this; it is therefore free.     4. Flu/influenza: Yearly flu vaccine given from September through April each year.     5. RSV: If you are over 60; beginning 10.1.23 you can now take a RSV vaccination.  This would lower your change for catching this winter/\"cold\" virus that can under some circumstances cause significant respiratory symptoms and even require hospitalization.  Being vaccinated also makes it more difficult for you to be contagious and get this to children; particularly children less than 6 months of where this can be a very dangerous illness.      6. COVID: " Since the early onset of COVID illness and a the many COVID vaccines that were initially developed; we will have a winter 2023 (and maybe beyond)  booster particularly for those at higher risk of complications and over age 65.    7. Travel vaccines:  If you are one to do international travel; be sure and ask us for any particular unusual vaccines you may need.     8.  Miscellaneous:  If you have certain health situations/disease you may need specific/particular vaccines not give to the general public.       Because of many restrictions on this office always having all the above vaccines; you may be advised to work with your local health department or various pharmacies to keep up with your individual vaccine needs.  If you are forced to get a vaccine outside this office in order to keep your health record up to date; we request you personally notify us after any vaccines of the type and date.

## 2023-09-12 NOTE — PATIENT INSTRUCTIONS
"There are several vaccines used for individuals near/over 65 years old.      1. Tetanus.   Like anyone this needs to given every 10 years; sooner for/with lacerations/wounds.   Likely when getting this booster it needs to be a tetanus called Tdap (tetanus mixed with diptheria and pertussis).   Years ago you had this vaccine.  We now know we can lose our immunity to pertussis (a part of this vaccine) and run a risk of catching this.  Now only would this make us ill; but more importantly we can spread this to very young children (and for them it can be a much more dangerous illness).   We call this the grandparent vaccine for this reason.     2. Pneumonia.   This comes now as a one time vaccine for most persons.  Even if you have had these before; we need to review when and your current health situation/s as you may need a booster     3. Shingles.  You do not want to catch shingles.  Though you will recover from this; the pain associated with shingles can be severe.  Even if you have had the now older zostavax, or have had shingles; it is recommended you still get the Shingrix (the new just available early 2018 shingles vaccine).   Medicare began 1.1.23 waving any co-pays for this; it is therefore free.     4. Flu/influenza: Yearly flu vaccine given from September through April each year.     5. RSV: If you are over 60; beginning 10.1.23 you can now take a RSV vaccination.  This would lower your change for catching this winter/\"cold\" virus that can under some circumstances cause significant respiratory symptoms and even require hospitalization.  Being vaccinated also makes it more difficult for you to be contagious and get this to children; particularly children less than 6 months of where this can be a very dangerous illness.      6. COVID: Since the early onset of COVID illness and a the many COVID vaccines that were initially developed; we will have a winter 2023 (and maybe beyond)  booster particularly for those " at higher risk of complications and over age 65.    7. Travel vaccines:  If you are one to do international travel; be sure and ask us for any particular unusual vaccines you may need.     8.  Miscellaneous:  If you have certain health situations/disease you may need specific/particular vaccines not give to the general public.       Because of many restrictions on this office always having all the above vaccines; you may be advised to work with your local health department or various pharmacies to keep up with your individual vaccine needs.  If you are forced to get a vaccine outside this office in order to keep your health record up to date; we request you personally notify us after any vaccines of the type and date.

## 2023-10-20 ENCOUNTER — HOSPITAL ENCOUNTER (OUTPATIENT)
Dept: CARDIOLOGY | Facility: HOSPITAL | Age: 78
Discharge: HOME | End: 2023-10-20
Payer: MEDICARE

## 2023-10-20 ENCOUNTER — HOSPITAL ENCOUNTER (OUTPATIENT)
Dept: CT IMAGING | Facility: HOSPITAL | Age: 78
Discharge: HOME | End: 2023-10-20
Payer: MEDICARE

## 2023-10-20 VITALS
SYSTOLIC BLOOD PRESSURE: 140 MMHG | BODY MASS INDEX: 33.31 KG/M2 | HEIGHT: 63 IN | DIASTOLIC BLOOD PRESSURE: 68 MMHG | WEIGHT: 188 LBS

## 2023-10-20 DIAGNOSIS — R22.41 MASS OF RIGHT LOWER EXTREMITY: ICD-10-CM

## 2023-10-20 DIAGNOSIS — I38 VALVULAR HEART DISEASE: ICD-10-CM

## 2023-10-20 LAB
BH CV ECHO MEAS - AO MAX PG: 21.5 MMHG
BH CV ECHO MEAS - AO MEAN PG: 10.5 MMHG
BH CV ECHO MEAS - AO ROOT DIAM: 2.9 CM
BH CV ECHO MEAS - AO V2 MAX: 232 CM/SEC
BH CV ECHO MEAS - AO V2 VTI: 62.1 CM
BH CV ECHO MEAS - AVA(I,D): 2.08 CM2
BH CV ECHO MEAS - EDV(CUBED): 117.6 ML
BH CV ECHO MEAS - EDV(MOD-SP2): 34.2 ML
BH CV ECHO MEAS - EDV(MOD-SP4): 59.8 ML
BH CV ECHO MEAS - EF(MOD-BP): 74.8 %
BH CV ECHO MEAS - EF(MOD-SP2): 64.3 %
BH CV ECHO MEAS - EF(MOD-SP4): 81.4 %
BH CV ECHO MEAS - ESV(CUBED): 9.3 ML
BH CV ECHO MEAS - ESV(MOD-SP2): 12.2 ML
BH CV ECHO MEAS - ESV(MOD-SP4): 11.1 ML
BH CV ECHO MEAS - FS: 57.1 %
BH CV ECHO MEAS - IVS/LVPW: 1.11 CM
BH CV ECHO MEAS - IVSD: 1 CM
BH CV ECHO MEAS - LA DIMENSION: 3.2 CM
BH CV ECHO MEAS - LAT PEAK E' VEL: 7.9 CM/SEC
BH CV ECHO MEAS - LV DIASTOLIC VOL/BSA (35-75): 31.7 CM2
BH CV ECHO MEAS - LV MASS(C)D: 164.3 GRAMS
BH CV ECHO MEAS - LV MAX PG: 7.7 MMHG
BH CV ECHO MEAS - LV MEAN PG: 4 MMHG
BH CV ECHO MEAS - LV SYSTOLIC VOL/BSA (12-30): 5.9 CM2
BH CV ECHO MEAS - LV V1 MAX: 138.5 CM/SEC
BH CV ECHO MEAS - LV V1 VTI: 34 CM
BH CV ECHO MEAS - LVIDD: 4.9 CM
BH CV ECHO MEAS - LVIDS: 2.1 CM
BH CV ECHO MEAS - LVOT AREA: 3.8 CM2
BH CV ECHO MEAS - LVOT DIAM: 2.2 CM
BH CV ECHO MEAS - LVPWD: 0.9 CM
BH CV ECHO MEAS - MED PEAK E' VEL: 6 CM/SEC
BH CV ECHO MEAS - MV A MAX VEL: 123 CM/SEC
BH CV ECHO MEAS - MV DEC TIME: 0.23 SEC
BH CV ECHO MEAS - MV E MAX VEL: 93.5 CM/SEC
BH CV ECHO MEAS - MV E/A: 0.76
BH CV ECHO MEAS - RAP SYSTOLE: 5 MMHG
BH CV ECHO MEAS - RVSP: 28 MMHG
BH CV ECHO MEAS - SI(MOD-SP2): 11.7 ML/M2
BH CV ECHO MEAS - SI(MOD-SP4): 25.9 ML/M2
BH CV ECHO MEAS - SV(LVOT): 129.2 ML
BH CV ECHO MEAS - SV(MOD-SP2): 22 ML
BH CV ECHO MEAS - SV(MOD-SP4): 48.7 ML
BH CV ECHO MEAS - TR MAX PG: 23 MMHG
BH CV ECHO MEAS - TR MAX VEL: 240 CM/SEC
BH CV ECHO MEASUREMENTS AVERAGE E/E' RATIO: 13.45
LEFT ATRIUM VOLUME INDEX: 37 ML/M2
LEFT ATRIUM VOLUME: 69.6 ML

## 2023-10-20 PROCEDURE — 73700 CT LOWER EXTREMITY W/O DYE: CPT

## 2023-10-20 PROCEDURE — 93306 TTE W/DOPPLER COMPLETE: CPT

## 2023-10-20 NOTE — PROGRESS NOTES
Please call result - They didn't see a suspicious mass on the CT - need to let patient know before weekend to alleviate worry.  I'm not sure what the f/u plan is from Dr. Tinoco so I will leave in his basket to review on Monday to see if he has any next step plans for this or if this test satisfied what they are working on.      Electronically signed by REBEL Levy, 10/20/23, 12:51 PM CDT.

## 2023-11-01 NOTE — PATIENT INSTRUCTIONS
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  She did not have any injury.  She does have pain in the morning when she gets up.  She worked as a  for 30 days straight and noticed it made it even worse.  It can be 6 out of 10.  It is worse with increased activity.  She has tried anti-inflammatory medication over-the-counter.  She has not had any other treatment.      The patient does not have a personal or family history of DVT, PE, hypercoagulable state or risk factors for clotting.        Current Outpatient Medications on File Prior to Visit   Medication Sig Dispense Refill    AMBIEN 10 MG tablet Take 0.5 tablets by mouth At Night As Needed.      amLODIPine (NORVASC) 5 MG tablet Take 1 tablet by mouth Daily As Needed.  0    atorvastatin (LIPITOR) 80 MG tablet Take 1 tablet by mouth Every Night.  1    carvedilol (COREG) 6.25 MG tablet Take 1 tablet by mouth Daily.  1    estradiol (MINIVELLE, VIVELLE-DOT) 0.05 MG/24HR patch Place 1 patch on the skin as directed by provider 2 (Two) Times a Week. 8 patch 11    hydrOXYzine pamoate (VISTARIL) 25 MG capsule Take 1 capsule by mouth Every Night.      PARoxetine (PAXIL) 30 MG tablet Take 1 tablet by mouth Daily. 30 tablet 5    Progesterone (PROMETRIUM) 200 MG capsule Take 1 capsule by mouth Every Night. 30 capsule 11    vitamin D (ERGOCALCIFEROL) 1.25 MG (10785 UT) capsule capsule        No current facility-administered medications on file prior to visit.      Allergies   Allergen Reactions    Ceftriaxone Sodium And Nacl Anaphylaxis and Shortness Of Breath     Rocephin    Levofloxacin Anaphylaxis and Shortness Of Breath     Levaquin       Past Medical History:  While on the medrol; dont take over the counter motrin, ibuprofen, alleve, naprosysn and like  Also, dont take the celebrex while on the medrol    You can go back to these once the medrol is over    ########################     "  Diagnosis Date    Hyperlipidemia     Per MD office progress note on 6/10/16    Hypertension     Per MD office progress note on 6/10/16    Overweight (BMI 25.0-29.9)     Per BMI and MD office note 6/10/16     Past Surgical History:   Procedure Laterality Date    GALLBLADDER SURGERY      SHOULDER SURGERY Left     TONSILLECTOMY      WRIST SURGERY Bilateral     CTR     Family History   Problem Relation Age of Onset    No Known Problems Mother     No Known Problems Father       Social History     Socioeconomic History    Marital status:    Tobacco Use    Smoking status: Never    Smokeless tobacco: Never   Substance and Sexual Activity    Alcohol use: No    Drug use: No    Sexual activity: Defer        Review of Systems   Constitutional: Negative.    HENT: Negative.     Eyes: Negative.    Respiratory: Negative.     Cardiovascular: Negative.    Gastrointestinal: Negative.    Endocrine: Negative.    Genitourinary: Negative.    Musculoskeletal:  Positive for arthralgias.   Skin: Negative.    Allergic/Immunologic: Negative.    Neurological: Negative.    Hematological: Negative.    Psychiatric/Behavioral: Negative.         The following portions of the patient's history were reviewed and updated as appropriate: allergies, current medications, past family history, past medical history, past social history, past surgical history, and problem list.    Physical Exam:   Ht 168.9 cm (66.5\")   Wt 89.8 kg (198 lb)   BMI 31.48 kg/m²   GENERAL: Body habitus: overweight    Lower extremity edema: Right: 1+ pitting; Left: 1+ pitting    Varicose veins:  Right: mild; Left: mild    Gait: antalgic with the first few steps     Mental Status:  awake and alert; oriented to person, place, and time    Voice:  clear  SKIN:  Lower extremity: Normal    Hair Growth(lower extremity):  Right:normal; Left:  normal  NAILS: Toenails: normal  HEENT: Head: Normocephalic, atraumatic,  without obvious abnormality.  eye: normal external eye, no " icterus  ears:normal external ears  nose: normal external nose  PULM:  Repiratory effort normal  CV:  Dorsalis Pedis:  Right: 2+; Left:2+    Posterior Tibial: Right:2+; Left:2+    Capillary Refill:  Brisk  MSK:        Tibia:  Right:  tender over subcutaneous border; Left:  tender over subcutaneous border      Ankle:  Right: non tender; Left:   Very tender at the insertion of the Achilles, slight thickening of the retrocalcaneal bursa, the Achilles is intact, normal range of motion      Foot:  Right:  non tender; Left:   Nontender in the midfoot and forefoot      NEURO: Heel Walking:  Right:  normal; Left:  painful    Toe Walking:  Right:  normal; Left:   Able to do this but painful      Mercer-Rai 5.07 monofilament test: normal    Lower extremity sensation: intact         Motor Function: all 5/5         Medical Decision Making    Data Review:   ordered and reviewed x-rays today    Assessment and Plan/ Diagnosis/Treatment options:   1. Achilles tendinitis of left lower extremity  She has insertional Achilles tendinitis retrocalcaneal bursitis.  Insertional Achilles tendonitis, retrocalcaneal bursitis.  I have explained the problem to the patient in detail.  It is generally thought to be an overuse syndrome or due to chronic aging change.  I explained that it is NOT due to a “spur”.  The osteophyte (“spur”) often visible on x-ray is not the source of the problem: Many, many people have the same x-ray finding and do not have Achilles pain.  The problem causing the pain is the chronic tendinitis, inflammation, wear and tear of the tendon where it goes into the bone.  I explained it is very common in overweight people over 40.  I explained that if you do an MRI on everyone with this problem we will see tears and inflammation in the tendon, and likely some edema in the bone, this does not change treatment.    The bump on the heel NOT go away, the goal of the treatment is to have pain resolve.    Literature shows it  is best treated with a 12 week course of physical therapy and night splinting.  I have shown the patient the 2 stretches to do at home (in addition to what PT shows them), and recommend they do them 5 reps, 6-8 times per day.  I explained that injections and orthotics will not help.  I wrote a prescription for physical therapy, and we explained where to obtain a night splint..  She would like to obtain a night splint here.  I also recommend topical Voltaren or Aleve gel.    Follow-up in 4 months if not improved.    If not improved, I explained the next step is a short leg walking cast for 6 weeks.    Surgery is a last resort as it is only helpful in about 60% of the time in improving the pain from this problem.      - Ambulatory Referral to Physical Therapy Evaluate and treat, Ortho    2. Venous stasis  I explained edema and venous stasis to the patient, and the often hereditary nature of the problem, and the contribution of weight, trauma, age, etc. I explained how these factors cause edema (due to gravity, etc) I explained how the edema can lead to ulceration.  I explained how the edema can cause pain, stiffness, aching, cramping, etc. I explained that it is a very very common problem, so common that even [employer] markets compression stockings.  I explained that diuretics cannot correct the problem. I recommend wearing support stockings (compression stockings) daily, we discussed what type and where to find them              Part of this encounter note is an electronic transcription/translation of spoken language to printed text. The electronic translation of spoken language may permit erroneous, or at times, nonsensical words or phrases to be inadvertently transcribed; Although I have reviewed the note for such errors, some may still exist.    NOTE TO PATIENT: The 21st Century Cures Act makes medical notes like these available to patients in the interest of transparency. However, be advised this is a medical  document. It is intended as peer to peer communication. It is written in medical language and may contain abbreviations or verbiage that are unfamiliar. It may appear blunt or direct. Medical documents are intended to carry relevant information, facts as evident, and the clinical opinion of the practitioner.       Abiola Bernabe MD

## 2023-12-06 ENCOUNTER — INFUSION (OUTPATIENT)
Dept: ONCOLOGY | Facility: CLINIC | Age: 78
End: 2023-12-06
Payer: MEDICARE

## 2023-12-06 DIAGNOSIS — Z45.2 ENCOUNTER FOR CARE RELATED TO VASCULAR ACCESS PORT: ICD-10-CM

## 2023-12-06 DIAGNOSIS — Z45.2 ENCOUNTER FOR CARE RELATED TO PORT-A-CATH: Primary | ICD-10-CM

## 2023-12-06 RX ORDER — HEPARIN SODIUM (PORCINE) LOCK FLUSH IV SOLN 100 UNIT/ML 100 UNIT/ML
500 SOLUTION INTRAVENOUS AS NEEDED
Status: DISCONTINUED | OUTPATIENT
Start: 2023-12-06 | End: 2023-12-06 | Stop reason: HOSPADM

## 2023-12-06 RX ORDER — SODIUM CHLORIDE 0.9 % (FLUSH) 0.9 %
10 SYRINGE (ML) INJECTION AS NEEDED
OUTPATIENT
Start: 2023-12-06

## 2023-12-06 RX ORDER — HEPARIN SODIUM (PORCINE) LOCK FLUSH IV SOLN 100 UNIT/ML 100 UNIT/ML
500 SOLUTION INTRAVENOUS AS NEEDED
OUTPATIENT
Start: 2023-12-06

## 2023-12-06 RX ORDER — SODIUM CHLORIDE 0.9 % (FLUSH) 0.9 %
10 SYRINGE (ML) INJECTION AS NEEDED
Status: DISCONTINUED | OUTPATIENT
Start: 2023-12-06 | End: 2023-12-06 | Stop reason: HOSPADM

## 2023-12-06 RX ADMIN — HEPARIN SODIUM (PORCINE) LOCK FLUSH IV SOLN 100 UNIT/ML 500 UNITS: 100 SOLUTION at 08:05

## 2023-12-06 RX ADMIN — Medication 10 ML: at 08:05

## 2023-12-12 DIAGNOSIS — R03.0 ELEVATED BLOOD PRESSURE READING: ICD-10-CM

## 2023-12-12 DIAGNOSIS — R42 DIZZY: ICD-10-CM

## 2023-12-13 RX ORDER — AMLODIPINE BESYLATE 5 MG/1
5 TABLET ORAL DAILY
Qty: 90 TABLET | Refills: 3 | Status: SHIPPED | OUTPATIENT
Start: 2023-12-13

## 2023-12-14 ENCOUNTER — OFFICE VISIT (OUTPATIENT)
Dept: GASTROENTEROLOGY | Facility: CLINIC | Age: 78
End: 2023-12-14
Payer: MEDICARE

## 2023-12-14 VITALS
HEART RATE: 44 BPM | SYSTOLIC BLOOD PRESSURE: 158 MMHG | HEIGHT: 63 IN | TEMPERATURE: 97.3 F | WEIGHT: 173 LBS | BODY MASS INDEX: 30.65 KG/M2 | DIASTOLIC BLOOD PRESSURE: 68 MMHG | OXYGEN SATURATION: 94 %

## 2023-12-14 DIAGNOSIS — D12.6 ADENOMATOUS POLYP OF COLON, UNSPECIFIED PART OF COLON: ICD-10-CM

## 2023-12-14 DIAGNOSIS — R79.89 ABNORMAL LFTS: Primary | ICD-10-CM

## 2023-12-14 DIAGNOSIS — R93.2: ICD-10-CM

## 2023-12-14 PROCEDURE — 99213 OFFICE O/P EST LOW 20 MIN: CPT | Performed by: INTERNAL MEDICINE

## 2023-12-14 PROCEDURE — 3078F DIAST BP <80 MM HG: CPT | Performed by: INTERNAL MEDICINE

## 2023-12-14 PROCEDURE — 1160F RVW MEDS BY RX/DR IN RCRD: CPT | Performed by: INTERNAL MEDICINE

## 2023-12-14 PROCEDURE — 3077F SYST BP >= 140 MM HG: CPT | Performed by: INTERNAL MEDICINE

## 2023-12-14 PROCEDURE — 1159F MED LIST DOCD IN RCRD: CPT | Performed by: INTERNAL MEDICINE

## 2023-12-14 NOTE — PROGRESS NOTES
[facility]    Chief Complaint   Patient presents with    GI Problem     liver       Subjective     HPI    Ira Sanchez is a 78 y.o. female who presents with a chief complaint of fatty liver.    When she was here last year we were evaluating for imaging that suggested she may have chronic liver disease or early cirrhosis.  Her elastography did not suggest any fibrosis.  Labs revealed preserved hepatic function.  We discussed that she probably has some underlying fatty liver disease.  We talked about weight loss.  She also was scheduled for colonoscopy.  She had that performed in February.  A small polyp was removed.  Future colonoscopies are planned as needed.    Today she feels well.  She is exercising.  She states she goes and exercises at least 3 days a week sometimes 5 days a week.  Her weight is down 15 pounds.  She wants to lose a little bit more weight.  She feels well.    I did review her labs from June this past year.  They are relatively unchanged from November last year.  See copy of LFTs below.     Latest Reference Range & Units 11/30/22 08:18 06/07/23 08:10   A/G Ratio g/dL 1.5 1.4   AST (SGOT) 1 - 32 U/L 32 34 (H)   ALT (SGPT) 1 - 33 U/L 16 15   Total Bilirubin 0.0 - 1.2 mg/dL 0.5 0.7   (H): Data is abnormally high  ======== copy of December 1, 2022 HPI==============  HPI     Ira Sanchez is a 77 y.o. female who presents with a chief complaint of elevated LFTs     She was here in September and saw Adela.  See copy of HPI below.  Adela ordered liver serologies which came back all unremarkable other than a positive JESUSITA.  Liver ultrasound with elastography revealed less than F2 fibrosis.  She had undergone prior CTs, MRI and ultrasounds of liver.  Some of the imaging previously had suggested cirrhosis which is the original reason she was referred.  She has no history of cirrhotic liver disease.  She has been overweight for most of her life.  No other risk factors.  She feels well  at this time.  Denies any edema.     She last had a colonoscopy 5 years ago.  She has family history colon cancer involving her father.  He was 64     ============ copy of September 1, 2022 HPI=====================  HISTORY OF PRESENT ILLNESS:      Ira Sanchez is a 77 y.o. female presents with abnormal lft's. I have reviewed labs in [facility] Lft's have been elevated since at least 2015.  She denies history of liver disease. No family history of liver disease.   No history chf. No juandice. No abdominal pain, fever,  or weight loss. No abdominal or ankle swelling. No history of heavy alcohol use.            Recent hepatitis panel negative.         MRI Abdomen With & Without Contrast (07/14/2022 15:18)  IMPRESSION:  1. There is a slightly lobulated contour to the left hepatic lobe with  mild caudate lobe hypertrophy. Findings can be seen with mild fibrosis.  There is no suspicious focal liver mass. No evidence of portal  hypertension.     US Liver (06/16/2022 09:06)  IMPRESSION:   1. Coarsened hepatic echotexture and mildly lobular contour with  prominence of the caudate lobe. Underlying fibrosis/cirrhotic morphology  to be considered.              No change in bowel habits or rectal bleeding.         SCANNED - COLONOSCOPY (12/19/2017 00:00)  She has history adenomatous colon polyp 2014.   Father had colon cancer at age 64.   Brother had colon polyps.        Past Medical History:   Diagnosis Date    Bradycardia     Breast cancer     Colon polyp     COVID-19     Diabetes mellitus     Encounter for care related to Port-a-Cath 04/24/2020    Hx of migraine headaches     Hypertension     Peripheral neuropathy     Urinary incontinence        Past Surgical History:   Procedure Laterality Date    APPENDECTOMY      BREAST SURGERY      CATARACT EXTRACTION Bilateral     COLONOSCOPY  02/21/2023    DILATATION AND CURETTAGE      KNEE SURGERY      MASTECTOMY Bilateral     NIPPLE TATTOO  04/12/2016    PORTACATH PLACEMENT      THROAT  SURGERY           Current Outpatient Medications:     amLODIPine (NORVASC) 5 MG tablet, TAKE 1 TABLET BY MOUTH DAILY, Disp: 90 tablet, Rfl: 3    Calcium Carb-Cholecalciferol (CALCIUM-VITAMIN D) 600-400 MG-UNIT tablet, Take 1 tablet by mouth Daily., Disp: , Rfl:     cycloSPORINE (RESTASIS) 0.05 % ophthalmic emulsion, 1 drop As Needed., Disp: , Rfl:     diphenhydrAMINE (BENADRYL) 25 mg capsule, Take 1 capsule by mouth At Night As Needed for Allergies., Disp: , Rfl:     losartan (COZAAR) 100 MG tablet, TAKE 1 TABLET BY MOUTH DAILY, Disp: 90 tablet, Rfl: 3    nystatin (MYCOSTATIN) 654112 UNIT/GM powder, Apply  topically to the appropriate area as directed 3 (Three) Times a Day., Disp: 45 g, Rfl: 0    oxybutynin XL (DITROPAN-XL) 10 MG 24 hr tablet, TAKE 1 TABLET BY MOUTH DAILY, Disp: 90 tablet, Rfl: 3    thiamine (VITAMINE B-1) 100 MG tablet, Take 1 tablet by mouth Daily., Disp: , Rfl:   No current facility-administered medications for this visit.    Facility-Administered Medications Ordered in Other Visits:     heparin flush (porcine) 100 UNIT/ML injection 500 Units, 500 Units, Intravenous, PRN, Shanda Torres MD, 500 Units at 04/16/18 1504    heparin flush (porcine) 100 UNIT/ML injection 500 Units, 500 Units, Intravenous, PRN, Shanda Torres MD, 500 Units at 06/12/18 1445    heparin injection 500 Units, 500 Units, Intravenous, PRN, Tamela Doherty, APRN, 500 Units at 12/08/21 0948    sodium chloride 0.9 % flush 10 mL, 10 mL, Intravenous, PRN, Shanda Torres MD, 10 mL at 04/16/18 1503    sodium chloride 0.9 % flush 10 mL, 10 mL, Intravenous, PRN, Shanda Torres MD, 10 mL at 06/12/18 1445    sodium chloride 0.9 % flush 10 mL, 10 mL, Intravenous, PRN, Tamela Doherty APRN, 10 mL at 12/08/21 0948    Allergies   Allergen Reactions    Benzonatate Hives and Itching       Social History     Socioeconomic History    Marital status:      Spouse name: Horace     Number of children: 0    Years of education: 12.5   Tobacco Use    Smoking status: Former     Packs/day: 2.00     Years: 37.00     Additional pack years: 0.00     Total pack years: 74.00     Types: Cigarettes     Start date: 1963     Quit date: 2000     Years since quittin.2    Smokeless tobacco: Never   Vaping Use    Vaping Use: Never used   Substance and Sexual Activity    Alcohol use: No    Drug use: No    Sexual activity: Defer       Family History   Problem Relation Age of Onset    Hypertension Mother     Heart failure Mother     Cancer Father         colon    Colon cancer Father     Cancer Maternal Aunt         breast    No Known Problems Maternal Grandmother     No Known Problems Maternal Grandfather     No Known Problems Paternal Grandmother     No Known Problems Paternal Grandfather     Cancer Maternal Aunt         breast    Cancer Maternal Aunt         breast    Cancer Other        Review of Systems  No abdominal pains    Objective     Vitals:    23 1437   BP: 158/68   Pulse: (!) 44   Temp: 97.3 °F (36.3 °C)   SpO2: 94%       Physical Exam  Vitals reviewed.   Constitutional:       Appearance: Normal appearance. She is not ill-appearing or diaphoretic.   Pulmonary:      Effort: Pulmonary effort is normal.   Abdominal:      General: Abdomen is flat. There is no distension.      Palpations: Abdomen is soft. There is no mass.      Tenderness: There is no abdominal tenderness. There is no guarding.      Comments: No hepatomegaly   Neurological:      Mental Status: She is alert.   Psychiatric:         Thought Content: Thought content normal.         Judgment: Judgment normal.               Assessment & Plan   Problem List Items Addressed This Visit          Gastrointestinal Abdominal     Abnormal liver x-ray    Overview      US liver  Result Date: 2022   1.  Coarsened hepatic echotexture and mildly lobular contour with prominence of the caudate lobe. Underlying fibrosis/cirrhotic  morphology to be considered.   This report was finalized on 06/16/2022 14:48 by Dr. Maco Leyva MD.     CT Abdomen Pelvis With Contrast (08/10/2021 08:51)  IMPRESSION:  1. No acute intra-abdominal finding.  2. Colonic diverticula. No evidence of acute diverticulitis.  3. Mildly nodular liver contour suggests chronic liver disease.  4. Small tree-in-bud opacities at the lingula. See separately dictated  CT chest of the same day.  5. Calcified atherosclerosis.     3.22.22 CT abdomen [facility]  Liver is unremarkable     3.19.22 CT chest [facility]  No evidence of pulmonary artery thrombus  R > L lung base nodular infiltrates and bronchial wall thickening; also a focal left upper lobe ground glass infitrate; favor pneumonitis    7.14.22 BH/MRI liver  IMPRESSION:  1. There is a slightly lobulated contour to the left hepatic lobe with  mild caudate lobe hypertrophy. Findings can be seen with mild fibrosis.  There is no suspicious focal liver mass. No evidence of portal  hypertension.  2. Right renal cysts.    Ultrasound liver with elastography September 2022  IMPRESSION:     1. Mild coarsening of the liver echotexture and mildly nodular contour.  Median velocity 1 m/s.  2. Metavir score less than F2.  3. No gallstones or ductal dilatation.         Abnormal LFTs - Primary    Adenomatous polyp of colon    Overview     Diminutive polyp removed February 2023.  Future surveillance colonoscopy is recommended as needed basis.              Clinically she is doing well.  From a GI standpoint I commended her for losing weight and exercise.  I think this is some of the best things she can do to help her overall health and longevity.  I encouraged her to continue this.  This will be beneficial to her liver as well.  She does get routine labs checked with her PCP or oncology.  We will hold on checking repeat labs.  Will have her come back and see us in 1 year.  Sooner if needed.    Continue ongoing management by primary care provider and other  specialists.     Body mass index is 31.14 kg/m².  BMI is elevated but she has been losing weight with diet and exercise.  Encouraged to continue      EMR Dragon/transcription disclaimer:  Much of this encounter note is electronic transcription/translation of spoken language to printed text.  The electronic translation of spoken language may be erroneous, or at times, nonsensical words or phrases may be inadvertently transcribed.  Although I have reviewed the note for such errors, some may still exist.    Modesto Genao MD  16:14 CST  12/14/23

## 2023-12-18 ENCOUNTER — OFFICE VISIT (OUTPATIENT)
Dept: FAMILY MEDICINE CLINIC | Facility: CLINIC | Age: 78
End: 2023-12-18
Payer: MEDICARE

## 2023-12-18 VITALS
WEIGHT: 170.8 LBS | OXYGEN SATURATION: 95 % | HEIGHT: 63 IN | DIASTOLIC BLOOD PRESSURE: 74 MMHG | HEART RATE: 50 BPM | TEMPERATURE: 96.9 F | BODY MASS INDEX: 30.26 KG/M2 | SYSTOLIC BLOOD PRESSURE: 140 MMHG

## 2023-12-18 DIAGNOSIS — R60.9 EDEMA, UNSPECIFIED TYPE: ICD-10-CM

## 2023-12-18 DIAGNOSIS — E55.9 VITAMIN D DEFICIENCY: ICD-10-CM

## 2023-12-18 DIAGNOSIS — M85.80 OSTEOPENIA, UNSPECIFIED LOCATION: ICD-10-CM

## 2023-12-18 DIAGNOSIS — R69 MULTIPLE CHRONIC DISEASES: ICD-10-CM

## 2023-12-18 DIAGNOSIS — J30.9 ALLERGIC RHINITIS, UNSPECIFIED SEASONALITY, UNSPECIFIED TRIGGER: ICD-10-CM

## 2023-12-18 DIAGNOSIS — M15.0 PRIMARY GENERALIZED (OSTEO)ARTHRITIS: ICD-10-CM

## 2023-12-18 DIAGNOSIS — I50.9 CONGESTIVE HEART FAILURE, UNSPECIFIED HF CHRONICITY, UNSPECIFIED HEART FAILURE TYPE: ICD-10-CM

## 2023-12-18 DIAGNOSIS — J44.9 CHRONIC OBSTRUCTIVE PULMONARY DISEASE, UNSPECIFIED COPD TYPE: ICD-10-CM

## 2023-12-18 DIAGNOSIS — E11.9 CONTROLLED TYPE 2 DIABETES MELLITUS WITHOUT COMPLICATION, WITHOUT LONG-TERM CURRENT USE OF INSULIN: Chronic | ICD-10-CM

## 2023-12-18 DIAGNOSIS — I10 PRIMARY HYPERTENSION: Chronic | ICD-10-CM

## 2023-12-18 DIAGNOSIS — K76.0 FATTY LIVER: ICD-10-CM

## 2023-12-18 DIAGNOSIS — Z00.00 WELLNESS EXAMINATION: Primary | ICD-10-CM

## 2023-12-18 NOTE — PATIENT INSTRUCTIONS
"There are several vaccines used for individuals near/over 65 years old.      1. Tetanus.   Like anyone this needs to given every 10 years; sooner for/with lacerations/wounds.   Likely when getting this booster it needs to be a tetanus called Tdap (tetanus mixed with diptheria and pertussis).   Years ago you had this vaccine.  We now know we can lose our immunity to pertussis (a part of this vaccine) and run a risk of catching this.  Now only would this make us ill; but more importantly we can spread this to very young children (and for them it can be a much more dangerous illness).   We call this the grandparent vaccine for this reason.     2. Pneumonia.   This comes now as a one time vaccine for most persons.  Even if you have had these before; we need to review when and your current health situation/s as you may need a booster     3. Shingles.  You do not want to catch shingles.  Though you will recover from this; the pain associated with shingles can be severe.  Even if you have had the now older zostavax, or have had shingles; it is recommended you still get the Shingrix (the new just available early 2018 shingles vaccine).   Medicare began 1.1.23 waving any co-pays for this; it is therefore free.     4. Flu/influenza: Yearly flu vaccine given from September through April each year.  For those over 65 it should be \"high dose\" flu (to complement the possibility the immune system is alittle weaker)    5. RSV: If you are over 60; beginning 10.1.23 you can now take a RSV vaccination.  This would lower your change for catching this winter/\"cold\" virus that can under some circumstances cause significant respiratory symptoms and even require hospitalization.  Being vaccinated also makes it more difficult for you to be contagious and get this to children; particularly children less than 6 months of where this can be a very dangerous illness.      6. COVID: Since the early onset of COVID illness and a the many COVID " vaccines that were initially developed; we will have a winter 2023 (and maybe beyond)  booster particularly for those at higher risk of complications and over age 65.    7. Travel vaccines:  If you are one to do international travel; be sure and ask us for any particular unusual vaccines you may need.     8.  Miscellaneous:  If you have certain health situations/disease you may need specific/particular vaccines not give to the general public.     Your records we have on file:   Immunization History   Administered Date(s) Administered    COVID-19 (MODERNA) 1st,2nd,3rd Dose Monovalent 02/19/2021, 03/19/2021    Fluad Quad 65+ 10/09/2020    Fluzone High Dose =>65 Years ([facility] ONLY) 11/19/2017, 11/14/2019    Fluzone High-Dose 65+yrs 12/08/2022    Pneumococcal Conjugate 13-Valent (PCV13) 11/19/2017       You therefore could need the ones if we listed below:   Winter 2023 shots: latest/updated covid vaccine + 2023 flu/high dose flu (can/should be given same day)                                 2-3 weeks after then get the new RSV vaccine    After this; take a break and then work on getting those other vaccines still needed:   Tdap  Shingles  pneumonia      Because of many restrictions on this office always having all the above vaccines; you may be advised to work with your local health department or various pharmacies to keep up with your individual vaccine needs.  If you are forced to get a vaccine outside this office in order to keep your health record up to date; we request you personally notify us after any vaccines of the type and date.

## 2023-12-18 NOTE — PROGRESS NOTES
The ABCs of the Annual Wellness Visit  Subsequent Medicare Wellness Visit    Subjective    Ira Sanchez is a 78 y.o. female who presents for a Subsequent Medicare Wellness Visit.    The following portions of the patient's history were reviewed and   updated as appropriate: allergies, current medications, past family history, past medical history, past social history, past surgical history, and problem list.    Compared to one year ago, the patient feels her physical   health is the same.    Compared to one year ago, the patient feels her mental   health is the same.    Recent Hospitalizations:  She was not admitted to the hospital during the last year.       Current Medical Providers:  Patient Care Team:  Conrado Tinoco MD as PCP - General  MendozaDante MD as Consulting Physician (Pulmonary Disease)  Adalberto Wallace MD as Consulting Physician (Hematology and Oncology)  Modesto Genao MD as Consulting Physician (Gastroenterology)    Outpatient Medications Prior to Visit   Medication Sig Dispense Refill    amLODIPine (NORVASC) 5 MG tablet TAKE 1 TABLET BY MOUTH DAILY 90 tablet 3    Calcium Carb-Cholecalciferol (CALCIUM-VITAMIN D) 600-400 MG-UNIT tablet Take 1 tablet by mouth Daily.      cycloSPORINE (RESTASIS) 0.05 % ophthalmic emulsion 1 drop As Needed.      diphenhydrAMINE (BENADRYL) 25 mg capsule Take 1 capsule by mouth At Night As Needed for Allergies.      losartan (COZAAR) 100 MG tablet TAKE 1 TABLET BY MOUTH DAILY 90 tablet 3    nystatin (MYCOSTATIN) 666757 UNIT/GM powder Apply  topically to the appropriate area as directed 3 (Three) Times a Day. 45 g 0    oxybutynin XL (DITROPAN-XL) 10 MG 24 hr tablet TAKE 1 TABLET BY MOUTH DAILY 90 tablet 3    thiamine (VITAMINE B-1) 100 MG tablet Take 1 tablet by mouth Daily.       Facility-Administered Medications Prior to Visit   Medication Dose Route Frequency Provider Last Rate Last Admin    heparin flush (porcine) 100 UNIT/ML injection 500  Units  500 Units Intravenous PRN Shanda Torres MD   500 Units at 04/16/18 1504    heparin flush (porcine) 100 UNIT/ML injection 500 Units  500 Units Intravenous PRN Shanda Torres MD   500 Units at 06/12/18 1445    heparin injection 500 Units  500 Units Intravenous PRN Tamela Doherty, APRN   500 Units at 12/08/21 0948    sodium chloride 0.9 % flush 10 mL  10 mL Intravenous PRN Shanda Torres MD   10 mL at 04/16/18 1503    sodium chloride 0.9 % flush 10 mL  10 mL Intravenous PRN Shanda Torres MD   10 mL at 06/12/18 1445    sodium chloride 0.9 % flush 10 mL  10 mL Intravenous PRN Tamela Doherty, APRN   10 mL at 12/08/21 0948       No opioid medication identified on active medication list. I have reviewed chart for other potential  high risk medication/s and harmful drug interactions in the elderly.        Aspirin is not on active medication list.  Aspirin use is not indicated based on review of current medical condition/s. Risk of harm outweighs potential benefits.  .    Patient Active Problem List   Diagnosis    Obesity (BMI 30-39.9)    Breast cancer-L/valente masectomy 2014    Overactive bladder    Diabetes type 2, controlled    Hypertension    Osteopenia 2022- 2 to 5y    Neuropathy-chemo/better    Primary generalized (osteo)arthritis    Malignant neoplasm of lower-outer quadrant of left female breast    Family history of breast cancer    Malignant neoplasm of upper-outer quadrant of female breast    History of bilateral mastectomy    Wellness examination-done    Shoulder pain    Chronic back pain    Laboratory test*    Bradycardia    Cough: copd    Allergic rhinitis    Menopause    History of shingles    COPD (chronic obstructive pulmonary disease)    Abnormal mammogram    After-cataract with vision obscured    Amblyopia    Cellulitis, trunk    Dermatitis of eyelid due to herpes zoster    Dermatochalasis    Hordeolum internum    Other hereditary  "corneal dystrophies    Pseudophakia    Rash    Encounter for care related to Port-a-Cath    Edema: venous-BH/vascular    Congestive heart failure    Valvular heart disease: AS    Chronic neck pain    Abdominal pannus    Encounter for care related to vascular access port    Pneumonia of both lower lobes due to infectious organism    Chronic cough    Personal history of nicotine dependence    Severe obesity (BMI 35.0-35.9 with comorbidity)    Abnormal liver x-ray    Positive JESUSITA (antinuclear antibody)-neg [place] visit    NSAID long-term use    Abnormal LFTs    Adenomatous polyp of colon    Fatty liver    Scarring of lung    Multiple chronic diseases     Advance Care Planning  Advance Directive is on file.  ACP discussion was held with the patient during this visit. Patient has an advance directive in EMR which is still valid.      Objective    Vitals:    23 1036   BP: 140/74   BP Location: Right arm   Patient Position: Sitting   Cuff Size: Large Adult   Pulse: 50   Temp: 96.9 °F (36.1 °C)   TempSrc: Infrared   SpO2: 95%   Weight: 77.5 kg (170 lb 12.8 oz)   Height: 158.8 cm (62.5\")   PainSc: 0-No pain     Estimated body mass index is 30.74 kg/m² as calculated from the following:    Height as of this encounter: 158.8 cm (62.5\").    Weight as of this encounter: 77.5 kg (170 lb 12.8 oz).           Does the patient have evidence of cognitive impairment? No          HEALTH RISK ASSESSMENT    Smoking Status:  Social History     Tobacco Use   Smoking Status Former    Packs/day: 2.00    Years: 37.00    Additional pack years: 0.00    Total pack years: 74.00    Types: Cigarettes    Start date: 1963    Quit date: 2000    Years since quittin.2   Smokeless Tobacco Never     Alcohol Consumption:  Social History     Substance and Sexual Activity   Alcohol Use No     Fall Risk Screen:    STEADI Fall Risk Assessment was completed, and patient is at LOW risk for falls.Assessment completed " on:2023    Depression Screenin/18/2023    10:39 AM   PHQ-2/PHQ-9 Depression Screening   Little Interest or Pleasure in Doing Things 0-->not at all   Feeling Down, Depressed or Hopeless 1-->several days   PHQ-9: Brief Depression Severity Measure Score 1       Health Habits and Functional and Cognitive Screenin/18/2023    10:40 AM   Functional & Cognitive Status   Do you have difficulty preparing food and eating? No   Do you have difficulty bathing yourself, getting dressed or grooming yourself? No   Do you have difficulty using the toilet? No   Do you have difficulty moving around from place to place? No   Do you have trouble with steps or getting out of a bed or a chair? No   Current Diet Well Balanced Diet   Dental Exam Not up to date   Eye Exam Up to date   Exercise (times per week) 3 times per week   Current Exercises Include Weightlifting   Do you need help using the phone?  No   Are you deaf or do you have serious difficulty hearing?  No   Do you need help to go to places out of walking distance? No   Do you need help shopping? No   Do you need help preparing meals?  No   Do you need help with housework?  No   Do you need help with laundry? No   Do you need help taking your medications? No   Do you need help managing money? No   Do you ever drive or ride in a car without wearing a seat belt? No   Have you felt unusual stress, anger or loneliness in the last month? No   Who do you live with? Alone   If you need help, do you have trouble finding someone available to you? No   Do you have difficulty concentrating, remembering or making decisions? No       Age-appropriate Screening Schedule:  Refer to the list below for future screening recommendations based on patient's age, sex and/or medical conditions. Orders for these recommended tests are listed in the plan section. The patient has been provided with a written plan.    Health Maintenance   Topic Date Due    URINE MICROALBUMIN  Never  done    TDAP/TD VACCINES (1 - Tdap) Never done    ZOSTER VACCINE (1 of 2) Never done    Pneumococcal Vaccine 65+ (2 - PPSV23 or PCV20) 01/14/2018    HEMOGLOBIN A1C  06/08/2022    ANNUAL WELLNESS VISIT  01/18/2023    INFLUENZA VACCINE  08/01/2023    COVID-19 Vaccine (3 - 2023-24 season) 09/01/2023    DXA SCAN  01/24/2024    BMI FOLLOWUP  06/20/2024    DIABETIC EYE EXAM  12/11/2024    COLORECTAL CANCER SCREENING  02/22/2028                CMS Preventative Services Quick Reference  Risk Factors Identified During Encounter  Immunizations Discussed/Encouraged: Tdap, Influenza, Prevnar 20 (Pneumococcal 20-valent conjugate), Shingrix, COVID19, and RSV (Respiratory Syncytial Virus)  The above risks/problems have been discussed with the patient.  Pertinent information has been shared with the patient in the After Visit Summary.  An After Visit Summary and PPPS were made available to the patient.    Follow Up:   Next Medicare Wellness visit to be scheduled in 1 year.       Additional E&M Note during same encounter follows:  Patient has multiple medical problems which are significant and separately identifiable that require additional work above and beyond the Medicare Wellness Visit.        E/M encounter  Subjective   Ira Sanchez is a 78 y.o. female presenting with chief complaint of:   Chief Complaint   Patient presents with    Annual Exam     S-MWV     AWV 1.18.22  Last Completed Annual Wellness Visit       This patient has no relevant Health Maintenance data.             History of Present Illness :  Alone.   Here for review of chronic problems that includes HTN and others.  Also yearly medicare wellness exam.    Has multiple chronic problems to consider that might have a bearing on today's issues;  an interval appointment.       Chronic/acute problems reviewed today:   1. Wellness examination-done Chronic ongoing over time screening or advice for general health care/wellness.      2. Allergic rhinitis, unspecified  seasonality, unspecified trigger Chronic/variable especially spring/fall.   On/off eye, sinus, nasal congestion and drainage.  Rx helps.  Aware of options additional medications, testing and not interested.     3. Primary hypertension Chronic/stable. Stable here past/no recent home blood pressures.  No significant chest pain, SOB, LE edema, orthopnea, near syncope, dizziness/light headness.   Recent Vitals         10/20/2023 12/14/2023 12/18/2023       BP: 140/68 158/68 140/74     Pulse: -- 44 50     Temp: -- 97.3 °F (36.3 °C) 96.9 °F (36.1 °C)     Weight: 85.3 kg (188 lb) 78.5 kg (173 lb) 77.5 kg (170 lb 12.8 oz)     BMI (Calculated): 33.3 31.1 30.7              4. Congestive heart failure, unspecified HF chronicity, unspecified heart failure type Chronic/stable.  Denies significant sob, orthopnea, leg edema, weight gain.  Aware of influence diet/salt and watching weight at home.       5. Controlled type 2 diabetes mellitus without complication, without long-term current use of insulin Chronic/stable  No problem/pattern hypoglycemia/hyperglycemia manifest by poly- dypsia, phagia, uria, or sweats, diaphoretic episodes, syncope/near.     6. Fatty liver Chronic/stable.  Aware treatment involves normalizing weight; usually including low fat diet and regular exercise.  Aware condition can go on to cirrhosis and death.  Stable occ liver labs and past imaging.       7. Osteopenia, unspecified location Chronic/stable.  Last bone density/if below.   Has considered Rx.  Ok further bone density screening when due.      8. Primary generalized (osteo)arthritis Chronic/stable.  Various on/off joint pains/soreness/stiffness.  Particular joint problems with knees.  No joint swelling.  Treats mainly with reduced activity, Rx listed, Tylenol.  No  NSAIDs, and no request/recent injections.      9. Chronic obstructive pulmonary disease, unspecified COPD type my COPD    10. Multiple chronic diseases Has multiple chronic problems with  increased risks for management (involves polypharmacy, multiple providers, many required labs/imaging/ to manage)     11. Edema, unspecified type chronic/variable.  Recently better.  Causes include: venous insuffieincy.        Has an/another acute issue today: none.    The following portions of the patient's history were reviewed and updated as appropriate: allergies, current medications, past family history, past medical history, past social history, past surgical history, and problem list.      Current Outpatient Medications:     amLODIPine (NORVASC) 5 MG tablet, TAKE 1 TABLET BY MOUTH DAILY, Disp: 90 tablet, Rfl: 3    Calcium Carb-Cholecalciferol (CALCIUM-VITAMIN D) 600-400 MG-UNIT tablet, Take 1 tablet by mouth Daily., Disp: , Rfl:     cycloSPORINE (RESTASIS) 0.05 % ophthalmic emulsion, 1 drop As Needed., Disp: , Rfl:     diphenhydrAMINE (BENADRYL) 25 mg capsule, Take 1 capsule by mouth At Night As Needed for Allergies., Disp: , Rfl:     losartan (COZAAR) 100 MG tablet, TAKE 1 TABLET BY MOUTH DAILY, Disp: 90 tablet, Rfl: 3    nystatin (MYCOSTATIN) 045438 UNIT/GM powder, Apply  topically to the appropriate area as directed 3 (Three) Times a Day., Disp: 45 g, Rfl: 0    oxybutynin XL (DITROPAN-XL) 10 MG 24 hr tablet, TAKE 1 TABLET BY MOUTH DAILY, Disp: 90 tablet, Rfl: 3    thiamine (VITAMINE B-1) 100 MG tablet, Take 1 tablet by mouth Daily., Disp: , Rfl:   No current facility-administered medications for this visit.    Facility-Administered Medications Ordered in Other Visits:     heparin flush (porcine) 100 UNIT/ML injection 500 Units, 500 Units, Intravenous, PRN, Shanda Torres MD, 500 Units at 04/16/18 1504    heparin flush (porcine) 100 UNIT/ML injection 500 Units, 500 Units, Intravenous, PRN, Shanda Torres MD, 500 Units at 06/12/18 1445    heparin injection 500 Units, 500 Units, Intravenous, PRN, Tamela Doherty APRN, 500 Units at 12/08/21 0948    sodium chloride 0.9 % flush  10 mL, 10 mL, Intravenous, PRN, Shanda Torres MD, 10 mL at 04/16/18 1503    sodium chloride 0.9 % flush 10 mL, 10 mL, Intravenous, PRN, Shanda Torres MD, 10 mL at 06/12/18 1445    sodium chloride 0.9 % flush 10 mL, 10 mL, Intravenous, PRN, Tamela Doherty, APRN, 10 mL at 12/08/21 0948    No problems with medications.    Allergies   Allergen Reactions    Benzonatate Hives and Itching       Review of Systems  GENERAL:  Active/slower with limits, speed, stamina for age, and occ back pain.  Sleep is ok. No fever now/recent.  SKIN: No other rash/skin lesion of concern:   ENDO:  No syncope, near or diaphoretic sweaty spells.  No download..  HEENT: No recent head injury; stable occ headache.  No vision change.  No chronic hearing loss.  Ears without fullness without pain/drainage.  No sore throat now.  No significant nasal/sinus congestion/drainage. No epistaxis.   CHEST: No chest wall tenderness or mass.  No significant cough, and no wheeze.  No SOB; no hemoptysis.  CV: No chest pain, palpitations; daily end of day mild pitting/ankle edema.  GI: No heartburn, dysphagia.  No abdominal pain, diarrhea, constipation.  No rectal bleeding, or melena.    :  Voids with occ on/off dysuria, without incontinence to completion.  ORTHO: No painful/swollen joints but various on /off sore.  Occ same/sore neck or back.  No acute neck or lower back pain without recent injury.  NEURO: No dizziness, weakness of extremities.  Same mild numbness/paresthesias LE.  PSYCH: No memory loss.  Mood good; mild anxious, depressed but/and not suicidal.  Tries to tolerate stress .   Screening:  Mammogram: bilateral masectomy  Bone density:   Bone d-deca/BH/hip -1.6, LS +1.3/1.24.22-watching  Bone d-deca/[facility]/T hip -0.5 LS +1.4/12.18.18;  no change; maybe 2-5 yr  Low dose CT chest:Tobacco-smoker/age 17/1-2ppd/dc age 60:  CTs with breast cancer:   GI:   Colon-adelam-[facility]/[place]/[facility]/12.19.17/5y  Colon-p, [facility],  div/[place]/[facility]/2.22.23/prn    Copy/paste function used for ROS/exam AND each area of these were reviewed, updated, confirmed and supplemented as needed.  Data reviewed:   Recent admit/ER/MD visits: 9.12.23    1. Primary hypertension    2. Congestive heart failure, unspecified HF chronicity, unspecified heart failure type    3. Valvular heart disease: AS    4. Controlled type 2 diabetes mellitus without complication, without long-term current use of insulin    5. Abnormal LFTs    6. Fatty liver    7. Primary generalized (osteo)arthritis    8. Chronic back pain, unspecified back location, unspecified back pain laterality    9. Neuropathy-chemo/better    10. Chronic obstructive pulmonary disease, unspecified COPD type    11. Edema, unspecified type    12. RUE numbness    13. Mass of right lower extremity    14. Intertrigo    15. Obesity (BMI 30-39.9)    16. Abdominal pannus    17. Overactive bladder    18. Osteopenia, unspecified location    19. Multiple chronic diseases          Data review above:   Discussions/medical decisions/reviews:  BP ok  Other vitals ok  Recent Vitals           6/19/2023 6/20/2023 9/12/2023          BP: 138/82 122/78 126/74       Pulse: 42 44 60       Temp: -- 97.1 °F (36.2 °C) 96.8 °F (36 °C)       Weight: 84.8 kg (187 lb) 85.7 kg (189 lb) 85.3 kg (188 lb)       BMI (Calculated): 33.1 33.5 33.3               DM/ 6.7.23  Lipid LDL due  PSA NA  CBC ok 6.7.23  Iron 85 6.7.23; no iron  Renal ok 6.7.23  Liver AST 34 6.7.23  Vit D due  Thyroid due     Screening reviewed/updated   Vaccines discussed winter covid, flu, RSV  Splint R wrist when driving since likely has carpal tunnel and does not want test/surgery  L shoulder > R; may try to exercise through; ? Injection  Intertrigo near old surgical incision; use nystatin power  Our labs for 12.2023 labs at  lab   CT R distal leg; mass.      Last cardiac testing:   Echo: Results for orders placed during the hospital encounter of  10/20/23    Adult Transthoracic Echo Complete W/ Cont if Necessary Per Protocol    Interpretation Summary    Left ventricular systolic function is normal. Left ventricular ejection fraction appears to be 66 - 70%.    Left ventricular diastolic function is consistent with (grade I) impaired relaxation.    The left atrial cavity is mildly dilated.    Normal right ventricular cavity size and systolic function noted.    Mild aortic valve stenosis is present.    Estimated right ventricular systolic pressure from tricuspid regurgitation is normal (<35 mmHg).      Radiology considered:   CT Lower Extremity Right Without Contrast    Addendum Date: 11/1/2023    ADDENDUM:  CT LOWER EXTREMITY RIGHT WO CONTRAST-  10/20/2023 11:33 AM CDT  Additional technique information:  In order to have a CT radiation dose as low as reasonably achievable Automated Exposure Control was utilized for adjustment of the mA and/or KV according to patient size.  DLP in mGycm= 400.  This report was signed and finalized on 11/1/2023 9:44 AM CDT by Dr. Venkata Torres MD.      Result Date: 11/1/2023  1. Slight prominence of subcutaneous fatty tissue at the palpable area of interest. No soft tissue mass or fluid collection. 2. Likely an incidental lipoma though a discrete encapsulated fatty mass is not seen.  This report was signed and finalized on 10/20/2023 12:08 PM CDT by Dr. Venkata Torres MD.         Lab Results:  Results for orders placed or performed during the hospital encounter of 10/20/23   Adult Transthoracic Echo Complete W/ Cont if Necessary Per Protocol   Result Value Ref Range    EF(MOD-bp) 74.8 %    LVIDd 4.9 cm    LVIDs 2.10 cm    IVSd 1.00 cm    LVPWd 0.90 cm    FS 57.1 %    IVS/LVPW 1.11 cm    ESV(cubed) 9.3 ml    LV Sys Vol (BSA corrected) 5.9 cm2    EDV(cubed) 117.6 ml    LV Wren Vol (BSA corrected) 31.7 cm2    LV mass(C)d 164.3 grams    LVOT area 3.8 cm2    LVOT diam 2.20 cm    EDV(MOD-sp2) 34.2 ml    EDV(MOD-sp4) 59.8 ml     "ESV(MOD-sp2) 12.2 ml    ESV(MOD-sp4) 11.1 ml    SV(MOD-sp2) 22.0 ml    SV(MOD-sp4) 48.7 ml    SI(MOD-sp2) 11.7 ml/m2    SI(MOD-sp4) 25.9 ml/m2    EF(MOD-sp2) 64.3 %    EF(MOD-sp4) 81.4 %    MV E max marlon 93.5 cm/sec    MV A max marlon 123.0 cm/sec    MV dec time 0.23 sec    MV E/A 0.76     LA ESV Index (BP) 37.0 ml/m2    Med Peak E' Marlon 6.0 cm/sec    Lat Peak E' Marlon 7.9 cm/sec    Avg E/e' ratio 13.45     SV(LVOT) 129.2 ml    LA dimension (2D)  3.2 cm    LV V1 max 138.5 cm/sec    LV V1 max PG 7.7 mmHg    LV V1 mean PG 4.0 mmHg    LV V1 VTI 34.0 cm    Ao pk marlon 232.0 cm/sec    Ao max PG 21.5 mmHg    Ao mean PG 10.5 mmHg    Ao V2 VTI 62.1 cm    SUNNI(I,D) 2.08 cm2    TR max marlon 240.0 cm/sec    TR max PG 23.0 mmHg    RVSP(TR) 28.0 mmHg    RAP systole 5.0 mmHg    Ao root diam 2.9 cm    LA ESV (BP) 69.6 ml       A1C:No results for input(s): \"HGBA1C\" in the last 04420 hours.  GLUCOSE:  Lab Results - Last 18 Months   Lab Units 06/07/23  0810 11/30/22  0818   GLUCOSE mg/dL 107* 129*     LIPID:No results for input(s): \"CHLPL\", \"LDL\", \"HDL\", \"TRIG\" in the last 31899 hours.  PSA:No results found for: \"PSA\"      CBC:  Lab Results - Last 18 Months   Lab Units 06/07/23  0810 11/30/22  0818 09/01/22  0848   WBC 10*3/mm3 7.60 8.51 6.89   HEMOGLOBIN g/dL 14.2 13.9 14.2   HEMATOCRIT % 44.1 43.3 44.2   PLATELETS 10*3/mm3 167 176 176   IRON mcg/dL 85  --  85     BMP/CMP:  Lab Results - Last 18 Months   Lab Units 06/07/23  0810 11/30/22  0818 07/14/22  1433   SODIUM mmol/L 138 138  --    POTASSIUM mmol/L 4.5 4.3  --    CHLORIDE mmol/L 101 102  --    CO2 mmol/L 25.0 27.0  --    GLUCOSE mg/dL 107* 129*  --    BUN mg/dL 18 12  --    CREATININE mg/dL 0.59 0.62 0.60   CALCIUM mg/dL 9.5 9.7  --        HEPATIC:  Lab Results - Last 18 Months   Lab Units 06/07/23  0810 11/30/22  0818 09/01/22  0848   ALT (SGPT) U/L 15 16 17   AST (SGOT) U/L 34* 32 33*   ALK PHOS U/L 110 127* 123*     HEPATITIS C ANTIBODY:   Lab Results   Component Value Date/Time    " "HEPCVIRUSABY Non-Reactive 06/01/2022 07:26 AM    HEPCVIRUSABY 0.3 06/08/2020 07:05 AM     Vit D:No results for input(s): \"XPYB96YL\" in the last 87932 hours.  THYROID:No results for input(s): \"TSH\", \"FREET4\", \"FTI\" in the last 97755 hours.    Invalid input(s): \"FREET3\", \"T3\", \"T4\", \"TEUP\", \"TOTALT4\"    Objective   /74 (BP Location: Right arm, Patient Position: Sitting, Cuff Size: Large Adult)   Pulse 50   Temp 96.9 °F (36.1 °C) (Infrared)   Ht 158.8 cm (62.5\")   Wt 77.5 kg (170 lb 12.8 oz)   LMP  (LMP Unknown)   SpO2 95%   BMI 30.74 kg/m²       Recent Vitals         10/20/2023 12/14/2023 12/18/2023       BP: 140/68 158/68 140/74     Pulse: -- 44 50     Temp: -- 97.3 °F (36.3 °C) 96.9 °F (36.1 °C)     Weight: 85.3 kg (188 lb) 78.5 kg (173 lb) 77.5 kg (170 lb 12.8 oz)     BMI (Calculated): 33.3 31.1 30.7           Wt Readings from Last 15 Encounters:   12/18/23 1036 77.5 kg (170 lb 12.8 oz)   12/14/23 1437 78.5 kg (173 lb)   10/20/23 1316 85.3 kg (188 lb)   09/12/23 0924 85.3 kg (188 lb)   06/20/23 1310 85.7 kg (189 lb)   06/19/23 0906 84.8 kg (187 lb)   06/07/23 0827 83.6 kg (184 lb 6.4 oz)   04/17/23 0902 83 kg (183 lb)   04/10/23 1111 86.2 kg (190 lb 1.6 oz)   12/08/22 1326 87.5 kg (193 lb)   12/07/22 0805 87.6 kg (193 lb 3.2 oz)   12/01/22 1416 87.5 kg (193 lb)   09/06/22 1130 87.1 kg (192 lb)   09/01/22 0755 88 kg (194 lb)   06/23/22 0910 86.2 kg (190 lb)       Physical Exam  GENERAL:  Well nourished/developed in no acute distress  SKIN: Turgor excellent, without wound, rash, lesion or significance  HEENT: Normal cephalic without trauma. Pupils equal round reactive to light. Extraocular motions full without nystagmus.  External canals nonobstructive nontender without reddness. Tymphatic membranes dull with irwin structures intact.   Oral cavity without growths, exudates, and moist.  Posterior pharynx without mass, obstruction, redness.  No thyromegaly, , tenderness, definite lymphadenopathy and " supple; 2-3 cm raised/flesh toned soft movable nodule R posterior trapezius/mid neck. (suspect lipoma)   CV: Regular rhythm.  1/6 systolic murmur without gallop and trace ankle/LE edema. Posterior pulses intact.  No carotid bruits.  CHEST: No chest wall tenderness or mass.   LUNGS: Symmetric motion with clear to auscultation.  No dullness to percussion  ABD: Soft, non-tender without mass.   ORTHO: Symmetric extremities without swelling/point tenderness.  Full gross range of motion.  NEURO: CN 2-12 grossly intact except L eyelid mild droop.  Otherwise symmetric facies and UE/LE. 3/5 strength throughout.   Otherwise nonfocal use extremities. Speech clear.        Assessment & Plan     Assessment & Plan     1. Wellness examination-done    2. Allergic rhinitis, unspecified seasonality, unspecified trigger    3. Primary hypertension    4. Congestive heart failure, unspecified HF chronicity, unspecified heart failure type    5. Controlled type 2 diabetes mellitus without complication, without long-term current use of insulin    6. Fatty liver    7. Osteopenia, unspecified location    8. Primary generalized (osteo)arthritis    9. Chronic obstructive pulmonary disease, unspecified COPD type    10. Multiple chronic diseases    11. Edema, unspecified type    12. Vitamin D deficiency        Issues that are new, uncontrolled, or required review HPI/ROS/exam and decisions beyond wellness today: (ie: requiring the service of a physician and/or not likely to resolve independently without clinical intervention.)   none    Discussions/medical decisions/reviews:  BP ok  Other vitals ok  Recent Vitals         10/20/2023 12/14/2023 12/18/2023       BP: 140/68 158/68 140/74     Pulse: -- 44 50     Temp: -- 97.3 °F (36.3 °C) 96.9 °F (36.1 °C)     Weight: 85.3 kg (188 lb) 78.5 kg (173 lb) 77.5 kg (170 lb 12.8 oz)     BMI (Calculated): 33.3 31.1 30.7           DM/ 6.7.23  Lipid LDL due  PSA NA  CBC ok 6.7.23  Iron 85 6.7.23;  none  Renal ok 6.7.23  Liver AST 34 6.7.23  Vit D due  Thyroid due    Wellness/or annual done   Screening reviewed/updated   Vaccines discussed (see below)     Follow up: Return for fasting lab AM; move visit Wednesday back 3m.  Future Appointments   Date Time Provider Department Center   3/6/2024  8:30 AM [facility] NURSE [facility]   3/18/2024  9:00 AM Conrado Tinoco MD [facility]   3/25/2024  8:00 AM PAD US [facility] CART 1  PAD [facility] PAD   3/25/2024  9:15 AM Henrietta Olvera APRN [facility] PAD PAD   6/5/2024  8:00 AM [facility]  PAD [facility] PAD   6/5/2024  8:30 AM Denisha Reyes APRN [facility] PAD PAD   6/18/2024  9:00 AM Dante Mendoza MD [facility] RD PAD PAD       Data review above:   Rx: reviewed and decisions:   Rx new/changes: none  No orders of the defined types were placed in this encounter.      Orders placed:   LAB/Testing/Referrals: reviewed/orders:   Today:   Orders Placed This Encounter   Procedures    Comprehensive Metabolic Panel    Lipid Panel    Hemoglobin A1c    TSH Rfx On Abnormal To Free T4    Microalbumin / Creatinine Urine Ratio - Urine, Clean Catch    Vitamin D,25-Hydroxy    CBC & Differential     Chronic/recurrent labs above or change to:   Same     Immunization History   Administered Date(s) Administered    COVID-19 (MODERNA) 1st,2nd,3rd Dose Monovalent 02/19/2021, 03/19/2021    Fluad Quad 65+ 10/09/2020    Fluzone High Dose =>65 Years ([employer] ONLY) 11/19/2017, 11/14/2019    Fluzone High-Dose 65+yrs 12/08/2022    Pneumococcal Conjugate 13-Valent (PCV13) 11/19/2017     We advised/reaffirmed our support/suggestion for staying complete with covid- covid boosters, seasonal flu/yearly and any missing vaccine from list we supplied; when cannot be given here we suggest contact with local health department office or pharmacy to review missing/needed vaccines and then bring nursing documentation for these vaccines to this office or call this information in. Shingles  "became \"free\" 1.1.23 for medicare insurance.  Health maintenance:     Tobacco use reviewed:   Ira Sanchez  reports that she quit smoking about 23 years ago. Her smoking use included cigarettes. She started smoking about 60 years ago. She has a 74.00 pack-year smoking history. She has never used smokeless tobacco..   Annual/wellness also done today.  Issues as appropriate discussed as counseling, anticipatory guidance:   Nutrition, physical activity, healthy weight, injury prevention, misuse of tobacco, alcohol and drugs, sexual behavior and STDs, contraception, dental health, mental health, immunizations, screenings as appropriate. As appropriate see AVS.         Assessment and Plan   Diagnoses and all orders for this visit:    1. Wellness examination-done (Primary)    2. Allergic rhinitis, unspecified seasonality, unspecified trigger  -     CBC & Differential; Future  -     Comprehensive Metabolic Panel; Future  -     Lipid Panel; Future  -     Hemoglobin A1c; Future  -     TSH Rfx On Abnormal To Free T4; Future  -     Microalbumin / Creatinine Urine Ratio - Urine, Clean Catch; Future    3. Primary hypertension  -     CBC & Differential; Future  -     Comprehensive Metabolic Panel; Future  -     Lipid Panel; Future  -     Hemoglobin A1c; Future  -     TSH Rfx On Abnormal To Free T4; Future  -     Microalbumin / Creatinine Urine Ratio - Urine, Clean Catch; Future    4. Congestive heart failure, unspecified HF chronicity, unspecified heart failure type  -     CBC & Differential; Future  -     Comprehensive Metabolic Panel; Future  -     Lipid Panel; Future  -     Hemoglobin A1c; Future  -     TSH Rfx On Abnormal To Free T4; Future  -     Microalbumin / Creatinine Urine Ratio - Urine, Clean Catch; Future    5. Controlled type 2 diabetes mellitus without complication, without long-term current use of insulin  -     CBC & Differential; Future  -     Comprehensive Metabolic Panel; Future  -     Lipid Panel; Future  -  "    Hemoglobin A1c; Future  -     TSH Rfx On Abnormal To Free T4; Future  -     Microalbumin / Creatinine Urine Ratio - Urine, Clean Catch; Future    6. Fatty liver  -     CBC & Differential; Future  -     Comprehensive Metabolic Panel; Future  -     Lipid Panel; Future  -     Hemoglobin A1c; Future  -     TSH Rfx On Abnormal To Free T4; Future  -     Microalbumin / Creatinine Urine Ratio - Urine, Clean Catch; Future    7. Osteopenia, unspecified location    8. Primary generalized (osteo)arthritis  -     CBC & Differential; Future  -     Comprehensive Metabolic Panel; Future  -     Lipid Panel; Future  -     Hemoglobin A1c; Future  -     TSH Rfx On Abnormal To Free T4; Future  -     Microalbumin / Creatinine Urine Ratio - Urine, Clean Catch; Future    9. Chronic obstructive pulmonary disease, unspecified COPD type    10. Multiple chronic diseases  -     CBC & Differential; Future  -     Comprehensive Metabolic Panel; Future  -     Lipid Panel; Future  -     Hemoglobin A1c; Future  -     TSH Rfx On Abnormal To Free T4; Future  -     Microalbumin / Creatinine Urine Ratio - Urine, Clean Catch; Future    11. Edema, unspecified type    12. Vitamin D deficiency  -     Vitamin D,25-Hydroxy; Future           Patient Instructions   There are several vaccines used for individuals near/over 65 years old.      1. Tetanus.   Like anyone this needs to given every 10 years; sooner for/with lacerations/wounds.   Likely when getting this booster it needs to be a tetanus called Tdap (tetanus mixed with diptheria and pertussis).   Years ago you had this vaccine.  We now know we can lose our immunity to pertussis (a part of this vaccine) and run a risk of catching this.  Now only would this make us ill; but more importantly we can spread this to very young children (and for them it can be a much more dangerous illness).   We call this the grandparent vaccine for this reason.     2. Pneumonia.   This comes now as a one time vaccine for  "most persons.  Even if you have had these before; we need to review when and your current health situation/s as you may need a booster     3. Shingles.  You do not want to catch shingles.  Though you will recover from this; the pain associated with shingles can be severe.  Even if you have had the now older zostavax, or have had shingles; it is recommended you still get the Shingrix (the new just available early 2018 shingles vaccine).   Medicare began 1.1.23 waving any co-pays for this; it is therefore free.     4. Flu/influenza: Yearly flu vaccine given from September through April each year.  For those over 65 it should be \"high dose\" flu (to complement the possibility the immune system is alittle weaker)    5. RSV: If you are over 60; beginning 10.1.23 you can now take a RSV vaccination.  This would lower your change for catching this winter/\"cold\" virus that can under some circumstances cause significant respiratory symptoms and even require hospitalization.  Being vaccinated also makes it more difficult for you to be contagious and get this to children; particularly children less than 6 months of where this can be a very dangerous illness.      6. COVID: Since the early onset of COVID illness and a the many COVID vaccines that were initially developed; we will have a winter 2023 (and maybe beyond)  booster particularly for those at higher risk of complications and over age 65.    7. Travel vaccines:  If you are one to do international travel; be sure and ask us for any particular unusual vaccines you may need.     8.  Miscellaneous:  If you have certain health situations/disease you may need specific/particular vaccines not give to the general public.     Your records we have on file:   Immunization History   Administered Date(s) Administered    COVID-19 (MODERNA) 1st,2nd,3rd Dose Monovalent 02/19/2021, 03/19/2021    Fluad Quad 65+ 10/09/2020    Fluzone High Dose =>65 Years ([employer] ONLY) 11/19/2017, " 11/14/2019    Fluzone High-Dose 65+yrs 12/08/2022    Pneumococcal Conjugate 13-Valent (PCV13) 11/19/2017       You therefore could need the ones if we listed below:   Winter 2023 shots: latest/updated covid vaccine + 2023 flu/high dose flu (can/should be given same day)                                 2-3 weeks after then get the new RSV vaccine    After this; take a break and then work on getting those other vaccines still needed:   Tdap  Shingles  pneumonia      Because of many restrictions on this office always having all the above vaccines; you may be advised to work with your local health department or various pharmacies to keep up with your individual vaccine needs.  If you are forced to get a vaccine outside this office in order to keep your health record up to date; we request you personally notify us after any vaccines of the type and date.

## 2023-12-19 DIAGNOSIS — K76.0 FATTY LIVER: ICD-10-CM

## 2023-12-19 DIAGNOSIS — G62.9 NEUROPATHY: ICD-10-CM

## 2023-12-19 DIAGNOSIS — E11.9 CONTROLLED TYPE 2 DIABETES MELLITUS WITHOUT COMPLICATION, WITHOUT LONG-TERM CURRENT USE OF INSULIN: Chronic | ICD-10-CM

## 2023-12-19 DIAGNOSIS — E55.9 VITAMIN D DEFICIENCY: ICD-10-CM

## 2023-12-19 DIAGNOSIS — R69 MULTIPLE CHRONIC DISEASES: ICD-10-CM

## 2023-12-19 DIAGNOSIS — M15.0 PRIMARY GENERALIZED (OSTEO)ARTHRITIS: ICD-10-CM

## 2023-12-19 DIAGNOSIS — I10 PRIMARY HYPERTENSION: Chronic | ICD-10-CM

## 2023-12-19 DIAGNOSIS — I50.9 CONGESTIVE HEART FAILURE, UNSPECIFIED HF CHRONICITY, UNSPECIFIED HEART FAILURE TYPE: ICD-10-CM

## 2023-12-19 DIAGNOSIS — J30.9 ALLERGIC RHINITIS, UNSPECIFIED SEASONALITY, UNSPECIFIED TRIGGER: ICD-10-CM

## 2023-12-20 LAB
25(OH)D3+25(OH)D2 SERPL-MCNC: 45.8 NG/ML (ref 30–100)
ALBUMIN SERPL-MCNC: 4.8 G/DL (ref 3.5–5.2)
ALBUMIN/GLOB SERPL: 1.9 G/DL
ALP SERPL-CCNC: 112 U/L (ref 39–117)
ALT SERPL-CCNC: 11 U/L (ref 1–33)
AST SERPL-CCNC: 26 U/L (ref 1–32)
BASOPHILS # BLD AUTO: 0.01 10*3/MM3 (ref 0–0.2)
BASOPHILS NFR BLD AUTO: 0.2 % (ref 0–1.5)
BILIRUB SERPL-MCNC: 0.7 MG/DL (ref 0–1.2)
BUN SERPL-MCNC: 12 MG/DL (ref 8–23)
BUN/CREAT SERPL: 15 (ref 7–25)
CALCIUM SERPL-MCNC: 9.4 MG/DL (ref 8.6–10.5)
CHLORIDE SERPL-SCNC: 100 MMOL/L (ref 98–107)
CHOLEST SERPL-MCNC: 151 MG/DL (ref 0–200)
CO2 SERPL-SCNC: 24.5 MMOL/L (ref 22–29)
CREAT SERPL-MCNC: 0.8 MG/DL (ref 0.57–1)
EGFRCR SERPLBLD CKD-EPI 2021: 74.6 ML/MIN/1.73
EOSINOPHIL # BLD AUTO: 0.08 10*3/MM3 (ref 0–0.4)
EOSINOPHIL NFR BLD AUTO: 1.4 % (ref 0.3–6.2)
ERYTHROCYTE [DISTWIDTH] IN BLOOD BY AUTOMATED COUNT: 11.5 % (ref 12.3–15.4)
GLOBULIN SER CALC-MCNC: 2.5 GM/DL
GLUCOSE SERPL-MCNC: 103 MG/DL (ref 65–99)
HBA1C MFR BLD: 5.5 % (ref 4.8–5.6)
HCT VFR BLD AUTO: 46.1 % (ref 34–46.6)
HDLC SERPL-MCNC: 45 MG/DL (ref 40–60)
HGB BLD-MCNC: 15 G/DL (ref 12–15.9)
IMM GRANULOCYTES # BLD AUTO: 0.01 10*3/MM3 (ref 0–0.05)
IMM GRANULOCYTES NFR BLD AUTO: 0.2 % (ref 0–0.5)
LDLC SERPL CALC-MCNC: 89 MG/DL (ref 0–100)
LYMPHOCYTES # BLD AUTO: 1.81 10*3/MM3 (ref 0.7–3.1)
LYMPHOCYTES NFR BLD AUTO: 32.8 % (ref 19.6–45.3)
MCH RBC QN AUTO: 30.2 PG (ref 26.6–33)
MCHC RBC AUTO-ENTMCNC: 32.5 G/DL (ref 31.5–35.7)
MCV RBC AUTO: 92.8 FL (ref 79–97)
MONOCYTES # BLD AUTO: 0.35 10*3/MM3 (ref 0.1–0.9)
MONOCYTES NFR BLD AUTO: 6.3 % (ref 5–12)
NEUTROPHILS # BLD AUTO: 3.26 10*3/MM3 (ref 1.7–7)
NEUTROPHILS NFR BLD AUTO: 59.1 % (ref 42.7–76)
NRBC BLD AUTO-RTO: 0 /100 WBC (ref 0–0.2)
PLATELET # BLD AUTO: 164 10*3/MM3 (ref 140–450)
POTASSIUM SERPL-SCNC: 3.9 MMOL/L (ref 3.5–5.2)
PROT SERPL-MCNC: 7.3 G/DL (ref 6–8.5)
RBC # BLD AUTO: 4.97 10*6/MM3 (ref 3.77–5.28)
SODIUM SERPL-SCNC: 141 MMOL/L (ref 136–145)
TRIGL SERPL-MCNC: 93 MG/DL (ref 0–150)
TSH SERPL DL<=0.005 MIU/L-ACNC: 2.24 UIU/ML (ref 0.27–4.2)
VLDLC SERPL CALC-MCNC: 17 MG/DL (ref 5–40)
WBC # BLD AUTO: 5.52 10*3/MM3 (ref 3.4–10.8)

## 2024-03-12 ENCOUNTER — OFFICE VISIT (OUTPATIENT)
Dept: FAMILY MEDICINE CLINIC | Facility: CLINIC | Age: 79
End: 2024-03-12
Payer: MEDICARE

## 2024-03-12 VITALS
HEART RATE: 64 BPM | OXYGEN SATURATION: 99 % | BODY MASS INDEX: 30.83 KG/M2 | DIASTOLIC BLOOD PRESSURE: 82 MMHG | WEIGHT: 174 LBS | SYSTOLIC BLOOD PRESSURE: 124 MMHG | RESPIRATION RATE: 18 BRPM | HEIGHT: 63 IN | TEMPERATURE: 98.1 F

## 2024-03-12 DIAGNOSIS — L98.9 SKIN LESION: ICD-10-CM

## 2024-03-12 DIAGNOSIS — G89.29 CHRONIC BACK PAIN, UNSPECIFIED BACK LOCATION, UNSPECIFIED BACK PAIN LATERALITY: ICD-10-CM

## 2024-03-12 DIAGNOSIS — I50.9 CONGESTIVE HEART FAILURE, UNSPECIFIED HF CHRONICITY, UNSPECIFIED HEART FAILURE TYPE: ICD-10-CM

## 2024-03-12 DIAGNOSIS — J31.0 CHRONIC RHINITIS: ICD-10-CM

## 2024-03-12 DIAGNOSIS — M54.9 CHRONIC BACK PAIN, UNSPECIFIED BACK LOCATION, UNSPECIFIED BACK PAIN LATERALITY: ICD-10-CM

## 2024-03-12 DIAGNOSIS — E11.9 CONTROLLED TYPE 2 DIABETES MELLITUS WITHOUT COMPLICATION, WITHOUT LONG-TERM CURRENT USE OF INSULIN: Chronic | ICD-10-CM

## 2024-03-12 DIAGNOSIS — M15.0 PRIMARY GENERALIZED (OSTEO)ARTHRITIS: ICD-10-CM

## 2024-03-12 DIAGNOSIS — J98.4 SCARRING OF LUNG: ICD-10-CM

## 2024-03-12 DIAGNOSIS — R69 MULTIPLE CHRONIC DISEASES: ICD-10-CM

## 2024-03-12 DIAGNOSIS — R79.89 ABNORMAL LFTS: ICD-10-CM

## 2024-03-12 DIAGNOSIS — K76.0 FATTY LIVER: ICD-10-CM

## 2024-03-12 DIAGNOSIS — I10 PRIMARY HYPERTENSION: Chronic | ICD-10-CM

## 2024-03-12 DIAGNOSIS — N32.81 OVERACTIVE BLADDER: ICD-10-CM

## 2024-03-12 DIAGNOSIS — I38 VALVULAR HEART DISEASE: ICD-10-CM

## 2024-03-12 DIAGNOSIS — C50.919 MALIGNANT NEOPLASM OF FEMALE BREAST, UNSPECIFIED ESTROGEN RECEPTOR STATUS, UNSPECIFIED LATERALITY, UNSPECIFIED SITE OF BREAST: Chronic | ICD-10-CM

## 2024-03-12 RX ORDER — OXYBUTYNIN CHLORIDE 10 MG/1
10 TABLET, EXTENDED RELEASE ORAL DAILY
Qty: 90 TABLET | Refills: 3 | Status: CANCELLED | OUTPATIENT
Start: 2024-03-12

## 2024-03-12 RX ORDER — AZELASTINE 1 MG/ML
2 SPRAY, METERED NASAL 2 TIMES DAILY
Qty: 30 ML | Refills: 0 | Status: SHIPPED | OUTPATIENT
Start: 2024-03-12

## 2024-03-12 RX ORDER — OXYBUTYNIN CHLORIDE 15 MG/1
15 TABLET, EXTENDED RELEASE ORAL DAILY
Qty: 30 TABLET | Refills: 5 | Status: SHIPPED | OUTPATIENT
Start: 2024-03-12

## 2024-03-12 RX ORDER — AZELASTINE 1 MG/ML
SPRAY, METERED NASAL
Qty: 90 ML | OUTPATIENT
Start: 2024-03-12

## 2024-03-12 NOTE — PROGRESS NOTES
ELANA”.   Subjective   Ira Sanchez is a 79 y.o. female presenting with chief complaint of:   Chief Complaint   Patient presents with    Follow-up     AWV 12.18.23  Last Completed Annual Wellness Visit            ANNUAL WELLNESS VISIT (Yearly) Next due on 12/18/2024 12/18/2023  Level of Service: PPPS, SUBSEQ VISIT    01/18/2022  Level of Service: PA PPPS, SUBSEQ VISIT    12/20/2019  Level of Service: PA PPPS, SUBSEQ VISIT    12/20/2019  Done                     History of Present Illness :  Alone.   Here for review of chronic problems that includes HTN and others.     Has multiple chronic problems to consider that might have a bearing on today's issues;  an interval appointment.       Chronic/acute problems reviewed today:   1. Overactive bladder: chronic/variable urinary frequency; especially night.  No dry mouth/vision changes with ditropan XR 10   2 CHF: Chronic/stable.  Denies significant sob, orthopnea, leg edema, weight gain.  Aware of influence diet/salt and watching weight at home.  Does have nocturia.     3. Primary hypertension: Chronic/stable. Stable here past/no recent home blood pressures.  No significant chest pain, SOB, LE edema, orthopnea, near syncope, dizziness/light headness.   Recent Vitals         12/14/2023 12/18/2023 3/12/2024       BP: 158/68 140/74 124/82     Pulse: 44 50 64     Temp: 97.3 °F (36.3 °C) 96.9 °F (36.1 °C) 98.1 °F (36.7 °C)     Weight: 78.5 kg (173 lb) 77.5 kg (170 lb 12.8 oz) 78.9 kg (174 lb)     BMI (Calculated): 31.1 30.7 31.3              4. Valvular heart disease: AS: chronic/stable symptoms of no chest pain, SOB, edema with past echos showing valvular changes of: AS.  c   5. Controlled type 2 diabetes mellitus without complication, without long-term current use of insulin : Chronic/stable. No problem/pattern hypoglycemia/hyperglycemia manifest by poly- dypsia, phagia, uria, or sweats, diaphoretic episodes, syncope/near.     6. Abnormal LFTs: chronic/stable  and seen by GI; working dx most likely fatty liver   7. Fatty liver: Chronic/stable.  Aware treatment involves normalizing weight; usually including low fat diet and regular exercise.  Aware condition can go on to cirrhosis and death.  Stable occ liver labs and past imaging.       8. Malignant neoplasm of female breast, unspecified estrogen receptor status, unspecified laterality, unspecified site of breast chronic/10 yr history back without known reoccurance.  No breast lumps.    9. Chronic back pain, unspecified back location, unspecified back pain laterality: : chronic/variable spinal (/lower) 0-1/10 pain with infrequent/same radiation to UE/LE.  No change LE numbness.  Has bladder/bowel control. No desire to change approach of care.      10. Primary generalized (osteo)arthritis: Chronic/stable.  Various on/off joint pains/soreness/stiffness.  Particular joint problems with various.  No joint swelling.  Treats mainly with reduced activity, Rx listed, Tylenol.  No  NSAIDs, and no injections.      11. Scarring of lung: chronic/CXR findings but without cough, wheeze, SOB   12. Multiple chronic diseases: Has multiple chronic problems with increased risks for management (involves polypharmacy, multiple providers, many required labs/imaging/ to manage)     13. Chronic rhinitis: chronic/variable bilateral nasal drainage; says tried flonase with no issues.  Eyes matter on/off. Denies sneezing.    14. Skin lesion: > 6m struck upper nasal bridge with trunk lid; healed with bump.  No soreness.  For years lesion on lower R back; growing and catches on clothes.      Has an/another acute issue today: none.    The following portions of the patient's history were reviewed and updated as appropriate: allergies, current medications, past family history, past medical history, past social history, past surgical history, and problem list.      Current Outpatient Medications:     amLODIPine (NORVASC) 5 MG tablet, TAKE 1 TABLET BY  MOUTH DAILY, Disp: 90 tablet, Rfl: 3    Calcium Carb-Cholecalciferol (CALCIUM-VITAMIN D) 600-400 MG-UNIT tablet, Take 1 tablet by mouth Daily., Disp: , Rfl:     cycloSPORINE (RESTASIS) 0.05 % ophthalmic emulsion, 1 drop As Needed., Disp: , Rfl:     diphenhydrAMINE (BENADRYL) 25 mg capsule, Take 1 capsule by mouth At Night As Needed for Allergies., Disp: , Rfl:     losartan (COZAAR) 100 MG tablet, TAKE 1 TABLET BY MOUTH DAILY, Disp: 90 tablet, Rfl: 3    nystatin (MYCOSTATIN) 412741 UNIT/GM powder, Apply  topically to the appropriate area as directed 3 (Three) Times a Day., Disp: 45 g, Rfl: 0    thiamine (VITAMINE B-1) 100 MG tablet, Take 1 tablet by mouth Daily., Disp: , Rfl:     azelastine (ASTELIN) 0.1 % nasal spray, 2 sprays into the nostril(s) as directed by provider 2 (Two) Times a Day., Disp: 30 mL, Rfl: 0    oxybutynin XL (DITROPAN XL) 15 MG 24 hr tablet, Take 1 tablet by mouth Daily., Disp: 30 tablet, Rfl: 5  No current facility-administered medications for this visit.    Facility-Administered Medications Ordered in Other Visits:     heparin flush (porcine) 100 UNIT/ML injection 500 Units, 500 Units, Intravenous, PRN, Shanda Torres MD, 500 Units at 04/16/18 1504    heparin flush (porcine) 100 UNIT/ML injection 500 Units, 500 Units, Intravenous, PRN, Shanda Torres MD, 500 Units at 06/12/18 1445    heparin injection 500 Units, 500 Units, Intravenous, PRN, Tamela Doherty APRN, 500 Units at 12/08/21 0948    sodium chloride 0.9 % flush 10 mL, 10 mL, Intravenous, PRN, Shanda Torres MD, 10 mL at 04/16/18 1503    sodium chloride 0.9 % flush 10 mL, 10 mL, Intravenous, PRN, Shanda Torres MD, 10 mL at 06/12/18 1445    sodium chloride 0.9 % flush 10 mL, 10 mL, Intravenous, PRN, Tamela Doherty, APRN, 10 mL at 12/08/21 0948    No problems with medications.    Allergies   Allergen Reactions    Benzonatate Hives and Itching       Review of  Systems  GENERAL:  Active/slower with limits, speed, stamina for age, and occ back pain.  Sleep is ok. No fever now/recent.  SKIN: No other rash/skin lesion of concern besides that mentioned above.   ENDO:  No syncope, near or diaphoretic sweaty spells.  No download..  HEENT: No recent head injury; stable occ headache.  No vision change.  No chronic hearing loss.  Ears without fullness without pain/drainage.  No sore throat now.  No significant nasal/sinus congestion/drainage. No epistaxis.   CHEST: No chest wall tenderness or mass.  No significant cough, and no wheeze.  No SOB; no hemoptysis.  CV: No chest pain, palpitations; daily end of day mild pitting/ankle edema.  GI: No heartburn, dysphagia.  No abdominal pain, diarrhea, constipation.  No rectal bleeding, or melena.    :  Voids with occ on/off dysuria, without incontinence to completion.  ORTHO: No painful/swollen joints but various on /off sore.  Occ same/sore neck or back.  No acute neck or lower back pain without recent injury.  NEURO: No dizziness, weakness of extremities.  Same mild numbness/paresthesias LE.  PSYCH: No memory loss.  Mood good; mild anxious, depressed but/and not suicidal.  Tries to tolerate stress .   Screening:  Mammogram: bilateral masectomy  Bone density:   Bone d-deca/BH/hip -1.6, LS +1.3/1.24.22-watching  Bone d-deca/[facility]/T hip -0.5 LS +1.4/12.18.18;  no change; maybe 2-5 yr  Low dose CT chest:Tobacco-smoker/age 17/1-2ppd/dc age 60:  CTs with breast cancer:   GI:   Colon-avm-div/Shieben/[facility]/12.19.17/5y  Colon-p, avm, div/Shieben/[facility]/2.22.23/prn    Copy/paste function used for ROS/exam AND each area of these were reviewed, updated, confirmed and supplemented as needed.  Data reviewed:   Recent admit/ER/MD visits: 12.18.23    1. Wellness examination-done    2. Allergic rhinitis, unspecified seasonality, unspecified trigger    3. Primary hypertension    4. Congestive heart failure, unspecified HF chronicity, unspecified heart failure  type    5. Controlled type 2 diabetes mellitus without complication, without long-term current use of insulin    6. Fatty liver    7. Osteopenia, unspecified location    8. Primary generalized (osteo)arthritis    9. Chronic obstructive pulmonary disease, unspecified COPD type    10. Multiple chronic diseases    11. Edema, unspecified type    12. Vitamin D deficiency          Issues that are new, uncontrolled, or required review HPI/ROS/exam and decisions beyond wellness today: (ie: requiring the service of a physician and/or not likely to resolve independently without clinical intervention.)   none     Discussions/medical decisions/reviews:  BP ok  Other vitals ok  Recent Vitals           10/20/2023 12/14/2023 12/18/2023          BP: 140/68 158/68 140/74       Pulse: -- 44 50       Temp: -- 97.3 °F (36.3 °C) 96.9 °F (36.1 °C)       Weight: 85.3 kg (188 lb) 78.5 kg (173 lb) 77.5 kg (170 lb 12.8 oz)       BMI (Calculated): 33.3 31.1 30.7               DM/ 6.7.23  Lipid LDL due  PSA NA  CBC ok 6.7.23  Iron 85 6.7.23; none  Renal ok 6.7.23  Liver AST 34 6.7.23  Vit D due  Thyroid due     Wellness/or annual done   Screening reviewed/updated   Vaccines discussed (see below)      Follow up: Return for fasting lab AM; move visit Wednesday back 3m.         Future Appointments   Date Time Provider Department Center   3/6/2024  8:30 AM [facility] NURSE [facility]   3/18/2024  9:00 AM Conrado Tinoco MD [facility]   3/25/2024  8:00 AM PAD US [facility] CART 1  PAD [facility] PAD   3/25/2024  9:15 AM Henrietta Olvera APRN [facility] PAD   6/5/2024  8:00 AM  [facility] CTR LAB  PAD CCLAB PAD   6/5/2024  8:30 AM Denisha Reyes APRN [facility]   6/18/2024  9:00 AM Dante Mendoza MD [facility] PAD PAD         Data review above:   Rx: reviewed and decisions:   Rx new/changes: none  No orders of the defined types were placed in this encounter.        Orders placed:   LAB/Testing/Referrals:  reviewed/orders:   Today:       Orders Placed This Encounter   Procedures    Comprehensive Metabolic Panel    Lipid Panel    Hemoglobin A1c    TSH Rfx On Abnormal To Free T4    Microalbumin / Creatinine Urine Ratio - Urine, Clean Catch    Vitamin D,25-Hydroxy    CBC & Differential       Last cardiac testing:   Echo:   Results for orders placed during the hospital encounter of 10/20/23    Adult Transthoracic Echo Complete W/ Cont if Necessary Per Protocol    Interpretation Summary    Left ventricular systolic function is normal. Left ventricular ejection fraction appears to be 66 - 70%.    Left ventricular diastolic function is consistent with (grade I) impaired relaxation.    The left atrial cavity is mildly dilated.    Normal right ventricular cavity size and systolic function noted.    Mild aortic valve stenosis is present.    Estimated right ventricular systolic pressure from tricuspid regurgitation is normal (<35 mmHg).    Radiology considered:   No radiology results for the last 90 days.    Lab Results:  Results for orders placed or performed in visit on 12/19/23   Comprehensive Metabolic Panel    Specimen: Blood   Result Value Ref Range    Glucose 103 (H) 65 - 99 mg/dL    BUN 12 8 - 23 mg/dL    Creatinine 0.80 0.57 - 1.00 mg/dL    EGFR Result 74.6 >60.0 mL/min/1.73    BUN/Creatinine Ratio 15.0 7.0 - 25.0    Sodium 141 136 - 145 mmol/L    Potassium 3.9 3.5 - 5.2 mmol/L    Chloride 100 98 - 107 mmol/L    Total CO2 24.5 22.0 - 29.0 mmol/L    Calcium 9.4 8.6 - 10.5 mg/dL    Total Protein 7.3 6.0 - 8.5 g/dL    Albumin 4.8 3.5 - 5.2 g/dL    Globulin 2.5 gm/dL    A/G Ratio 1.9 g/dL    Total Bilirubin 0.7 0.0 - 1.2 mg/dL    Alkaline Phosphatase 112 39 - 117 U/L    AST (SGOT) 26 1 - 32 U/L    ALT (SGPT) 11 1 - 33 U/L   Lipid Panel    Specimen: Blood   Result Value Ref Range    Total Cholesterol 151 0 - 200 mg/dL    Triglycerides 93 0 - 150 mg/dL    HDL Cholesterol 45 40 - 60 mg/dL    VLDL Cholesterol Derrek 17 5 - 40  "mg/dL    LDL Chol Calc ([facility]) 89 0 - 100 mg/dL   Hemoglobin A1c    Specimen: Blood   Result Value Ref Range    Hemoglobin A1C 5.50 4.80 - 5.60 %   TSH Rfx On Abnormal To Free T4    Specimen: Blood   Result Value Ref Range    TSH 2.240 0.270 - 4.200 uIU/mL   Vitamin D,25-Hydroxy    Specimen: Blood   Result Value Ref Range    25 Hydroxy, Vitamin D 45.8 30.0 - 100.0 ng/ml   CBC & Differential    Specimen: Blood   Result Value Ref Range    WBC 5.52 3.40 - 10.80 10*3/mm3    RBC 4.97 3.77 - 5.28 10*6/mm3    Hemoglobin 15.0 12.0 - 15.9 g/dL    Hematocrit 46.1 34.0 - 46.6 %    MCV 92.8 79.0 - 97.0 fL    MCH 30.2 26.6 - 33.0 pg    MCHC 32.5 31.5 - 35.7 g/dL    RDW 11.5 (L) 12.3 - 15.4 %    Platelets 164 140 - 450 10*3/mm3    Neutrophil Rel % 59.1 42.7 - 76.0 %    Lymphocyte Rel % 32.8 19.6 - 45.3 %    Monocyte Rel % 6.3 5.0 - 12.0 %    Eosinophil Rel % 1.4 0.3 - 6.2 %    Basophil Rel % 0.2 0.0 - 1.5 %    Neutrophils Absolute 3.26 1.70 - 7.00 10*3/mm3    Lymphocytes Absolute 1.81 0.70 - 3.10 10*3/mm3    Monocytes Absolute 0.35 0.10 - 0.90 10*3/mm3    Eosinophils Absolute 0.08 0.00 - 0.40 10*3/mm3    Basophils Absolute 0.01 0.00 - 0.20 10*3/mm3    Immature Granulocyte Rel % 0.2 0.0 - 0.5 %    Immature Grans Absolute 0.01 0.00 - 0.05 10*3/mm3    nRBC 0.0 0.0 - 0.2 /100 WBC       A1C:  Lab Results - Last 18 Months   Lab Units 12/19/23  0733   HEMOGLOBIN A1C % 5.50     GLUCOSE:  Lab Results - Last 18 Months   Lab Units 12/19/23  0733 06/07/23  0810 11/30/22  0818   GLUCOSE mg/dL 103* 107* 129*     LIPID:  Lab Results - Last 18 Months   Lab Units 12/19/23  0733   CHOLESTEROL mg/dL 151   LDL CHOL mg/dL 89   HDL CHOL mg/dL 45   TRIGLYCERIDES mg/dL 93     PSA:No results found for: \"PSA\"    CBC:  Lab Results - Last 18 Months   Lab Units 12/19/23  0733 06/07/23  0810 11/30/22  0818   WBC 10*3/mm3 5.52 7.60 8.51   HEMOGLOBIN g/dL 15.0 14.2 13.9   HEMATOCRIT % 46.1 44.1 43.3   PLATELETS 10*3/mm3 164 167 176   IRON mcg/dL  --  85  --  " "    BMP/CMP:  Lab Results - Last 18 Months   Lab Units 12/19/23  0733 06/07/23  0810 11/30/22  0818   SODIUM mmol/L 141 138 138   POTASSIUM mmol/L 3.9 4.5 4.3   CHLORIDE mmol/L 100 101 102   CO2 mmol/L 24.5 25.0 27.0   GLUCOSE mg/dL 103* 107* 129*   BUN mg/dL 12 18 12   CREATININE mg/dL 0.80 0.59 0.62   EGFR RESULT mL/min/1.73 74.6  --   --    CALCIUM mg/dL 9.4 9.5 9.7       HEPATIC:  Lab Results - Last 18 Months   Lab Units 12/19/23  0733 06/07/23  0810 11/30/22  0818   ALT (SGPT) U/L 11 15 16   AST (SGOT) U/L 26 34* 32   ALK PHOS U/L 112 110 127*     HEPATITIS C ANTIBODY:   Lab Results   Component Value Date/Time    HEPCVIRUSABY Non-Reactive 06/01/2022 07:26 AM    HEPCVIRUSABY 0.3 06/08/2020 07:05 AM     Vit D:  Lab Results - Last 18 Months   Lab Units 12/19/23  0733   VIT D 25 HYDROXY ng/ml 45.8     THYROID:  Lab Results - Last 18 Months   Lab Units 12/19/23  0733   TSH uIU/mL 2.240       Objective   /82   Pulse 64   Temp 98.1 °F (36.7 °C) (Temporal)   Resp 18   Ht 158.8 cm (62.5\")   Wt 78.9 kg (174 lb)   LMP  (LMP Unknown)   SpO2 99%   BMI 31.32 kg/m²   Body mass index is 31.32 kg/m².    Recent Vitals         12/14/2023 12/18/2023 3/12/2024       BP: 158/68 140/74 124/82     Pulse: 44 50 64     Temp: 97.3 °F (36.3 °C) 96.9 °F (36.1 °C) 98.1 °F (36.7 °C)     Weight: 78.5 kg (173 lb) 77.5 kg (170 lb 12.8 oz) 78.9 kg (174 lb)     BMI (Calculated): 31.1 30.7 31.3           Wt Readings from Last 15 Encounters:   03/12/24 0817 78.9 kg (174 lb)   12/18/23 1036 77.5 kg (170 lb 12.8 oz)   12/14/23 1437 78.5 kg (173 lb)   10/20/23 1316 85.3 kg (188 lb)   09/12/23 0924 85.3 kg (188 lb)   06/20/23 1310 85.7 kg (189 lb)   06/19/23 0906 84.8 kg (187 lb)   06/07/23 0827 83.6 kg (184 lb 6.4 oz)   04/17/23 0902 83 kg (183 lb)   04/10/23 1111 86.2 kg (190 lb 1.6 oz)   12/08/22 1326 87.5 kg (193 lb)   12/07/22 0805 87.6 kg (193 lb 3.2 oz)   12/01/22 1416 87.5 kg (193 lb)   09/06/22 1130 87.1 kg (192 lb)   09/01/22 " 0755 88 kg (194 lb)       Physical Exam  GENERAL:  Well nourished/developed in no acute distress  SKIN: Turgor excellent, without wound, rash, lesion or significance beyond PHOTOS  HEENT: Normal cephalic without trauma. Pupils equal round reactive to light. Extraocular motions full without nystagmus.  External canals nonobstructive nontender without reddness. Tymphatic membranes dull with irwin structures intact.   Oral cavity without growths, exudates, and moist.  Posterior pharynx without mass, obstruction, redness.  No thyromegaly, , tenderness, definite lymphadenopathy and supple;   CV: Regular rhythm.  1/6 systolic murmur without gallop and trace ankle/LE edema. Posterior pulses intact.  No carotid bruits.  CHEST: No chest wall tenderness or mass.   LUNGS: Symmetric motion with clear to auscultation.  No dullness to percussion  ABD: Soft, non-tender without mass.   ORTHO: Symmetric extremities without swelling/point tenderness.  Full gross range of motion.  NEURO: CN 2-12 grossly intact except L eyelid mild droop.  Otherwise symmetric facies and UE/LE. 3/5 strength throughout.   Otherwise nonfocal use extremities. Speech clear.       Assessment & Plan        1. Cough, unspecified type    2. Valvular heart disease: AS    3. Bronchitis    4. Neck nodule    5. Controlled type 2 diabetes mellitus without complication, without long-term current use of insulin    6. Congestive heart failure, unspecified HF chronicity, unspecified heart failure type    7. Chronic obstructive pulmonary disease, unspecified COPD type        R lower back          nose    Assessment & Plan     1. Overactive bladder    2. Congestive heart failure, unspecified HF chronicity, unspecified heart failure type    3. Primary hypertension    4. Valvular heart disease: AS    5. Controlled type 2 diabetes mellitus without complication, without long-term current use of insulin    6. Abnormal LFTs    7. Fatty liver    8. Malignant neoplasm of female  breast, unspecified estrogen receptor status, unspecified laterality, unspecified site of breast    9. Chronic back pain, unspecified back location, unspecified back pain laterality    10. Primary generalized (osteo)arthritis    11. Scarring of lung    12. Multiple chronic diseases    13. Chronic rhinitis    14. Skin lesion        Data review above:   Discussions/medical decisions/reviews:  BP ok  Other vitals ok  Recent Vitals         12/14/2023 12/18/2023 3/12/2024       BP: 158/68 140/74 124/82     Pulse: 44 50 64     Temp: 97.3 °F (36.3 °C) 96.9 °F (36.1 °C) 98.1 °F (36.7 °C)     Weight: 78.5 kg (173 lb) 77.5 kg (170 lb 12.8 oz) 78.9 kg (174 lb)     BMI (Calculated): 31.1 30.7 31.3           DM/A1c 5.5 12.19.23  DM/ 12.19.23  Lipid LDL 89 12.19.23-none  PSA NA  CBC ok 12.19.23  Iron 85 6.7.23  Renal ok 12.19.23  Liver ok 12.19.23  Vit D 45 12.19.23  Thyroid TSH ok 12.19.23    Screening reviewed/updated   Vaccines discussed (see below)   Skin lesions: nose looks like keloid; could worsen with attempt to remove; suggest punch bx  Lower R back; skin tag; can set up removal  Will need echo next 6-12m visit; follow aortic valve  Increase ditropan 10xr to 15xr; see if helps voiding/tolerated; if not consider back to 10 mg and even lasix small dose mid day to see if LE/end of day edema is being mobilized during night?   Trial of astelin NS    Follow up: Return for fasting lab May; procedure/skin day in May after labs.  then 6m+.  Future Appointments   Date Time Provider Department Center   3/14/2024  8:30 AM [facility] NURSE [facility]   3/25/2024  8:00 AM PAD US [facility] CART 1 BH PAD [facility] PAD   3/25/2024  9:15 AM Henrietta Olvera APRN [facility] PAD   5/13/2024  8:20 AM [facility] [place] [facility]   5/16/2024  9:30 AM Conrado Tinoco MD [facility] PAD   6/5/2024  8:00 AM  [facility] CTR LAB  PAD CCLAB PAD   6/5/2024  8:30 AM Denisha Reyes APRN [facility] PAD   6/18/2024  9:00 AM  "Dante Mendoza MD [facility] RD PAD PAD       Data review above:   Rx: reviewed and decisions:   Rx new/changes:   New Medications Ordered This Visit   Medications    azelastine (ASTELIN) 0.1 % nasal spray     Si sprays into the nostril(s) as directed by provider 2 (Two) Times a Day.     Dispense:  30 mL     Refill:  0    oxybutynin XL (DITROPAN XL) 15 MG 24 hr tablet     Sig: Take 1 tablet by mouth Daily.     Dispense:  30 tablet     Refill:  5       Orders placed:   LAB/Testing/Referrals: reviewed/orders:   Today:   No orders of the defined types were placed in this encounter.    Chronic/recurrent labs above or change to:   Same     Immunization History   Administered Date(s) Administered    ABRYSVO (RSV, 60+ or pregnant women 32-36 wks) 2024    COVID-19 (MODERNA) 1st,2nd,3rd Dose Monovalent 2021, 2021    COVID-19 (UNSPECIFIED) 2023    Fluad Quad 65+ 10/09/2020    Fluzone High Dose =>65 Years ([employer] ONLY) 2017, 2019    Fluzone High-Dose 65+yrs 2022    Influenza, Unspecified 2023    Pneumococcal Conjugate 13-Valent (PCV13) 2017    Pneumococcal Conjugate 20-Valent (PCV20) 2024    Shingrix 2024    Tdap 2024     We advised/reaffirmed our support/suggestion for staying complete with covid- covid boosters, seasonal flu/yearly and any missing vaccine from list we supplied; when cannot be given here we suggest contact with local health department office or pharmacy to review missing/needed vaccines and then bring nursing documentation for these vaccines to this office or call this information in. Shingles became \"free\" 23 for medicare insurance.    Health maintenance:   Body mass index is 31.32 kg/m².         Tobacco use reviewed:   Ira Sanchez  reports that she quit smoking about 23 years ago. Her smoking use included cigarettes. She started smoking about 61 years ago. She has a 75.2 pack-year smoking history. She has never used " smokeless tobacco.       There are no Patient Instructions on file for this visit.

## 2024-03-14 ENCOUNTER — INFUSION (OUTPATIENT)
Dept: ONCOLOGY | Facility: CLINIC | Age: 79
End: 2024-03-14
Payer: MEDICARE

## 2024-03-14 DIAGNOSIS — Z45.2 ENCOUNTER FOR CARE RELATED TO VASCULAR ACCESS PORT: Primary | ICD-10-CM

## 2024-03-14 DIAGNOSIS — Z45.2 ENCOUNTER FOR CARE RELATED TO PORT-A-CATH: ICD-10-CM

## 2024-03-14 RX ORDER — SODIUM CHLORIDE 0.9 % (FLUSH) 0.9 %
10 SYRINGE (ML) INJECTION AS NEEDED
Status: DISCONTINUED | OUTPATIENT
Start: 2024-03-14 | End: 2024-03-14 | Stop reason: HOSPADM

## 2024-03-14 RX ORDER — HEPARIN SODIUM (PORCINE) LOCK FLUSH IV SOLN 100 UNIT/ML 100 UNIT/ML
500 SOLUTION INTRAVENOUS AS NEEDED
Status: DISCONTINUED | OUTPATIENT
Start: 2024-03-14 | End: 2024-03-14 | Stop reason: HOSPADM

## 2024-03-14 RX ADMIN — Medication 10 ML: at 08:10

## 2024-03-14 RX ADMIN — HEPARIN SODIUM (PORCINE) LOCK FLUSH IV SOLN 100 UNIT/ML 500 UNITS: 100 SOLUTION at 08:10

## 2024-03-22 ENCOUNTER — TELEPHONE (OUTPATIENT)
Dept: VASCULAR SURGERY | Facility: CLINIC | Age: 79
End: 2024-03-22
Payer: MEDICARE

## 2024-03-25 ENCOUNTER — HOSPITAL ENCOUNTER (OUTPATIENT)
Dept: ULTRASOUND IMAGING | Facility: HOSPITAL | Age: 79
Discharge: HOME | End: 2024-03-25
Admitting: NURSE PRACTITIONER
Payer: MEDICARE

## 2024-03-25 ENCOUNTER — OFFICE VISIT (OUTPATIENT)
Dept: VASCULAR SURGERY | Facility: CLINIC | Age: 79
End: 2024-03-25
Payer: MEDICARE

## 2024-03-25 VITALS
DIASTOLIC BLOOD PRESSURE: 68 MMHG | SYSTOLIC BLOOD PRESSURE: 132 MMHG | HEIGHT: 63 IN | WEIGHT: 171 LBS | HEART RATE: 57 BPM | OXYGEN SATURATION: 96 % | BODY MASS INDEX: 30.3 KG/M2

## 2024-03-25 DIAGNOSIS — I10 ESSENTIAL HYPERTENSION: ICD-10-CM

## 2024-03-25 DIAGNOSIS — I65.23 BILATERAL CAROTID ARTERY STENOSIS: ICD-10-CM

## 2024-03-25 DIAGNOSIS — I10 PRIMARY HYPERTENSION: ICD-10-CM

## 2024-03-25 DIAGNOSIS — I87.323 CHRONIC VENOUS HYPERTENSION WITH INFLAMMATION INVOLVING BOTH SIDES: Primary | ICD-10-CM

## 2024-03-25 PROCEDURE — 99214 OFFICE O/P EST MOD 30 MIN: CPT | Performed by: NURSE PRACTITIONER

## 2024-03-25 PROCEDURE — 93880 EXTRACRANIAL BILAT STUDY: CPT | Performed by: SURGERY

## 2024-03-25 PROCEDURE — 93880 EXTRACRANIAL BILAT STUDY: CPT

## 2024-03-25 PROCEDURE — 3075F SYST BP GE 130 - 139MM HG: CPT | Performed by: NURSE PRACTITIONER

## 2024-03-25 PROCEDURE — 1159F MED LIST DOCD IN RCRD: CPT | Performed by: NURSE PRACTITIONER

## 2024-03-25 PROCEDURE — 1160F RVW MEDS BY RX/DR IN RCRD: CPT | Performed by: NURSE PRACTITIONER

## 2024-03-25 PROCEDURE — 3078F DIAST BP <80 MM HG: CPT | Performed by: NURSE PRACTITIONER

## 2024-03-25 NOTE — PROGRESS NOTES
"3/25/2024       Conrado Tinoco MD  [address] [place] [id number]    Ira Sanchez  1945    Chief Complaint   Patient presents with   • chronic venous hypertension     Carotids this am, may have had a spider bite to right leg. No other complaints       Dear Conrado Tinoco MD       HPI  I had the pleasure of seeing your patient Ira Sanchez in the office today.   As you recall, Ira Sanchez is a 79 y.o.  female who you are following for routine health maintenance.  She has had significant leg swelling since March 2021.  She does take Norvasc as well which she feels like her legs have swollen since she began this medication a couple years ago.  Since her last visit she reports she has been doing well on legs are not swelling.  She has not had to wear compression stockings.  Her previous testing did show venous insufficiency to her lower extremities.  She denies any strokelike symptoms.  She did have noninvasive testing performed today, which I did review in office.    Review of Systems   Constitutional: Negative.    HENT: Negative.     Eyes: Negative.    Respiratory: Negative.     Cardiovascular: Negative.    Gastrointestinal: Negative.    Endocrine: Negative.    Genitourinary: Negative.    Musculoskeletal: Negative.    Skin: Negative.         Possible spider bite to right leg   Allergic/Immunologic: Negative.    Neurological: Negative.    Hematological: Negative.    Psychiatric/Behavioral: Negative.     All other systems reviewed and are negative.        /68   Pulse 57   Ht 160 cm (63\")   Wt 77.6 kg (171 lb)   LMP  (LMP Unknown)   SpO2 96%   BMI 30.29 kg/m²    Physical Exam  Vitals and nursing note reviewed.   Constitutional:       General: She is not in acute distress.     Appearance: Normal appearance. She is well-developed. She is obese. She is not diaphoretic.   HENT:      Head: Normocephalic and atraumatic.   Eyes:      General: No scleral icterus.     Pupils: Pupils are " equal, round, and reactive to light.   Neck:      Thyroid: No thyromegaly.      Vascular: No carotid bruit or JVD.   Cardiovascular:      Rate and Rhythm: Normal rate and regular rhythm.      Pulses: Normal pulses.      Heart sounds: Normal heart sounds and S2 normal. No murmur heard.     No friction rub. No gallop.      Comments: Varicose veins to bilateral lower extremities with mild swelling  Pulmonary:      Effort: Pulmonary effort is normal.      Breath sounds: Normal breath sounds.   Abdominal:      General: Bowel sounds are normal.      Palpations: Abdomen is soft.   Musculoskeletal:         General: Swelling present. Normal range of motion.      Cervical back: Normal range of motion and neck supple.   Skin:     General: Skin is warm and dry.   Neurological:      General: No focal deficit present.      Mental Status: She is alert and oriented to person, place, and time.      Cranial Nerves: No cranial nerve deficit.   Psychiatric:         Mood and Affect: Mood normal.         Behavior: Behavior normal.         Thought Content: Thought content normal.         Judgment: Judgment normal.     Diagnostic data:  Noninvasive testing including a carotid duplex shows less than 50% right carotid stenosis and to 69% left carotid stenosis with bilateral antegrade vertebral flow.      Patient Active Problem List   Diagnosis   • Obesity (BMI 30-39.9)   • Breast cancer-L/valente masectomy 2014   • Overactive bladder   • Diabetes type 2, controlled   • Hypertension   • Osteopenia 2022- 2 to 5y   • Neuropathy-chemo/better   • Primary generalized (osteo)arthritis   • Malignant neoplasm of lower-outer quadrant of left female breast   • Family history of breast cancer   • Malignant neoplasm of upper-outer quadrant of female breast   • History of bilateral mastectomy   • Wellness examination-done   • Shoulder pain   • Chronic back pain   • Laboratory test*   • Bradycardia   • Cough: copd   • Allergic rhinitis   • Menopause   •  History of shingles   • COPD (chronic obstructive pulmonary disease)   • Abnormal mammogram   • After-cataract with vision obscured   • Amblyopia   • Cellulitis, trunk   • Dermatitis of eyelid due to herpes zoster   • Dermatochalasis   • Hordeolum internum   • Other hereditary corneal dystrophies   • Pseudophakia   • Rash   • Encounter for care related to Port-a-Cath   • Edema: venous-BH/vascular   • Congestive heart failure   • Valvular heart disease: AS   • Chronic neck pain   • Abdominal pannus   • Encounter for care related to vascular access port   • Pneumonia of both lower lobes due to infectious organism   • Chronic cough   • Personal history of nicotine dependence   • Severe obesity (BMI 35.0-35.9 with comorbidity)   • Abnormal liver x-ray   • Positive JESUSITA (antinuclear antibody)-neg contreras visit   • NSAID long-term use   • Abnormal LFTs   • Adenomatous polyp of colon   • Fatty liver   • Scarring of lung   • Multiple chronic diseases   • Skin lesion         ICD-10-CM ICD-9-CM   1. Chronic venous hypertension with inflammation involving both sides  I87.323 459.32   2. Primary hypertension  I10 401.9   3. Essential hypertension  I10 401.9   4. Bilateral carotid artery stenosis  I65.23 433.10     433.30             Plan: After thoroughly evaluating Ira Sanchez, I believe the best course of action is to remain conservative from vascular surgery standpoint.  Currently she is doing well and denies any strokelike symptoms.  She only has some mild swelling to her lower extremities.  I did review her testing which shows less than 50% right carotid stenosis and 50 to 69% left carotid stenosis with bilateral antegrade vertebral flow.  She is not even wearing compression at this time.  Previously her testing did show significant venous insufficiency and she has opted for conservative treatment.  We will see her back in 1 year for continued surveillance.  At that time I would like to get a screening carotid duplex. I  did discuss vascular risk factors as they pertain to the progression of vascular disease including controlling her hypertension.  Her blood pressure stable on her current medication.  The patient can continue taking their current medication regimen as previously planned.  This was all discussed in full with complete understanding.    Thank you for allowing me to participate in the care of your patient.  Please do not hesitate with any questions or concerns.  I will keep you aware of any further encounters with Ira Sanchez.        Sincerely yours,         REBEL Crawford

## 2024-04-18 NOTE — ASSESSMENT & PLAN NOTE
"Jolie Nayak edema    Last Recorded Vitals  Blood pressure (!) 161/91, pulse 99, temperature 36.8 °C (98.2 °F), temperature source Temporal, resp. rate 18, height 1.676 m (5' 5.98\"), weight 119 kg (262 lb 3.8 oz), SpO2 95%.  Intake/Output last 3 Shifts:  I/O last 3 completed shifts:  In: 3875 (32.6 mL/kg) [P.O.:3360; Other:15; IV Piggyback:500]  Out: 8485 (71.3 mL/kg) [Urine:8350 (1.9 mL/kg/hr); Drains:135]  Weight: 118.9 kg      Lab Results   Component Value Date    CREATININE 2.86 (H) 04/18/2024    K 4.1 04/18/2024    GLUCOSE 181 (H) 04/18/2024    HGB 9.3 (L) 04/18/2024    WBC 4.3 (L) 04/18/2024    PLT 90 (L) 04/18/2024    CALCIUM 9.1 04/18/2024         Current Facility-Administered Medications:     acetaminophen (Tylenol) tablet 650 mg, 650 mg, oral, Once, Marina Jean, APRN-CNP    acyclovir (Zovirax) tablet 400 mg, 400 mg, oral, q12h [facility], Torres Reddy MD, 400 mg at 04/18/24 0858    amLODIPine (Norvasc) tablet 10 mg, 10 mg, oral, Daily, Li L Tayler, APRN-CNP, 10 mg at 04/18/24 0859    atorvastatin (Lipitor) tablet 40 mg, 40 mg, oral, Daily, Li L Tayler, APRN-CNP, 40 mg at 04/18/24 0859    buPROPion SR (Wellbutrin SR) 12 hr tablet 150 mg, 150 mg, oral, BID, Li L Tayler, APRN-CNP, 150 mg at 04/18/24 0858    clotrimazole (Mycelex) nicole 10 mg, 10 mg, oral, TID after meals, Li L Tayler, APRN-CNP, 10 mg at 04/18/24 1309    darbepoetin maldonado (Aranesp) injection 100 mcg, 100 mcg, subcutaneous, Every Sunday, Li L Tayler, APRN-CNP, 100 mcg at 04/16/24 1429    dextrose 50 % injection 12.5 g, 12.5 g, intravenous, q15 min PRN, Li L [place], APRN-CNP    glucagon (Glucagen) injection 1 mg, 1 mg, intramuscular, q15 min PRN, Li L Tayler, APRN-CNP    heparin (porcine) injection " She quit smoking in 2000.   HumaLOG) injection 0-15 Units, 0-15 Units, subcutaneous, Before meals & nightly, Ray Palomo MD    insulin lispro (HumaLOG) injection 10 Units, 10 Units, subcutaneous, TID with meals, Nathaly Hong MD, 10 Units at 04/18/24 1309    insulin NPH (Isophane) (HumuLIN N,NovoLIN N) injection 15 Units, 15 Units, subcutaneous, Daily with breakfast, Nathaly Hong MD, 15 Units at 04/18/24 0859    mycophenolate (Cellcept) capsule 1,000 mg, 1,000 mg, oral, q12h, Li Lester APRN-CNP, 1,000 mg at 04/18/24 0735    naloxone (Narcan) injection 0.2 mg, 0.2 mg, intravenous, q5 min PRN, Li Lester, APRN-CNP    ondansetron (Zofran) injection 4 mg, 4 mg, intravenous, q6h PRN, PEYMAN Smith-CNP, 4 mg at 04/18/24 1105    oxyCODONE (Roxicodone) immediate release tablet 10 mg, 10 mg, oral, q6h PRN, PEYMAN Smith-CNP, 10 mg at 04/18/24 0735    oxyCODONE (Roxicodone) immediate release tablet 5 mg, 5 mg, oral, q6h PRN, Marina Jean APRN-CNP    pantoprazole (ProtoNix) EC tablet 40 mg, 40 mg, oral, BID AC, Li Lester, APRN-CNP, 40 mg at 04/18/24 0608    polyethylene glycol (Glycolax, Miralax) packet 17 g, 17 g, oral, Daily PRN, Li Lester, APRN-CNP, 17 g at 04/17/24 1100    [START ON 4/19/2024] predniSONE (Deltasone) tablet 20 mg, 20 mg, oral, Daily, Li LARA [place], APRN-CNP    sennosides (Senokot) tablet 8.6 mg, 1 tablet, oral, BID, Li L Tayler, APRN-CNP, 8.6 mg at 04/18/24 0858    sodium bicarbonate tablet 1,300 mg, 1,300 mg, oral, BID, PEYMAN Smith-CNP, 1,300 mg at 04/18/24 0858    sulfamethoxazole-trimethoprim (Bactrim) 400-80 mg per tablet 1 tablet, 1 tablet, oral, Daily, Torres Reddy MD, 1 tablet at 04/18/24 0858    tacrolimus  (native right ureter with dilation     Continue both deep and superficial drain  Donor HCV antibody +. TAMERA -  Of note donor is now being listed as risk criteria.  Did discuss this with Mr. Nayak today    Immunosuppression   PRA 92%  Possible low DSA (MFI 1,800)  Auto crossmatch pending  Thymo 4.5mg/kg goal - completed  Tac 2:2  MMF 1000 BID  Steroid taper    3. DM  Appreciate endocrine    Donor Kidney Info:  Etiology:  DBD  Risk: YES  Terminal Cr 2.09  ABO: A1  KDPI: 44 %  PRA: 92 %  CMV: D-/R+  EBV: D-/R+   Anti HCV +, TAMERA -     I spent 35 minutes in the professional and overall care of this patient.      Sonya Donahue MD

## 2024-05-16 ENCOUNTER — OFFICE VISIT (OUTPATIENT)
Dept: FAMILY MEDICINE CLINIC | Facility: CLINIC | Age: 79
End: 2024-05-16
Payer: MEDICARE

## 2024-05-16 VITALS
SYSTOLIC BLOOD PRESSURE: 144 MMHG | HEART RATE: 51 BPM | HEIGHT: 63 IN | OXYGEN SATURATION: 95 % | TEMPERATURE: 97.1 F | BODY MASS INDEX: 29.77 KG/M2 | WEIGHT: 168 LBS | DIASTOLIC BLOOD PRESSURE: 80 MMHG

## 2024-05-16 DIAGNOSIS — G62.9 NEUROPATHY: ICD-10-CM

## 2024-05-16 DIAGNOSIS — J44.9 CHRONIC OBSTRUCTIVE PULMONARY DISEASE, UNSPECIFIED COPD TYPE: ICD-10-CM

## 2024-05-16 DIAGNOSIS — M85.80 OSTEOPENIA, UNSPECIFIED LOCATION: ICD-10-CM

## 2024-05-16 DIAGNOSIS — K76.0 FATTY LIVER: ICD-10-CM

## 2024-05-16 DIAGNOSIS — I50.9 CONGESTIVE HEART FAILURE, UNSPECIFIED HF CHRONICITY, UNSPECIFIED HEART FAILURE TYPE: ICD-10-CM

## 2024-05-16 DIAGNOSIS — R69 MULTIPLE CHRONIC DISEASES: ICD-10-CM

## 2024-05-16 DIAGNOSIS — M15.0 PRIMARY GENERALIZED (OSTEO)ARTHRITIS: ICD-10-CM

## 2024-05-16 DIAGNOSIS — E11.9 CONTROLLED TYPE 2 DIABETES MELLITUS WITHOUT COMPLICATION, WITHOUT LONG-TERM CURRENT USE OF INSULIN: ICD-10-CM

## 2024-05-16 DIAGNOSIS — R79.89 ABNORMAL LFTS: ICD-10-CM

## 2024-05-16 DIAGNOSIS — R60.9 EDEMA, UNSPECIFIED TYPE: ICD-10-CM

## 2024-05-16 DIAGNOSIS — I38 VALVULAR HEART DISEASE: ICD-10-CM

## 2024-05-16 DIAGNOSIS — L91.8 SKIN TAG: Primary | ICD-10-CM

## 2024-05-16 DIAGNOSIS — L98.9 SKIN LESION: ICD-10-CM

## 2024-05-16 DIAGNOSIS — I10 PRIMARY HYPERTENSION: ICD-10-CM

## 2024-05-16 NOTE — PROGRESS NOTES
ELANA”.   Subjective   Ira Sanchez is a 79 y.o. female presenting with chief complaint of:   Chief Complaint   Patient presents with    Follow-up     AWV   Last Completed Annual Wellness Visit            ANNUAL WELLNESS VISIT (Yearly) Next due on 12/18/2024 12/18/2023  Level of Service: MS PPPS, SUBSEQ VISIT    01/18/2022  Level of Service: MS PPPS, SUBSEQ VISIT    12/20/2019  Level of Service: MS PPPS, SUBSEQ VISIT    12/20/2019  Done                     History of Present Illness :  Alone.   Here for review of chronic problems that includes HTN and others.     Has multiple chronic problems to consider that might have a bearing on today's issues;  an interval appointment.       Chronic/acute problems reviewed today:   1. Primary hypertension Chronic/stable. Stable here past/no recent home blood pressures.  No significant chest pain, SOB, LE edema, orthopnea, near syncope, dizziness/light headness.   Recent Vitals         3/12/2024 3/25/2024 5/16/2024       BP: 124/82 132/68 144/80     Pulse: 64 57 51     Temp: 98.1 °F (36.7 °C) -- 97.1 °F (36.2 °C)     Weight: 78.9 kg (174 lb) 77.6 kg (171 lb) 76.2 kg (168 lb)     BMI (Calculated): 31.3 30.3 29.8              2. Controlled type 2 diabetes mellitus without complication, without long-term current use of insulin Chronic/stable. No problem/pattern hypoglycemia/hyperglycemia manifest by poly- dypsia, phagia, uria, or sweats, diaphoretic episodes, syncope/near.     3. Chronic obstructive pulmonary disease, unspecified COPD type Chronic/stable mild occ cough, sob, wheeze.  Rx declined.   No smoking.       4. Osteopenia, unspecified location Chronic/stable.  Last bone density/if below.   Has had/considerred Rx.  Ok further bone density screening when due.      5. Valvular heart disease: AS chronic/stable symptoms of no chest pain, SOB, edema with past echos showing valvular changes of: AS.     6. Fatty liver Chronic/stable.  Aware treatment involves  None normalizing weight; usually including low fat diet and regular exercise.  Aware condition can go on to cirrhosis and death.  Stable occ liver labs and past imaging.       7. Abnormal LFTs history of elevated liver function; at this point still felt to be benign   8. Congestive heart failure, unspecified HF chronicity, unspecified heart failure type Chronic/stable.  Denies significant sob, orthopnea, leg edema, weight gain.  Aware of influence diet/salt and watching weight at home.       9. Edema, unspecified type chronic/variable.  Recently better.  Causes include: likely venous.      10. Multiple chronic diseases Has multiple chronic problems with increased risks for management (involves polypharmacy, multiple providers, many required labs/imaging/ to manage)     11. Primary generalized (osteo)arthritis Chronic/stable.  Various on/off joint pains/soreness/stiffness.  Particular joint problems with LE.  No joint swelling.  Treats mainly with reduced activity, Rx listed, Tylenol.  No  NSAIDs, and no recent/or request injections.      12. Neuropathy-chemo/better chronic lower extremity numbness stinging or tingling started when she is on chemo and has a little improved over time.  Does not affect ADLs   13. Skin lesion still has a lesion on her right lower back and she mentioned last visit.  Would like to have it removed.     Has an/another acute issue today: none.    The following portions of the patient's history were reviewed and updated as appropriate: allergies, current medications, past family history, past medical history, past social history, past surgical history, and problem list.      Current Outpatient Medications:     amLODIPine (NORVASC) 5 MG tablet, TAKE 1 TABLET BY MOUTH DAILY, Disp: 90 tablet, Rfl: 3    azelastine (ASTELIN) 0.1 % nasal spray, 2 sprays into the nostril(s) as directed by provider 2 (Two) Times a Day., Disp: 30 mL, Rfl: 0    Calcium Carb-Cholecalciferol (CALCIUM-VITAMIN D) 600-400  MG-UNIT tablet, Take 1 tablet by mouth Daily., Disp: , Rfl:     cycloSPORINE (RESTASIS) 0.05 % ophthalmic emulsion, 1 drop As Needed., Disp: , Rfl:     diphenhydrAMINE (BENADRYL) 25 mg capsule, Take 1 capsule by mouth At Night As Needed for Allergies., Disp: , Rfl:     losartan (COZAAR) 100 MG tablet, TAKE 1 TABLET BY MOUTH DAILY, Disp: 90 tablet, Rfl: 3    nystatin (MYCOSTATIN) 692313 UNIT/GM powder, Apply  topically to the appropriate area as directed 3 (Three) Times a Day., Disp: 45 g, Rfl: 0    oxybutynin XL (DITROPAN XL) 15 MG 24 hr tablet, Take 1 tablet by mouth Daily., Disp: 30 tablet, Rfl: 5    thiamine (VITAMINE B-1) 100 MG tablet, Take 1 tablet by mouth Daily., Disp: , Rfl:   No current facility-administered medications for this visit.    Facility-Administered Medications Ordered in Other Visits:     heparin flush (porcine) 100 UNIT/ML injection 500 Units, 500 Units, Intravenous, PRN, Shanda Torres MD, 500 Units at 04/16/18 1504    heparin flush (porcine) 100 UNIT/ML injection 500 Units, 500 Units, Intravenous, PRN, Shanda Torres MD, 500 Units at 06/12/18 1445    heparin injection 500 Units, 500 Units, Intravenous, PRN, Tamela Doherty, APRN, 500 Units at 12/08/21 0948    sodium chloride 0.9 % flush 10 mL, 10 mL, Intravenous, PRN, Shanda Torres MD, 10 mL at 04/16/18 1503    sodium chloride 0.9 % flush 10 mL, 10 mL, Intravenous, PRN, Shanda Torres MD, 10 mL at 06/12/18 1445    sodium chloride 0.9 % flush 10 mL, 10 mL, Intravenous, PRN, Tamela Doherty, APRN, 10 mL at 12/08/21 0948    No problems with medications.    Allergies   Allergen Reactions    Benzonatate Hives and Itching       Review of Systems  GENERAL:  Active/slower with limits, speed, stamina for age, and occ back pain.  Sleep is ok. No fever now/recent.  SKIN: No other rash/skin lesion of concern besides that mentioned above.   ENDO:  No syncope, near or diaphoretic sweaty  spells.  No download..  HEENT: No recent head injury; stable occ headache.  No vision change.  No chronic hearing loss.  Ears without fullness without pain/drainage.  No sore throat now.  No significant nasal/sinus congestion/drainage. No epistaxis.   CHEST: No chest wall tenderness or mass.  No significant cough, and no wheeze.  No SOB; no hemoptysis.  CV: No chest pain, palpitations; daily end of day mild pitting/ankle edema.  GI: No heartburn, dysphagia.  No abdominal pain, diarrhea, constipation.  No rectal bleeding, or melena.    :  Voids with occ on/off dysuria, without incontinence to completion.  ORTHO: No painful/swollen joints but various on /off sore.  Occ same/sore neck or back.  No acute neck or lower back pain without recent injury.  NEURO: No dizziness, weakness of extremities.  Same mild numbness/paresthesias LE.  PSYCH: No memory loss.  Mood good; mild anxious, depressed but/and not suicidal.  Tries to tolerate stress .   Screening:  Mammogram: bilateral masectomy  Bone density:   Bone d-deca/BH/hip -1.6, LS +1.3/1.24.22-watching  Bone d-deca/[facility]/T hip -0.5 LS +1.4/12.18.18;  no change; maybe 2-5 yr  Low dose CT chest:Tobacco-smoker/age 17/1-2ppd/dc age 60:  CTs with breast cancer:   GI:   Colon-avm-div/Shieben/[facility]/12.19.17/5y  Colon-p, avm, div/Shieben/[facility]/2.22.23/prn    Copy/paste function used for ROS/exam AND each area of these were reviewed, updated, confirmed and supplemented as needed.  Data reviewed:   Recent admit/ER/MD visits: 3.12.24    1. Overactive bladder    2. Congestive heart failure, unspecified HF chronicity, unspecified heart failure type    3. Primary hypertension    4. Valvular heart disease: AS    5. Controlled type 2 diabetes mellitus without complication, without long-term current use of insulin    6. Abnormal LFTs    7. Fatty liver    8. Malignant neoplasm of female breast, unspecified estrogen receptor status, unspecified laterality, unspecified site of breast    9.  Chronic back pain, unspecified back location, unspecified back pain laterality    10. Primary generalized (osteo)arthritis    11. Scarring of lung    12. Multiple chronic diseases    13. Chronic rhinitis    14. Skin lesion          Data review above:   Discussions/medical decisions/reviews:  BP ok  Other vitals ok  Recent Vitals           12/14/2023 12/18/2023 3/12/2024          BP: 158/68 140/74 124/82       Pulse: 44 50 64       Temp: 97.3 °F (36.3 °C) 96.9 °F (36.1 °C) 98.1 °F (36.7 °C)       Weight: 78.5 kg (173 lb) 77.5 kg (170 lb 12.8 oz) 78.9 kg (174 lb)       BMI (Calculated): 31.1 30.7 31.3               DM/A1c 5.5 12.19.23  DM/ 12.19.23  Lipid LDL 89 12.19.23-none  PSA NA  CBC ok 12.19.23  Iron 85 6.7.23  Renal ok 12.19.23  Liver ok 12.19.23  Vit D 45 12.19.23  Thyroid TSH ok 12.19.23     Screening reviewed/updated   Vaccines discussed (see below)   Skin lesions: nose looks like keloid; could worsen with attempt to remove; suggest punch bx  Lower R back; skin tag; can set up removal  Will need echo next 6-12m visit; follow aortic valve  Increase ditropan 10xr to 15xr; see if helps voiding/tolerated; if not consider back to 10 mg and even lasix small dose mid day to see if LE/end of day edema is being mobilized during night?   Trial of astelin NS    Last cardiac testing:   Echo:   Results for orders placed during the hospital encounter of 10/20/23    Adult Transthoracic Echo Complete W/ Cont if Necessary Per Protocol    Interpretation Summary    Left ventricular systolic function is normal. Left ventricular ejection fraction appears to be 66 - 70%.    Left ventricular diastolic function is consistent with (grade I) impaired relaxation.    The left atrial cavity is mildly dilated.    Normal right ventricular cavity size and systolic function noted.    Mild aortic valve stenosis is present.    Estimated right ventricular systolic pressure from tricuspid regurgitation is normal (<35 mmHg).      Radiology  considered:   US Carotid Bilateral (per vascular)    Result Date: 3/25/2024  Impression: 1. There is less than 50% stenosis of the right internal carotid artery. 2. There is 50 to 69% stenosis of the left internal carotid artery. 3. Antegrade flow is demonstrated in bilateral vertebral arteries.  Comments: Bilateral carotid vertebral arterial duplex scan was performed.  Grayscale imaging shows intimal thickening and calcified elements at the carotid bifurcation. The right internal carotid artery peak systolic velocity is 83.5 cm/sec. The end-diastolic velocity is 16.4 cm/sec. The right ICA/CCA ratio is approximately 0.6. These findings correlate with less than 50% stenosis of the right internal carotid artery.  Grayscale imaging shows intimal thickening and calcified elements at the carotid bifurcation. The left internal carotid artery peak systolic velocity is 134.9 cm/sec. The end-diastolic velocity is 33.5 cm/sec. The left ICA/CCA ratio is approximately 1.9. These findings correlate with 50 to 69% stenosis of the left internal carotid artery.  Antegrade flow is demonstrated in bilateral vertebral arteries.   This report was signed and finalized on 3/25/2024 2:40 PM by Dr. Mustapha London MD.       Lab Results:  Results for orders placed or performed in visit on 05/13/24   Comprehensive metabolic panel    Specimen: Blood   Result Value Ref Range    Glucose 107 (H) 65 - 99 mg/dL    BUN 15 8 - 23 mg/dL    Creatinine 0.78 0.57 - 1.00 mg/dL    EGFR Result 76.4 >60.0 mL/min/1.73    BUN/Creatinine Ratio 19.2 7.0 - 25.0    Sodium 142 136 - 145 mmol/L    Potassium 4.1 3.5 - 5.2 mmol/L    Chloride 104 98 - 107 mmol/L    Total CO2 26.2 22.0 - 29.0 mmol/L    Calcium 9.3 8.6 - 10.5 mg/dL    Total Protein 6.8 6.0 - 8.5 g/dL    Albumin 4.2 3.5 - 5.2 g/dL    Globulin 2.6 gm/dL    A/G Ratio 1.6 g/dL    Total Bilirubin 0.5 0.0 - 1.2 mg/dL    Alkaline Phosphatase 111 39 - 117 U/L    AST (SGOT) 16 1 - 32 U/L    ALT (SGPT) 6 1 - 33  U/L   Lipid Panel w/ Chol/HDL Ratio    Specimen: Blood   Result Value Ref Range    Total Cholesterol 150 0 - 200 mg/dL    Triglycerides 123 0 - 150 mg/dL    HDL Cholesterol 40 40 - 60 mg/dL    VLDL Cholesterol Derrek 22 5 - 40 mg/dL    LDL Chol Calc (NIH) 88 0 - 100 mg/dL    Chol/HDL Ratio 3.75    TSH    Specimen: Blood   Result Value Ref Range    TSH 1.800 0.270 - 4.200 uIU/mL   Hemoglobin A1c    Specimen: Blood   Result Value Ref Range    Hemoglobin A1C 5.50 4.80 - 5.60 %   Vitamin D 25 hydroxy    Specimen: Blood   Result Value Ref Range    25 Hydroxy, Vitamin D 37.4 30.0 - 100.0 ng/ml   CBC w AUTO Differential    Specimen: Blood   Result Value Ref Range    WBC 6.43 3.40 - 10.80 10*3/mm3    RBC 4.46 3.77 - 5.28 10*6/mm3    Hemoglobin 13.8 12.0 - 15.9 g/dL    Hematocrit 42.0 34.0 - 46.6 %    MCV 94.2 79.0 - 97.0 fL    MCH 30.9 26.6 - 33.0 pg    MCHC 32.9 31.5 - 35.7 g/dL    RDW 11.8 (L) 12.3 - 15.4 %    Platelets 172 140 - 450 10*3/mm3    Neutrophil Rel % 56.8 42.7 - 76.0 %    Lymphocyte Rel % 34.1 19.6 - 45.3 %    Monocyte Rel % 6.8 5.0 - 12.0 %    Eosinophil Rel % 1.7 0.3 - 6.2 %    Basophil Rel % 0.3 0.0 - 1.5 %    Neutrophils Absolute 3.65 1.70 - 7.00 10*3/mm3    Lymphocytes Absolute 2.19 0.70 - 3.10 10*3/mm3    Monocytes Absolute 0.44 0.10 - 0.90 10*3/mm3    Eosinophils Absolute 0.11 0.00 - 0.40 10*3/mm3    Basophils Absolute 0.02 0.00 - 0.20 10*3/mm3    Immature Granulocyte Rel % 0.3 0.0 - 0.5 %    Immature Grans Absolute 0.02 0.00 - 0.05 10*3/mm3    nRBC 0.0 0.0 - 0.2 /100 WBC       A1C:  Lab Results - Last 18 Months   Lab Units 05/13/24  0745 12/19/23  0733   HEMOGLOBIN A1C % 5.50 5.50     GLUCOSE:  Lab Results - Last 18 Months   Lab Units 05/13/24  0745 12/19/23  0733 06/07/23  0810 11/30/22  0818   GLUCOSE mg/dL 107* 103* 107* 129*     LIPID:  Lab Results - Last 18 Months   Lab Units 05/13/24  0745 12/19/23  0733   CHOLESTEROL mg/dL 150 151   LDL CHOL mg/dL 88 89   HDL CHOL mg/dL 40 45   TRIGLYCERIDES mg/dL  "123 93     PSA:No results found for: \"PSA\"    CBC:  Lab Results - Last 18 Months   Lab Units 05/13/24  0745 12/19/23  0733 06/07/23  0810 11/30/22  0818   WBC 10*3/mm3 6.43 5.52 7.60 8.51   HEMOGLOBIN g/dL 13.8 15.0 14.2 13.9   HEMATOCRIT % 42.0 46.1 44.1 43.3   PLATELETS 10*3/mm3 172 164 167 176   IRON mcg/dL  --   --  85  --      BMP/CMP:  Lab Results - Last 18 Months   Lab Units 05/13/24  0745 12/19/23  0733 06/07/23  0810 11/30/22  0818   SODIUM mmol/L 142 141 138 138   POTASSIUM mmol/L 4.1 3.9 4.5 4.3   CHLORIDE mmol/L 104 100 101 102   CO2 mmol/L 26.2 24.5 25.0 27.0   GLUCOSE mg/dL 107* 103* 107* 129*   BUN mg/dL 15 12 18 12   CREATININE mg/dL 0.78 0.80 0.59 0.62   EGFR RESULT mL/min/1.73 76.4 74.6  --   --    CALCIUM mg/dL 9.3 9.4 9.5 9.7       HEPATIC:  Lab Results - Last 18 Months   Lab Units 05/13/24  0745 12/19/23  0733 06/07/23  0810 11/30/22  0818   ALT (SGPT) U/L 6 11 15 16   AST (SGOT) U/L 16 26 34* 32   ALK PHOS U/L 111 112 110 127*     HEPATITIS C ANTIBODY:   Lab Results   Component Value Date/Time    HEPCVIRUSABY Non-Reactive 06/01/2022 07:26 AM    HEPCVIRUSABY 0.3 06/08/2020 07:05 AM     Vit D:  Lab Results - Last 18 Months   Lab Units 05/13/24  0745 12/19/23  0733   VIT D 25 HYDROXY ng/ml 37.4 45.8     THYROID:  Lab Results - Last 18 Months   Lab Units 05/13/24  0745 12/19/23  0733   TSH uIU/mL 1.800 2.240       Objective   /80   Pulse 51   Temp 97.1 °F (36.2 °C) (Temporal)   Ht 160 cm (63\")   Wt 76.2 kg (168 lb)   LMP  (LMP Unknown)   SpO2 95%   BMI 29.76 kg/m²   Body mass index is 29.76 kg/m².    Recent Vitals         3/12/2024 3/25/2024 5/16/2024       BP: 124/82 132/68 144/80     Pulse: 64 57 51     Temp: 98.1 °F (36.7 °C) -- 97.1 °F (36.2 °C)     Weight: 78.9 kg (174 lb) 77.6 kg (171 lb) 76.2 kg (168 lb)     BMI (Calculated): 31.3 30.3 29.8           Wt Readings from Last 15 Encounters:   05/16/24 0907 76.2 kg (168 lb)   03/25/24 0907 77.6 kg (171 lb)   03/12/24 0817 78.9 kg " (174 lb)   12/18/23 1036 77.5 kg (170 lb 12.8 oz)   12/14/23 1437 78.5 kg (173 lb)   10/20/23 1316 85.3 kg (188 lb)   09/12/23 0924 85.3 kg (188 lb)   06/20/23 1310 85.7 kg (189 lb)   06/19/23 0906 84.8 kg (187 lb)   06/07/23 0827 83.6 kg (184 lb 6.4 oz)   04/17/23 0902 83 kg (183 lb)   04/10/23 1111 86.2 kg (190 lb 1.6 oz)   12/08/22 1326 87.5 kg (193 lb)   12/07/22 0805 87.6 kg (193 lb 3.2 oz)   12/01/22 1416 87.5 kg (193 lb)       Physical Exam  GENERAL:  Well nourished/developed in no acute distress  SKIN: Turgor excellent, without wound, rash, lesion or significance beyond PHOTO below; R lower back.   HEENT: Normal cephalic without trauma. Pupils equal round reactive to light. Extraocular motions full without nystagmus.  External canals nonobstructive nontender without reddness. Tymphatic membranes dull with irwin structures intact.   Oral cavity without growths, exudates, and moist.  Posterior pharynx without mass, obstruction, redness.  No thyromegaly, , tenderness, definite lymphadenopathy   CV: Regular rhythm.  1-2/6 systolic murmur without gallop and trace ankle/LE edema. Posterior pulses intact.  No carotid bruits.  CHEST: No chest wall tenderness or mass.   LUNGS: Symmetric motion with clear to auscultation.  No dullness to percussion  ABD: Soft, non-tender without mass.   ORTHO: Symmetric extremities without swelling/point tenderness.  Full gross range of motion.  NEURO: CN 2-12 grossly intact except L eyelid mild droop.  Otherwise symmetric facies and UE/LE. 3/5 strength throughout.   Otherwise nonfocal use extremities. Speech clear.          File Link    Scan on 3/12/2024 0846 by Conrado Tinoco MD: R lower back        Key Information    Document ID File Type Document Type Description   [id number] Image CLINICAL PHOTOGRAPHS OTHER R lower back     Import Information    Attached At Date Time User Dept   Encounter Level 3/12/2024  8:46 AM Conrado Tinoco MD [facility]      Encounter    Office Visit on 3/12/24 with Conrado Tinoco MD        Assessment & Plan        1. Cough, unspecified type    2. Valvular heart disease: AS    3. Bronchitis    4. Neck nodule    5. Controlled type 2 diabetes mellitus without complication, without long-term current use of insulin    6. Congestive heart failure, unspecified HF chronicity, unspecified heart failure type    7. Chronic obstructive pulmonary disease, unspecified COPD type        Assessment & Plan     1. Primary hypertension    2. Controlled type 2 diabetes mellitus without complication, without long-term current use of insulin    3. Chronic obstructive pulmonary disease, unspecified COPD type    4. Osteopenia, unspecified location    5. Valvular heart disease: AS    6. Fatty liver    7. Abnormal LFTs    8. Congestive heart failure, unspecified HF chronicity, unspecified heart failure type    9. Edema, unspecified type    10. Multiple chronic diseases    11. Primary generalized (osteo)arthritis    12. Neuropathy-chemo/better    13. Skin lesion        Data review above:   Discussions/medical decisions/reviews:  BP ok  Other vitals ok  Recent Vitals         3/12/2024 3/25/2024 5/16/2024       BP: 124/82 132/68 144/80     Pulse: 64 57 51     Temp: 98.1 °F (36.7 °C) -- 97.1 °F (36.2 °C)     Weight: 78.9 kg (174 lb) 77.6 kg (171 lb) 76.2 kg (168 lb)     BMI (Calculated): 31.3 30.3 29.8           DM/A1c 5.5 5.13.24  DM/ 5.13.24  Lipid LDL 88 5.13.24; none  PSA NA  CBC ok 5.13.24  Iron 85 6.7.23; none  Renal ok 5.13.24  Liver ok 5.13.24  Vit D 37 5.13.24  Thyroid TSH ok 5.13.24; none    Screening reviewed/updated up to date  Vaccines discussed (see below) shingles again advised  Removal of skin tag  Echocardiogram 6m from now (would be 18m); going slow with echos as asymptomatic    Verbal permission given for procedure described as done with warning of pain, bleeding, infection, more surgery/expense and regrowth; all possible.  Accepted  Cleansed the lesion/surrounding area with alcohol  Lidocaine 1%/with 1 cc infiltrated; and confirmed anesthesia/no pain perceived  With scissors clipped the lesion at its base  Lesion base/edges was hyfricated repeated until no bleeding and noting some wound closure.  The wound was then cleansed with peroxide.  Triple antibiotic/equal was applied and bandage applied after that.    Asked to repeat this cleanse procedure as needed/at least twice a day until healed and report any signs of problems such as bleeding, pain, fever, nonhealing and regrowth in the future.   Path report pending; patient was asked to call if they do not receive verbal confirmation of the path report usually returning 7-10 days.       Follow up: Return for lab/Dr Johnson 6m .  Future Appointments   Date Time Provider Department Center   6/5/2024  8:00 AM  [facility]  [facility]   6/5/2024  8:30 AM Denisha Reyes APRN [facility]   6/18/2024  9:00 AM Dante eMndoza MD [facility]   1/13/2025  9:00 AM Modesto Johnson MD [facility]   3/17/2025 11:00 AM [facility] C[facility] 2  [facility]   3/17/2025 11:30 AM Henrietta Olvera APRN [facility]     Data review above:   Rx: reviewed and decisions:   Rx new/changes: none  No orders of the defined types were placed in this encounter.    Orders placed:   LAB/Testing/Referrals: reviewed/orders:   Today:   No orders of the defined types were placed in this encounter.    Chronic/recurrent labs above or change to:   Same   Immunization History   Administered Date(s) Administered    ABRYSVO (RSV, 60+ or pregnant women 32-36 wks) 01/17/2024    COVID-19 (MODERNA) 1st,2nd,3rd Dose Monovalent 02/19/2021, 03/19/2021    COVID-19 (UNSPECIFIED) 12/28/2023    Fluad Quad 65+ 10/09/2020    Fluzone High Dose =>65 Years ([employer] ONLY) 11/19/2017, 11/14/2019    Fluzone High-Dose 65+yrs 12/08/2022    Influenza, Unspecified 12/28/2023    Pneumococcal Conjugate 13-Valent (PCV13)  "11/19/2017    Pneumococcal Conjugate 20-Valent (PCV20) 01/17/2024    Shingrix 02/01/2024    Tdap 02/01/2024     We advised/reaffirmed our support/suggestion for staying complete with covid- covid boosters, seasonal flu/yearly and any missing vaccine from list we supplied; when cannot be given here we suggest contact with local health department office or pharmacy to review missing/needed vaccines and then bring nursing documentation for these vaccines to this office or call this information in. Shingles became \"free\" 1.1.23 for medicare insurance.    Health maintenance:   Body mass index is 29.76 kg/m².         Tobacco use reviewed:   Ira Sanchez  reports that she quit smoking about 23 years ago. Her smoking use included cigarettes. She started smoking about 61 years ago. She has a 75.2 pack-year smoking history. She has never used smokeless tobacco.   There are no Patient Instructions on file for this visit.          "

## 2024-05-21 LAB
DX ICD CODE: NORMAL
DX ICD CODE: NORMAL
PATH REPORT.FINAL DX SPEC: NORMAL
PATH REPORT.GROSS SPEC: NORMAL
PATH REPORT.SITE OF ORIGIN SPEC: NORMAL
PATHOLOGIST NAME: NORMAL
PAYMENT PROCEDURE: NORMAL

## 2024-05-30 DIAGNOSIS — Z45.2 ENCOUNTER FOR CARE RELATED TO VASCULAR ACCESS PORT: Primary | ICD-10-CM

## 2024-05-30 DIAGNOSIS — E61.1 IRON DEFICIENCY: ICD-10-CM

## 2024-05-30 DIAGNOSIS — Z85.3 HISTORY OF BREAST CANCER: ICD-10-CM

## 2024-06-05 ENCOUNTER — LAB (OUTPATIENT)
Dept: LAB | Facility: HOSPITAL | Age: 79
End: 2024-06-05
Payer: MEDICARE

## 2024-06-05 ENCOUNTER — OFFICE VISIT (OUTPATIENT)
Dept: ONCOLOGY | Facility: CLINIC | Age: 79
End: 2024-06-05
Payer: MEDICARE

## 2024-06-05 VITALS
HEART RATE: 48 BPM | DIASTOLIC BLOOD PRESSURE: 82 MMHG | BODY MASS INDEX: 29.73 KG/M2 | TEMPERATURE: 97.2 F | OXYGEN SATURATION: 97 % | RESPIRATION RATE: 16 BRPM | SYSTOLIC BLOOD PRESSURE: 126 MMHG | HEIGHT: 63 IN | WEIGHT: 167.8 LBS

## 2024-06-05 DIAGNOSIS — Z85.3 HISTORY OF BREAST CANCER: Primary | ICD-10-CM

## 2024-06-05 DIAGNOSIS — I10 PRIMARY HYPERTENSION: Chronic | ICD-10-CM

## 2024-06-05 DIAGNOSIS — M85.80 OSTEOPENIA, UNSPECIFIED LOCATION: ICD-10-CM

## 2024-06-05 DIAGNOSIS — Z45.2 ENCOUNTER FOR CARE RELATED TO VASCULAR ACCESS PORT: ICD-10-CM

## 2024-06-05 DIAGNOSIS — Z45.2 ENCOUNTER FOR CARE RELATED TO PORT-A-CATH: ICD-10-CM

## 2024-06-05 DIAGNOSIS — Z85.3 HISTORY OF BREAST CANCER: ICD-10-CM

## 2024-06-05 DIAGNOSIS — E61.1 IRON DEFICIENCY: ICD-10-CM

## 2024-06-05 DIAGNOSIS — Z78.0 POST-MENOPAUSAL: ICD-10-CM

## 2024-06-05 DIAGNOSIS — Z86.39 HISTORY OF IRON DEFICIENCY: ICD-10-CM

## 2024-06-05 LAB
ALBUMIN SERPL-MCNC: 4.3 G/DL (ref 3.5–5.2)
ALBUMIN/GLOB SERPL: 1.8 G/DL
ALP SERPL-CCNC: 110 U/L (ref 39–117)
ALT SERPL W P-5'-P-CCNC: 8 U/L (ref 1–33)
ANION GAP SERPL CALCULATED.3IONS-SCNC: 10 MMOL/L (ref 5–15)
AST SERPL-CCNC: 18 U/L (ref 1–32)
BASOPHILS # BLD AUTO: 0.02 10*3/MM3 (ref 0–0.2)
BASOPHILS NFR BLD AUTO: 0.3 % (ref 0–1.5)
BILIRUB SERPL-MCNC: 0.5 MG/DL (ref 0–1.2)
BUN SERPL-MCNC: 16 MG/DL (ref 8–23)
BUN/CREAT SERPL: 32 (ref 7–25)
CALCIUM SPEC-SCNC: 9.6 MG/DL (ref 8.6–10.5)
CEA SERPL-MCNC: 5.11 NG/ML
CHLORIDE SERPL-SCNC: 105 MMOL/L (ref 98–107)
CO2 SERPL-SCNC: 26 MMOL/L (ref 22–29)
CREAT SERPL-MCNC: 0.5 MG/DL (ref 0.57–1)
DEPRECATED RDW RBC AUTO: 43.7 FL (ref 37–54)
EGFRCR SERPLBLD CKD-EPI 2021: 94.4 ML/MIN/1.73
EOSINOPHIL # BLD AUTO: 0.1 10*3/MM3 (ref 0–0.4)
EOSINOPHIL NFR BLD AUTO: 1.5 % (ref 0.3–6.2)
ERYTHROCYTE [DISTWIDTH] IN BLOOD BY AUTOMATED COUNT: 12.5 % (ref 12.3–15.4)
FERRITIN SERPL-MCNC: 102 NG/ML (ref 13–150)
GLOBULIN UR ELPH-MCNC: 2.4 GM/DL
GLUCOSE SERPL-MCNC: 108 MG/DL (ref 65–99)
HCT VFR BLD AUTO: 40.2 % (ref 34–46.6)
HGB BLD-MCNC: 13.2 G/DL (ref 12–15.9)
IMM GRANULOCYTES # BLD AUTO: 0.01 10*3/MM3 (ref 0–0.05)
IMM GRANULOCYTES NFR BLD AUTO: 0.1 % (ref 0–0.5)
IRON 24H UR-MRATE: 86 MCG/DL (ref 37–145)
IRON SATN MFR SERPL: 23 % (ref 20–50)
LYMPHOCYTES # BLD AUTO: 1.89 10*3/MM3 (ref 0.7–3.1)
LYMPHOCYTES NFR BLD AUTO: 28.2 % (ref 19.6–45.3)
MCH RBC QN AUTO: 31.4 PG (ref 26.6–33)
MCHC RBC AUTO-ENTMCNC: 32.8 G/DL (ref 31.5–35.7)
MCV RBC AUTO: 95.5 FL (ref 79–97)
MONOCYTES # BLD AUTO: 0.46 10*3/MM3 (ref 0.1–0.9)
MONOCYTES NFR BLD AUTO: 6.9 % (ref 5–12)
NEUTROPHILS NFR BLD AUTO: 4.22 10*3/MM3 (ref 1.7–7)
NEUTROPHILS NFR BLD AUTO: 63 % (ref 42.7–76)
NRBC BLD AUTO-RTO: 0 /100 WBC (ref 0–0.2)
PLATELET # BLD AUTO: 154 10*3/MM3 (ref 140–450)
PMV BLD AUTO: 10.5 FL (ref 6–12)
POTASSIUM SERPL-SCNC: 4.3 MMOL/L (ref 3.5–5.2)
PROT SERPL-MCNC: 6.7 G/DL (ref 6–8.5)
RBC # BLD AUTO: 4.21 10*6/MM3 (ref 3.77–5.28)
SODIUM SERPL-SCNC: 141 MMOL/L (ref 136–145)
TIBC SERPL-MCNC: 375 MCG/DL (ref 298–536)
TRANSFERRIN SERPL-MCNC: 252 MG/DL (ref 200–360)
WBC NRBC COR # BLD AUTO: 6.7 10*3/MM3 (ref 3.4–10.8)

## 2024-06-05 PROCEDURE — 85025 COMPLETE CBC W/AUTO DIFF WBC: CPT

## 2024-06-05 PROCEDURE — 36415 COLL VENOUS BLD VENIPUNCTURE: CPT

## 2024-06-05 PROCEDURE — 80053 COMPREHEN METABOLIC PANEL: CPT

## 2024-06-05 PROCEDURE — 83540 ASSAY OF IRON: CPT

## 2024-06-05 PROCEDURE — 84466 ASSAY OF TRANSFERRIN: CPT

## 2024-06-05 PROCEDURE — 86300 IMMUNOASSAY TUMOR CA 15-3: CPT

## 2024-06-05 PROCEDURE — 82378 CARCINOEMBRYONIC ANTIGEN: CPT

## 2024-06-05 PROCEDURE — 82728 ASSAY OF FERRITIN: CPT

## 2024-06-05 RX ORDER — SODIUM CHLORIDE 0.9 % (FLUSH) 0.9 %
10 SYRINGE (ML) INJECTION AS NEEDED
OUTPATIENT
Start: 2024-06-05

## 2024-06-05 RX ORDER — HEPARIN SODIUM (PORCINE) LOCK FLUSH IV SOLN 100 UNIT/ML 100 UNIT/ML
500 SOLUTION INTRAVENOUS AS NEEDED
OUTPATIENT
Start: 2024-06-05

## 2024-06-05 RX ORDER — SODIUM CHLORIDE 0.9 % (FLUSH) 0.9 %
10 SYRINGE (ML) INJECTION AS NEEDED
Status: SHIPPED | OUTPATIENT
Start: 2024-06-05

## 2024-06-05 RX ORDER — HEPARIN SODIUM (PORCINE) LOCK FLUSH IV SOLN 100 UNIT/ML 100 UNIT/ML
500 SOLUTION INTRAVENOUS AS NEEDED
Status: SHIPPED | OUTPATIENT
Start: 2024-06-05

## 2024-06-05 RX ADMIN — Medication 10 ML: at 08:50

## 2024-06-05 RX ADMIN — HEPARIN SODIUM (PORCINE) LOCK FLUSH IV SOLN 100 UNIT/ML 500 UNITS: 100 SOLUTION at 08:50

## 2024-06-05 NOTE — PROGRESS NOTES
[facility] ONC [facility] HEMATOLOGY & ONCOLOGY [place]  [address]  [facility] [address] [phone]    Patient Name: Ira Sanchez  Encounter Date: 06/05/2024   YOB: 1945  Patient Number: [id number]    Hematology / Oncology Progress Note    HPI / REASON FOR VISIT: Ira Sanchez is a 79 y.o. female who is followed by this office for history of  left breast cancer that was diagnosed in 2014.  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  It was ER+, NH+ and Her 2 emerita +.  She subsequently underwent bilateral mastectomies and her AJCC TNM staging was [id number], Stage IIA.      She then underwent 5 cycles of Carboplatin, Taxotere and Herceptin followed by a year of Herceptin therapy.  She then was placed on hormonal manipulation with Arimidex in June 2015 and completed 5 years in 2020.      She mentioned getting her port out but with further discussion, she states she doesn't want it out.  Discussed with her how it has been over five years but she continues to not want to have it removed.      I have reviewed the HPI and verified with the patient the accuracy of it. No changes to history since the information was documented.  Denisha Reyes, APRN.  06/05/2024      INTERVAL HISTORY   Ms. Sanchez presents to clinic today for continued follow up.   She has no acute complaints today.   Pt had labs drawn and results were reviewed with her today.       LABS    Lab Results - Last 18 Months   Lab Units 06/05/24  0753 05/13/24  0745 12/19/23  0733 06/07/23  0810   HEMOGLOBIN g/dL 13.2 13.8 15.0 14.2   HEMATOCRIT % 40.2 42.0 46.1 44.1   MCV fL 95.5 94.2 92.8 96.7   WBC 10*3/mm3 6.70 6.43 5.52 7.60   RDW % 12.5 11.8* 11.5* 12.5   MPV fL 10.5  --   --  10.5   PLATELETS 10*3/mm3 154 172 164 167   IMM GRAN % % 0.1  --   --  0.3   NEUTROS ABS 10*3/mm3 4.22 3.65 3.26 4.19   LYMPHS ABS 10*3/mm3 1.89 2.19 1.81 2.69   MONOS ABS  10*3/mm3 0.46 0.44 0.35 0.58   EOS ABS 10*3/mm3 0.10 0.11 0.08 0.10   BASOS ABS 10*3/mm3 0.02 0.02 0.01 0.02   IMMATURE GRANS (ABS) 10*3/mm3 0.01  --   --  0.02   NRBC /100 WBC 0.0 0.0 0.0 0.0       Lab Results - Last 18 Months   Lab Units 06/05/24 0753 05/13/24  0745 12/19/23  0733 06/07/23  0810   GLUCOSE mg/dL 108* 107* 103* 107*   SODIUM mmol/L 141 142 141 138   POTASSIUM mmol/L 4.3 4.1 3.9 4.5   CO2 mmol/L 26.0 26.2 24.5 25.0   CHLORIDE mmol/L 105 104 100 101   ANION GAP mmol/L 10.0  --   --  12.0   CREATININE mg/dL 0.50* 0.78 0.80 0.59   BUN mg/dL 16 15 12 18   BUN / CREAT RATIO  32.0* 19.2 15.0 30.5*   CALCIUM mg/dL 9.6 9.3 9.4 9.5   ALK PHOS U/L 110 111 112 110   TOTAL PROTEIN g/dL 6.7  --   --  7.2   ALT (SGPT) U/L 8 6 11 15   AST (SGOT) U/L 18 16 26 34*   BILIRUBIN mg/dL 0.5 0.5 0.7 0.7   ALBUMIN g/dL 4.3 4.2 4.8 4.2   GLOBULIN gm/dL 2.4  --   --  3.0       Lab Results - Last 18 Months   Lab Units 06/07/23  0810   CEA ng/mL 4.70       Lab Results - Last 18 Months   Lab Units 06/05/24  0753 05/13/24  0745 12/19/23  0733 06/07/23  0810   IRON mcg/dL 86  --   --  85   TIBC mcg/dL 375  --   --  417   IRON SATURATION (TSAT) % 23  --   --  20   FERRITIN ng/mL 102.00  --   --  106.70   TSH uIU/mL  --  1.800 2.240  --          PAST MEDICAL HISTORY:  ALLERGIES:  Allergies   Allergen Reactions    Benzonatate Hives and Itching     CURRENT MEDICATIONS:  Outpatient Encounter Medications as of 6/5/2024   Medication Sig Dispense Refill    amLODIPine (NORVASC) 5 MG tablet TAKE 1 TABLET BY MOUTH DAILY 90 tablet 3    azelastine (ASTELIN) 0.1 % nasal spray 2 sprays into the nostril(s) as directed by provider 2 (Two) Times a Day. 30 mL 0    Calcium Carb-Cholecalciferol (CALCIUM-VITAMIN D) 600-400 MG-UNIT tablet Take 1 tablet by mouth Daily.      cycloSPORINE (RESTASIS) 0.05 % ophthalmic emulsion 1 drop As Needed.      diphenhydrAMINE (BENADRYL) 25 mg capsule Take 1 capsule by mouth At Night As Needed for Allergies.       losartan (COZAAR) 100 MG tablet TAKE 1 TABLET BY MOUTH DAILY 90 tablet 3    oxybutynin XL (DITROPAN XL) 15 MG 24 hr tablet Take 1 tablet by mouth Daily. 30 tablet 5    thiamine (VITAMINE B-1) 100 MG tablet Take 1 tablet by mouth Daily.      [DISCONTINUED] nystatin (MYCOSTATIN) 773403 UNIT/GM powder Apply  topically to the appropriate area as directed 3 (Three) Times a Day. 45 g 0     Facility-Administered Encounter Medications as of 6/5/2024   Medication Dose Route Frequency Provider Last Rate Last Admin    heparin flush (porcine) 100 UNIT/ML injection 500 Units  500 Units Intravenous PRN Shanda Torres MD   500 Units at 04/16/18 1504    heparin flush (porcine) 100 UNIT/ML injection 500 Units  500 Units Intravenous PRN Shanda Torres MD   500 Units at 06/12/18 1445    heparin injection 500 Units  500 Units Intravenous PRN Tamela Doherty, APRN   500 Units at 12/08/21 0948    sodium chloride 0.9 % flush 10 mL  10 mL Intravenous PRN Shanda Torres MD   10 mL at 04/16/18 1503    sodium chloride 0.9 % flush 10 mL  10 mL Intravenous PRN Shanda Torres MD   10 mL at 06/12/18 1445    sodium chloride 0.9 % flush 10 mL  10 mL Intravenous PRN Tamela Doherty, APRN   10 mL at 12/08/21 0948     ADULT ILLNESSES:  Patient Active Problem List   Diagnosis Code    Obesity (BMI 30-39.9) E66.9    Breast cancer-L/valente masectomy 2014 C50.919    Overactive bladder N32.81    Diabetes type 2, controlled E11.9    Hypertension I10    Osteopenia 2022- 2 to 5y M85.80    Neuropathy-chemo/better G62.9    Primary generalized (osteo)arthritis M15.0    Malignant neoplasm of lower-outer quadrant of left female breast C50.512    Family history of breast cancer Z80.3    Malignant neoplasm of upper-outer quadrant of female breast C50.419    History of bilateral mastectomy Z90.13    Wellness examination-done Z00.00    Shoulder pain M25.519    Chronic back pain M54.9, G89.29    Laboratory  test* Z01.89    Bradycardia R00.1    Cough: copd R05.9    Allergic rhinitis J30.9    Menopause Z78.0    History of shingles Z86.19    COPD (chronic obstructive pulmonary disease) J44.9    Abnormal mammogram R92.8    After-cataract with vision obscured H26.499    Amblyopia H53.009    Cellulitis, trunk L03.319    Dermatitis of eyelid due to herpes zoster B02.39    Dermatochalasis H02.839    Hordeolum internum H00.029    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    Pseudophakia Z96.1    Rash R21    Encounter for care related to Port-a-Cath Z45.2    Edema: venous-BH/vascular R60.9    Congestive heart failure I50.9    Valvular heart disease: AS I38    Chronic neck pain M54.2, G89.29    Abdominal pannus E65    Encounter for care related to vascular access port Z45.2    Pneumonia of both lower lobes due to infectious organism J18.9    Chronic cough R05.3    Personal history of nicotine dependence Z87.891    Severe obesity (BMI 35.0-35.9 with comorbidity) E66.01, Z68.35    Abnormal liver x-ray R93.2    Positive JESUSITA (antinuclear antibody)-neg contreras visit R76.8    NSAID long-term use Z79.1    Abnormal LFTs R79.89    Adenomatous polyp of colon D12.6    Fatty liver K76.0    Scarring of lung J98.4    Multiple chronic diseases R69    Skin lesion L98.9     SURGERIES:  Past Surgical History:   Procedure Laterality Date    APPENDECTOMY      BREAST SURGERY      CATARACT EXTRACTION Bilateral     COLONOSCOPY  02/21/2023    DILATATION AND CURETTAGE      KNEE SURGERY      MASTECTOMY Bilateral     NIPPLE TATTOO  04/12/2016    PORTACATH PLACEMENT      THROAT SURGERY       HEALTH MAINTENANCE ITEMS:  Health Maintenance Due   Topic Date Due    DXA SCAN  01/24/2024    COVID-19 Vaccine (4 - 2023-24 season) 02/22/2024    ZOSTER VACCINE (2 of 2) 03/28/2024       <no information>  Last Completed Colonoscopy            COLORECTAL CANCER SCREENING (COLONOSCOPY - Every 5 Years) Next due on 2/22/2028 02/22/2023  SCANNED - COLONOSCOPY     2023  SCANNED - COLONOSCOPY    2017  SCANNED - COLONOSCOPY    2014  SCANNED - COLONOSCOPY                  Immunization History   Administered Date(s) Administered    ABRYSVO (RSV, 60+ or pregnant women 32-36 wks) 2024    COVID-19 (MODERNA) 1st,2nd,3rd Dose Monovalent 2021, 2021    COVID-19 (UNSPECIFIED) 2023    Fluad Quad 65+ 10/09/2020    Fluzone High Dose =>65 Years ([employer] ONLY) 2017, 2019    Fluzone High-Dose 65+yrs 2022    Influenza, Unspecified 2023    Pneumococcal Conjugate 13-Valent (PCV13) 2017    Pneumococcal Conjugate 20-Valent (PCV20) 2024    Shingrix 2024    Tdap 2024     Last Completed Mammogram       This patient has no relevant Health Maintenance data.              FAMILY HISTORY:  Family History   Problem Relation Age of Onset    Hypertension Mother     Heart failure Mother     Cancer Father         colon    Colon cancer Father     Cancer Maternal Aunt         breast    No Known Problems Maternal Grandmother     No Known Problems Maternal Grandfather     No Known Problems Paternal Grandmother     No Known Problems Paternal Grandfather     Cancer Maternal Aunt         breast    Cancer Maternal Aunt         breast    Cancer Other      SOCIAL HISTORY:  Social History     Socioeconomic History    Marital status:      Spouse name: Horace    Number of children: 0    Years of education: 12.5   Tobacco Use    Smoking status: Former     Current packs/day: 0.00     Average packs/day: 2.0 packs/day for 37.6 years (75.2 ttl pk-yrs)     Types: Cigarettes     Start date: 1963     Quit date: 2000     Years since quittin.6    Smokeless tobacco: Never   Vaping Use    Vaping status: Never Used   Substance and Sexual Activity    Alcohol use: No    Drug use: No    Sexual activity: Defer       REVIEW OF SYSTEMS:  Review of Systems   Constitutional:  Positive for fatigue. Negative for fever.   HENT:  Negative  "for trouble swallowing.    Respiratory:  Negative for cough and shortness of breath.    Cardiovascular:  Negative for chest pain, palpitations and leg swelling.   Gastrointestinal:  Negative for nausea and vomiting.   Genitourinary:  Negative for hematuria.   Musculoskeletal:  Negative for arthralgias and myalgias.   Skin:  Negative for rash, skin lesions and wound.   Neurological:  Negative for dizziness, syncope, memory problem and confusion.   Psychiatric/Behavioral:  Negative for suicidal ideas and depressed mood. The patient is not nervous/anxious.        /82   Pulse (!) 48   Temp 97.2 °F (36.2 °C)   Resp 16   Ht 160 cm (63\")   Wt 76.1 kg (167 lb 12.8 oz)   LMP  (LMP Unknown)   SpO2 97%   BMI 29.72 kg/m²  Body surface area is 1.79 meters squared.    Pain Score    06/05/24 0809   PainSc: 0-No pain         Physical Exam  Constitutional:       Appearance: Normal appearance. She is obese.   HENT:      Head: Normocephalic and atraumatic.   Cardiovascular:      Rate and Rhythm: Regular rhythm. Bradycardia present.      Heart sounds: Murmur heard.      Comments: HR 48.  Pt asymptomatic.  Baseline for the past twelve months 42-60  Pulmonary:      Effort: Pulmonary effort is normal.      Breath sounds: Normal breath sounds.   Chest:   Breasts:     Right: Absent.      Left: Absent.   Abdominal:      General: Bowel sounds are normal.      Palpations: Abdomen is soft.   Musculoskeletal:      Right lower leg: No edema.      Left lower leg: No edema.   Skin:     General: Skin is warm and dry.   Neurological:      Mental Status: She is alert and oriented to person, place, and time.   Psychiatric:         Attention and Perception: Attention normal.         Mood and Affect: Mood normal.         Judgment: Judgment normal.         Ira Sanchez reports a pain score of 0.  Given her pain assessment as noted, treatment options were discussed and the following options were decided upon as a follow-up plan to address " the patient's pain:  No intervention indicated .           ASSESSMENT / PLAN    1. History of breast cancer    2. Primary hypertension    3. Osteopenia, unspecified location    4. Post-menopausal    5. Encounter for care related to vascular access port    6. History of iron deficiency           ASSESSMENT:    1.  History of Left breast carcinoma diagnosed in October 2014  Stage: AJCC TNM IIA [id number], ER/IL/HER-2 positive  Treatment: Bilateral mastectomies, Adjuvant chemotherapy with 5 cycles of Carbplatin Taxotere Herceptin followed by a total of one year of herceptin. Hormonal manipulation starting in June 2015 -2020.  Tumor markers stable: 6/7/23 CEA 4.7 and Ca 27.29 34.7  Today's markers pending  Heme status stable      2.  Hypertension  -Taking Amlodipine and Losartan   -BP today 126/82  -Managed by PCP        3.  Post menopausal / Osteopenia   -DEXA Scan 01/24/22 with Femoral neck T Score -1.6 and Lumbar Spine 1.3  -Pt is taking Calcium and Vit D   -Ordered DEXA Scan    4.  History of Iron Deficiency   Labs stable.    Not requiring iron supplement at this time    PLAN:  Stable for observation.    Port flushed today and q 3 months  RTC in 1 year  Patient will have labs one week before office visit. CBC, CMP, Iron Profile, Ferritin, Ca 27.29 CEA   Continue current medications, treatment plans and follow up with PCP and any other providers.  Care discussed with patient.  Understanding expressed.  Patient agreeable with plan.    Denisha Reyes, REBEL  06/05/2024

## 2024-06-06 LAB — CANCER AG27-29 SERPL-ACNC: 31.9 U/ML (ref 0–38.6)

## 2024-06-07 RX ORDER — OXYBUTYNIN CHLORIDE 15 MG/1
15 TABLET, EXTENDED RELEASE ORAL DAILY
Qty: 30 TABLET | Refills: 5 | Status: SHIPPED | OUTPATIENT
Start: 2024-06-07

## 2024-06-07 NOTE — TELEPHONE ENCOUNTER
Caller: Laura Ira BULL    Relationship: Self    Best call back number: [phone]     Requested Prescriptions:   Requested Prescriptions     Pending Prescriptions Disp Refills    oxybutynin XL (DITROPAN XL) 15 MG 24 hr tablet 30 tablet 5     Sig: Take 1 tablet by mouth Daily.        Pharmacy where request should be sent: [id number] - [address] AT [address] [facility] - [phone]  - [phone] FX     Last office visit with prescribing clinician: 5/16/2024   Last telemedicine visit with prescribing clinician: Visit date not found   Next office visit with prescribing clinician: Visit date not found     Additional details provided by patient: ONE WEEK LEFT     PATIENT IS REQUESTING A 90 DAY SUPPLY OF THIS MEDICATION.     Does the patient have less than a 3 day supply:  [] Yes  [x] No    Would you like a call back once the refill request has been completed: [] Yes [] No    If the office needs to give you a call back, can they leave a voicemail: [] Yes [] No    Refugio Sheets Rep   06/07/24 08:24 CDT

## 2024-06-14 ENCOUNTER — TELEPHONE (OUTPATIENT)
Dept: FAMILY MEDICINE CLINIC | Facility: CLINIC | Age: 79
End: 2024-06-14
Payer: MEDICARE

## 2024-06-14 RX ORDER — OXYBUTYNIN CHLORIDE 15 MG/1
15 TABLET, EXTENDED RELEASE ORAL DAILY
Qty: 90 TABLET | Refills: 3 | Status: SHIPPED | OUTPATIENT
Start: 2024-06-14

## 2024-06-14 NOTE — TELEPHONE ENCOUNTER
Pt called stating she had requested her medication Oxybutynin to be a 90 day supply and it is currently only refilled at 30 still. Pt is wondering if she could get it at 90. -please advise

## 2024-06-18 ENCOUNTER — OFFICE VISIT (OUTPATIENT)
Dept: PULMONOLOGY | Facility: CLINIC | Age: 79
End: 2024-06-18
Payer: MEDICARE

## 2024-06-18 VITALS
HEIGHT: 63 IN | DIASTOLIC BLOOD PRESSURE: 70 MMHG | WEIGHT: 166.8 LBS | BODY MASS INDEX: 29.55 KG/M2 | OXYGEN SATURATION: 96 % | SYSTOLIC BLOOD PRESSURE: 136 MMHG | HEART RATE: 48 BPM

## 2024-06-18 DIAGNOSIS — R00.1 BRADYCARDIA: Chronic | ICD-10-CM

## 2024-06-18 DIAGNOSIS — E66.3 OVERWEIGHT: Chronic | ICD-10-CM

## 2024-06-18 DIAGNOSIS — J98.4 SCARRING OF LUNG: Primary | Chronic | ICD-10-CM

## 2024-06-18 PROCEDURE — 99213 OFFICE O/P EST LOW 20 MIN: CPT | Performed by: INTERNAL MEDICINE

## 2024-06-18 PROCEDURE — 1159F MED LIST DOCD IN RCRD: CPT | Performed by: INTERNAL MEDICINE

## 2024-06-18 PROCEDURE — 1160F RVW MEDS BY RX/DR IN RCRD: CPT | Performed by: INTERNAL MEDICINE

## 2024-06-18 PROCEDURE — 3075F SYST BP GE 130 - 139MM HG: CPT | Performed by: INTERNAL MEDICINE

## 2024-06-18 PROCEDURE — 3078F DIAST BP <80 MM HG: CPT | Performed by: INTERNAL MEDICINE

## 2024-06-18 NOTE — ASSESSMENT & PLAN NOTE
Patient's (Body mass index is 29.55 kg/m².) indicates that they are overweight with health conditions that include hypertension . Weight is unchanged.  Diet and exercise are encouraged and she will follow-up with her primary care physician regarding her elevated BMI otherwise.

## 2024-06-18 NOTE — PATIENT INSTRUCTIONS
The patient is stable from a pulmonary standpoint.  She is somewhat bradycardic with a pulse of 48 but on review of prior vital signs she has been bradycardic on multiple prior vital sign determinations with a pulse from the high 40s to the high 50s.  She is asymptomatic from this.  I will just plan on a follow-up visit in 1 year unless she has any acute respiratory problems in the interim.

## 2024-06-18 NOTE — PROGRESS NOTES
Chief Complaint  Scarring of lung    Subjective    History of Present Illness {CC  Problem List  Visit Diagnosis   Encounters  Notes  Medications  Labs  Result Review Imaging  Media: 23}    Ira Sanchez presents to [facility] PULMONARY [facility] MEDICINE for scarring of lung.    History of Present Illness   Patient is stable from a pulmonary standpoint with no respiratory complaints.  I told in the absence of any respiratory problems we will just see her on a once yearly basis.  She is bradycardic today and has been a consistent finding on multiple prior vital signs determinations and she is asymptomatic.  She has had the COVID-19 vaccine including a booster.  She had the flu shot this past fall and has had a Prevnar 29 and RSV vaccine in the past.  Prior to Admission medications    Medication Sig Start Date End Date Taking? Authorizing Provider   amLODIPine (NORVASC) 5 MG tablet TAKE 1 TABLET BY MOUTH DAILY 12/13/23  Yes Conrado Tinoco MD   azelastine (ASTELIN) 0.1 % nasal spray 2 sprays into the nostril(s) as directed by provider 2 (Two) Times a Day. 3/12/24  Yes Conrado Tinoco MD   Calcium Carb-Cholecalciferol (CALCIUM-VITAMIN D) 600-400 MG-UNIT tablet Take 1 tablet by mouth Daily.   Yes Karis Billingsley MD   cycloSPORINE (RESTASIS) 0.05 % ophthalmic emulsion 1 drop As Needed.   Yes Karis Billingsley MD   diphenhydrAMINE (BENADRYL) 25 mg capsule Take 1 capsule by mouth At Night As Needed for Allergies.   Yes Karis Billingsley MD   losartan (COZAAR) 100 MG tablet TAKE 1 TABLET BY MOUTH DAILY 6/13/23  Yes Conrado Tinoco MD   oxybutynin XL (DITROPAN XL) 15 MG 24 hr tablet Take 1 tablet by mouth Daily. 6/14/24  Yes David Burgess APRN   thiamine (VITAMINE B-1) 100 MG tablet Take 1 tablet by mouth Daily.   Yes Karis Billingsley MD       Social History     Socioeconomic History    Marital status:      Spouse name: Horace    Number of  "children: 0    Years of education: 12.5   Tobacco Use    Smoking status: Former     Current packs/day: 0.00     Average packs/day: 2.0 packs/day for 37.6 years (75.2 ttl pk-yrs)     Types: Cigarettes     Start date: 1963     Quit date: 2000     Years since quittin.7     Passive exposure: Past    Smokeless tobacco: Never   Vaping Use    Vaping status: Never Used   Substance and Sexual Activity    Alcohol use: No    Drug use: No    Sexual activity: Defer       Objective   Vital Signs:   /70   Pulse (!) 48   Ht 160 cm (62.99\")   Wt 75.7 kg (166 lb 12.8 oz)   SpO2 96% Comment: RA  BMI 29.55 kg/m²     Physical Exam  Vitals and nursing note reviewed.   Constitutional:       Comments: She is bradycardic with a pulse of 48 and this is not a new finding.  Again she is asymptomatic from the standpoint of her bradycardia.  Her BMI is elevated.   HENT:      Head: Normocephalic.   Eyes:      Extraocular Movements: Extraocular movements intact.   Cardiovascular:      Rate and Rhythm: Regular rhythm. Bradycardia present.      Comments: Again her pulse is 48.  Pulmonary:      Effort: Pulmonary effort is normal.      Breath sounds: Normal breath sounds.   Musculoskeletal:         General: Normal range of motion.   Skin:     General: Skin is warm and dry.   Neurological:      General: No focal deficit present.      Mental Status: She is alert and oriented to person, place, and time.   Psychiatric:         Mood and Affect: Mood normal.         Behavior: Behavior normal.        Result Review :          Results for orders placed during the hospital encounter of 08/10/21    Pulmonary Function Test    [facility] - Pulmonary Function Test    [address] [place] [address]  [place]  [address]  [phone]    Patient : Ira Sanchez  MRN : [id number]  CSN : [id number]  Pulmonologist : Dante Mendoza MD  Date : " 8/10/2021    ______________________________________________________________________    Interpretation :  1.  Spirometry is within normal limits.  2.  There is actually improvement in small airways function postbronchodilator which is supranormal postbronchodilator.  Otherwise there is no significant change in spirometry postbronchodilator.  3.  Lung volumes reveal a decrease in expiratory reserve volume, and otherwise are within normal limits.  4.  There is a mild diffusion impairment which when corrected for alveolar volume is a low normal diffusion capacity.  4.  Arterial blood gases reveal mild hypoxemia on room air.      Dante Mendoza MD                   Assessment and Plan {CC Problem List  Visit Diagnosis  ROS  Review (Popup)  Health Maintenance  Quality  BestPractice  Medications  SmartSets  SnapShot Encounters  Media : 23}    Diagnoses and all orders for this visit:    1. Scarring of lung (Primary)  Assessment & Plan:  She had some scarring noted on her last chest CT but no evidence of any acute process.      2. Bradycardia  Assessment & Plan:  This is a chronic finding.  She is asymptomatic from the standpoint of her bradycardia.      3. Overweight  Assessment & Plan:  Patient's (Body mass index is 29.55 kg/m².) indicates that they are overweight with health conditions that include hypertension . Weight is unchanged.  Diet and exercise are encouraged and she will follow-up with her primary care physician regarding her elevated BMI otherwise.            Dante Mendoza MD  6/18/2024  09:11 CDT    Follow Up   Return in about 1 year (around 6/18/2025) for To see me specifically.    Patient was given instructions and counseling regarding her condition or for health maintenance advice. Please see specific information pulled into the AVS if appropriate.

## 2024-06-21 ENCOUNTER — HOSPITAL ENCOUNTER (OUTPATIENT)
Dept: BONE DENSITY | Facility: HOSPITAL | Age: 79
Discharge: HOME | End: 2024-06-21
Payer: MEDICARE

## 2024-06-21 DIAGNOSIS — M85.80 OSTEOPENIA, UNSPECIFIED LOCATION: ICD-10-CM

## 2024-06-21 DIAGNOSIS — Z78.0 POST-MENOPAUSAL: ICD-10-CM

## 2024-06-21 PROCEDURE — 77080 DXA BONE DENSITY AXIAL: CPT

## 2024-07-10 ENCOUNTER — TELEPHONE (OUTPATIENT)
Dept: ONCOLOGY | Facility: CLINIC | Age: 79
End: 2024-07-10
Payer: MEDICARE

## 2024-07-10 NOTE — TELEPHONE ENCOUNTER
----- Message from Denisha Reyes sent at 7/9/2024  2:23 PM CDT -----  She has Osteopenia ans is a high fracture risk.  If she is willing, we can get her started on a medication to try to strengthen her bones.   Either a pill, Fosamax, once weekly or Prolia, an injection once every 6 months. We will have to monitor her Calcium, Vit D, Mag and Phos before and while taking these.

## 2024-07-11 ENCOUNTER — TELEPHONE (OUTPATIENT)
Dept: ONCOLOGY | Facility: CLINIC | Age: 79
End: 2024-07-11
Payer: MEDICARE

## 2024-07-11 ENCOUNTER — TELEPHONE (OUTPATIENT)
Dept: FAMILY MEDICINE CLINIC | Facility: CLINIC | Age: 79
End: 2024-07-11
Payer: MEDICARE

## 2024-07-11 NOTE — TELEPHONE ENCOUNTER
Pt wants to speak to Dr Tinoco before moving forward with fosamax or prolia. She will call me back when she decides which one she would like to try.

## 2024-07-11 NOTE — TELEPHONE ENCOUNTER
"Pt called stating she wanted  to suggest what she should do about her dexa scan. Pt was advised she would have to contact Denisha Reyes's office for a treatment plan since she is the ordering doctor.   I stated pt could call to get a treatment plan from Eric's office and we could then run it by olvin, pt said \"ok bye\" proceeded to hang up on me.   "

## 2024-07-11 NOTE — TELEPHONE ENCOUNTER
Caller: Ira Sanchez    Relationship: Self    Best call back number:  [phone] (     What is the best time to reach you: ANY    Who are you requesting to speak with (clinical staff, provider,  specific staff member): CLINICAL, DR. KAHN         What was the call regarding: Bone density showed she needs a shot or pills what does he suggest.  She dosen't want the oral medication what do you think about the injection for patient? SHE SAID SHE HEARD THE PILLS CAUSE OTHER ISSUES.

## 2024-07-11 NOTE — TELEPHONE ENCOUNTER
Dexa scan was order by syl zuniga. Patient needs to contact their office for treatment. Attempted to reach patient. Left vm to return call

## 2024-09-04 ENCOUNTER — INFUSION (OUTPATIENT)
Dept: ONCOLOGY | Facility: CLINIC | Age: 79
End: 2024-09-04
Payer: MEDICARE

## 2024-09-04 DIAGNOSIS — Z45.2 ENCOUNTER FOR CARE RELATED TO PORT-A-CATH: Primary | ICD-10-CM

## 2024-09-04 DIAGNOSIS — Z45.2 ENCOUNTER FOR CARE RELATED TO VASCULAR ACCESS PORT: ICD-10-CM

## 2024-09-04 RX ORDER — SODIUM CHLORIDE 0.9 % (FLUSH) 0.9 %
10 SYRINGE (ML) INJECTION AS NEEDED
Status: DISCONTINUED | OUTPATIENT
Start: 2024-09-04 | End: 2024-09-04 | Stop reason: HOSPADM

## 2024-09-04 RX ORDER — SODIUM CHLORIDE 0.9 % (FLUSH) 0.9 %
10 SYRINGE (ML) INJECTION AS NEEDED
OUTPATIENT
Start: 2024-09-04

## 2024-09-04 RX ORDER — HEPARIN SODIUM (PORCINE) LOCK FLUSH IV SOLN 100 UNIT/ML 100 UNIT/ML
500 SOLUTION INTRAVENOUS AS NEEDED
Status: DISCONTINUED | OUTPATIENT
Start: 2024-09-04 | End: 2024-09-04 | Stop reason: HOSPADM

## 2024-09-04 RX ORDER — HEPARIN SODIUM (PORCINE) LOCK FLUSH IV SOLN 100 UNIT/ML 100 UNIT/ML
500 SOLUTION INTRAVENOUS AS NEEDED
OUTPATIENT
Start: 2024-09-04

## 2024-09-04 RX ADMIN — Medication 10 ML: at 08:18

## 2024-09-04 RX ADMIN — HEPARIN SODIUM (PORCINE) LOCK FLUSH IV SOLN 100 UNIT/ML 500 UNITS: 100 SOLUTION at 08:18

## 2024-09-05 DIAGNOSIS — R03.0 ELEVATED BLOOD PRESSURE READING: ICD-10-CM

## 2024-09-05 DIAGNOSIS — R42 DIZZY: ICD-10-CM

## 2024-09-05 DIAGNOSIS — I10 PRIMARY HYPERTENSION: Chronic | ICD-10-CM

## 2024-09-05 RX ORDER — AMLODIPINE BESYLATE 5 MG/1
5 TABLET ORAL DAILY
Qty: 90 TABLET | Refills: 3 | Status: SHIPPED | OUTPATIENT
Start: 2024-09-05

## 2024-09-05 RX ORDER — LOSARTAN POTASSIUM 100 MG/1
100 TABLET ORAL DAILY
Qty: 90 TABLET | Refills: 3 | Status: SHIPPED | OUTPATIENT
Start: 2024-09-05

## 2024-12-02 ENCOUNTER — TELEPHONE (OUTPATIENT)
Dept: GASTROENTEROLOGY | Facility: CLINIC | Age: 79
End: 2024-12-02
Payer: MEDICARE

## 2024-12-02 NOTE — TELEPHONE ENCOUNTER
RETURNED THE PATIENTS MESSAGE/ LEFT MESSAGE FOR THE PATIENT TO CONTACT THE OFFICE TO SCHEDULE A YEARLY WITH DR. CHICAS/THE OFFICE WILL NEED TO SCHEDULE THIS.

## 2024-12-02 NOTE — TELEPHONE ENCOUNTER
Caller: Ira Sanchez    Relationship to patient: Self    Best call back number: [phone]      Patient is needing: RECEIVED A LETTER TO SCHEDULE HER YEARLY APPOINTMENT WITH DR. CHICAS.  PLEASE CALL PT.

## 2024-12-04 ENCOUNTER — INFUSION (OUTPATIENT)
Dept: ONCOLOGY | Facility: CLINIC | Age: 79
End: 2024-12-04
Payer: MEDICARE

## 2024-12-04 DIAGNOSIS — Z45.2 ENCOUNTER FOR CARE RELATED TO PORT-A-CATH: Primary | ICD-10-CM

## 2024-12-04 DIAGNOSIS — Z45.2 ENCOUNTER FOR CARE RELATED TO VASCULAR ACCESS PORT: ICD-10-CM

## 2024-12-04 RX ORDER — HEPARIN SODIUM (PORCINE) LOCK FLUSH IV SOLN 100 UNIT/ML 100 UNIT/ML
500 SOLUTION INTRAVENOUS AS NEEDED
OUTPATIENT
Start: 2024-12-04

## 2024-12-04 RX ORDER — SODIUM CHLORIDE 0.9 % (FLUSH) 0.9 %
10 SYRINGE (ML) INJECTION AS NEEDED
OUTPATIENT
Start: 2024-12-04

## 2024-12-04 RX ORDER — HEPARIN SODIUM (PORCINE) LOCK FLUSH IV SOLN 100 UNIT/ML 100 UNIT/ML
500 SOLUTION INTRAVENOUS AS NEEDED
Status: DISCONTINUED | OUTPATIENT
Start: 2024-12-04 | End: 2024-12-04 | Stop reason: HOSPADM

## 2024-12-04 RX ORDER — SODIUM CHLORIDE 0.9 % (FLUSH) 0.9 %
10 SYRINGE (ML) INJECTION AS NEEDED
Status: DISCONTINUED | OUTPATIENT
Start: 2024-12-04 | End: 2024-12-04 | Stop reason: HOSPADM

## 2024-12-04 RX ADMIN — Medication 10 ML: at 08:25

## 2024-12-04 RX ADMIN — HEPARIN SODIUM (PORCINE) LOCK FLUSH IV SOLN 100 UNIT/ML 500 UNITS: 100 SOLUTION at 08:25

## 2025-01-13 ENCOUNTER — OFFICE VISIT (OUTPATIENT)
Dept: FAMILY MEDICINE CLINIC | Facility: CLINIC | Age: 80
End: 2025-01-13
Payer: MEDICARE

## 2025-01-13 VITALS
BODY MASS INDEX: 28.17 KG/M2 | DIASTOLIC BLOOD PRESSURE: 70 MMHG | HEIGHT: 63 IN | OXYGEN SATURATION: 94 % | WEIGHT: 159 LBS | SYSTOLIC BLOOD PRESSURE: 122 MMHG | HEART RATE: 41 BPM

## 2025-01-13 DIAGNOSIS — I10 PRIMARY HYPERTENSION: Primary | Chronic | ICD-10-CM

## 2025-01-13 DIAGNOSIS — N32.81 OVERACTIVE BLADDER: Chronic | ICD-10-CM

## 2025-01-13 PROBLEM — R76.8 POSITIVE ANA (ANTINUCLEAR ANTIBODY): Status: RESOLVED | Noted: 2022-09-06 | Resolved: 2025-01-13

## 2025-01-13 PROBLEM — R93.2: Status: RESOLVED | Noted: 2022-07-14 | Resolved: 2025-01-13

## 2025-01-13 PROBLEM — J18.9 PNEUMONIA OF BOTH LOWER LOBES DUE TO INFECTIOUS ORGANISM: Status: RESOLVED | Noted: 2022-05-05 | Resolved: 2025-01-13

## 2025-01-13 PROBLEM — E65 ABDOMINAL PANNUS: Status: RESOLVED | Noted: 2022-03-03 | Resolved: 2025-01-13

## 2025-01-13 PROBLEM — R05.3 CHRONIC COUGH: Status: RESOLVED | Noted: 2022-05-05 | Resolved: 2025-01-13

## 2025-01-13 PROBLEM — Z78.0 MENOPAUSE: Status: RESOLVED | Noted: 2018-12-11 | Resolved: 2025-01-13

## 2025-01-13 PROBLEM — E66.01 SEVERE OBESITY (BMI 35.0-35.9 WITH COMORBIDITY): Status: RESOLVED | Noted: 2022-05-05 | Resolved: 2025-01-13

## 2025-01-13 PROBLEM — R79.89 ABNORMAL LFTS: Status: RESOLVED | Noted: 2022-12-01 | Resolved: 2025-01-13

## 2025-01-13 PROBLEM — Z01.89 LABORATORY TEST: Status: RESOLVED | Noted: 2017-11-28 | Resolved: 2025-01-13

## 2025-01-13 PROBLEM — Z00.00 WELLNESS EXAMINATION: Status: RESOLVED | Noted: 2017-05-14 | Resolved: 2025-01-13

## 2025-01-13 PROBLEM — Z86.19 HISTORY OF SHINGLES: Status: RESOLVED | Noted: 2019-04-04 | Resolved: 2025-01-13

## 2025-01-13 PROBLEM — K76.0 FATTY LIVER: Status: RESOLVED | Noted: 2022-12-01 | Resolved: 2025-01-13

## 2025-01-13 PROCEDURE — 99214 OFFICE O/P EST MOD 30 MIN: CPT

## 2025-01-13 PROCEDURE — 3074F SYST BP LT 130 MM HG: CPT

## 2025-01-13 PROCEDURE — 1126F AMNT PAIN NOTED NONE PRSNT: CPT

## 2025-01-13 PROCEDURE — 3078F DIAST BP <80 MM HG: CPT

## 2025-01-13 PROCEDURE — G0439 PPPS, SUBSEQ VISIT: HCPCS

## 2025-01-13 PROCEDURE — 1170F FXNL STATUS ASSESSED: CPT

## 2025-01-13 NOTE — PROGRESS NOTES
Subjective   The ABCs of the Annual Wellness Visit  Medicare Wellness Visit      Ira Sanchez is a 79 y.o. patient who presents for a Medicare Wellness Visit.    The following portions of the patient's history were reviewed and   updated as appropriate: allergies, current medications, past family history, past medical history, past social history, past surgical history, and problem list.    Compared to one year ago, the patient's physical   health is the same.  Compared to one year ago, the patient's mental   health is the same.    Recent Hospitalizations:  She was not admitted to the hospital during the last year.     Current Medical Providers:  Patient Care Team:  Modesto Johnson MD as PCP - General (Family Medicine)  Dante Mendoza MD as Consulting Physician (Pulmonary Disease)  Adalberto Wallace MD as Consulting Physician (Hematology and Oncology)  Modesto Genao MD as Consulting Physician (Gastroenterology)    Outpatient Medications Prior to Visit   Medication Sig Dispense Refill    amLODIPine (NORVASC) 5 MG tablet TAKE 1 TABLET BY MOUTH DAILY 90 tablet 3    azelastine (ASTELIN) 0.1 % nasal spray 2 sprays into the nostril(s) as directed by provider 2 (Two) Times a Day. 30 mL 0    Calcium Carb-Cholecalciferol (CALCIUM-VITAMIN D) 600-400 MG-UNIT tablet Take 1 tablet by mouth Daily.      diphenhydrAMINE (BENADRYL) 25 mg capsule Take 1 capsule by mouth At Night As Needed for Allergies.      losartan (COZAAR) 100 MG tablet TAKE 1 TABLET BY MOUTH DAILY 90 tablet 3    oxybutynin XL (DITROPAN XL) 15 MG 24 hr tablet Take 1 tablet by mouth Daily. 90 tablet 3    thiamine (VITAMINE B-1) 100 MG tablet Take 1 tablet by mouth Daily.      cycloSPORINE (RESTASIS) 0.05 % ophthalmic emulsion 1 drop As Needed.       Facility-Administered Medications Prior to Visit   Medication Dose Route Frequency Provider Last Rate Last Admin    heparin flush (porcine) 100 UNIT/ML injection 500 Units  500 Units Intravenous PRN  Shanda Torres MD   500 Units at 04/16/18 1504    heparin flush (porcine) 100 UNIT/ML injection 500 Units  500 Units Intravenous PRN Shanda Torres MD   500 Units at 06/12/18 1445    heparin injection 500 Units  500 Units Intravenous PRN Tamela Doherty, APRN   500 Units at 12/08/21 0948    heparin injection 500 Units  500 Units Intravenous PRN Denisha Reyes, APRN   500 Units at 06/05/24 0850    sodium chloride 0.9 % flush 10 mL  10 mL Intravenous PRN Shanda Torres MD   10 mL at 04/16/18 1503    sodium chloride 0.9 % flush 10 mL  10 mL Intravenous PRN Shanda Torres MD   10 mL at 06/12/18 1445    sodium chloride 0.9 % flush 10 mL  10 mL Intravenous PRN Tamela Doherty, APRN   10 mL at 12/08/21 0948    sodium chloride 0.9 % flush 10 mL  10 mL Intravenous PRN Denisha Reyes, APRN   10 mL at 06/05/24 0850     No opioid medication identified on active medication list. I have reviewed chart for other potential  high risk medication/s and harmful drug interactions in the elderly.      Aspirin is not on active medication list.  Aspirin use is not indicated based on review of current medical condition/s. Risk of harm outweighs potential benefits.  .    Patient Active Problem List   Diagnosis    Breast cancer-L/valente masectomy 2014    Overactive bladder    Hypertension    Osteopenia 2022- 2 to 5y    Neuropathy-chemo/better    Primary generalized (osteo)arthritis    Malignant neoplasm of lower-outer quadrant of left female breast    Malignant neoplasm of upper-outer quadrant of female breast    Shoulder pain    Chronic back pain    Bradycardia    Cough: copd    Allergic rhinitis    COPD (chronic obstructive pulmonary disease)    After-cataract with vision obscured    Amblyopia    Dermatitis of eyelid due to herpes zoster    Dermatochalasis    Hordeolum internum    Other hereditary corneal dystrophies    Pseudophakia    Rash    Encounter for care related to  "Port-a-Cath    Edema: venous-BH/vascular    Congestive heart failure    Valvular heart disease: AS    Chronic neck pain    Encounter for care related to vascular access port    Chronic cough    Personal history of nicotine dependence    NSAID long-term use    Adenomatous polyp of colon    Scarring of lung    Multiple chronic diseases    Skin lesion    Overweight     Advance Care Planning Advance Directive is on file.  ACP discussion was held with the patient during this visit. Patient has an advance directive in EMR which is still valid.             Objective   Vitals:    25 0946   BP: 122/70   Pulse: (!) 41   SpO2: 94%   Weight: 72.1 kg (159 lb)   Height: 160 cm (63\")   PainSc: 0-No pain       Estimated body mass index is 28.17 kg/m² as calculated from the following:    Height as of this encounter: 160 cm (63\").    Weight as of this encounter: 72.1 kg (159 lb).    BMI is >= 25 and <30. (Overweight) The following options were offered after discussion;: weight loss educational material (shared in after visit summary), exercise counseling/recommendations, and nutrition counseling/recommendations       Gait and Balance Evaluation:  Normal      Does the patient have evidence of cognitive impairment? No                                                                                                Health  Risk Assessment    Smoking Status:  Social History     Tobacco Use   Smoking Status Former    Current packs/day: 0.00    Average packs/day: 2.0 packs/day for 37.6 years (75.2 ttl pk-yrs)    Types: Cigarettes    Start date: 1963    Quit date: 2000    Years since quittin.3    Passive exposure: Past   Smokeless Tobacco Never     Alcohol Consumption:  Social History     Substance and Sexual Activity   Alcohol Use No       Fall Risk Screen  STEADI Fall Risk Assessment was completed, and patient is at LOW risk for falls.Assessment completed on:2025    Depression Screening   Little interest or pleasure " in doing things? Not at all   Feeling down, depressed, or hopeless? Not at all   PHQ-2 Total Score 0      Health Habits and Functional and Cognitive Screenin/13/2025     9:43 AM   Functional & Cognitive Status   Do you have difficulty preparing food and eating? No   Do you have difficulty bathing yourself, getting dressed or grooming yourself? No   Do you have difficulty using the toilet? No   Do you have difficulty moving around from place to place? No   Do you have trouble with steps or getting out of a bed or a chair? No   Current Diet Well Balanced Diet   Dental Exam Not up to date   Eye Exam Up to date   Exercise (times per week) 3 times per week   Current Exercises Include House Cleaning;Cardiovascular Workout   Do you need help using the phone?  No   Are you deaf or do you have serious difficulty hearing?  No   Do you need help to go to places out of walking distance? No   Do you need help shopping? No   Do you need help preparing meals?  No   Do you need help with housework?  No   Do you need help with laundry? No   Do you need help taking your medications? No   Do you need help managing money? No   Do you ever drive or ride in a car without wearing a seat belt? No   Have you felt unusual stress, anger or loneliness in the last month? No   Who do you live with? Alone   If you need help, do you have trouble finding someone available to you? No   Have you been bothered in the last four weeks by sexual problems? No   Do you have difficulty concentrating, remembering or making decisions? No           Age-appropriate Screening Schedule:  Refer to the list below for future screening recommendations based on patient's age, sex and/or medical conditions. Orders for these recommended tests are listed in the plan section. The patient has been provided with a written plan.    Health Maintenance List  Health Maintenance   Topic Date Due    ZOSTER VACCINE (2 of 2) 2024    BMI FOLLOWUP  2024     INFLUENZA VACCINE  07/01/2024    COVID-19 Vaccine (4 - 2024-25 season) 09/01/2024    HEMOGLOBIN A1C  11/13/2024    DIABETIC EYE EXAM  12/11/2024    ANNUAL WELLNESS VISIT  01/13/2026    DXA SCAN  06/21/2026    COLORECTAL CANCER SCREENING  02/22/2028    TDAP/TD VACCINES (2 - Td or Tdap) 02/01/2034    RSV Vaccine - Adults  Completed    Pneumococcal Vaccine 65+  Completed    MAMMOGRAM  Discontinued    URINE MICROALBUMIN  Discontinued    LUNG CANCER SCREENING  Discontinued                                                                                                                                                CMS Preventative Services Quick Reference  Risk Factors Identified During Encounter  Dental Screening Recommended  Vision Screening Recommended    The above risks/problems have been discussed with the patient.  Pertinent information has been shared with the patient in the After Visit Summary.  An After Visit Summary and PPPS were made available to the patient.    Follow Up:   Next Medicare Wellness visit to be scheduled in 1 year.         Additional E&M Note during same encounter follows:  Patient has additional, significant, and separately identifiable condition(s)/problem(s) that require work above and beyond the Medicare Wellness Visit     Chief Complaint  Chief Complaint   Patient presents with    Medicare Wellness-subsequent    Swelling     Pt has puffy area on back, lower area of the left shoulder blade.        Subjective      History of Present Illness  The patient is an 79-year-old female who is here today for a follow-up visit.    She reports a new concern of a spot on her back, which has been present for approximately one month. She describes it as a small, fatty tumor-like lesion that protrudes slightly and exhibits a reddish hue. She also mentions a similar spot on her leg, which she believes is benign. The spot on her back is not causing any discomfort or pain.    She is currently on amlodipine 5 mg  "and losartan 100 mg for blood pressure management, which has been stable.    She is also taking oxybutynin for overactive bladder, which has been effective in reducing the frequency of urgent bathroom visits.    She received her influenza vaccine last year and plans to get it again in October or November of this year. She also received the pneumonia and shingles vaccines last year. She had a bout of shingles in her eye a few years ago and consulted an ophthalmologist at that time. She does not have diabetes. She underwent a bilateral mastectomy due to breast cancer and continues to see her oncologist annually. She is under the care of a pulmonologist for COPD but is not on any inhalers. She has a history of smoking but quit in her 50s. She reports feeling well overall and has not required hospitalization in the past year. Her last hospital admission was 2 to 3 years ago for pneumonia. She does not experience any difficulty with mobility or walking around her house. She sleeps on the couch as she prefers not to climb two flights of stairs to reach her bedroom. She does not consume alcohol and does not feel depressed. She acknowledges a decline in her vision compared to previous years and had an ophthalmology appointment in October. She engages in physical exercise, although less frequently since November, averaging one to two times per week.    SOCIAL HISTORY  She does not drink alcohol. She smoked for years but quit in her 50s, approximately 31 years ago.    FAMILY HISTORY  Her mother had shingles.    MEDICATIONS  amlodipine, losartan, oxybutynin    IMMUNIZATIONS  She received a pneumonia shot and two shingles shots last year.          Objective   Vital Signs:  /70   Pulse (!) 41   Ht 160 cm (63\")   Wt 72.1 kg (159 lb)   SpO2 94%   BMI 28.17 kg/m²     The following labs/imaging/notes were reviewed and discussed with the patient by Modesto Johnson MD on 01/13/2025:     Latest Reference Range & Units 05/13/24 " 07:45 06/05/24 07:53   Sodium 136 - 145 mmol/L 142 141   Potassium 3.5 - 5.2 mmol/L 4.1 4.3   Chloride 98 - 107 mmol/L 104 105   CO2 22.0 - 29.0 mmol/L 26.2 26.0   Anion Gap 5.0 - 15.0 mmol/L  10.0   BUN 8 - 23 mg/dL 15 16   Creatinine 0.57 - 1.00 mg/dL 0.78 0.50 (L)   BUN/Creatinine Ratio 7.0 - 25.0  19.2 32.0 (H)   eGFR >60.0 mL/min/1.73  94.4   EGFR Result >60.0 mL/min/1.73 76.4    Glucose 65 - 99 mg/dL 107 (H) 108 (H)   Calcium 8.6 - 10.5 mg/dL 9.3 9.6   Alkaline Phosphatase 39 - 117 U/L 111 110   Total Protein 6.0 - 8.5 g/dL 6.8 6.7   Albumin 3.5 - 5.2 g/dL 4.2 4.3   Globulin gm/dL  2.4   A/G Ratio g/dL 1.6 1.8   AST (SGOT) 1 - 32 U/L 16 18   ALT (SGPT) 1 - 33 U/L 6 8   Total Bilirubin 0.0 - 1.2 mg/dL 0.5 0.5   Hemoglobin A1C 4.80 - 5.60 % 5.50    TSH Baseline 0.270 - 4.200 uIU/mL 1.800    Iron 37 - 145 mcg/dL  86   Ferritin 13.00 - 150.00 ng/mL  102.00   Iron Saturation (TSAT) 20 - 50 %  23   Transferrin 200 - 360 mg/dL  252   TIBC 298 - 536 mcg/dL  375   Total Cholesterol 0 - 200 mg/dL 150    HDL Cholesterol 40 - 60 mg/dL 40    LDL Cholesterol  0 - 100 mg/dL 88    Triglycerides 0 - 150 mg/dL 123    Chol/HDL Ratio  3.75    VLDL Cholesterol Derrek 5 - 40 mg/dL 22    25 Hydroxy, Vitamin D 30.0 - 100.0 ng/ml 37.4    Globulin gm/dL 2.6    WBC 3.40 - 10.80 10*3/mm3 6.43 6.70   RBC 3.77 - 5.28 10*6/mm3 4.46 4.21   Hemoglobin 12.0 - 15.9 g/dL 13.8 13.2   Hematocrit 34.0 - 46.6 % 42.0 40.2   Platelets 140 - 450 10*3/mm3 172 154   RDW 12.3 - 15.4 % 11.8 (L) 12.5   MCV 79.0 - 97.0 fL 94.2 95.5   MCH 26.6 - 33.0 pg 30.9 31.4   MCHC 31.5 - 35.7 g/dL 32.9 32.8   MPV 6.0 - 12.0 fL  10.5   RDW-SD 37.0 - 54.0 fl  43.7   Neutrophil Rel % 42.7 - 76.0 % 56.8 63.0   Lymphocyte Rel % 19.6 - 45.3 % 34.1 28.2   Monocyte Rel % 5.0 - 12.0 % 6.8 6.9   Eosinophil Rel % 0.3 - 6.2 % 1.7 1.5   Basophil Rel % 0.0 - 1.5 % 0.3 0.3   Immature Granulocyte Rel % 0.0 - 0.5 % 0.3 0.1   Neutrophils Absolute 1.70 - 7.00 10*3/mm3 3.65 4.22   Lymphocytes  Absolute 0.70 - 3.10 10*3/mm3 2.19 1.89   Monocytes Absolute 0.10 - 0.90 10*3/mm3 0.44 0.46   Eosinophils Absolute 0.00 - 0.40 10*3/mm3 0.11 0.10   Basophils Absolute 0.00 - 0.20 10*3/mm3 0.02 0.02   Immature Grans, Absolute 0.00 - 0.05 10*3/mm3 0.02 0.01   nRBC 0.0 - 0.2 /100 WBC 0.0 0.0   CA 27.29 0.0 - 38.6 U/mL  31.9   CEA ng/mL  5.11   (L): Data is abnormally low  (H): Data is abnormally high   Latest Reference Range & Units 12/19/23 07:33 05/13/24 07:45   Hemoglobin A1C 4.80 - 5.60 % 5.50 5.50         Physical Exam    Physical Exam  Vital Signs  Blood pressure is 122/70.        Assessment      Diagnoses and all orders for this visit:    1. Primary hypertension (Primary)    2. Overactive bladder             Assessment & Plan  1. Lipoma.  A small lipoma was identified on her back. She was advised to monitor the lipoma for any changes in size, pain, or redness. If any of these symptoms occur, she should notify the clinic immediately.    2. Hypertension.  Her blood pressure is well-controlled at 122/70 mmHg. She will continue her current regimen of amlodipine 5 mg and losartan 100 mg.    3. Overactive bladder.  She reports improvement in symptoms with oxybutynin. She will continue her current oxybutynin therapy.    4. Medicare wellness visit.  She received her pneumonia and shingles vaccines last year. She does not have diabetes. She underwent a bilateral mastectomy due to breast cancer and continues to see her oncologist annually. She is under the care of a pulmonologist for COPD but is not on any inhalers. She has a history of smoking but quit in her 50s. She reports feeling well overall and has not required hospitalization in the past year. Her last hospital admission was 2 to 3 years ago for pneumonia. She does not experience any difficulty with mobility or walking around her house. She sleeps on the couch as she prefers not to climb two flights of stairs to reach her bedroom. She does not consume alcohol and  does not feel depressed. She acknowledges a decline in her vision compared to previous years and had an ophthalmology appointment in October. She engages in physical exercise, although less frequently since November, averaging one to two times per week.    Follow-up  The patient will follow up in 6 months.    PROCEDURE  The patient underwent a bilateral mastectomy due to breast cancer.    No orders of the defined types were placed in this encounter.                Follow Up   Return in about 6 months (around 7/13/2025) for Annual labs, HTN, overactive bladder. .  Patient was given instructions and counseling regarding her condition or for health maintenance advice. Please see specific information pulled into the AVS if appropriate.    Patient or patient representative verbalized consent for the use of Ambient Listening during the visit with  Modesto Johnson MD for chart documentation. 1/13/2025  10:25 CST

## 2025-01-14 ENCOUNTER — OFFICE VISIT (OUTPATIENT)
Dept: GASTROENTEROLOGY | Facility: CLINIC | Age: 80
End: 2025-01-14
Payer: MEDICARE

## 2025-01-14 VITALS
TEMPERATURE: 96.9 F | BODY MASS INDEX: 28.53 KG/M2 | DIASTOLIC BLOOD PRESSURE: 76 MMHG | WEIGHT: 161 LBS | HEIGHT: 63 IN | SYSTOLIC BLOOD PRESSURE: 154 MMHG | HEART RATE: 41 BPM | OXYGEN SATURATION: 98 %

## 2025-01-14 DIAGNOSIS — R79.89 ABNORMAL LFTS: Primary | ICD-10-CM

## 2025-01-14 DIAGNOSIS — R93.2: ICD-10-CM

## 2025-01-14 DIAGNOSIS — K59.09 OTHER CONSTIPATION: ICD-10-CM

## 2025-01-14 PROCEDURE — 1160F RVW MEDS BY RX/DR IN RCRD: CPT | Performed by: NURSE PRACTITIONER

## 2025-01-14 PROCEDURE — 99213 OFFICE O/P EST LOW 20 MIN: CPT | Performed by: NURSE PRACTITIONER

## 2025-01-14 PROCEDURE — 3078F DIAST BP <80 MM HG: CPT | Performed by: NURSE PRACTITIONER

## 2025-01-14 PROCEDURE — 3077F SYST BP >= 140 MM HG: CPT | Performed by: NURSE PRACTITIONER

## 2025-01-14 PROCEDURE — 1159F MED LIST DOCD IN RCRD: CPT | Performed by: NURSE PRACTITIONER

## 2025-01-14 NOTE — PROGRESS NOTES
[facility] GASTROENTEROLOGY - OFFICE NOTE    1/14/2025    Ira Sanchez   1945    Primary Physician: Modesto Johnson MD    Chief Complaint   Patient presents with    GI Problem     Constipation          HISTORY OF PRESENT ILLNESS:     Ira Sanchez is a 79 y.o. female presents for follow up abnormal lft's. Has history of imaging noting nodular liver. Ultrasound elastography metavir score less than F2. She has lost weight intentionally.  No alcohol. No jaundice. No abdominal pain or distention.       Constipation   Has bm daily but small amount. No new meds. Does not drink much water. She has not been exercising as much. No rectal bleeding. No weight loss. No abdominal pain.         Labs 6-5-24 lft's normal, albumin normal, platelets normal.     US Elastography Parenchyma (09/08/2022 07:53) metavir score less than  F2.         SCANNED - COLONOSCOPY (02/22/2023 00:00)       ========================================================================  Office visit 12-14-23 HPI  Ira Sanchez is a 78 y.o. female who presents with a chief complaint of fatty liver.     When she was here last year we were evaluating for imaging that suggested she may have chronic liver disease or early cirrhosis.  Her elastography did not suggest any fibrosis.  Labs revealed preserved hepatic function.  We discussed that she probably has some underlying fatty liver disease.  We talked about weight loss.  She also was scheduled for colonoscopy.  She had that performed in February.  A small polyp was removed.  Future colonoscopies are planned as needed.     Today she feels well.  She is exercising.  She states she goes and exercises at least 3 days a week sometimes 5 days a week.  Her weight is down 15 pounds.  She wants to lose a little bit more weight.  She feels well.     I did review her labs from June this past year.  They are relatively unchanged from November last year.  See copy of LFTs below.       Latest Reference Range &  Units 11/30/22 08:18 06/07/23 08:10   A/G Ratio g/dL 1.5 1.4   AST (SGOT) 1 - 32 U/L 32 34 (H)   ALT (SGPT) 1 - 33 U/L 16 15   Total Bilirubin 0.0 - 1.2 mg/dL 0.5 0.7   (H): Data is abnormally high  ======== copy of December 1, 2022 HPI==============  HPI     Ira Sanchez is a 77 y.o. female who presents with a chief complaint of elevated LFTs     She was here in September and saw Adela.  See copy of HPI below.  Adela ordered liver serologies which came back all unremarkable other than a positive JESUSITA.  Liver ultrasound with elastography revealed less than F2 fibrosis.  She had undergone prior CTs, MRI and ultrasounds of liver.  Some of the imaging previously had suggested cirrhosis which is the original reason she was referred.  She has no history of cirrhotic liver disease.  She has been overweight for most of her life.  No other risk factors.  She feels well at this time.  Denies any edema.     She last had a colonoscopy 5 years ago.  She has family history colon cancer involving her father.  He was 64     ============ copy of September 1, 2022 HPI=====================  HISTORY OF PRESENT ILLNESS:      Ira Sanchez is a 77 y.o. female presents with abnormal lft's. I have reviewed labs in [facility] Lft's have been elevated since at least 2015.  She denies history of liver disease. No family history of liver disease.   No history chf. No juandice. No abdominal pain, fever,  or weight loss. No abdominal or ankle swelling. No history of heavy alcohol use.            Recent hepatitis panel negative.         MRI Abdomen With & Without Contrast (07/14/2022 15:18)  IMPRESSION:  1. There is a slightly lobulated contour to the left hepatic lobe with  mild caudate lobe hypertrophy. Findings can be seen with mild fibrosis.  There is no suspicious focal liver mass. No evidence of portal  hypertension.     US Liver (06/16/2022 09:06)  IMPRESSION:   1. Coarsened hepatic echotexture and mildly lobular contour with  prominence of  the caudate lobe. Underlying fibrosis/cirrhotic morphology  to be considered.              No change in bowel habits or rectal bleeding.         SCANNED - COLONOSCOPY (12/19/2017 00:00)  She has history adenomatous colon polyp 2014.   Father had colon cancer at age 64.   Brother had colon polyps.        Past Medical History:   Diagnosis Date    Abnormal LFTs 12/01/2022    Abnormal liver x-ray 07/14/2022    US liver  Result Date: 6/16/2022   1.  Coarsened hepatic echotexture and mildly lobular contour with prominence of the caudate lobe. Underlying fibrosis/cirrhotic morphology to be considered.   This report was finalized on 06/16/2022 14:48 by Dr. Maco Leyva MD.     CT Abdomen Pelvis With Contrast (08/10/2021 08:51)  IMPRESSION:  1. No acute intra-abdominal finding.  2. Colonic diverticula. No    Bradycardia     Breast cancer     Colon polyp     COVID-19     Diabetes mellitus     Encounter for care related to Port-a-Cath 04/24/2020    History of bilateral mastectomy 11/26/2014    History of shingles 04/04/2019    Had zostavax     4.4.19 photo-L forehead     6.19.19 advise shinglex      Hx of migraine headaches     Hypertension     Menopause 12/11/2018    Peripheral neuropathy     Pneumonia of both lower lobes due to infectious organism 05/05/2022    Urinary incontinence        Past Surgical History:   Procedure Laterality Date    APPENDECTOMY      BREAST SURGERY      CATARACT EXTRACTION Bilateral     COLONOSCOPY  02/21/2023    DILATATION AND CURETTAGE      KNEE SURGERY      MASTECTOMY Bilateral     NIPPLE TATTOO  04/12/2016    PORTACATH PLACEMENT      THROAT SURGERY         Outpatient Medications Marked as Taking for the 1/14/25 encounter (Office Visit) with Adeal Barboza APRN   Medication Sig Dispense Refill    amLODIPine (NORVASC) 5 MG tablet TAKE 1 TABLET BY MOUTH DAILY 90 tablet 3    azelastine (ASTELIN) 0.1 % nasal spray 2 sprays into the nostril(s) as directed by provider 2 (Two) Times a Day. 30 mL 0     Calcium Carb-Cholecalciferol (CALCIUM-VITAMIN D) 600-400 MG-UNIT tablet Take 1 tablet by mouth Daily.      diphenhydrAMINE (BENADRYL) 25 mg capsule Take 1 capsule by mouth At Night As Needed for Allergies.      losartan (COZAAR) 100 MG tablet TAKE 1 TABLET BY MOUTH DAILY 90 tablet 3    oxybutynin XL (DITROPAN XL) 15 MG 24 hr tablet Take 1 tablet by mouth Daily. 90 tablet 3    thiamine (VITAMINE B-1) 100 MG tablet Take 1 tablet by mouth Daily.         Allergies   Allergen Reactions    Benzonatate Hives and Itching       Social History     Socioeconomic History    Marital status:      Spouse name: Horace    Number of children: 0    Years of education: 12.5   Tobacco Use    Smoking status: Former     Current packs/day: 0.00     Average packs/day: 2.0 packs/day for 37.6 years (75.2 ttl pk-yrs)     Types: Cigarettes     Start date: 1963     Quit date: 2000     Years since quittin.3     Passive exposure: Past    Smokeless tobacco: Never   Vaping Use    Vaping status: Never Used   Substance and Sexual Activity    Alcohol use: No    Drug use: No    Sexual activity: Defer       Family History   Problem Relation Age of Onset    Hypertension Mother     Heart failure Mother     Cancer Father         colon    Colon cancer Father     Cancer Maternal Aunt         breast    No Known Problems Maternal Grandmother     No Known Problems Maternal Grandfather     No Known Problems Paternal Grandmother     No Known Problems Paternal Grandfather     Cancer Maternal Aunt         breast    Cancer Maternal Aunt         breast    Cancer Other        Review of Systems   Constitutional:  Negative for chills, fever and unexpected weight change.   Respiratory:  Negative for shortness of breath.    Cardiovascular:  Negative for chest pain.   Gastrointestinal:  Negative for abdominal distention, abdominal pain, anal bleeding, blood in stool, diarrhea, nausea and vomiting.        Vitals:    25 0841   BP: 154/76  "  Pulse: (!) 41   Temp: 96.9 °F (36.1 °C)   SpO2: 98%   Weight: 73 kg (161 lb)   Height: 160 cm (63\")      Body mass index is 28.52 kg/m².    Physical Exam  Vitals reviewed.   Constitutional:       General: She is not in acute distress.  Cardiovascular:      Rate and Rhythm: Normal rate and regular rhythm.      Heart sounds: Normal heart sounds.   Pulmonary:      Effort: Pulmonary effort is normal.      Breath sounds: Normal breath sounds.   Abdominal:      General: Bowel sounds are normal. There is no distension.      Palpations: Abdomen is soft.      Tenderness: There is no abdominal tenderness.   Skin:     General: Skin is warm and dry.   Neurological:      Mental Status: She is alert.         Results for orders placed or performed in visit on 06/05/24   Comprehensive Metabolic Panel    Collection Time: 06/05/24  7:53 AM    Specimen: Blood   Result Value Ref Range    Glucose 108 (H) 65 - 99 mg/dL    BUN 16 8 - 23 mg/dL    Creatinine 0.50 (L) 0.57 - 1.00 mg/dL    Sodium 141 136 - 145 mmol/L    Potassium 4.3 3.5 - 5.2 mmol/L    Chloride 105 98 - 107 mmol/L    CO2 26.0 22.0 - 29.0 mmol/L    Calcium 9.6 8.6 - 10.5 mg/dL    Total Protein 6.7 6.0 - 8.5 g/dL    Albumin 4.3 3.5 - 5.2 g/dL    ALT (SGPT) 8 1 - 33 U/L    AST (SGOT) 18 1 - 32 U/L    Alkaline Phosphatase 110 39 - 117 U/L    Total Bilirubin 0.5 0.0 - 1.2 mg/dL    Globulin 2.4 gm/dL    A/G Ratio 1.8 g/dL    BUN/Creatinine Ratio 32.0 (H) 7.0 - 25.0    Anion Gap 10.0 5.0 - 15.0 mmol/L    eGFR 94.4 >60.0 mL/min/1.73   Iron and TIBC    Collection Time: 06/05/24  7:53 AM    Specimen: Blood   Result Value Ref Range    Iron 86 37 - 145 mcg/dL    Iron Saturation (TSAT) 23 20 - 50 %    Transferrin 252 200 - 360 mg/dL    TIBC 375 298 - 536 mcg/dL   Ferritin    Collection Time: 06/05/24  7:53 AM    Specimen: Blood   Result Value Ref Range    Ferritin 102.00 13.00 - 150.00 ng/mL   Cancer Antigen 27.29    Collection Time: 06/05/24  7:53 AM    Specimen: Blood   Result Value " Ref Range    CA 27.29 31.9 0.0 - 38.6 U/mL   CEA    Collection Time: 06/05/24  7:53 AM    Specimen: Blood   Result Value Ref Range    CEA 5.11 ng/mL   CBC Auto Differential    Collection Time: 06/05/24  7:53 AM    Specimen: Blood   Result Value Ref Range    WBC 6.70 3.40 - 10.80 10*3/mm3    RBC 4.21 3.77 - 5.28 10*6/mm3    Hemoglobin 13.2 12.0 - 15.9 g/dL    Hematocrit 40.2 34.0 - 46.6 %    MCV 95.5 79.0 - 97.0 fL    MCH 31.4 26.6 - 33.0 pg    MCHC 32.8 31.5 - 35.7 g/dL    RDW 12.5 12.3 - 15.4 %    RDW-SD 43.7 37.0 - 54.0 fl    MPV 10.5 6.0 - 12.0 fL    Platelets 154 140 - 450 10*3/mm3    Neutrophil % 63.0 42.7 - 76.0 %    Lymphocyte % 28.2 19.6 - 45.3 %    Monocyte % 6.9 5.0 - 12.0 %    Eosinophil % 1.5 0.3 - 6.2 %    Basophil % 0.3 0.0 - 1.5 %    Immature Grans % 0.1 0.0 - 0.5 %    Neutrophils, Absolute 4.22 1.70 - 7.00 10*3/mm3    Lymphocytes, Absolute 1.89 0.70 - 3.10 10*3/mm3    Monocytes, Absolute 0.46 0.10 - 0.90 10*3/mm3    Eosinophils, Absolute 0.10 0.00 - 0.40 10*3/mm3    Basophils, Absolute 0.02 0.00 - 0.20 10*3/mm3    Immature Grans, Absolute 0.01 0.00 - 0.05 10*3/mm3    nRBC 0.0 0.0 - 0.2 /100 WBC           ASSESSMENT AND PLAN    Assessment & Plan     Diagnoses and all orders for this visit:    1. Abnormal LFTs (Primary)    2. Abnormal liver x-ray    3. Other constipation      In regards to abnormal lft's, they are now normal. Suspected could have been due to fatty liver even though imaging did not confirm. She has lost some weight and I think that has helped. Imaging of the liver in the past has noted lobular contour. Labs and ultrasound elastography do not go along with cirrhosis. Since lft's have normalized I recommend that she continue to follow up with her pcp to monitor labs at his discretion. She can follow up here if any problems.     In regards to constipation, recommend increase water intake/daily fiber supplement/exercise.  We also discussed trial of miralax and adjust accordingly.  She is up  to date on colonoscopy.     * Surgery not found *           No follow-ups on file.          There are no Patient Instructions on file for this visit.      Adela Barboza, APRN

## 2025-03-05 ENCOUNTER — INFUSION (OUTPATIENT)
Dept: ONCOLOGY | Facility: CLINIC | Age: 80
End: 2025-03-05
Payer: MEDICARE

## 2025-03-05 DIAGNOSIS — Z45.2 ENCOUNTER FOR CARE RELATED TO VASCULAR ACCESS PORT: ICD-10-CM

## 2025-03-05 DIAGNOSIS — Z45.2 ENCOUNTER FOR CARE RELATED TO PORT-A-CATH: Primary | ICD-10-CM

## 2025-03-05 RX ORDER — HEPARIN SODIUM (PORCINE) LOCK FLUSH IV SOLN 100 UNIT/ML 100 UNIT/ML
500 SOLUTION INTRAVENOUS AS NEEDED
OUTPATIENT
Start: 2025-03-05

## 2025-03-05 RX ORDER — SODIUM CHLORIDE 0.9 % (FLUSH) 0.9 %
10 SYRINGE (ML) INJECTION AS NEEDED
Status: DISCONTINUED | OUTPATIENT
Start: 2025-03-05 | End: 2025-03-05 | Stop reason: HOSPADM

## 2025-03-05 RX ORDER — HEPARIN SODIUM (PORCINE) LOCK FLUSH IV SOLN 100 UNIT/ML 100 UNIT/ML
500 SOLUTION INTRAVENOUS AS NEEDED
Status: DISCONTINUED | OUTPATIENT
Start: 2025-03-05 | End: 2025-03-05 | Stop reason: HOSPADM

## 2025-03-05 RX ORDER — SODIUM CHLORIDE 0.9 % (FLUSH) 0.9 %
10 SYRINGE (ML) INJECTION AS NEEDED
OUTPATIENT
Start: 2025-03-05

## 2025-03-05 RX ADMIN — Medication 10 ML: at 08:19

## 2025-03-05 RX ADMIN — HEPARIN SODIUM (PORCINE) LOCK FLUSH IV SOLN 100 UNIT/ML 500 UNITS: 100 SOLUTION at 08:19

## 2025-03-14 ENCOUNTER — TELEPHONE (OUTPATIENT)
Dept: VASCULAR SURGERY | Facility: CLINIC | Age: 80
End: 2025-03-14
Payer: MEDICARE

## 2025-03-17 ENCOUNTER — HOSPITAL ENCOUNTER (OUTPATIENT)
Dept: ULTRASOUND IMAGING | Facility: HOSPITAL | Age: 80
Discharge: HOME | End: 2025-03-17
Admitting: NURSE PRACTITIONER
Payer: MEDICARE

## 2025-03-17 ENCOUNTER — OFFICE VISIT (OUTPATIENT)
Dept: VASCULAR SURGERY | Facility: CLINIC | Age: 80
End: 2025-03-17
Payer: MEDICARE

## 2025-03-17 VITALS
BODY MASS INDEX: 28.71 KG/M2 | HEIGHT: 62 IN | WEIGHT: 156 LBS | HEART RATE: 40 BPM | SYSTOLIC BLOOD PRESSURE: 122 MMHG | OXYGEN SATURATION: 94 % | DIASTOLIC BLOOD PRESSURE: 68 MMHG

## 2025-03-17 DIAGNOSIS — I65.23 BILATERAL CAROTID ARTERY STENOSIS: Primary | ICD-10-CM

## 2025-03-17 DIAGNOSIS — I87.323 CHRONIC VENOUS HYPERTENSION WITH INFLAMMATION INVOLVING BOTH SIDES: ICD-10-CM

## 2025-03-17 DIAGNOSIS — I65.23 BILATERAL CAROTID ARTERY STENOSIS: ICD-10-CM

## 2025-03-17 DIAGNOSIS — I10 PRIMARY HYPERTENSION: ICD-10-CM

## 2025-03-17 PROCEDURE — 93880 EXTRACRANIAL BILAT STUDY: CPT

## 2025-03-17 PROCEDURE — 1160F RVW MEDS BY RX/DR IN RCRD: CPT | Performed by: NURSE PRACTITIONER

## 2025-03-17 PROCEDURE — 3078F DIAST BP <80 MM HG: CPT | Performed by: NURSE PRACTITIONER

## 2025-03-17 PROCEDURE — 93880 EXTRACRANIAL BILAT STUDY: CPT | Performed by: SURGERY

## 2025-03-17 PROCEDURE — 1159F MED LIST DOCD IN RCRD: CPT | Performed by: NURSE PRACTITIONER

## 2025-03-17 PROCEDURE — 99214 OFFICE O/P EST MOD 30 MIN: CPT | Performed by: NURSE PRACTITIONER

## 2025-03-17 PROCEDURE — 3074F SYST BP LT 130 MM HG: CPT | Performed by: NURSE PRACTITIONER

## 2025-03-17 NOTE — PROGRESS NOTES
"3/17/2025       Modesto Johnson MD  [address] [place] [id number]    Ira Sanchez  1945    Chief Complaint   Patient presents with    Follow-up     1 year follow up w/ testing. Last seen 3/25/24. Patient denies any issues with legs at this time.        Dear Modesto Johnson MD       HPI  I had the pleasure of seeing your patient Ira Sanchez in the office today.   As you recall, Ira Sanchez is a 80 y.o.  female who you are following for routine health maintenance.  We are following for venous insufficiency and asymptomatic carotid occlusive disease.  She does take Norvasc as well which she feels like her legs have swollen since she began this medication a couple years ago.  She does not feel like her legs are significantly swollen at this time.  She did have noninvasive testing performed today, which I did review in office.    Review of Systems   Constitutional: Negative.    HENT: Negative.     Eyes: Negative.    Respiratory: Negative.     Cardiovascular: Negative.    Gastrointestinal: Negative.    Endocrine: Negative.    Genitourinary: Negative.    Musculoskeletal: Negative.    Skin: Negative.    Allergic/Immunologic: Negative.    Neurological: Negative.    Hematological: Negative.    Psychiatric/Behavioral: Negative.           /68   Pulse (!) 40   Ht 157.5 cm (62\")   Wt 70.8 kg (156 lb)   LMP  (LMP Unknown)   SpO2 94%   BMI 28.53 kg/m²    Physical Exam  Vitals and nursing note reviewed.   Constitutional:       General: She is not in acute distress.     Appearance: Normal appearance. She is well-developed, well-groomed and overweight. She is not diaphoretic.   HENT:      Head: Normocephalic and atraumatic.   Eyes:      General: No scleral icterus.     Pupils: Pupils are equal, round, and reactive to light.   Neck:      Thyroid: No thyromegaly.      Vascular: No carotid bruit or JVD.   Cardiovascular:      Rate and Rhythm: Normal rate and regular rhythm. Bradycardia present.      Pulses: " Normal pulses.      Heart sounds: Normal heart sounds and S2 normal. No murmur heard.     No friction rub. No gallop.      Comments: Varicose veins to bilateral lower extremities  Pulmonary:      Effort: Pulmonary effort is normal.      Breath sounds: Normal breath sounds.   Abdominal:      General: Bowel sounds are normal.      Palpations: Abdomen is soft.   Musculoskeletal:         General: Normal range of motion.      Cervical back: Normal range of motion and neck supple.   Skin:     General: Skin is warm and dry.   Neurological:      Mental Status: She is alert and oriented to person, place, and time.      Cranial Nerves: No cranial nerve deficit.   Psychiatric:         Mood and Affect: Mood normal.         Behavior: Behavior normal. Behavior is cooperative.         Thought Content: Thought content normal.         Judgment: Judgment normal.       Diagnostic data:  Noninvasive testing including a carotid duplex shows less than 50% right carotid stenosis and to 50-69% left carotid stenosis with bilateral antegrade vertebral flow.      Patient Active Problem List   Diagnosis    Breast cancer-L/valente masectomy 2014    Overactive bladder    Hypertension    Osteopenia 2022- 2 to 5y    Neuropathy-chemo/better    Primary generalized (osteo)arthritis    Malignant neoplasm of lower-outer quadrant of left female breast    Malignant neoplasm of upper-outer quadrant of female breast    Shoulder pain    Chronic back pain    Bradycardia    Cough: copd    Allergic rhinitis    COPD (chronic obstructive pulmonary disease)    After-cataract with vision obscured    Amblyopia    Dermatitis of eyelid due to herpes zoster    Dermatochalasis    Hordeolum internum    Other hereditary corneal dystrophies    Pseudophakia    Rash    Encounter for care related to Port-a-Cath    Edema: venous-BH/vascular    Congestive heart failure    Valvular heart disease: AS    Chronic neck pain    Encounter for care related to vascular access port     Personal history of nicotine dependence    NSAID long-term use    Adenomatous polyp of colon    Scarring of lung    Multiple chronic diseases    Skin lesion    Overweight         ICD-10-CM ICD-9-CM   1. Bilateral carotid artery stenosis  I65.23 433.10     433.30   2. Chronic venous hypertension with inflammation involving both sides  I87.323 459.32   3. Primary hypertension  I10 401.9   4. Essential hypertension  I10 401.9         Plan: After thoroughly evaluating Ira Sanchez, I believe the best course of action is to remain conservative from vascular surgery standpoint.  Overall she is doing well and reports only mild swelling to her lower extremities.  She denies any strokelike symptoms.  I did review her testing which shows less than 50% right carotid stenosis and 50 to 69% left carotid stenosis.  We will see her back in 1 year with repeat noninvasive testing including a carotid duplex.  I did discuss vascular risk factors as they pertain to the progression of vascular disease including controlling her hypertension, which is stable on her current medications.  Body mass index is 28.53 kg/m².  The patient can continue taking their current medication regimen as previously planned.  This was all discussed in full with complete understanding.    Thank you for allowing me to participate in the care of your patient.  Please do not hesitate with any questions or concerns.  I will keep you aware of any further encounters with Ira Sanchez.        Sincerely yours,         REBEL Crawford

## 2025-03-17 NOTE — LETTER
"March 17, 2025     Modesto Johnson MD  [address] [place], [place] [id number]    Patient: Ira Sanchez   YOB: 1945   Date of Visit: 3/17/2025     Dear Modesto Johnson MD:       Thank you for referring Ira Sanchez to me for evaluation. Below are the relevant portions of my assessment and plan of care.    If you have questions, please do not hesitate to call me. I look forward to following Iar along with you.         Sincerely,        REBEL Crawford        CC: No Recipients    Henrietta Olvera APRN  03/17/25 1345  Sign when Signing Visit  3/17/2025       Modesto Johnson MD  [address] [place], [place] [address]    Ira Sanchez  1945    Chief Complaint   Patient presents with   • Follow-up     1 year follow up w/ testing. Last seen 3/25/24. Patient denies any issues with legs at this time.        Dear Modesto Johnson MD       HPI  I had the pleasure of seeing your patient Ira Sanchez in the office today.   As you recall, Ira Sanchez is a 80 y.o.  female who you are following for routine health maintenance.  We are following for venous insufficiency and asymptomatic carotid occlusive disease.  She does take Norvasc as well which she feels like her legs have swollen since she began this medication a couple years ago.  She does not feel like her legs are significantly swollen at this time.  She did have noninvasive testing performed today, which I did review in office.    Review of Systems   Constitutional: Negative.    HENT: Negative.     Eyes: Negative.    Respiratory: Negative.     Cardiovascular: Negative.    Gastrointestinal: Negative.    Endocrine: Negative.    Genitourinary: Negative.    Musculoskeletal: Negative.    Skin: Negative.    Allergic/Immunologic: Negative.    Neurological: Negative.    Hematological: Negative.    Psychiatric/Behavioral: Negative.           /68   Pulse (!) 40   Ht 157.5 cm (62\")   Wt 70.8 kg (156 lb)   LMP  (LMP Unknown)   SpO2 94%   BMI 28.53 " kg/m²    Physical Exam  Vitals and nursing note reviewed.   Constitutional:       General: She is not in acute distress.     Appearance: Normal appearance. She is well-developed, well-groomed and overweight. She is not diaphoretic.   HENT:      Head: Normocephalic and atraumatic.   Eyes:      General: No scleral icterus.     Pupils: Pupils are equal, round, and reactive to light.   Neck:      Thyroid: No thyromegaly.      Vascular: No carotid bruit or JVD.   Cardiovascular:      Rate and Rhythm: Normal rate and regular rhythm. Bradycardia present.      Pulses: Normal pulses.      Heart sounds: Normal heart sounds and S2 normal. No murmur heard.     No friction rub. No gallop.      Comments: Varicose veins to bilateral lower extremities  Pulmonary:      Effort: Pulmonary effort is normal.      Breath sounds: Normal breath sounds.   Abdominal:      General: Bowel sounds are normal.      Palpations: Abdomen is soft.   Musculoskeletal:         General: Normal range of motion.      Cervical back: Normal range of motion and neck supple.   Skin:     General: Skin is warm and dry.   Neurological:      Mental Status: She is alert and oriented to person, place, and time.      Cranial Nerves: No cranial nerve deficit.   Psychiatric:         Mood and Affect: Mood normal.         Behavior: Behavior normal. Behavior is cooperative.         Thought Content: Thought content normal.         Judgment: Judgment normal.       Diagnostic data:  Noninvasive testing including a carotid duplex shows less than 50% right carotid stenosis and to 50-69% left carotid stenosis with bilateral antegrade vertebral flow.      Patient Active Problem List   Diagnosis   • Breast cancer-L/valente masectomy 2014   • Overactive bladder   • Hypertension   • Osteopenia 2022- 2 to 5y   • Neuropathy-chemo/better   • Primary generalized (osteo)arthritis   • Malignant neoplasm of lower-outer quadrant of left female breast   • Malignant neoplasm of upper-outer  quadrant of female breast   • Shoulder pain   • Chronic back pain   • Bradycardia   • Cough: copd   • Allergic rhinitis   • COPD (chronic obstructive pulmonary disease)   • After-cataract with vision obscured   • Amblyopia   • Dermatitis of eyelid due to herpes zoster   • Dermatochalasis   • Hordeolum internum   • Other hereditary corneal dystrophies   • Pseudophakia   • Rash   • Encounter for care related to Port-a-Cath   • Edema: venous-BH/vascular   • Congestive heart failure   • Valvular heart disease: AS   • Chronic neck pain   • Encounter for care related to vascular access port   • Personal history of nicotine dependence   • NSAID long-term use   • Adenomatous polyp of colon   • Scarring of lung   • Multiple chronic diseases   • Skin lesion   • Overweight         ICD-10-CM ICD-9-CM   1. Bilateral carotid artery stenosis  I65.23 433.10     433.30   2. Chronic venous hypertension with inflammation involving both sides  I87.323 459.32   3. Primary hypertension  I10 401.9   4. Essential hypertension  I10 401.9         Plan: After thoroughly evaluating Ira Sanchez, I believe the best course of action is to remain conservative from vascular surgery standpoint.  Overall she is doing well and reports only mild swelling to her lower extremities.  She denies any strokelike symptoms.  I did review her testing which shows less than 50% right carotid stenosis and 50 to 69% left carotid stenosis.  We will see her back in 1 year with repeat noninvasive testing including a carotid duplex.  I did discuss vascular risk factors as they pertain to the progression of vascular disease including controlling her hypertension, which is stable on her current medications.  Body mass index is 28.53 kg/m².  The patient can continue taking their current medication regimen as previously planned.  This was all discussed in full with complete understanding.    Thank you for allowing me to participate in the care of your patient.  Please  do not hesitate with any questions or concerns.  I will keep you aware of any further encounters with Ira Sanchez.        Sincerely yours,         REBEL Crawford

## 2025-05-28 ENCOUNTER — INFUSION (OUTPATIENT)
Dept: ONCOLOGY | Facility: CLINIC | Age: 80
End: 2025-05-28
Payer: MEDICARE

## 2025-05-28 ENCOUNTER — LAB (OUTPATIENT)
Dept: LAB | Facility: HOSPITAL | Age: 80
End: 2025-05-28
Payer: MEDICARE

## 2025-05-28 DIAGNOSIS — Z45.2 ENCOUNTER FOR CARE RELATED TO VASCULAR ACCESS PORT: ICD-10-CM

## 2025-05-28 DIAGNOSIS — Z86.39 HISTORY OF IRON DEFICIENCY: ICD-10-CM

## 2025-05-28 DIAGNOSIS — Z85.3 HISTORY OF BREAST CANCER: ICD-10-CM

## 2025-05-28 DIAGNOSIS — Z45.2 ENCOUNTER FOR CARE RELATED TO PORT-A-CATH: Primary | ICD-10-CM

## 2025-05-28 DIAGNOSIS — I10 PRIMARY HYPERTENSION: Chronic | ICD-10-CM

## 2025-05-28 LAB
ALBUMIN SERPL-MCNC: 4 G/DL (ref 3.5–5.2)
ALBUMIN/GLOB SERPL: 1.6 G/DL
ALP SERPL-CCNC: 100 U/L (ref 39–117)
ALT SERPL W P-5'-P-CCNC: <5 U/L (ref 1–33)
ANION GAP SERPL CALCULATED.3IONS-SCNC: 11 MMOL/L (ref 5–15)
AST SERPL-CCNC: 17 U/L (ref 1–32)
BASOPHILS # BLD AUTO: 0.02 10*3/MM3 (ref 0–0.2)
BASOPHILS NFR BLD AUTO: 0.3 % (ref 0–1.5)
BILIRUB SERPL-MCNC: 0.7 MG/DL (ref 0–1.2)
BUN SERPL-MCNC: 12 MG/DL (ref 8–23)
BUN/CREAT SERPL: 23.5 (ref 7–25)
CALCIUM SPEC-SCNC: 9.2 MG/DL (ref 8.6–10.5)
CEA SERPL-MCNC: 5.18 NG/ML
CHLORIDE SERPL-SCNC: 104 MMOL/L (ref 98–107)
CO2 SERPL-SCNC: 25 MMOL/L (ref 22–29)
CREAT SERPL-MCNC: 0.51 MG/DL (ref 0.57–1)
DEPRECATED RDW RBC AUTO: 43.2 FL (ref 37–54)
EGFRCR SERPLBLD CKD-EPI 2021: 93.3 ML/MIN/1.73
EOSINOPHIL # BLD AUTO: 0.06 10*3/MM3 (ref 0–0.4)
EOSINOPHIL NFR BLD AUTO: 0.9 % (ref 0.3–6.2)
ERYTHROCYTE [DISTWIDTH] IN BLOOD BY AUTOMATED COUNT: 12.4 % (ref 12.3–15.4)
FERRITIN SERPL-MCNC: 69.96 NG/ML (ref 13–150)
GLOBULIN UR ELPH-MCNC: 2.5 GM/DL
GLUCOSE SERPL-MCNC: 108 MG/DL (ref 65–99)
HCT VFR BLD AUTO: 42.7 % (ref 34–46.6)
HGB BLD-MCNC: 13.7 G/DL (ref 12–15.9)
IMM GRANULOCYTES # BLD AUTO: 0.02 10*3/MM3 (ref 0–0.05)
IMM GRANULOCYTES NFR BLD AUTO: 0.3 % (ref 0–0.5)
IRON 24H UR-MRATE: 112 MCG/DL (ref 37–145)
IRON SATN MFR SERPL: 30 % (ref 20–50)
LYMPHOCYTES # BLD AUTO: 1.82 10*3/MM3 (ref 0.7–3.1)
LYMPHOCYTES NFR BLD AUTO: 28.4 % (ref 19.6–45.3)
MCH RBC QN AUTO: 30.4 PG (ref 26.6–33)
MCHC RBC AUTO-ENTMCNC: 32.1 G/DL (ref 31.5–35.7)
MCV RBC AUTO: 94.7 FL (ref 79–97)
MONOCYTES # BLD AUTO: 0.41 10*3/MM3 (ref 0.1–0.9)
MONOCYTES NFR BLD AUTO: 6.4 % (ref 5–12)
NEUTROPHILS NFR BLD AUTO: 4.08 10*3/MM3 (ref 1.7–7)
NEUTROPHILS NFR BLD AUTO: 63.7 % (ref 42.7–76)
NRBC BLD AUTO-RTO: 0 /100 WBC (ref 0–0.2)
PLATELET # BLD AUTO: 151 10*3/MM3 (ref 140–450)
PMV BLD AUTO: 10.3 FL (ref 6–12)
POTASSIUM SERPL-SCNC: 3.9 MMOL/L (ref 3.5–5.2)
PROT SERPL-MCNC: 6.5 G/DL (ref 6–8.5)
RBC # BLD AUTO: 4.51 10*6/MM3 (ref 3.77–5.28)
SODIUM SERPL-SCNC: 140 MMOL/L (ref 136–145)
TIBC SERPL-MCNC: 374 MCG/DL (ref 298–536)
TRANSFERRIN SERPL-MCNC: 251 MG/DL (ref 200–360)
WBC NRBC COR # BLD AUTO: 6.41 10*3/MM3 (ref 3.4–10.8)

## 2025-05-28 PROCEDURE — 85025 COMPLETE CBC W/AUTO DIFF WBC: CPT

## 2025-05-28 PROCEDURE — 84466 ASSAY OF TRANSFERRIN: CPT

## 2025-05-28 PROCEDURE — 86300 IMMUNOASSAY TUMOR CA 15-3: CPT

## 2025-05-28 PROCEDURE — 82378 CARCINOEMBRYONIC ANTIGEN: CPT

## 2025-05-28 PROCEDURE — 83540 ASSAY OF IRON: CPT

## 2025-05-28 PROCEDURE — 80053 COMPREHEN METABOLIC PANEL: CPT

## 2025-05-28 PROCEDURE — 36415 COLL VENOUS BLD VENIPUNCTURE: CPT

## 2025-05-28 PROCEDURE — 82728 ASSAY OF FERRITIN: CPT

## 2025-05-28 RX ORDER — SODIUM CHLORIDE 0.9 % (FLUSH) 0.9 %
10 SYRINGE (ML) INJECTION AS NEEDED
Status: DISCONTINUED | OUTPATIENT
Start: 2025-05-28 | End: 2025-05-28 | Stop reason: HOSPADM

## 2025-05-28 RX ORDER — SODIUM CHLORIDE 0.9 % (FLUSH) 0.9 %
10 SYRINGE (ML) INJECTION AS NEEDED
OUTPATIENT
Start: 2025-05-28

## 2025-05-28 RX ORDER — HEPARIN SODIUM (PORCINE) LOCK FLUSH IV SOLN 100 UNIT/ML 100 UNIT/ML
500 SOLUTION INTRAVENOUS AS NEEDED
Status: DISCONTINUED | OUTPATIENT
Start: 2025-05-28 | End: 2025-05-28 | Stop reason: HOSPADM

## 2025-05-28 RX ORDER — HEPARIN SODIUM (PORCINE) LOCK FLUSH IV SOLN 100 UNIT/ML 100 UNIT/ML
500 SOLUTION INTRAVENOUS AS NEEDED
OUTPATIENT
Start: 2025-05-28

## 2025-05-28 RX ADMIN — Medication 10 ML: at 07:54

## 2025-05-28 RX ADMIN — HEPARIN SODIUM (PORCINE) LOCK FLUSH IV SOLN 100 UNIT/ML 500 UNITS: 100 SOLUTION at 07:54

## 2025-05-29 LAB — CANCER AG27-29 SERPL-ACNC: 34.1 U/ML (ref 0–38.6)

## 2025-07-23 ENCOUNTER — TELEPHONE (OUTPATIENT)
Dept: ONCOLOGY | Facility: CLINIC | Age: 80
End: 2025-07-23
Payer: MEDICARE

## 2025-07-30 ENCOUNTER — INFUSION (OUTPATIENT)
Dept: ONCOLOGY | Facility: CLINIC | Age: 80
End: 2025-07-30
Payer: MEDICARE

## 2025-07-30 DIAGNOSIS — Z45.2 ENCOUNTER FOR CARE RELATED TO PORT-A-CATH: Primary | ICD-10-CM

## 2025-07-30 DIAGNOSIS — Z45.2 ENCOUNTER FOR CARE RELATED TO VASCULAR ACCESS PORT: ICD-10-CM

## 2025-07-30 RX ORDER — SODIUM CHLORIDE 0.9 % (FLUSH) 0.9 %
10 SYRINGE (ML) INJECTION AS NEEDED
OUTPATIENT
Start: 2025-07-30

## 2025-07-30 RX ORDER — HEPARIN SODIUM (PORCINE) LOCK FLUSH IV SOLN 100 UNIT/ML 100 UNIT/ML
500 SOLUTION INTRAVENOUS AS NEEDED
Status: SHIPPED | OUTPATIENT
Start: 2025-07-30

## 2025-07-30 RX ORDER — HEPARIN SODIUM (PORCINE) LOCK FLUSH IV SOLN 100 UNIT/ML 100 UNIT/ML
500 SOLUTION INTRAVENOUS AS NEEDED
OUTPATIENT
Start: 2025-07-30

## 2025-07-30 RX ORDER — SODIUM CHLORIDE 0.9 % (FLUSH) 0.9 %
10 SYRINGE (ML) INJECTION AS NEEDED
Status: SHIPPED | OUTPATIENT
Start: 2025-07-30

## 2025-07-30 RX ADMIN — HEPARIN SODIUM (PORCINE) LOCK FLUSH IV SOLN 100 UNIT/ML 500 UNITS: 100 SOLUTION at 09:03

## 2025-07-30 RX ADMIN — Medication 10 ML: at 09:03

## 2025-08-18 ENCOUNTER — TELEPHONE (OUTPATIENT)
Dept: ONCOLOGY | Facility: CLINIC | Age: 80
End: 2025-08-18
Payer: MEDICARE

## 2025-08-18 DIAGNOSIS — M85.80 OSTEOPENIA, UNSPECIFIED LOCATION: Primary | ICD-10-CM

## 2025-08-19 RX ORDER — ALENDRONATE SODIUM 70 MG/1
70 TABLET ORAL
Qty: 4 TABLET | Refills: 2 | Status: SHIPPED | OUTPATIENT
Start: 2025-08-19

## 2025-08-21 ENCOUNTER — OFFICE VISIT (OUTPATIENT)
Dept: PULMONOLOGY | Facility: CLINIC | Age: 80
End: 2025-08-21
Payer: MEDICARE

## 2025-08-21 VITALS
HEART RATE: 40 BPM | DIASTOLIC BLOOD PRESSURE: 84 MMHG | SYSTOLIC BLOOD PRESSURE: 138 MMHG | WEIGHT: 147 LBS | BODY MASS INDEX: 27.05 KG/M2 | OXYGEN SATURATION: 97 % | HEIGHT: 62 IN

## 2025-08-21 DIAGNOSIS — Z87.891 PERSONAL HISTORY OF NICOTINE DEPENDENCE: Chronic | ICD-10-CM

## 2025-08-21 DIAGNOSIS — J98.4 SCARRING OF LUNG: Primary | Chronic | ICD-10-CM

## 2025-08-21 DIAGNOSIS — I38 VALVULAR HEART DISEASE: Chronic | ICD-10-CM

## 2025-08-21 DIAGNOSIS — E66.3 OVERWEIGHT: Chronic | ICD-10-CM

## 2025-08-26 ENCOUNTER — OFFICE VISIT (OUTPATIENT)
Dept: FAMILY MEDICINE CLINIC | Facility: CLINIC | Age: 80
End: 2025-08-26
Payer: MEDICARE

## 2025-08-26 VITALS
SYSTOLIC BLOOD PRESSURE: 140 MMHG | BODY MASS INDEX: 26.31 KG/M2 | OXYGEN SATURATION: 95 % | WEIGHT: 143 LBS | HEIGHT: 62 IN | DIASTOLIC BLOOD PRESSURE: 76 MMHG | HEART RATE: 42 BPM

## 2025-08-26 DIAGNOSIS — N32.81 OVERACTIVE BLADDER: Chronic | ICD-10-CM

## 2025-08-26 DIAGNOSIS — I10 PRIMARY HYPERTENSION: Primary | Chronic | ICD-10-CM

## 2025-08-26 DIAGNOSIS — R01.1 MURMUR: ICD-10-CM

## 2025-08-26 DIAGNOSIS — L98.9 SKIN LESION: ICD-10-CM

## 2025-08-26 PROCEDURE — 3077F SYST BP >= 140 MM HG: CPT

## 2025-08-26 PROCEDURE — G2211 COMPLEX E/M VISIT ADD ON: HCPCS

## 2025-08-26 PROCEDURE — 1126F AMNT PAIN NOTED NONE PRSNT: CPT

## 2025-08-26 PROCEDURE — 3078F DIAST BP <80 MM HG: CPT

## 2025-08-26 PROCEDURE — 99214 OFFICE O/P EST MOD 30 MIN: CPT
